# Patient Record
Sex: FEMALE | Race: WHITE | NOT HISPANIC OR LATINO | Employment: UNEMPLOYED | URBAN - METROPOLITAN AREA
[De-identification: names, ages, dates, MRNs, and addresses within clinical notes are randomized per-mention and may not be internally consistent; named-entity substitution may affect disease eponyms.]

---

## 2017-02-21 ENCOUNTER — LAB (OUTPATIENT)
Dept: LAB | Facility: HOSPITAL | Age: 42
End: 2017-02-21
Attending: INTERNAL MEDICINE

## 2017-02-21 DIAGNOSIS — L88 PYODERMA GANGRENOSUM: Primary | ICD-10-CM

## 2017-02-21 LAB
ALBUMIN SERPL-MCNC: 3 G/DL (ref 3.2–4.8)
ALBUMIN/GLOB SERPL: 1 G/DL (ref 1.5–2.5)
ALP SERPL-CCNC: 200 U/L (ref 25–100)
ALT SERPL W P-5'-P-CCNC: 14 U/L (ref 7–40)
ANION GAP SERPL CALCULATED.3IONS-SCNC: 0 MMOL/L (ref 3–11)
AST SERPL-CCNC: 47 U/L (ref 0–33)
BASOPHILS # BLD AUTO: 0.03 10*3/MM3 (ref 0–0.2)
BASOPHILS NFR BLD AUTO: 0.6 % (ref 0–1)
BILIRUB SERPL-MCNC: 0.4 MG/DL (ref 0.3–1.2)
BUN BLD-MCNC: 9 MG/DL (ref 9–23)
BUN/CREAT SERPL: 18 (ref 7–25)
CALCIUM SPEC-SCNC: 8.9 MG/DL (ref 8.7–10.4)
CHLORIDE SERPL-SCNC: 110 MMOL/L (ref 99–109)
CO2 SERPL-SCNC: 34 MMOL/L (ref 20–31)
CREAT BLD-MCNC: 0.5 MG/DL (ref 0.6–1.3)
CRP SERPL-MCNC: 9.1 MG/DL (ref 0–10)
DEPRECATED RDW RBC AUTO: 61.1 FL (ref 37–54)
EOSINOPHIL # BLD AUTO: 0.42 10*3/MM3 (ref 0.1–0.3)
EOSINOPHIL NFR BLD AUTO: 7.7 % (ref 0–3)
ERYTHROCYTE [DISTWIDTH] IN BLOOD BY AUTOMATED COUNT: 14.5 % (ref 11.3–14.5)
ERYTHROCYTE [SEDIMENTATION RATE] IN BLOOD: 59 MM/HR (ref 0–20)
GFR SERPL CREATININE-BSD FRML MDRD: 135 ML/MIN/1.73
GLOBULIN UR ELPH-MCNC: 3.1 GM/DL
GLUCOSE BLD-MCNC: 85 MG/DL (ref 70–100)
HCT VFR BLD AUTO: 32 % (ref 34.5–44)
HGB BLD-MCNC: 10.2 G/DL (ref 11.5–15.5)
IMM GRANULOCYTES # BLD: 0 10*3/MM3 (ref 0–0.03)
IMM GRANULOCYTES NFR BLD: 0 % (ref 0–0.6)
LYMPHOCYTES # BLD AUTO: 1.47 10*3/MM3 (ref 0.6–4.8)
LYMPHOCYTES NFR BLD AUTO: 27 % (ref 24–44)
MCH RBC QN AUTO: 36.3 PG (ref 27–31)
MCHC RBC AUTO-ENTMCNC: 31.9 G/DL (ref 32–36)
MCV RBC AUTO: 113.9 FL (ref 80–99)
MONOCYTES # BLD AUTO: 0.37 10*3/MM3 (ref 0–1)
MONOCYTES NFR BLD AUTO: 6.8 % (ref 0–12)
NEUTROPHILS # BLD AUTO: 3.16 10*3/MM3 (ref 1.5–8.3)
NEUTROPHILS NFR BLD AUTO: 57.9 % (ref 41–71)
PLAT MORPH BLD: NORMAL
PLATELET # BLD AUTO: 323 10*3/MM3 (ref 150–450)
PMV BLD AUTO: 9.4 FL (ref 6–12)
POTASSIUM BLD-SCNC: 4.5 MMOL/L (ref 3.5–5.5)
PROT SERPL-MCNC: 6.1 G/DL (ref 5.7–8.2)
RBC # BLD AUTO: 2.81 10*6/MM3 (ref 3.89–5.14)
RBC MORPH BLD: NORMAL
SODIUM BLD-SCNC: 144 MMOL/L (ref 132–146)
WBC MORPH BLD: NORMAL
WBC NRBC COR # BLD: 5.45 10*3/MM3 (ref 3.5–10.8)

## 2017-02-21 PROCEDURE — 36415 COLL VENOUS BLD VENIPUNCTURE: CPT

## 2017-02-21 PROCEDURE — 85025 COMPLETE CBC W/AUTO DIFF WBC: CPT | Performed by: INTERNAL MEDICINE

## 2017-02-21 PROCEDURE — 80053 COMPREHEN METABOLIC PANEL: CPT | Performed by: INTERNAL MEDICINE

## 2017-02-21 PROCEDURE — 85007 BL SMEAR W/DIFF WBC COUNT: CPT | Performed by: INTERNAL MEDICINE

## 2017-02-21 PROCEDURE — 86140 C-REACTIVE PROTEIN: CPT | Performed by: INTERNAL MEDICINE

## 2017-02-21 PROCEDURE — 85652 RBC SED RATE AUTOMATED: CPT | Performed by: INTERNAL MEDICINE

## 2017-02-28 ENCOUNTER — TRANSCRIBE ORDERS (OUTPATIENT)
Dept: LAB | Facility: HOSPITAL | Age: 42
End: 2017-02-28

## 2017-02-28 ENCOUNTER — APPOINTMENT (OUTPATIENT)
Dept: LAB | Facility: HOSPITAL | Age: 42
End: 2017-02-28

## 2017-02-28 DIAGNOSIS — L88 PYODERMA GANGRENOSUM: ICD-10-CM

## 2017-02-28 DIAGNOSIS — Z89.522 ACQUIRED ABSENCE OF LEFT KNEE: ICD-10-CM

## 2017-02-28 DIAGNOSIS — I87.2 UNSPECIFIED VENOUS (PERIPHERAL) INSUFFICIENCY: ICD-10-CM

## 2017-02-28 DIAGNOSIS — L73.9 STAPHYLOCOCCUS AUREUS SUPERFICIAL FOLLICULITIS: ICD-10-CM

## 2017-02-28 DIAGNOSIS — B95.61 STAPHYLOCOCCUS AUREUS SUPERFICIAL FOLLICULITIS: ICD-10-CM

## 2017-02-28 DIAGNOSIS — T84.54XD INFECTION OF TOTAL LEFT KNEE REPLACEMENT, SUBSEQUENT ENCOUNTER: Primary | ICD-10-CM

## 2017-02-28 LAB
ALBUMIN SERPL-MCNC: 2.8 G/DL (ref 3.2–4.8)
ALBUMIN/GLOB SERPL: 0.9 G/DL (ref 1.5–2.5)
ALP SERPL-CCNC: 170 U/L (ref 25–100)
ALT SERPL W P-5'-P-CCNC: 12 U/L (ref 7–40)
ANION GAP SERPL CALCULATED.3IONS-SCNC: 0 MMOL/L (ref 3–11)
AST SERPL-CCNC: 38 U/L (ref 0–33)
BILIRUB SERPL-MCNC: 0.4 MG/DL (ref 0.3–1.2)
BUN BLD-MCNC: 9 MG/DL (ref 9–23)
BUN/CREAT SERPL: 22.5 (ref 7–25)
CALCIUM SPEC-SCNC: 8.6 MG/DL (ref 8.7–10.4)
CHLORIDE SERPL-SCNC: 105 MMOL/L (ref 99–109)
CO2 SERPL-SCNC: 31 MMOL/L (ref 20–31)
CREAT BLD-MCNC: 0.4 MG/DL (ref 0.6–1.3)
CRP SERPL-MCNC: 4.7 MG/DL (ref 0–10)
DEPRECATED RDW RBC AUTO: 57.8 FL (ref 37–54)
ERYTHROCYTE [DISTWIDTH] IN BLOOD BY AUTOMATED COUNT: 13.9 % (ref 11.3–14.5)
ERYTHROCYTE [SEDIMENTATION RATE] IN BLOOD: 48 MM/HR (ref 0–20)
GFR SERPL CREATININE-BSD FRML MDRD: >150 ML/MIN/1.73
GLOBULIN UR ELPH-MCNC: 3.1 GM/DL
GLUCOSE BLD-MCNC: 79 MG/DL (ref 70–100)
HCT VFR BLD AUTO: 31.9 % (ref 34.5–44)
HGB BLD-MCNC: 10.1 G/DL (ref 11.5–15.5)
MCH RBC QN AUTO: 35.7 PG (ref 27–31)
MCHC RBC AUTO-ENTMCNC: 31.7 G/DL (ref 32–36)
MCV RBC AUTO: 112.7 FL (ref 80–99)
PLATELET # BLD AUTO: 242 10*3/MM3 (ref 150–450)
PMV BLD AUTO: 10.1 FL (ref 6–12)
POTASSIUM BLD-SCNC: 4.5 MMOL/L (ref 3.5–5.5)
PROT SERPL-MCNC: 5.9 G/DL (ref 5.7–8.2)
RBC # BLD AUTO: 2.83 10*6/MM3 (ref 3.89–5.14)
SODIUM BLD-SCNC: 136 MMOL/L (ref 132–146)
WBC NRBC COR # BLD: 4.37 10*3/MM3 (ref 3.5–10.8)

## 2017-02-28 PROCEDURE — 85652 RBC SED RATE AUTOMATED: CPT | Performed by: INTERNAL MEDICINE

## 2017-02-28 PROCEDURE — 86140 C-REACTIVE PROTEIN: CPT | Performed by: INTERNAL MEDICINE

## 2017-02-28 PROCEDURE — 36415 COLL VENOUS BLD VENIPUNCTURE: CPT | Performed by: INTERNAL MEDICINE

## 2017-02-28 PROCEDURE — 80053 COMPREHEN METABOLIC PANEL: CPT | Performed by: INTERNAL MEDICINE

## 2017-02-28 PROCEDURE — 85027 COMPLETE CBC AUTOMATED: CPT | Performed by: INTERNAL MEDICINE

## 2017-03-07 ENCOUNTER — TRANSCRIBE ORDERS (OUTPATIENT)
Dept: LAB | Facility: HOSPITAL | Age: 42
End: 2017-03-07

## 2017-03-07 ENCOUNTER — APPOINTMENT (OUTPATIENT)
Dept: LAB | Facility: HOSPITAL | Age: 42
End: 2017-03-07

## 2017-03-07 DIAGNOSIS — L88 PYODERMA GANGRENOSUM: Primary | ICD-10-CM

## 2017-03-07 LAB
ALBUMIN SERPL-MCNC: 3 G/DL (ref 3.2–4.8)
ALBUMIN/GLOB SERPL: 1 G/DL (ref 1.5–2.5)
ALP SERPL-CCNC: 205 U/L (ref 25–100)
ALT SERPL W P-5'-P-CCNC: 16 U/L (ref 7–40)
ANION GAP SERPL CALCULATED.3IONS-SCNC: 2 MMOL/L (ref 3–11)
AST SERPL-CCNC: 38 U/L (ref 0–33)
BASOPHILS # BLD AUTO: 0.01 10*3/MM3 (ref 0–0.2)
BASOPHILS NFR BLD AUTO: 0.2 % (ref 0–1)
BILIRUB SERPL-MCNC: 0.6 MG/DL (ref 0.3–1.2)
BUN BLD-MCNC: 7 MG/DL (ref 9–23)
BUN/CREAT SERPL: 14 (ref 7–25)
CALCIUM SPEC-SCNC: 9.2 MG/DL (ref 8.7–10.4)
CHLORIDE SERPL-SCNC: 108 MMOL/L (ref 99–109)
CO2 SERPL-SCNC: 32 MMOL/L (ref 20–31)
CREAT BLD-MCNC: 0.5 MG/DL (ref 0.6–1.3)
CRP SERPL-MCNC: 4 MG/DL (ref 0–10)
DEPRECATED RDW RBC AUTO: 57.2 FL (ref 37–54)
EOSINOPHIL # BLD AUTO: 0.26 10*3/MM3 (ref 0.1–0.3)
EOSINOPHIL NFR BLD AUTO: 5.5 % (ref 0–3)
ERYTHROCYTE [DISTWIDTH] IN BLOOD BY AUTOMATED COUNT: 13.9 % (ref 11.3–14.5)
ERYTHROCYTE [SEDIMENTATION RATE] IN BLOOD: 39 MM/HR (ref 0–20)
GFR SERPL CREATININE-BSD FRML MDRD: 135 ML/MIN/1.73
GLOBULIN UR ELPH-MCNC: 3 GM/DL
GLUCOSE BLD-MCNC: 92 MG/DL (ref 70–100)
HCT VFR BLD AUTO: 34.2 % (ref 34.5–44)
HGB BLD-MCNC: 10.8 G/DL (ref 11.5–15.5)
IMM GRANULOCYTES # BLD: 0 10*3/MM3 (ref 0–0.03)
IMM GRANULOCYTES NFR BLD: 0 % (ref 0–0.6)
LYMPHOCYTES # BLD AUTO: 1.52 10*3/MM3 (ref 0.6–4.8)
LYMPHOCYTES NFR BLD AUTO: 32 % (ref 24–44)
MCH RBC QN AUTO: 35.6 PG (ref 27–31)
MCHC RBC AUTO-ENTMCNC: 31.6 G/DL (ref 32–36)
MCV RBC AUTO: 112.9 FL (ref 80–99)
MONOCYTES # BLD AUTO: 0.44 10*3/MM3 (ref 0–1)
MONOCYTES NFR BLD AUTO: 9.3 % (ref 0–12)
NEUTROPHILS # BLD AUTO: 2.52 10*3/MM3 (ref 1.5–8.3)
NEUTROPHILS NFR BLD AUTO: 53 % (ref 41–71)
PLATELET # BLD AUTO: 186 10*3/MM3 (ref 150–450)
PMV BLD AUTO: 10.5 FL (ref 6–12)
POTASSIUM BLD-SCNC: 4.6 MMOL/L (ref 3.5–5.5)
PROT SERPL-MCNC: 6 G/DL (ref 5.7–8.2)
RBC # BLD AUTO: 3.03 10*6/MM3 (ref 3.89–5.14)
SODIUM BLD-SCNC: 142 MMOL/L (ref 132–146)
WBC NRBC COR # BLD: 4.75 10*3/MM3 (ref 3.5–10.8)

## 2017-03-07 PROCEDURE — 85025 COMPLETE CBC W/AUTO DIFF WBC: CPT | Performed by: INTERNAL MEDICINE

## 2017-03-07 PROCEDURE — 80053 COMPREHEN METABOLIC PANEL: CPT | Performed by: INTERNAL MEDICINE

## 2017-03-07 PROCEDURE — 85652 RBC SED RATE AUTOMATED: CPT | Performed by: INTERNAL MEDICINE

## 2017-03-07 PROCEDURE — 36415 COLL VENOUS BLD VENIPUNCTURE: CPT

## 2017-03-07 PROCEDURE — 86140 C-REACTIVE PROTEIN: CPT | Performed by: INTERNAL MEDICINE

## 2017-03-14 ENCOUNTER — TRANSCRIBE ORDERS (OUTPATIENT)
Dept: LAB | Facility: HOSPITAL | Age: 42
End: 2017-03-14

## 2017-03-14 ENCOUNTER — LAB (OUTPATIENT)
Dept: LAB | Facility: HOSPITAL | Age: 42
End: 2017-03-14

## 2017-03-14 DIAGNOSIS — L88 PYODERMA GANGRENOSUM: ICD-10-CM

## 2017-03-14 DIAGNOSIS — L88 PYODERMA GANGRENOSUM: Primary | ICD-10-CM

## 2017-03-14 LAB
ALBUMIN SERPL-MCNC: 3.3 G/DL (ref 3.2–4.8)
ALBUMIN/GLOB SERPL: 1 G/DL (ref 1.5–2.5)
ALP SERPL-CCNC: 188 U/L (ref 25–100)
ALT SERPL W P-5'-P-CCNC: 15 U/L (ref 7–40)
ANION GAP SERPL CALCULATED.3IONS-SCNC: 0 MMOL/L (ref 3–11)
AST SERPL-CCNC: 29 U/L (ref 0–33)
BASOPHILS # BLD AUTO: 0.01 10*3/MM3 (ref 0–0.2)
BASOPHILS NFR BLD AUTO: 0.2 % (ref 0–1)
BILIRUB SERPL-MCNC: 0.5 MG/DL (ref 0.3–1.2)
BUN BLD-MCNC: 9 MG/DL (ref 9–23)
BUN/CREAT SERPL: 18 (ref 7–25)
CALCIUM SPEC-SCNC: 9.3 MG/DL (ref 8.7–10.4)
CHLORIDE SERPL-SCNC: 102 MMOL/L (ref 99–109)
CO2 SERPL-SCNC: 38 MMOL/L (ref 20–31)
CREAT BLD-MCNC: 0.5 MG/DL (ref 0.6–1.3)
CRP SERPL-MCNC: 7.4 MG/DL (ref 0–10)
DEPRECATED RDW RBC AUTO: 54.8 FL (ref 37–54)
EOSINOPHIL # BLD AUTO: 0.16 10*3/MM3 (ref 0.1–0.3)
EOSINOPHIL NFR BLD AUTO: 3.3 % (ref 0–3)
ERYTHROCYTE [DISTWIDTH] IN BLOOD BY AUTOMATED COUNT: 13.5 % (ref 11.3–14.5)
ERYTHROCYTE [SEDIMENTATION RATE] IN BLOOD: 46 MM/HR (ref 0–20)
GFR SERPL CREATININE-BSD FRML MDRD: 135 ML/MIN/1.73
GLOBULIN UR ELPH-MCNC: 3.3 GM/DL
GLUCOSE BLD-MCNC: 97 MG/DL (ref 70–100)
HCT VFR BLD AUTO: 38.3 % (ref 34.5–44)
HGB BLD-MCNC: 12.1 G/DL (ref 11.5–15.5)
IMM GRANULOCYTES # BLD: 0.01 10*3/MM3 (ref 0–0.03)
IMM GRANULOCYTES NFR BLD: 0.2 % (ref 0–0.6)
LYMPHOCYTES # BLD AUTO: 1.29 10*3/MM3 (ref 0.6–4.8)
LYMPHOCYTES NFR BLD AUTO: 26.8 % (ref 24–44)
MCH RBC QN AUTO: 35.2 PG (ref 27–31)
MCHC RBC AUTO-ENTMCNC: 31.6 G/DL (ref 32–36)
MCV RBC AUTO: 111.3 FL (ref 80–99)
MONOCYTES # BLD AUTO: 0.57 10*3/MM3 (ref 0–1)
MONOCYTES NFR BLD AUTO: 11.9 % (ref 0–12)
NEUTROPHILS # BLD AUTO: 2.77 10*3/MM3 (ref 1.5–8.3)
NEUTROPHILS NFR BLD AUTO: 57.6 % (ref 41–71)
PLATELET # BLD AUTO: 203 10*3/MM3 (ref 150–450)
PMV BLD AUTO: 10.5 FL (ref 6–12)
POTASSIUM BLD-SCNC: 4.2 MMOL/L (ref 3.5–5.5)
PROT SERPL-MCNC: 6.6 G/DL (ref 5.7–8.2)
RBC # BLD AUTO: 3.44 10*6/MM3 (ref 3.89–5.14)
SODIUM BLD-SCNC: 140 MMOL/L (ref 132–146)
WBC NRBC COR # BLD: 4.81 10*3/MM3 (ref 3.5–10.8)

## 2017-03-14 PROCEDURE — 85025 COMPLETE CBC W/AUTO DIFF WBC: CPT | Performed by: INTERNAL MEDICINE

## 2017-03-14 PROCEDURE — 85652 RBC SED RATE AUTOMATED: CPT | Performed by: INTERNAL MEDICINE

## 2017-03-14 PROCEDURE — 86140 C-REACTIVE PROTEIN: CPT | Performed by: INTERNAL MEDICINE

## 2017-03-14 PROCEDURE — 36415 COLL VENOUS BLD VENIPUNCTURE: CPT

## 2017-03-14 PROCEDURE — 80053 COMPREHEN METABOLIC PANEL: CPT | Performed by: INTERNAL MEDICINE

## 2017-03-21 ENCOUNTER — TRANSCRIBE ORDERS (OUTPATIENT)
Dept: LAB | Facility: HOSPITAL | Age: 42
End: 2017-03-21

## 2017-03-21 ENCOUNTER — APPOINTMENT (OUTPATIENT)
Dept: LAB | Facility: HOSPITAL | Age: 42
End: 2017-03-21

## 2017-03-21 DIAGNOSIS — L88 PYODERMA GANGRENOSUM: Primary | ICD-10-CM

## 2017-03-21 LAB
ALBUMIN SERPL-MCNC: 3.3 G/DL (ref 3.2–4.8)
ALBUMIN/GLOB SERPL: 1 G/DL (ref 1.5–2.5)
ALP SERPL-CCNC: 252 U/L (ref 25–100)
ALT SERPL W P-5'-P-CCNC: 17 U/L (ref 7–40)
ANION GAP SERPL CALCULATED.3IONS-SCNC: 7 MMOL/L (ref 3–11)
AST SERPL-CCNC: 41 U/L (ref 0–33)
BASOPHILS # BLD AUTO: 0.02 10*3/MM3 (ref 0–0.2)
BASOPHILS NFR BLD AUTO: 0.2 % (ref 0–1)
BILIRUB SERPL-MCNC: 0.4 MG/DL (ref 0.3–1.2)
BUN BLD-MCNC: 9 MG/DL (ref 9–23)
BUN/CREAT SERPL: 18 (ref 7–25)
CALCIUM SPEC-SCNC: 9.1 MG/DL (ref 8.7–10.4)
CHLORIDE SERPL-SCNC: 102 MMOL/L (ref 99–109)
CO2 SERPL-SCNC: 31 MMOL/L (ref 20–31)
CREAT BLD-MCNC: 0.5 MG/DL (ref 0.6–1.3)
CRP SERPL-MCNC: 9.6 MG/DL (ref 0–10)
DEPRECATED RDW RBC AUTO: 57.1 FL (ref 37–54)
EOSINOPHIL # BLD AUTO: 0.21 10*3/MM3 (ref 0.1–0.3)
EOSINOPHIL NFR BLD AUTO: 2.5 % (ref 0–3)
ERYTHROCYTE [DISTWIDTH] IN BLOOD BY AUTOMATED COUNT: 13.8 % (ref 11.3–14.5)
ERYTHROCYTE [SEDIMENTATION RATE] IN BLOOD: 35 MM/HR (ref 0–20)
GFR SERPL CREATININE-BSD FRML MDRD: 135 ML/MIN/1.73
GLOBULIN UR ELPH-MCNC: 3.4 GM/DL
GLUCOSE BLD-MCNC: 77 MG/DL (ref 70–100)
HCT VFR BLD AUTO: 39.1 % (ref 34.5–44)
HGB BLD-MCNC: 12 G/DL (ref 11.5–15.5)
IMM GRANULOCYTES # BLD: 0.01 10*3/MM3 (ref 0–0.03)
IMM GRANULOCYTES NFR BLD: 0.1 % (ref 0–0.6)
LYMPHOCYTES # BLD AUTO: 1.29 10*3/MM3 (ref 0.6–4.8)
LYMPHOCYTES NFR BLD AUTO: 15.1 % (ref 24–44)
MCH RBC QN AUTO: 35.1 PG (ref 27–31)
MCHC RBC AUTO-ENTMCNC: 30.7 G/DL (ref 32–36)
MCV RBC AUTO: 114.3 FL (ref 80–99)
MONOCYTES # BLD AUTO: 0.59 10*3/MM3 (ref 0–1)
MONOCYTES NFR BLD AUTO: 6.9 % (ref 0–12)
NEUTROPHILS # BLD AUTO: 6.44 10*3/MM3 (ref 1.5–8.3)
NEUTROPHILS NFR BLD AUTO: 75.2 % (ref 41–71)
PLATELET # BLD AUTO: 202 10*3/MM3 (ref 150–450)
PMV BLD AUTO: 9.7 FL (ref 6–12)
POTASSIUM BLD-SCNC: 5.1 MMOL/L (ref 3.5–5.5)
PROT SERPL-MCNC: 6.7 G/DL (ref 5.7–8.2)
RBC # BLD AUTO: 3.42 10*6/MM3 (ref 3.89–5.14)
SODIUM BLD-SCNC: 140 MMOL/L (ref 132–146)
WBC NRBC COR # BLD: 8.56 10*3/MM3 (ref 3.5–10.8)

## 2017-03-21 PROCEDURE — 36415 COLL VENOUS BLD VENIPUNCTURE: CPT | Performed by: INTERNAL MEDICINE

## 2017-03-21 PROCEDURE — 80053 COMPREHEN METABOLIC PANEL: CPT | Performed by: INTERNAL MEDICINE

## 2017-03-21 PROCEDURE — 85652 RBC SED RATE AUTOMATED: CPT | Performed by: INTERNAL MEDICINE

## 2017-03-21 PROCEDURE — 86140 C-REACTIVE PROTEIN: CPT | Performed by: INTERNAL MEDICINE

## 2017-03-21 PROCEDURE — 85025 COMPLETE CBC W/AUTO DIFF WBC: CPT | Performed by: INTERNAL MEDICINE

## 2017-03-28 ENCOUNTER — APPOINTMENT (OUTPATIENT)
Dept: LAB | Facility: HOSPITAL | Age: 42
End: 2017-03-28

## 2017-03-28 ENCOUNTER — TRANSCRIBE ORDERS (OUTPATIENT)
Dept: LAB | Facility: HOSPITAL | Age: 42
End: 2017-03-28

## 2017-03-28 DIAGNOSIS — L88 PYODERMA GANGRENOSUM: Primary | ICD-10-CM

## 2017-03-28 LAB
ALBUMIN SERPL-MCNC: 3.3 G/DL (ref 3.2–4.8)
ALBUMIN/GLOB SERPL: 1 G/DL (ref 1.5–2.5)
ALP SERPL-CCNC: 234 U/L (ref 25–100)
ALT SERPL W P-5'-P-CCNC: 18 U/L (ref 7–40)
ANION GAP SERPL CALCULATED.3IONS-SCNC: 6 MMOL/L (ref 3–11)
AST SERPL-CCNC: 38 U/L (ref 0–33)
BASOPHILS # BLD AUTO: 0.01 10*3/MM3 (ref 0–0.2)
BASOPHILS NFR BLD AUTO: 0.2 % (ref 0–1)
BILIRUB SERPL-MCNC: 0.7 MG/DL (ref 0.3–1.2)
BUN BLD-MCNC: 7 MG/DL (ref 9–23)
BUN/CREAT SERPL: 14 (ref 7–25)
CALCIUM SPEC-SCNC: 9.1 MG/DL (ref 8.7–10.4)
CHLORIDE SERPL-SCNC: 105 MMOL/L (ref 99–109)
CO2 SERPL-SCNC: 30 MMOL/L (ref 20–31)
CREAT BLD-MCNC: 0.5 MG/DL (ref 0.6–1.3)
CRP SERPL-MCNC: 6 MG/DL (ref 0–10)
DEPRECATED RDW RBC AUTO: 57.8 FL (ref 37–54)
EOSINOPHIL # BLD AUTO: 0.17 10*3/MM3 (ref 0.1–0.3)
EOSINOPHIL NFR BLD AUTO: 2.8 % (ref 0–3)
ERYTHROCYTE [DISTWIDTH] IN BLOOD BY AUTOMATED COUNT: 14.1 % (ref 11.3–14.5)
ERYTHROCYTE [SEDIMENTATION RATE] IN BLOOD: 41 MM/HR (ref 0–20)
GFR SERPL CREATININE-BSD FRML MDRD: 135 ML/MIN/1.73
GLOBULIN UR ELPH-MCNC: 3.3 GM/DL
GLUCOSE BLD-MCNC: 86 MG/DL (ref 70–100)
HCT VFR BLD AUTO: 38.9 % (ref 34.5–44)
HGB BLD-MCNC: 12.3 G/DL (ref 11.5–15.5)
IMM GRANULOCYTES # BLD: 0 10*3/MM3 (ref 0–0.03)
IMM GRANULOCYTES NFR BLD: 0 % (ref 0–0.6)
LYMPHOCYTES # BLD AUTO: 1.51 10*3/MM3 (ref 0.6–4.8)
LYMPHOCYTES NFR BLD AUTO: 25 % (ref 24–44)
MCH RBC QN AUTO: 35.7 PG (ref 27–31)
MCHC RBC AUTO-ENTMCNC: 31.6 G/DL (ref 32–36)
MCV RBC AUTO: 112.8 FL (ref 80–99)
MONOCYTES # BLD AUTO: 0.41 10*3/MM3 (ref 0–1)
MONOCYTES NFR BLD AUTO: 6.8 % (ref 0–12)
NEUTROPHILS # BLD AUTO: 3.95 10*3/MM3 (ref 1.5–8.3)
NEUTROPHILS NFR BLD AUTO: 65.2 % (ref 41–71)
PLATELET # BLD AUTO: 205 10*3/MM3 (ref 150–450)
PMV BLD AUTO: 9.8 FL (ref 6–12)
POTASSIUM BLD-SCNC: 4.5 MMOL/L (ref 3.5–5.5)
PROT SERPL-MCNC: 6.6 G/DL (ref 5.7–8.2)
RBC # BLD AUTO: 3.45 10*6/MM3 (ref 3.89–5.14)
SODIUM BLD-SCNC: 141 MMOL/L (ref 132–146)
WBC NRBC COR # BLD: 6.05 10*3/MM3 (ref 3.5–10.8)

## 2017-03-28 PROCEDURE — 86140 C-REACTIVE PROTEIN: CPT | Performed by: INTERNAL MEDICINE

## 2017-03-28 PROCEDURE — 85652 RBC SED RATE AUTOMATED: CPT | Performed by: INTERNAL MEDICINE

## 2017-03-28 PROCEDURE — 80053 COMPREHEN METABOLIC PANEL: CPT | Performed by: INTERNAL MEDICINE

## 2017-03-28 PROCEDURE — 85025 COMPLETE CBC W/AUTO DIFF WBC: CPT | Performed by: INTERNAL MEDICINE

## 2017-03-28 PROCEDURE — 36415 COLL VENOUS BLD VENIPUNCTURE: CPT | Performed by: INTERNAL MEDICINE

## 2017-04-04 ENCOUNTER — LAB (OUTPATIENT)
Dept: LAB | Facility: HOSPITAL | Age: 42
End: 2017-04-04

## 2017-04-04 ENCOUNTER — TRANSCRIBE ORDERS (OUTPATIENT)
Dept: LAB | Facility: HOSPITAL | Age: 42
End: 2017-04-04

## 2017-04-04 DIAGNOSIS — L88 PYODERMA GANGRENOSUM: ICD-10-CM

## 2017-04-04 DIAGNOSIS — L88 PYODERMA GANGRENOSUM: Primary | ICD-10-CM

## 2017-04-04 LAB
ALBUMIN SERPL-MCNC: 3.4 G/DL (ref 3.2–4.8)
ALBUMIN/GLOB SERPL: 1 G/DL (ref 1.5–2.5)
ALP SERPL-CCNC: 228 U/L (ref 25–100)
ALT SERPL W P-5'-P-CCNC: 22 U/L (ref 7–40)
ANION GAP SERPL CALCULATED.3IONS-SCNC: 10 MMOL/L (ref 3–11)
AST SERPL-CCNC: 48 U/L (ref 0–33)
BASOPHILS # BLD AUTO: 0.01 10*3/MM3 (ref 0–0.2)
BASOPHILS NFR BLD AUTO: 0.2 % (ref 0–1)
BILIRUB SERPL-MCNC: 0.4 MG/DL (ref 0.3–1.2)
BUN BLD-MCNC: 5 MG/DL (ref 9–23)
BUN/CREAT SERPL: 10 (ref 7–25)
CALCIUM SPEC-SCNC: 9.2 MG/DL (ref 8.7–10.4)
CHLORIDE SERPL-SCNC: 102 MMOL/L (ref 99–109)
CO2 SERPL-SCNC: 26 MMOL/L (ref 20–31)
CREAT BLD-MCNC: 0.5 MG/DL (ref 0.6–1.3)
CRP SERPL-MCNC: 0.5 MG/DL (ref 0–1)
DEPRECATED RDW RBC AUTO: 56 FL (ref 37–54)
EOSINOPHIL # BLD AUTO: 0.18 10*3/MM3 (ref 0.1–0.3)
EOSINOPHIL NFR BLD AUTO: 4.1 % (ref 0–3)
ERYTHROCYTE [DISTWIDTH] IN BLOOD BY AUTOMATED COUNT: 14 % (ref 11.3–14.5)
ERYTHROCYTE [SEDIMENTATION RATE] IN BLOOD: 24 MM/HR (ref 0–20)
GFR SERPL CREATININE-BSD FRML MDRD: 135 ML/MIN/1.73
GLOBULIN UR ELPH-MCNC: 3.3 GM/DL
GLUCOSE BLD-MCNC: 111 MG/DL (ref 70–100)
HCT VFR BLD AUTO: 37 % (ref 34.5–44)
HGB BLD-MCNC: 11.7 G/DL (ref 11.5–15.5)
IMM GRANULOCYTES # BLD: 0 10*3/MM3 (ref 0–0.03)
IMM GRANULOCYTES NFR BLD: 0 % (ref 0–0.6)
LYMPHOCYTES # BLD AUTO: 1.9 10*3/MM3 (ref 0.6–4.8)
LYMPHOCYTES NFR BLD AUTO: 43.7 % (ref 24–44)
MCH RBC QN AUTO: 35.6 PG (ref 27–31)
MCHC RBC AUTO-ENTMCNC: 31.6 G/DL (ref 32–36)
MCV RBC AUTO: 112.5 FL (ref 80–99)
MONOCYTES # BLD AUTO: 0.37 10*3/MM3 (ref 0–1)
MONOCYTES NFR BLD AUTO: 8.5 % (ref 0–12)
NEUTROPHILS # BLD AUTO: 1.89 10*3/MM3 (ref 1.5–8.3)
NEUTROPHILS NFR BLD AUTO: 43.5 % (ref 41–71)
PLAT MORPH BLD: NORMAL
PLATELET # BLD AUTO: 194 10*3/MM3 (ref 150–450)
PMV BLD AUTO: 10 FL (ref 6–12)
POTASSIUM BLD-SCNC: 4.6 MMOL/L (ref 3.5–5.5)
PROT SERPL-MCNC: 6.7 G/DL (ref 5.7–8.2)
RBC # BLD AUTO: 3.29 10*6/MM3 (ref 3.89–5.14)
RBC MORPH BLD: NORMAL
SODIUM BLD-SCNC: 138 MMOL/L (ref 132–146)
WBC MORPH BLD: NORMAL
WBC NRBC COR # BLD: 4.35 10*3/MM3 (ref 3.5–10.8)

## 2017-04-04 PROCEDURE — 85652 RBC SED RATE AUTOMATED: CPT | Performed by: INTERNAL MEDICINE

## 2017-04-04 PROCEDURE — 80053 COMPREHEN METABOLIC PANEL: CPT | Performed by: INTERNAL MEDICINE

## 2017-04-04 PROCEDURE — 36415 COLL VENOUS BLD VENIPUNCTURE: CPT | Performed by: INTERNAL MEDICINE

## 2017-04-04 PROCEDURE — 85025 COMPLETE CBC W/AUTO DIFF WBC: CPT | Performed by: INTERNAL MEDICINE

## 2017-04-04 PROCEDURE — 86140 C-REACTIVE PROTEIN: CPT | Performed by: INTERNAL MEDICINE

## 2017-04-04 PROCEDURE — 85007 BL SMEAR W/DIFF WBC COUNT: CPT | Performed by: INTERNAL MEDICINE

## 2017-04-11 ENCOUNTER — LAB (OUTPATIENT)
Dept: LAB | Facility: HOSPITAL | Age: 42
End: 2017-04-11

## 2017-04-11 ENCOUNTER — TRANSCRIBE ORDERS (OUTPATIENT)
Dept: LAB | Facility: HOSPITAL | Age: 42
End: 2017-04-11

## 2017-04-11 DIAGNOSIS — L88 PYODERMA GANGRENOSUM: Primary | ICD-10-CM

## 2017-04-11 DIAGNOSIS — L88 PYODERMA GANGRENOSUM: ICD-10-CM

## 2017-04-11 LAB
ALBUMIN SERPL-MCNC: 3.3 G/DL (ref 3.2–4.8)
ALBUMIN/GLOB SERPL: 1 G/DL (ref 1.5–2.5)
ALP SERPL-CCNC: 278 U/L (ref 25–100)
ALT SERPL W P-5'-P-CCNC: 17 U/L (ref 7–40)
ANION GAP SERPL CALCULATED.3IONS-SCNC: 3 MMOL/L (ref 3–11)
AST SERPL-CCNC: 38 U/L (ref 0–33)
BASOPHILS # BLD AUTO: 0 10*3/MM3 (ref 0–0.2)
BASOPHILS NFR BLD AUTO: 0 % (ref 0–1)
BILIRUB SERPL-MCNC: 0.7 MG/DL (ref 0.3–1.2)
BUN BLD-MCNC: 7 MG/DL (ref 9–23)
BUN/CREAT SERPL: 14 (ref 7–25)
CALCIUM SPEC-SCNC: 9.1 MG/DL (ref 8.7–10.4)
CHLORIDE SERPL-SCNC: 105 MMOL/L (ref 99–109)
CO2 SERPL-SCNC: 30 MMOL/L (ref 20–31)
CREAT BLD-MCNC: 0.5 MG/DL (ref 0.6–1.3)
CRP SERPL-MCNC: 1.02 MG/DL (ref 0–1)
DEPRECATED RDW RBC AUTO: 58.7 FL (ref 37–54)
EOSINOPHIL # BLD AUTO: 0.15 10*3/MM3 (ref 0.1–0.3)
EOSINOPHIL NFR BLD AUTO: 2.8 % (ref 0–3)
ERYTHROCYTE [DISTWIDTH] IN BLOOD BY AUTOMATED COUNT: 14.9 % (ref 11.3–14.5)
ERYTHROCYTE [SEDIMENTATION RATE] IN BLOOD: 29 MM/HR (ref 0–20)
GFR SERPL CREATININE-BSD FRML MDRD: 135 ML/MIN/1.73
GLOBULIN UR ELPH-MCNC: 3.2 GM/DL
GLUCOSE BLD-MCNC: 156 MG/DL (ref 70–100)
HCT VFR BLD AUTO: 36.5 % (ref 34.5–44)
HGB BLD-MCNC: 11.3 G/DL (ref 11.5–15.5)
IMM GRANULOCYTES # BLD: 0.01 10*3/MM3 (ref 0–0.03)
IMM GRANULOCYTES NFR BLD: 0.2 % (ref 0–0.6)
LYMPHOCYTES # BLD AUTO: 1.55 10*3/MM3 (ref 0.6–4.8)
LYMPHOCYTES NFR BLD AUTO: 29.4 % (ref 24–44)
MCH RBC QN AUTO: 35.3 PG (ref 27–31)
MCHC RBC AUTO-ENTMCNC: 31 G/DL (ref 32–36)
MCV RBC AUTO: 114.1 FL (ref 80–99)
MONOCYTES # BLD AUTO: 0.35 10*3/MM3 (ref 0–1)
MONOCYTES NFR BLD AUTO: 6.6 % (ref 0–12)
NEUTROPHILS # BLD AUTO: 3.21 10*3/MM3 (ref 1.5–8.3)
NEUTROPHILS NFR BLD AUTO: 61 % (ref 41–71)
PLATELET # BLD AUTO: 200 10*3/MM3 (ref 150–450)
PMV BLD AUTO: 9.7 FL (ref 6–12)
POTASSIUM BLD-SCNC: 4.9 MMOL/L (ref 3.5–5.5)
PROT SERPL-MCNC: 6.5 G/DL (ref 5.7–8.2)
RBC # BLD AUTO: 3.2 10*6/MM3 (ref 3.89–5.14)
SODIUM BLD-SCNC: 138 MMOL/L (ref 132–146)
WBC NRBC COR # BLD: 5.27 10*3/MM3 (ref 3.5–10.8)

## 2017-04-11 PROCEDURE — 86140 C-REACTIVE PROTEIN: CPT

## 2017-04-11 PROCEDURE — 80053 COMPREHEN METABOLIC PANEL: CPT

## 2017-04-11 PROCEDURE — 36415 COLL VENOUS BLD VENIPUNCTURE: CPT

## 2017-04-11 PROCEDURE — 85025 COMPLETE CBC W/AUTO DIFF WBC: CPT

## 2017-04-11 PROCEDURE — 85652 RBC SED RATE AUTOMATED: CPT

## 2017-04-25 ENCOUNTER — TRANSCRIBE ORDERS (OUTPATIENT)
Dept: LAB | Facility: HOSPITAL | Age: 42
End: 2017-04-25

## 2017-04-25 ENCOUNTER — APPOINTMENT (OUTPATIENT)
Dept: LAB | Facility: HOSPITAL | Age: 42
End: 2017-04-25

## 2017-04-25 DIAGNOSIS — L88 PYODERMA GANGRENOSUM: Primary | ICD-10-CM

## 2017-04-25 LAB
ALBUMIN SERPL-MCNC: 3.7 G/DL (ref 3.2–4.8)
ALBUMIN/GLOB SERPL: 1.1 G/DL (ref 1.5–2.5)
ALP SERPL-CCNC: 377 U/L (ref 25–100)
ALT SERPL W P-5'-P-CCNC: 22 U/L (ref 7–40)
ANION GAP SERPL CALCULATED.3IONS-SCNC: 6 MMOL/L (ref 3–11)
AST SERPL-CCNC: 52 U/L (ref 0–33)
BASOPHILS # BLD AUTO: 0.02 10*3/MM3 (ref 0–0.2)
BASOPHILS NFR BLD AUTO: 0.4 % (ref 0–1)
BILIRUB SERPL-MCNC: 0.6 MG/DL (ref 0.3–1.2)
BUN BLD-MCNC: 5 MG/DL (ref 9–23)
BUN/CREAT SERPL: 10 (ref 7–25)
CALCIUM SPEC-SCNC: 9.5 MG/DL (ref 8.7–10.4)
CHLORIDE SERPL-SCNC: 101 MMOL/L (ref 99–109)
CO2 SERPL-SCNC: 32 MMOL/L (ref 20–31)
CREAT BLD-MCNC: 0.5 MG/DL (ref 0.6–1.3)
CRP SERPL-MCNC: 1.58 MG/DL (ref 0–1)
DEPRECATED RDW RBC AUTO: 62.3 FL (ref 37–54)
EOSINOPHIL # BLD AUTO: 0.16 10*3/MM3 (ref 0.1–0.3)
EOSINOPHIL NFR BLD AUTO: 2.8 % (ref 0–3)
ERYTHROCYTE [DISTWIDTH] IN BLOOD BY AUTOMATED COUNT: 15.8 % (ref 11.3–14.5)
ERYTHROCYTE [SEDIMENTATION RATE] IN BLOOD: 15 MM/HR (ref 0–20)
GFR SERPL CREATININE-BSD FRML MDRD: 135 ML/MIN/1.73
GLOBULIN UR ELPH-MCNC: 3.4 GM/DL
GLUCOSE BLD-MCNC: 152 MG/DL (ref 70–100)
HCT VFR BLD AUTO: 39.8 % (ref 34.5–44)
HGB BLD-MCNC: 12.9 G/DL (ref 11.5–15.5)
IMM GRANULOCYTES # BLD: 0.01 10*3/MM3 (ref 0–0.03)
IMM GRANULOCYTES NFR BLD: 0.2 % (ref 0–0.6)
LYMPHOCYTES # BLD AUTO: 1.54 10*3/MM3 (ref 0.6–4.8)
LYMPHOCYTES NFR BLD AUTO: 27.2 % (ref 24–44)
MCH RBC QN AUTO: 35.7 PG (ref 27–31)
MCHC RBC AUTO-ENTMCNC: 32.4 G/DL (ref 32–36)
MCV RBC AUTO: 110.2 FL (ref 80–99)
MONOCYTES # BLD AUTO: 0.39 10*3/MM3 (ref 0–1)
MONOCYTES NFR BLD AUTO: 6.9 % (ref 0–12)
NEUTROPHILS # BLD AUTO: 3.55 10*3/MM3 (ref 1.5–8.3)
NEUTROPHILS NFR BLD AUTO: 62.5 % (ref 41–71)
PLATELET # BLD AUTO: 186 10*3/MM3 (ref 150–450)
PMV BLD AUTO: 9.7 FL (ref 6–12)
POTASSIUM BLD-SCNC: 4.6 MMOL/L (ref 3.5–5.5)
PROT SERPL-MCNC: 7.1 G/DL (ref 5.7–8.2)
RBC # BLD AUTO: 3.61 10*6/MM3 (ref 3.89–5.14)
SODIUM BLD-SCNC: 139 MMOL/L (ref 132–146)
WBC NRBC COR # BLD: 5.67 10*3/MM3 (ref 3.5–10.8)

## 2017-04-25 PROCEDURE — 85025 COMPLETE CBC W/AUTO DIFF WBC: CPT | Performed by: INTERNAL MEDICINE

## 2017-04-25 PROCEDURE — 36415 COLL VENOUS BLD VENIPUNCTURE: CPT | Performed by: INTERNAL MEDICINE

## 2017-04-25 PROCEDURE — 86140 C-REACTIVE PROTEIN: CPT | Performed by: INTERNAL MEDICINE

## 2017-04-25 PROCEDURE — 80053 COMPREHEN METABOLIC PANEL: CPT | Performed by: INTERNAL MEDICINE

## 2017-04-25 PROCEDURE — 85652 RBC SED RATE AUTOMATED: CPT | Performed by: INTERNAL MEDICINE

## 2017-05-25 ENCOUNTER — TRANSCRIBE ORDERS (OUTPATIENT)
Dept: LAB | Facility: HOSPITAL | Age: 42
End: 2017-05-25

## 2017-05-25 ENCOUNTER — LAB (OUTPATIENT)
Dept: LAB | Facility: HOSPITAL | Age: 42
End: 2017-05-25

## 2017-05-25 DIAGNOSIS — M06.1 ADULT-ONSET STILL'S DISEASE (HCC): ICD-10-CM

## 2017-05-25 DIAGNOSIS — E66.09 OTHER OBESITY DUE TO EXCESS CALORIES: Primary | ICD-10-CM

## 2017-05-25 DIAGNOSIS — B95.61 STAPHYLOCOCCUS AUREUS SUPERFICIAL FOLLICULITIS: ICD-10-CM

## 2017-05-25 DIAGNOSIS — Z89.522 ACQUIRED ABSENCE OF LEFT KNEE: ICD-10-CM

## 2017-05-25 DIAGNOSIS — I87.2 UNSPECIFIED VENOUS (PERIPHERAL) INSUFFICIENCY: ICD-10-CM

## 2017-05-25 DIAGNOSIS — E66.09 OTHER OBESITY DUE TO EXCESS CALORIES: ICD-10-CM

## 2017-05-25 DIAGNOSIS — T84.54XD INFECTION OF TOTAL LEFT KNEE REPLACEMENT, SUBSEQUENT ENCOUNTER: ICD-10-CM

## 2017-05-25 DIAGNOSIS — L73.9 STAPHYLOCOCCUS AUREUS SUPERFICIAL FOLLICULITIS: ICD-10-CM

## 2017-05-25 LAB
ALBUMIN SERPL-MCNC: 3.3 G/DL (ref 3.2–4.8)
ALBUMIN/GLOB SERPL: 1 G/DL (ref 1.5–2.5)
ALP SERPL-CCNC: 274 U/L (ref 25–100)
ALT SERPL W P-5'-P-CCNC: 30 U/L (ref 7–40)
ANION GAP SERPL CALCULATED.3IONS-SCNC: 4 MMOL/L (ref 3–11)
AST SERPL-CCNC: 74 U/L (ref 0–33)
BASOPHILS # BLD AUTO: 0.01 10*3/MM3 (ref 0–0.2)
BASOPHILS NFR BLD AUTO: 0.2 % (ref 0–1)
BILIRUB SERPL-MCNC: 0.6 MG/DL (ref 0.3–1.2)
BUN BLD-MCNC: 6 MG/DL (ref 9–23)
BUN/CREAT SERPL: 12 (ref 7–25)
CALCIUM SPEC-SCNC: 9.1 MG/DL (ref 8.7–10.4)
CHLORIDE SERPL-SCNC: 104 MMOL/L (ref 99–109)
CO2 SERPL-SCNC: 31 MMOL/L (ref 20–31)
CREAT BLD-MCNC: 0.5 MG/DL (ref 0.6–1.3)
CRP SERPL-MCNC: 0.88 MG/DL (ref 0–1)
DEPRECATED RDW RBC AUTO: 76.8 FL (ref 37–54)
EOSINOPHIL # BLD AUTO: 0.14 10*3/MM3 (ref 0.1–0.3)
EOSINOPHIL NFR BLD AUTO: 3.1 % (ref 0–3)
ERYTHROCYTE [DISTWIDTH] IN BLOOD BY AUTOMATED COUNT: 18.9 % (ref 11.3–14.5)
ERYTHROCYTE [SEDIMENTATION RATE] IN BLOOD: 18 MM/HR (ref 0–20)
GFR SERPL CREATININE-BSD FRML MDRD: 135 ML/MIN/1.73
GLOBULIN UR ELPH-MCNC: 3.2 GM/DL
GLUCOSE BLD-MCNC: 174 MG/DL (ref 70–100)
HCT VFR BLD AUTO: 38.2 % (ref 34.5–44)
HGB BLD-MCNC: 12.2 G/DL (ref 11.5–15.5)
IMM GRANULOCYTES # BLD: 0.01 10*3/MM3 (ref 0–0.03)
IMM GRANULOCYTES NFR BLD: 0.2 % (ref 0–0.6)
LYMPHOCYTES # BLD AUTO: 1.5 10*3/MM3 (ref 0.6–4.8)
LYMPHOCYTES NFR BLD AUTO: 33.6 % (ref 24–44)
MCH RBC QN AUTO: 36.2 PG (ref 27–31)
MCHC RBC AUTO-ENTMCNC: 31.9 G/DL (ref 32–36)
MCV RBC AUTO: 113.4 FL (ref 80–99)
MONOCYTES # BLD AUTO: 0.32 10*3/MM3 (ref 0–1)
MONOCYTES NFR BLD AUTO: 7.2 % (ref 0–12)
NEUTROPHILS # BLD AUTO: 2.48 10*3/MM3 (ref 1.5–8.3)
NEUTROPHILS NFR BLD AUTO: 55.7 % (ref 41–71)
PLATELET # BLD AUTO: 170 10*3/MM3 (ref 150–450)
PMV BLD AUTO: 9.7 FL (ref 6–12)
POTASSIUM BLD-SCNC: 4.3 MMOL/L (ref 3.5–5.5)
PROT SERPL-MCNC: 6.5 G/DL (ref 5.7–8.2)
RBC # BLD AUTO: 3.37 10*6/MM3 (ref 3.89–5.14)
SODIUM BLD-SCNC: 139 MMOL/L (ref 132–146)
WBC NRBC COR # BLD: 4.46 10*3/MM3 (ref 3.5–10.8)

## 2017-05-25 PROCEDURE — 86140 C-REACTIVE PROTEIN: CPT | Performed by: INTERNAL MEDICINE

## 2017-05-25 PROCEDURE — 36415 COLL VENOUS BLD VENIPUNCTURE: CPT

## 2017-05-25 PROCEDURE — 85652 RBC SED RATE AUTOMATED: CPT | Performed by: INTERNAL MEDICINE

## 2017-05-25 PROCEDURE — 85025 COMPLETE CBC W/AUTO DIFF WBC: CPT | Performed by: INTERNAL MEDICINE

## 2017-05-25 PROCEDURE — 80053 COMPREHEN METABOLIC PANEL: CPT | Performed by: INTERNAL MEDICINE

## 2017-11-07 ENCOUNTER — LAB (OUTPATIENT)
Dept: LAB | Facility: HOSPITAL | Age: 42
End: 2017-11-07
Attending: INTERNAL MEDICINE

## 2017-11-07 ENCOUNTER — TRANSCRIBE ORDERS (OUTPATIENT)
Dept: LAB | Facility: HOSPITAL | Age: 42
End: 2017-11-07

## 2017-11-07 DIAGNOSIS — L73.9 STAPHYLOCOCCUS AUREUS SUPERFICIAL FOLLICULITIS: ICD-10-CM

## 2017-11-07 DIAGNOSIS — I87.2 PERIPHERAL VENOUS INSUFFICIENCY: ICD-10-CM

## 2017-11-07 DIAGNOSIS — Z91.199 PERSONAL HISTORY OF NONCOMPLIANCE WITH MEDICAL TREATMENT, PRESENTING HAZARDS TO HEALTH: ICD-10-CM

## 2017-11-07 DIAGNOSIS — Z89.522 ACQUIRED ABSENCE OF LEFT KNEE: ICD-10-CM

## 2017-11-07 DIAGNOSIS — M06.1 ADULT-ONSET STILL'S DISEASE (HCC): ICD-10-CM

## 2017-11-07 DIAGNOSIS — L73.9 STAPHYLOCOCCUS AUREUS SUPERFICIAL FOLLICULITIS: Primary | ICD-10-CM

## 2017-11-07 DIAGNOSIS — L88 PYODERMA GANGRENOSUM: ICD-10-CM

## 2017-11-07 DIAGNOSIS — E66.09 EXOGENOUS OBESITY: ICD-10-CM

## 2017-11-07 DIAGNOSIS — B95.61 STAPHYLOCOCCUS AUREUS SUPERFICIAL FOLLICULITIS: ICD-10-CM

## 2017-11-07 DIAGNOSIS — B95.61 STAPHYLOCOCCUS AUREUS SUPERFICIAL FOLLICULITIS: Primary | ICD-10-CM

## 2017-11-07 LAB
ALBUMIN SERPL-MCNC: 3.1 G/DL (ref 3.2–4.8)
ALBUMIN/GLOB SERPL: 1 G/DL (ref 1.5–2.5)
ALP SERPL-CCNC: 183 U/L (ref 25–100)
ALT SERPL W P-5'-P-CCNC: 18 U/L (ref 7–40)
ANION GAP SERPL CALCULATED.3IONS-SCNC: 7 MMOL/L (ref 3–11)
AST SERPL-CCNC: 47 U/L (ref 0–33)
BASOPHILS # BLD AUTO: 0.04 10*3/MM3 (ref 0–0.2)
BASOPHILS NFR BLD AUTO: 0.7 % (ref 0–1)
BILIRUB SERPL-MCNC: 0.5 MG/DL (ref 0.3–1.2)
BUN BLD-MCNC: 5 MG/DL (ref 9–23)
BUN/CREAT SERPL: 8.3 (ref 7–25)
CALCIUM SPEC-SCNC: 9 MG/DL (ref 8.7–10.4)
CHLORIDE SERPL-SCNC: 106 MMOL/L (ref 99–109)
CK SERPL-CCNC: 33 U/L (ref 26–174)
CO2 SERPL-SCNC: 30 MMOL/L (ref 20–31)
CREAT BLD-MCNC: 0.6 MG/DL (ref 0.6–1.3)
CRP SERPL-MCNC: 1.06 MG/DL (ref 0–1)
DEPRECATED RDW RBC AUTO: 59.7 FL (ref 37–54)
EOSINOPHIL # BLD AUTO: 0.27 10*3/MM3 (ref 0–0.3)
EOSINOPHIL NFR BLD AUTO: 4.7 % (ref 0–3)
ERYTHROCYTE [DISTWIDTH] IN BLOOD BY AUTOMATED COUNT: 13.4 % (ref 11.3–14.5)
ERYTHROCYTE [SEDIMENTATION RATE] IN BLOOD: 30 MM/HR (ref 0–20)
GFR SERPL CREATININE-BSD FRML MDRD: 109 ML/MIN/1.73
GLOBULIN UR ELPH-MCNC: 3.1 GM/DL
GLUCOSE BLD-MCNC: 85 MG/DL (ref 70–100)
HCT VFR BLD AUTO: 37.6 % (ref 34.5–44)
HGB BLD-MCNC: 12.3 G/DL (ref 11.5–15.5)
IMM GRANULOCYTES # BLD: 0.01 10*3/MM3 (ref 0–0.03)
IMM GRANULOCYTES NFR BLD: 0.2 % (ref 0–0.6)
LYMPHOCYTES # BLD AUTO: 2.03 10*3/MM3 (ref 0.6–4.8)
LYMPHOCYTES NFR BLD AUTO: 35.1 % (ref 24–44)
MCH RBC QN AUTO: 40.5 PG (ref 27–31)
MCHC RBC AUTO-ENTMCNC: 32.7 G/DL (ref 32–36)
MCV RBC AUTO: 123.7 FL (ref 80–99)
MONOCYTES # BLD AUTO: 0.57 10*3/MM3 (ref 0–1)
MONOCYTES NFR BLD AUTO: 9.8 % (ref 0–12)
NEUTROPHILS # BLD AUTO: 2.87 10*3/MM3 (ref 1.5–8.3)
NEUTROPHILS NFR BLD AUTO: 49.5 % (ref 41–71)
PLATELET # BLD AUTO: 292 10*3/MM3 (ref 150–450)
PMV BLD AUTO: 10.3 FL (ref 6–12)
POTASSIUM BLD-SCNC: 5 MMOL/L (ref 3.5–5.5)
PROT SERPL-MCNC: 6.2 G/DL (ref 5.7–8.2)
RBC # BLD AUTO: 3.04 10*6/MM3 (ref 3.89–5.14)
SODIUM BLD-SCNC: 143 MMOL/L (ref 132–146)
WBC NRBC COR # BLD: 5.79 10*3/MM3 (ref 3.5–10.8)

## 2017-11-07 PROCEDURE — 82550 ASSAY OF CK (CPK): CPT | Performed by: INTERNAL MEDICINE

## 2017-11-07 PROCEDURE — 86140 C-REACTIVE PROTEIN: CPT | Performed by: INTERNAL MEDICINE

## 2017-11-07 PROCEDURE — 85652 RBC SED RATE AUTOMATED: CPT | Performed by: INTERNAL MEDICINE

## 2017-11-07 PROCEDURE — 80053 COMPREHEN METABOLIC PANEL: CPT | Performed by: INTERNAL MEDICINE

## 2017-11-07 PROCEDURE — 36415 COLL VENOUS BLD VENIPUNCTURE: CPT | Performed by: INTERNAL MEDICINE

## 2017-11-07 PROCEDURE — 85025 COMPLETE CBC W/AUTO DIFF WBC: CPT | Performed by: INTERNAL MEDICINE

## 2017-11-09 ENCOUNTER — TRANSCRIBE ORDERS (OUTPATIENT)
Dept: ADMINISTRATIVE | Facility: HOSPITAL | Age: 42
End: 2017-11-09

## 2017-11-09 DIAGNOSIS — S80.211A INFECTED ABRASION OF RIGHT KNEE, INITIAL ENCOUNTER: Primary | ICD-10-CM

## 2017-11-09 DIAGNOSIS — L08.9 INFECTED ABRASION OF RIGHT KNEE, INITIAL ENCOUNTER: Primary | ICD-10-CM

## 2017-11-10 ENCOUNTER — CLINICAL SUPPORT (OUTPATIENT)
Dept: INFUSION THERAPY | Facility: HOSPITAL | Age: 42
End: 2017-11-10
Attending: INTERNAL MEDICINE

## 2017-11-10 VITALS
BODY MASS INDEX: 45.31 KG/M2 | WEIGHT: 240 LBS | RESPIRATION RATE: 20 BRPM | DIASTOLIC BLOOD PRESSURE: 81 MMHG | HEART RATE: 84 BPM | SYSTOLIC BLOOD PRESSURE: 111 MMHG | TEMPERATURE: 98 F | OXYGEN SATURATION: 95 % | HEIGHT: 61 IN

## 2017-11-10 PROCEDURE — C1894 INTRO/SHEATH, NON-LASER: HCPCS

## 2017-11-10 RX ORDER — SODIUM CHLORIDE 0.9 % (FLUSH) 0.9 %
10 SYRINGE (ML) INJECTION AS NEEDED
Status: CANCELLED | OUTPATIENT
Start: 2017-11-10

## 2017-11-10 RX ORDER — AMOXICILLIN AND CLAVULANATE POTASSIUM 875; 125 MG/1; MG/1
1 TABLET, FILM COATED ORAL 2 TIMES DAILY
COMMUNITY

## 2017-11-10 RX ORDER — POTASSIUM CHLORIDE 750 MG/1
10 TABLET, FILM COATED, EXTENDED RELEASE ORAL DAILY
COMMUNITY

## 2017-11-10 RX ORDER — FUROSEMIDE 20 MG/1
20 TABLET ORAL DAILY
COMMUNITY

## 2017-11-10 RX ORDER — ASPIRIN 325 MG
81 TABLET ORAL DAILY
COMMUNITY

## 2017-11-10 RX ORDER — FAMOTIDINE 10 MG
10 TABLET ORAL DAILY
COMMUNITY

## 2017-11-10 RX ORDER — PREGABALIN 200 MG/1
200 CAPSULE ORAL 3 TIMES DAILY
COMMUNITY

## 2017-11-10 RX ORDER — OXYCODONE HYDROCHLORIDE AND ACETAMINOPHEN 5; 325 MG/1; MG/1
1 TABLET ORAL EVERY 6 HOURS PRN
COMMUNITY

## 2017-11-10 RX ORDER — LEVOTHYROXINE SODIUM 0.12 MG/1
125 TABLET ORAL DAILY
COMMUNITY

## 2017-11-10 NOTE — NURSING NOTE
11/10/2017 1503 Pt discharged s/p picc line placement.  Education provided by [facility] nurses.  Pt left unit ambulatory with assist of rolling walker, headed to office for IV infusion.

## 2017-11-14 ENCOUNTER — APPOINTMENT (OUTPATIENT)
Dept: LAB | Facility: HOSPITAL | Age: 42
End: 2017-11-14

## 2017-11-14 ENCOUNTER — TRANSCRIBE ORDERS (OUTPATIENT)
Dept: LAB | Facility: HOSPITAL | Age: 42
End: 2017-11-14

## 2017-11-14 DIAGNOSIS — S10.92XS BLISTER OF FACE, NECK, AND SCALP EXCEPT EYE, INFECTED, SEQUELA: Primary | ICD-10-CM

## 2017-11-14 DIAGNOSIS — R79.89 HYPOURICEMIA: ICD-10-CM

## 2017-11-14 DIAGNOSIS — S00.02XS BLISTER OF FACE, NECK, AND SCALP EXCEPT EYE, INFECTED, SEQUELA: Primary | ICD-10-CM

## 2017-11-14 DIAGNOSIS — I87.2 PERIPHERAL VENOUS INSUFFICIENCY: ICD-10-CM

## 2017-11-14 DIAGNOSIS — Z89.522 ACQUIRED ABSENCE OF LEFT KNEE: ICD-10-CM

## 2017-11-14 DIAGNOSIS — L08.9 BLISTER OF FACE, NECK, AND SCALP EXCEPT EYE, INFECTED, SEQUELA: Primary | ICD-10-CM

## 2017-11-14 DIAGNOSIS — S00.82XS BLISTER OF FACE, NECK, AND SCALP EXCEPT EYE, INFECTED, SEQUELA: Primary | ICD-10-CM

## 2017-11-14 LAB
ALBUMIN SERPL-MCNC: 2.9 G/DL (ref 3.2–4.8)
ALBUMIN/GLOB SERPL: 0.9 G/DL (ref 1.5–2.5)
ALP SERPL-CCNC: 194 U/L (ref 25–100)
ALT SERPL W P-5'-P-CCNC: 18 U/L (ref 7–40)
ANION GAP SERPL CALCULATED.3IONS-SCNC: 9 MMOL/L (ref 3–11)
AST SERPL-CCNC: 52 U/L (ref 0–33)
BILIRUB SERPL-MCNC: 0.4 MG/DL (ref 0.3–1.2)
BUN BLD-MCNC: <5 MG/DL (ref 9–23)
BUN/CREAT SERPL: ABNORMAL (ref 7–25)
CALCIUM SPEC-SCNC: 8.4 MG/DL (ref 8.7–10.4)
CHLORIDE SERPL-SCNC: 106 MMOL/L (ref 99–109)
CK SERPL-CCNC: 40 U/L (ref 26–174)
CO2 SERPL-SCNC: 23 MMOL/L (ref 20–31)
CREAT BLD-MCNC: 0.5 MG/DL (ref 0.6–1.3)
CRP SERPL-MCNC: 1.17 MG/DL (ref 0–1)
DEPRECATED RDW RBC AUTO: 57 FL (ref 37–54)
ERYTHROCYTE [DISTWIDTH] IN BLOOD BY AUTOMATED COUNT: 12.8 % (ref 11.3–14.5)
ERYTHROCYTE [SEDIMENTATION RATE] IN BLOOD: 24 MM/HR (ref 0–20)
GFR SERPL CREATININE-BSD FRML MDRD: 134 ML/MIN/1.73
GLOBULIN UR ELPH-MCNC: 3.1 GM/DL
GLUCOSE BLD-MCNC: 111 MG/DL (ref 70–100)
HCT VFR BLD AUTO: 36.2 % (ref 34.5–44)
HGB BLD-MCNC: 12.4 G/DL (ref 11.5–15.5)
MCH RBC QN AUTO: 41.8 PG (ref 27–31)
MCHC RBC AUTO-ENTMCNC: 34.3 G/DL (ref 32–36)
MCV RBC AUTO: 121.9 FL (ref 80–99)
PLATELET # BLD AUTO: 243 10*3/MM3 (ref 150–450)
PMV BLD AUTO: 10.1 FL (ref 6–12)
POTASSIUM BLD-SCNC: 4.4 MMOL/L (ref 3.5–5.5)
PROT SERPL-MCNC: 6 G/DL (ref 5.7–8.2)
RBC # BLD AUTO: 2.97 10*6/MM3 (ref 3.89–5.14)
SODIUM BLD-SCNC: 138 MMOL/L (ref 132–146)
WBC NRBC COR # BLD: 4.38 10*3/MM3 (ref 3.5–10.8)

## 2017-11-14 PROCEDURE — 86140 C-REACTIVE PROTEIN: CPT | Performed by: INTERNAL MEDICINE

## 2017-11-14 PROCEDURE — 80053 COMPREHEN METABOLIC PANEL: CPT | Performed by: INTERNAL MEDICINE

## 2017-11-14 PROCEDURE — 82550 ASSAY OF CK (CPK): CPT | Performed by: INTERNAL MEDICINE

## 2017-11-14 PROCEDURE — 85027 COMPLETE CBC AUTOMATED: CPT | Performed by: INTERNAL MEDICINE

## 2017-11-14 PROCEDURE — 36415 COLL VENOUS BLD VENIPUNCTURE: CPT | Performed by: INTERNAL MEDICINE

## 2022-09-03 ENCOUNTER — TRANSCRIBE ORDERS (OUTPATIENT)
Dept: LAB | Facility: HOSPITAL | Age: 47
End: 2022-09-03

## 2022-09-03 DIAGNOSIS — L08.89 PITTED KERATOLYSIS: ICD-10-CM

## 2022-09-03 DIAGNOSIS — L03.116 CELLULITIS OF LEFT FOOT: Primary | ICD-10-CM

## 2022-09-03 DIAGNOSIS — L97.422 ULCER OF LEFT HEEL, WITH FAT LAYER EXPOSED: ICD-10-CM

## 2022-09-03 DIAGNOSIS — T84.53XA INFECTION OF TOTAL RIGHT KNEE REPLACEMENT, INITIAL ENCOUNTER: ICD-10-CM

## 2022-09-03 DIAGNOSIS — L03.115 CELLULITIS OF RIGHT KNEE: ICD-10-CM

## 2022-09-05 ENCOUNTER — TRANSCRIBE ORDERS (OUTPATIENT)
Dept: LAB | Facility: HOSPITAL | Age: 47
End: 2022-09-05

## 2022-09-05 ENCOUNTER — LAB (OUTPATIENT)
Dept: LAB | Facility: HOSPITAL | Age: 47
End: 2022-09-05

## 2022-09-05 DIAGNOSIS — T84.53XA INFECTION OF TOTAL RIGHT KNEE REPLACEMENT, INITIAL ENCOUNTER: ICD-10-CM

## 2022-09-05 DIAGNOSIS — L97.422 ULCER OF LEFT HEEL, WITH FAT LAYER EXPOSED: ICD-10-CM

## 2022-09-05 DIAGNOSIS — T84.53XA INFECTION AND INFLAMMATORY REACTION DUE TO INTERNAL RIGHT KNEE PROSTHESIS, INITIAL ENCOUNTER: ICD-10-CM

## 2022-09-05 DIAGNOSIS — L03.116 CELLULITIS OF LEFT FOOT: Primary | ICD-10-CM

## 2022-09-05 DIAGNOSIS — L03.115 CELLULITIS OF RIGHT KNEE: ICD-10-CM

## 2022-09-05 DIAGNOSIS — L03.115 CELLULITIS OF RIGHT FOOT: ICD-10-CM

## 2022-09-05 DIAGNOSIS — L03.116 CELLULITIS OF LEFT FOOT: ICD-10-CM

## 2022-09-05 DIAGNOSIS — L08.89 PITTED KERATOLYSIS: ICD-10-CM

## 2022-09-05 LAB
ALBUMIN SERPL-MCNC: 3.2 G/DL (ref 3.5–5.2)
ALBUMIN/GLOB SERPL: 0.8 G/DL
ALP SERPL-CCNC: 234 U/L (ref 39–117)
ALT SERPL W P-5'-P-CCNC: 17 U/L (ref 1–33)
ANION GAP SERPL CALCULATED.3IONS-SCNC: 7 MMOL/L (ref 5–15)
AST SERPL-CCNC: 61 U/L (ref 1–32)
BASOPHILS # BLD AUTO: 0.03 10*3/MM3 (ref 0–0.2)
BASOPHILS NFR BLD AUTO: 0.6 % (ref 0–1.5)
BILIRUB SERPL-MCNC: 0.5 MG/DL (ref 0–1.2)
BUN SERPL-MCNC: 7 MG/DL (ref 6–20)
BUN/CREAT SERPL: 9.3 (ref 7–25)
CALCIUM SPEC-SCNC: 8.8 MG/DL (ref 8.6–10.5)
CHLORIDE SERPL-SCNC: 107 MMOL/L (ref 98–107)
CO2 SERPL-SCNC: 29 MMOL/L (ref 22–29)
CREAT SERPL-MCNC: 0.75 MG/DL (ref 0.57–1)
CRP SERPL-MCNC: 0.31 MG/DL (ref 0–0.5)
DEPRECATED RDW RBC AUTO: 60.9 FL (ref 37–54)
EGFRCR SERPLBLD CKD-EPI 2021: 97.7 ML/MIN/1.73
EOSINOPHIL # BLD AUTO: 0.17 10*3/MM3 (ref 0–0.4)
EOSINOPHIL NFR BLD AUTO: 3.2 % (ref 0.3–6.2)
ERYTHROCYTE [DISTWIDTH] IN BLOOD BY AUTOMATED COUNT: 14.7 % (ref 12.3–15.4)
ERYTHROCYTE [SEDIMENTATION RATE] IN BLOOD: 32 MM/HR (ref 0–20)
GLOBULIN UR ELPH-MCNC: 4 GM/DL
GLUCOSE SERPL-MCNC: 88 MG/DL (ref 65–99)
HCT VFR BLD AUTO: 36.8 % (ref 34–46.6)
HGB BLD-MCNC: 12.2 G/DL (ref 12–15.9)
IMM GRANULOCYTES # BLD AUTO: 0.02 10*3/MM3 (ref 0–0.05)
IMM GRANULOCYTES NFR BLD AUTO: 0.4 % (ref 0–0.5)
LYMPHOCYTES # BLD AUTO: 1.7 10*3/MM3 (ref 0.7–3.1)
LYMPHOCYTES NFR BLD AUTO: 32.3 % (ref 19.6–45.3)
MACROCYTES BLD QL SMEAR: NORMAL
MCH RBC QN AUTO: 37.9 PG (ref 26.6–33)
MCHC RBC AUTO-ENTMCNC: 33.2 G/DL (ref 31.5–35.7)
MCV RBC AUTO: 114.3 FL (ref 79–97)
MONOCYTES # BLD AUTO: 0.42 10*3/MM3 (ref 0.1–0.9)
MONOCYTES NFR BLD AUTO: 8 % (ref 5–12)
NEUTROPHILS NFR BLD AUTO: 2.92 10*3/MM3 (ref 1.7–7)
NEUTROPHILS NFR BLD AUTO: 55.5 % (ref 42.7–76)
NRBC BLD AUTO-RTO: 0 /100 WBC (ref 0–0.2)
PLAT MORPH BLD: NORMAL
PLATELET # BLD AUTO: 181 10*3/MM3 (ref 140–450)
PMV BLD AUTO: 9.3 FL (ref 6–12)
POTASSIUM SERPL-SCNC: 4.2 MMOL/L (ref 3.5–5.2)
PROT SERPL-MCNC: 7.2 G/DL (ref 6–8.5)
RBC # BLD AUTO: 3.22 10*6/MM3 (ref 3.77–5.28)
SODIUM SERPL-SCNC: 143 MMOL/L (ref 136–145)
WBC MORPH BLD: NORMAL
WBC NRBC COR # BLD: 5.26 10*3/MM3 (ref 3.4–10.8)

## 2022-09-05 PROCEDURE — 85007 BL SMEAR W/DIFF WBC COUNT: CPT

## 2022-09-05 PROCEDURE — 36415 COLL VENOUS BLD VENIPUNCTURE: CPT

## 2022-09-05 PROCEDURE — 80053 COMPREHEN METABOLIC PANEL: CPT

## 2022-09-05 PROCEDURE — 86140 C-REACTIVE PROTEIN: CPT

## 2022-09-05 PROCEDURE — 85652 RBC SED RATE AUTOMATED: CPT

## 2022-09-05 PROCEDURE — 85025 COMPLETE CBC W/AUTO DIFF WBC: CPT

## 2022-09-07 ENCOUNTER — TRANSCRIBE ORDERS (OUTPATIENT)
Dept: ADMINISTRATIVE | Facility: HOSPITAL | Age: 47
End: 2022-09-07

## 2022-09-07 DIAGNOSIS — L03.116 CELLULITIS OF LEFT FOOT: Primary | ICD-10-CM

## 2022-09-08 ENCOUNTER — HOSPITAL ENCOUNTER (OUTPATIENT)
Dept: INFUSION THERAPY | Facility: HOSPITAL | Age: 47
Discharge: HOME | End: 2022-09-08
Admitting: INTERNAL MEDICINE

## 2022-09-08 VITALS
OXYGEN SATURATION: 97 % | TEMPERATURE: 97.8 F | SYSTOLIC BLOOD PRESSURE: 112 MMHG | RESPIRATION RATE: 18 BRPM | HEART RATE: 72 BPM | DIASTOLIC BLOOD PRESSURE: 63 MMHG

## 2022-09-08 DIAGNOSIS — L03.116 CELLULITIS OF LEFT FOOT: ICD-10-CM

## 2022-09-08 PROCEDURE — C1894 INTRO/SHEATH, NON-LASER: HCPCS

## 2022-09-08 PROCEDURE — C1751 CATH, INF, PER/CENT/MIDLINE: HCPCS

## 2022-09-08 RX ORDER — SODIUM CHLORIDE 0.9 % (FLUSH) 0.9 %
10 SYRINGE (ML) INJECTION EVERY 12 HOURS SCHEDULED
Status: DISCONTINUED | OUTPATIENT
Start: 2022-09-08 | End: 2022-09-10 | Stop reason: HOSPADM

## 2022-09-08 RX ORDER — SODIUM CHLORIDE 0.9 % (FLUSH) 0.9 %
10 SYRINGE (ML) INJECTION AS NEEDED
Status: DISCONTINUED | OUTPATIENT
Start: 2022-09-08 | End: 2022-09-10 | Stop reason: HOSPADM

## 2022-09-08 NOTE — NURSING NOTE
Single lumen PICC in place Right arm. Patient tolerated well. Ambulated to exit/ Discharged in stable condition and all belongings are with patient

## 2022-09-13 ENCOUNTER — TRANSCRIBE ORDERS (OUTPATIENT)
Dept: LAB | Facility: HOSPITAL | Age: 47
End: 2022-09-13

## 2022-09-13 ENCOUNTER — LAB (OUTPATIENT)
Dept: LAB | Facility: HOSPITAL | Age: 47
End: 2022-09-13

## 2022-09-13 DIAGNOSIS — L08.89 PITTED KERATOLYSIS: ICD-10-CM

## 2022-09-13 DIAGNOSIS — L97.422 ULCER OF LEFT HEEL, WITH FAT LAYER EXPOSED: ICD-10-CM

## 2022-09-13 DIAGNOSIS — L03.116 CELLULITIS OF LEFT FOOT: ICD-10-CM

## 2022-09-13 DIAGNOSIS — G62.9 PERIPHERAL NERVE DISORDER: ICD-10-CM

## 2022-09-13 DIAGNOSIS — G62.9 PERIPHERAL NERVE DISORDER: Primary | ICD-10-CM

## 2022-09-13 LAB
ALBUMIN SERPL-MCNC: 3.3 G/DL (ref 3.5–5.2)
ALBUMIN/GLOB SERPL: 0.9 G/DL
ALP SERPL-CCNC: 266 U/L (ref 39–117)
ALT SERPL W P-5'-P-CCNC: 13 U/L (ref 1–33)
ANION GAP SERPL CALCULATED.3IONS-SCNC: 7 MMOL/L (ref 5–15)
AST SERPL-CCNC: 29 U/L (ref 1–32)
BASOPHILS # BLD AUTO: 0.03 10*3/MM3 (ref 0–0.2)
BASOPHILS NFR BLD AUTO: 0.5 % (ref 0–1.5)
BILIRUB SERPL-MCNC: 0.7 MG/DL (ref 0–1.2)
BUN SERPL-MCNC: 5 MG/DL (ref 6–20)
BUN/CREAT SERPL: 9.6 (ref 7–25)
CALCIUM SPEC-SCNC: 8.5 MG/DL (ref 8.6–10.5)
CHLORIDE SERPL-SCNC: 102 MMOL/L (ref 98–107)
CO2 SERPL-SCNC: 30 MMOL/L (ref 22–29)
CREAT SERPL-MCNC: 0.52 MG/DL (ref 0.57–1)
CRP SERPL-MCNC: 2.02 MG/DL (ref 0–0.5)
DEPRECATED RDW RBC AUTO: 58.7 FL (ref 37–54)
EGFRCR SERPLBLD CKD-EPI 2021: 114.1 ML/MIN/1.73
EOSINOPHIL # BLD AUTO: 0.34 10*3/MM3 (ref 0–0.4)
EOSINOPHIL NFR BLD AUTO: 5.5 % (ref 0.3–6.2)
ERYTHROCYTE [DISTWIDTH] IN BLOOD BY AUTOMATED COUNT: 14.1 % (ref 12.3–15.4)
ERYTHROCYTE [SEDIMENTATION RATE] IN BLOOD: 33 MM/HR (ref 0–20)
GLOBULIN UR ELPH-MCNC: 3.8 GM/DL
GLUCOSE SERPL-MCNC: 92 MG/DL (ref 65–99)
HCT VFR BLD AUTO: 35.5 % (ref 34–46.6)
HGB BLD-MCNC: 11.9 G/DL (ref 12–15.9)
IMM GRANULOCYTES # BLD AUTO: 0.02 10*3/MM3 (ref 0–0.05)
IMM GRANULOCYTES NFR BLD AUTO: 0.3 % (ref 0–0.5)
LYMPHOCYTES # BLD AUTO: 1.37 10*3/MM3 (ref 0.7–3.1)
LYMPHOCYTES NFR BLD AUTO: 22.3 % (ref 19.6–45.3)
MCH RBC QN AUTO: 37.5 PG (ref 26.6–33)
MCHC RBC AUTO-ENTMCNC: 33.5 G/DL (ref 31.5–35.7)
MCV RBC AUTO: 112 FL (ref 79–97)
MONOCYTES # BLD AUTO: 0.5 10*3/MM3 (ref 0.1–0.9)
MONOCYTES NFR BLD AUTO: 8.1 % (ref 5–12)
NEUTROPHILS NFR BLD AUTO: 3.88 10*3/MM3 (ref 1.7–7)
NEUTROPHILS NFR BLD AUTO: 63.3 % (ref 42.7–76)
NRBC BLD AUTO-RTO: 0 /100 WBC (ref 0–0.2)
PLATELET # BLD AUTO: 171 10*3/MM3 (ref 140–450)
PMV BLD AUTO: 9.4 FL (ref 6–12)
POTASSIUM SERPL-SCNC: 3.8 MMOL/L (ref 3.5–5.2)
PROT SERPL-MCNC: 7.1 G/DL (ref 6–8.5)
RBC # BLD AUTO: 3.17 10*6/MM3 (ref 3.77–5.28)
SODIUM SERPL-SCNC: 139 MMOL/L (ref 136–145)
WBC NRBC COR # BLD: 6.14 10*3/MM3 (ref 3.4–10.8)

## 2022-09-13 PROCEDURE — 36415 COLL VENOUS BLD VENIPUNCTURE: CPT

## 2022-09-13 PROCEDURE — 86140 C-REACTIVE PROTEIN: CPT

## 2022-09-13 PROCEDURE — 85025 COMPLETE CBC W/AUTO DIFF WBC: CPT

## 2022-09-13 PROCEDURE — 85652 RBC SED RATE AUTOMATED: CPT

## 2022-09-13 PROCEDURE — 80053 COMPREHEN METABOLIC PANEL: CPT

## 2022-09-21 ENCOUNTER — TRANSCRIBE ORDERS (OUTPATIENT)
Dept: LAB | Facility: HOSPITAL | Age: 47
End: 2022-09-21

## 2022-09-21 ENCOUNTER — LAB (OUTPATIENT)
Dept: LAB | Facility: HOSPITAL | Age: 47
End: 2022-09-21

## 2022-09-21 DIAGNOSIS — L97.422 ULCER OF LEFT HEEL, WITH FAT LAYER EXPOSED: ICD-10-CM

## 2022-09-21 DIAGNOSIS — L03.116 CELLULITIS OF LEFT FOOT: ICD-10-CM

## 2022-09-21 DIAGNOSIS — G62.9 PERIPHERAL NERVE DISORDER: Primary | ICD-10-CM

## 2022-09-21 DIAGNOSIS — L08.89 PITTED KERATOLYSIS: ICD-10-CM

## 2022-09-21 DIAGNOSIS — G62.9 PERIPHERAL NERVE DISORDER: ICD-10-CM

## 2022-09-21 LAB
ALBUMIN SERPL-MCNC: 2.9 G/DL (ref 3.5–5.2)
ALBUMIN/GLOB SERPL: 0.9 G/DL
ALP SERPL-CCNC: 224 U/L (ref 39–117)
ALT SERPL W P-5'-P-CCNC: 9 U/L (ref 1–33)
ANION GAP SERPL CALCULATED.3IONS-SCNC: 7 MMOL/L (ref 5–15)
AST SERPL-CCNC: 21 U/L (ref 1–32)
BASOPHILS # BLD AUTO: 0.02 10*3/MM3 (ref 0–0.2)
BASOPHILS NFR BLD AUTO: 0.4 % (ref 0–1.5)
BILIRUB SERPL-MCNC: 0.5 MG/DL (ref 0–1.2)
BUN SERPL-MCNC: 6 MG/DL (ref 6–20)
BUN/CREAT SERPL: 9.2 (ref 7–25)
CALCIUM SPEC-SCNC: 8.1 MG/DL (ref 8.6–10.5)
CHLORIDE SERPL-SCNC: 106 MMOL/L (ref 98–107)
CO2 SERPL-SCNC: 26 MMOL/L (ref 22–29)
CREAT SERPL-MCNC: 0.65 MG/DL (ref 0.57–1)
CRP SERPL-MCNC: 2.25 MG/DL (ref 0–0.5)
DEPRECATED RDW RBC AUTO: 61.6 FL (ref 37–54)
EGFRCR SERPLBLD CKD-EPI 2021: 108.1 ML/MIN/1.73
EOSINOPHIL # BLD AUTO: 0.2 10*3/MM3 (ref 0–0.4)
EOSINOPHIL NFR BLD AUTO: 3.7 % (ref 0.3–6.2)
ERYTHROCYTE [DISTWIDTH] IN BLOOD BY AUTOMATED COUNT: 15.2 % (ref 12.3–15.4)
ERYTHROCYTE [SEDIMENTATION RATE] IN BLOOD: 14 MM/HR (ref 0–20)
GLOBULIN UR ELPH-MCNC: 3.4 GM/DL
GLUCOSE SERPL-MCNC: 123 MG/DL (ref 65–99)
HCT VFR BLD AUTO: 33.1 % (ref 34–46.6)
HGB BLD-MCNC: 10.9 G/DL (ref 12–15.9)
IMM GRANULOCYTES # BLD AUTO: 0.01 10*3/MM3 (ref 0–0.05)
IMM GRANULOCYTES NFR BLD AUTO: 0.2 % (ref 0–0.5)
LYMPHOCYTES # BLD AUTO: 1.33 10*3/MM3 (ref 0.7–3.1)
LYMPHOCYTES NFR BLD AUTO: 24.8 % (ref 19.6–45.3)
MCH RBC QN AUTO: 37.6 PG (ref 26.6–33)
MCHC RBC AUTO-ENTMCNC: 32.9 G/DL (ref 31.5–35.7)
MCV RBC AUTO: 114.1 FL (ref 79–97)
MONOCYTES # BLD AUTO: 0.38 10*3/MM3 (ref 0.1–0.9)
MONOCYTES NFR BLD AUTO: 7.1 % (ref 5–12)
NEUTROPHILS NFR BLD AUTO: 3.42 10*3/MM3 (ref 1.7–7)
NEUTROPHILS NFR BLD AUTO: 63.8 % (ref 42.7–76)
NRBC BLD AUTO-RTO: 0 /100 WBC (ref 0–0.2)
PLATELET # BLD AUTO: 142 10*3/MM3 (ref 140–450)
PMV BLD AUTO: 10 FL (ref 6–12)
POTASSIUM SERPL-SCNC: 3.9 MMOL/L (ref 3.5–5.2)
PROT SERPL-MCNC: 6.3 G/DL (ref 6–8.5)
RBC # BLD AUTO: 2.9 10*6/MM3 (ref 3.77–5.28)
SODIUM SERPL-SCNC: 139 MMOL/L (ref 136–145)
WBC NRBC COR # BLD: 5.36 10*3/MM3 (ref 3.4–10.8)

## 2022-09-21 PROCEDURE — 36415 COLL VENOUS BLD VENIPUNCTURE: CPT

## 2022-09-21 PROCEDURE — 80053 COMPREHEN METABOLIC PANEL: CPT

## 2022-09-21 PROCEDURE — 85025 COMPLETE CBC W/AUTO DIFF WBC: CPT

## 2022-09-21 PROCEDURE — 86140 C-REACTIVE PROTEIN: CPT

## 2022-09-21 PROCEDURE — 85652 RBC SED RATE AUTOMATED: CPT

## 2022-09-30 ENCOUNTER — TRANSCRIBE ORDERS (OUTPATIENT)
Dept: LAB | Facility: HOSPITAL | Age: 47
End: 2022-09-30

## 2022-09-30 ENCOUNTER — LAB (OUTPATIENT)
Dept: LAB | Facility: HOSPITAL | Age: 47
End: 2022-09-30

## 2022-09-30 DIAGNOSIS — M06.1 ADULT-ONSET STILL'S DISEASE: ICD-10-CM

## 2022-09-30 DIAGNOSIS — L97.512 RIGHT FOOT ULCER, WITH FAT LAYER EXPOSED: ICD-10-CM

## 2022-09-30 DIAGNOSIS — L03.115 CELLULITIS OF RIGHT FOOT: Primary | ICD-10-CM

## 2022-09-30 DIAGNOSIS — T84.54XD INFECTION OF TOTAL LEFT KNEE REPLACEMENT, SUBSEQUENT ENCOUNTER: ICD-10-CM

## 2022-09-30 DIAGNOSIS — L97.422 ULCER OF LEFT HEEL, WITH FAT LAYER EXPOSED: ICD-10-CM

## 2022-09-30 DIAGNOSIS — L03.116 CELLULITIS OF LEFT FOOT: ICD-10-CM

## 2022-09-30 DIAGNOSIS — L03.115 CELLULITIS OF RIGHT FOOT: ICD-10-CM

## 2022-09-30 DIAGNOSIS — L88 PYODERMA GANGRENOSUM: ICD-10-CM

## 2022-09-30 DIAGNOSIS — I87.2 PERIPHERAL VENOUS INSUFFICIENCY: ICD-10-CM

## 2022-09-30 LAB
ALBUMIN SERPL-MCNC: 3 G/DL (ref 3.5–5.2)
ALBUMIN/GLOB SERPL: 0.8 G/DL
ALP SERPL-CCNC: 294 U/L (ref 39–117)
ALT SERPL W P-5'-P-CCNC: 19 U/L (ref 1–33)
ANION GAP SERPL CALCULATED.3IONS-SCNC: 8 MMOL/L (ref 5–15)
AST SERPL-CCNC: 51 U/L (ref 1–32)
BASOPHILS # BLD AUTO: 0.02 10*3/MM3 (ref 0–0.2)
BASOPHILS NFR BLD AUTO: 0.4 % (ref 0–1.5)
BILIRUB SERPL-MCNC: 0.6 MG/DL (ref 0–1.2)
BUN SERPL-MCNC: 5 MG/DL (ref 6–20)
BUN/CREAT SERPL: 8.2 (ref 7–25)
CALCIUM SPEC-SCNC: 8.5 MG/DL (ref 8.6–10.5)
CHLORIDE SERPL-SCNC: 99 MMOL/L (ref 98–107)
CO2 SERPL-SCNC: 28 MMOL/L (ref 22–29)
CREAT SERPL-MCNC: 0.61 MG/DL (ref 0.57–1)
CRP SERPL-MCNC: 2.03 MG/DL (ref 0–0.5)
DEPRECATED RDW RBC AUTO: 63 FL (ref 37–54)
EGFRCR SERPLBLD CKD-EPI 2021: 109.8 ML/MIN/1.73
EOSINOPHIL # BLD AUTO: 0.26 10*3/MM3 (ref 0–0.4)
EOSINOPHIL NFR BLD AUTO: 4.6 % (ref 0.3–6.2)
ERYTHROCYTE [DISTWIDTH] IN BLOOD BY AUTOMATED COUNT: 15.4 % (ref 12.3–15.4)
GLOBULIN UR ELPH-MCNC: 3.9 GM/DL
GLUCOSE SERPL-MCNC: 108 MG/DL (ref 65–99)
HCT VFR BLD AUTO: 34.5 % (ref 34–46.6)
HGB BLD-MCNC: 11.4 G/DL (ref 12–15.9)
IMM GRANULOCYTES # BLD AUTO: 0.01 10*3/MM3 (ref 0–0.05)
IMM GRANULOCYTES NFR BLD AUTO: 0.2 % (ref 0–0.5)
LYMPHOCYTES # BLD AUTO: 1.29 10*3/MM3 (ref 0.7–3.1)
LYMPHOCYTES NFR BLD AUTO: 22.6 % (ref 19.6–45.3)
MACROCYTES BLD QL SMEAR: NORMAL
MCH RBC QN AUTO: 37.1 PG (ref 26.6–33)
MCHC RBC AUTO-ENTMCNC: 33 G/DL (ref 31.5–35.7)
MCV RBC AUTO: 112.4 FL (ref 79–97)
MONOCYTES # BLD AUTO: 0.46 10*3/MM3 (ref 0.1–0.9)
MONOCYTES NFR BLD AUTO: 8.1 % (ref 5–12)
NEUTROPHILS NFR BLD AUTO: 3.66 10*3/MM3 (ref 1.7–7)
NEUTROPHILS NFR BLD AUTO: 64.1 % (ref 42.7–76)
NRBC BLD AUTO-RTO: 0 /100 WBC (ref 0–0.2)
PLAT MORPH BLD: NORMAL
PLATELET # BLD AUTO: 183 10*3/MM3 (ref 140–450)
PMV BLD AUTO: 9.6 FL (ref 6–12)
POTASSIUM SERPL-SCNC: 4.3 MMOL/L (ref 3.5–5.2)
PROT SERPL-MCNC: 6.9 G/DL (ref 6–8.5)
RBC # BLD AUTO: 3.07 10*6/MM3 (ref 3.77–5.28)
SODIUM SERPL-SCNC: 135 MMOL/L (ref 136–145)
WBC MORPH BLD: NORMAL
WBC NRBC COR # BLD: 5.7 10*3/MM3 (ref 3.4–10.8)

## 2022-09-30 PROCEDURE — 36415 COLL VENOUS BLD VENIPUNCTURE: CPT

## 2022-09-30 PROCEDURE — 85025 COMPLETE CBC W/AUTO DIFF WBC: CPT

## 2022-09-30 PROCEDURE — 86140 C-REACTIVE PROTEIN: CPT

## 2022-09-30 PROCEDURE — 80053 COMPREHEN METABOLIC PANEL: CPT

## 2022-09-30 PROCEDURE — 85007 BL SMEAR W/DIFF WBC COUNT: CPT

## 2022-10-24 ENCOUNTER — TRANSCRIBE ORDERS (OUTPATIENT)
Dept: LAB | Facility: HOSPITAL | Age: 47
End: 2022-10-24

## 2022-10-24 ENCOUNTER — LAB (OUTPATIENT)
Dept: LAB | Facility: HOSPITAL | Age: 47
End: 2022-10-24

## 2022-10-24 DIAGNOSIS — L97.422 ULCER OF LEFT HEEL, WITH FAT LAYER EXPOSED: ICD-10-CM

## 2022-10-24 DIAGNOSIS — L97.512 RIGHT FOOT ULCER, WITH FAT LAYER EXPOSED: ICD-10-CM

## 2022-10-24 DIAGNOSIS — L03.116 CELLULITIS OF LEFT FOOT: ICD-10-CM

## 2022-10-24 DIAGNOSIS — T84.54XD INFECTION OF TOTAL LEFT KNEE REPLACEMENT, SUBSEQUENT ENCOUNTER: ICD-10-CM

## 2022-10-24 DIAGNOSIS — I87.2 PERIPHERAL VENOUS INSUFFICIENCY: ICD-10-CM

## 2022-10-24 DIAGNOSIS — M06.1 ADULT-ONSET STILL'S DISEASE: ICD-10-CM

## 2022-10-24 DIAGNOSIS — L03.115 CELLULITIS OF RIGHT FOOT: ICD-10-CM

## 2022-10-24 DIAGNOSIS — L03.115 CELLULITIS OF RIGHT FOOT: Primary | ICD-10-CM

## 2022-10-24 LAB
ALBUMIN SERPL-MCNC: 3 G/DL (ref 3.5–5.2)
ALBUMIN/GLOB SERPL: 0.8 G/DL
ALP SERPL-CCNC: 181 U/L (ref 39–117)
ALT SERPL W P-5'-P-CCNC: 9 U/L (ref 1–33)
ANION GAP SERPL CALCULATED.3IONS-SCNC: 10 MMOL/L (ref 5–15)
AST SERPL-CCNC: 22 U/L (ref 1–32)
BILIRUB SERPL-MCNC: 0.4 MG/DL (ref 0–1.2)
BUN SERPL-MCNC: 7 MG/DL (ref 6–20)
BUN/CREAT SERPL: 11.1 (ref 7–25)
CALCIUM SPEC-SCNC: 8.2 MG/DL (ref 8.6–10.5)
CHLORIDE SERPL-SCNC: 105 MMOL/L (ref 98–107)
CO2 SERPL-SCNC: 25 MMOL/L (ref 22–29)
CREAT SERPL-MCNC: 0.63 MG/DL (ref 0.57–1)
CRP SERPL-MCNC: 3.43 MG/DL (ref 0–0.5)
DEPRECATED RDW RBC AUTO: 67.9 FL (ref 37–54)
EGFRCR SERPLBLD CKD-EPI 2021: 108.9 ML/MIN/1.73
ERYTHROCYTE [DISTWIDTH] IN BLOOD BY AUTOMATED COUNT: 16.7 % (ref 12.3–15.4)
GLOBULIN UR ELPH-MCNC: 3.7 GM/DL
GLUCOSE SERPL-MCNC: 126 MG/DL (ref 65–99)
HCT VFR BLD AUTO: 33.1 % (ref 34–46.6)
HGB BLD-MCNC: 11.5 G/DL (ref 12–15.9)
MCH RBC QN AUTO: 38.1 PG (ref 26.6–33)
MCHC RBC AUTO-ENTMCNC: 34.7 G/DL (ref 31.5–35.7)
MCV RBC AUTO: 109.6 FL (ref 79–97)
PLATELET # BLD AUTO: 207 10*3/MM3 (ref 140–450)
PMV BLD AUTO: 9.3 FL (ref 6–12)
POTASSIUM SERPL-SCNC: 4.3 MMOL/L (ref 3.5–5.2)
PROT SERPL-MCNC: 6.7 G/DL (ref 6–8.5)
RBC # BLD AUTO: 3.02 10*6/MM3 (ref 3.77–5.28)
SODIUM SERPL-SCNC: 140 MMOL/L (ref 136–145)
WBC NRBC COR # BLD: 6.15 10*3/MM3 (ref 3.4–10.8)

## 2022-10-24 PROCEDURE — 85027 COMPLETE CBC AUTOMATED: CPT

## 2022-10-24 PROCEDURE — 36415 COLL VENOUS BLD VENIPUNCTURE: CPT

## 2022-10-24 PROCEDURE — 86140 C-REACTIVE PROTEIN: CPT

## 2022-10-24 PROCEDURE — 80053 COMPREHEN METABOLIC PANEL: CPT

## 2023-12-18 ENCOUNTER — LAB (OUTPATIENT)
Dept: LAB | Facility: HOSPITAL | Age: 48
End: 2023-12-18
Payer: COMMERCIAL

## 2023-12-18 ENCOUNTER — TRANSCRIBE ORDERS (OUTPATIENT)
Dept: LAB | Facility: HOSPITAL | Age: 48
End: 2023-12-18
Payer: COMMERCIAL

## 2023-12-18 DIAGNOSIS — M86.672 CHRONIC OSTEOMYELITIS OF HINDFOOT, LEFT: ICD-10-CM

## 2023-12-18 DIAGNOSIS — T84.54XS INFECTION ASSOCIATED WITH INTERNAL LEFT KNEE PROSTHESIS, SEQUELA: ICD-10-CM

## 2023-12-18 DIAGNOSIS — M86.172 ACUTE OSTEOMYELITIS OF LEFT ANKLE OR FOOT: ICD-10-CM

## 2023-12-18 DIAGNOSIS — M86.172 ACUTE OSTEOMYELITIS OF LEFT ANKLE OR FOOT: Primary | ICD-10-CM

## 2023-12-18 LAB
ALBUMIN SERPL-MCNC: 2.9 G/DL (ref 3.5–5.2)
ALBUMIN/GLOB SERPL: 0.8 G/DL
ALP SERPL-CCNC: 291 U/L (ref 39–117)
ALT SERPL W P-5'-P-CCNC: 8 U/L (ref 1–33)
ANION GAP SERPL CALCULATED.3IONS-SCNC: 7 MMOL/L (ref 5–15)
AST SERPL-CCNC: 22 U/L (ref 1–32)
BASOPHILS # BLD AUTO: 0.02 10*3/MM3 (ref 0–0.2)
BASOPHILS NFR BLD AUTO: 0.5 % (ref 0–1.5)
BILIRUB SERPL-MCNC: 0.6 MG/DL (ref 0–1.2)
BUN SERPL-MCNC: 6 MG/DL (ref 6–20)
BUN/CREAT SERPL: 10 (ref 7–25)
CALCIUM SPEC-SCNC: 8.4 MG/DL (ref 8.6–10.5)
CHLORIDE SERPL-SCNC: 103 MMOL/L (ref 98–107)
CK SERPL-CCNC: 23 U/L (ref 20–180)
CO2 SERPL-SCNC: 27 MMOL/L (ref 22–29)
CREAT SERPL-MCNC: 0.6 MG/DL (ref 0.57–1)
CRP SERPL-MCNC: 2.02 MG/DL (ref 0–0.5)
DEPRECATED RDW RBC AUTO: 64.9 FL (ref 37–54)
EGFRCR SERPLBLD CKD-EPI 2021: 109.5 ML/MIN/1.73
EOSINOPHIL # BLD AUTO: 0.17 10*3/MM3 (ref 0–0.4)
EOSINOPHIL NFR BLD AUTO: 3.9 % (ref 0.3–6.2)
ERYTHROCYTE [DISTWIDTH] IN BLOOD BY AUTOMATED COUNT: 15.7 % (ref 12.3–15.4)
GLOBULIN UR ELPH-MCNC: 3.6 GM/DL
GLUCOSE SERPL-MCNC: 87 MG/DL (ref 65–99)
HCT VFR BLD AUTO: 32.3 % (ref 34–46.6)
HGB BLD-MCNC: 10.4 G/DL (ref 12–15.9)
IMM GRANULOCYTES # BLD AUTO: 0.02 10*3/MM3 (ref 0–0.05)
IMM GRANULOCYTES NFR BLD AUTO: 0.5 % (ref 0–0.5)
LYMPHOCYTES # BLD AUTO: 1.18 10*3/MM3 (ref 0.7–3.1)
LYMPHOCYTES NFR BLD AUTO: 27.1 % (ref 19.6–45.3)
MACROCYTES BLD QL SMEAR: NORMAL
MCH RBC QN AUTO: 35.7 PG (ref 26.6–33)
MCHC RBC AUTO-ENTMCNC: 32.2 G/DL (ref 31.5–35.7)
MCV RBC AUTO: 111 FL (ref 79–97)
MONOCYTES # BLD AUTO: 0.36 10*3/MM3 (ref 0.1–0.9)
MONOCYTES NFR BLD AUTO: 8.3 % (ref 5–12)
NEUTROPHILS NFR BLD AUTO: 2.6 10*3/MM3 (ref 1.7–7)
NEUTROPHILS NFR BLD AUTO: 59.7 % (ref 42.7–76)
NRBC BLD AUTO-RTO: 0 /100 WBC (ref 0–0.2)
PLAT MORPH BLD: NORMAL
PLATELET # BLD AUTO: 161 10*3/MM3 (ref 140–450)
PMV BLD AUTO: 9.8 FL (ref 6–12)
POTASSIUM SERPL-SCNC: 4 MMOL/L (ref 3.5–5.2)
PROT SERPL-MCNC: 6.5 G/DL (ref 6–8.5)
RBC # BLD AUTO: 2.91 10*6/MM3 (ref 3.77–5.28)
SODIUM SERPL-SCNC: 137 MMOL/L (ref 136–145)
WBC MORPH BLD: NORMAL
WBC NRBC COR # BLD AUTO: 4.35 10*3/MM3 (ref 3.4–10.8)

## 2023-12-18 PROCEDURE — 85007 BL SMEAR W/DIFF WBC COUNT: CPT

## 2023-12-18 PROCEDURE — 36415 COLL VENOUS BLD VENIPUNCTURE: CPT

## 2023-12-18 PROCEDURE — 82550 ASSAY OF CK (CPK): CPT

## 2023-12-18 PROCEDURE — 85025 COMPLETE CBC W/AUTO DIFF WBC: CPT

## 2023-12-18 PROCEDURE — 86140 C-REACTIVE PROTEIN: CPT

## 2023-12-18 PROCEDURE — 80053 COMPREHEN METABOLIC PANEL: CPT

## 2023-12-28 ENCOUNTER — APPOINTMENT (OUTPATIENT)
Dept: GENERAL RADIOLOGY | Facility: HOSPITAL | Age: 48
End: 2023-12-28
Payer: COMMERCIAL

## 2023-12-28 ENCOUNTER — HOSPITAL ENCOUNTER (INPATIENT)
Facility: HOSPITAL | Age: 48
LOS: 22 days | Discharge: NURSING, REHAB OR LONG-TERM CARE | End: 2024-01-19
Attending: EMERGENCY MEDICINE | Admitting: STUDENT IN AN ORGANIZED HEALTH CARE EDUCATION/TRAINING PROGRAM
Payer: COMMERCIAL

## 2023-12-28 ENCOUNTER — ANESTHESIA EVENT (OUTPATIENT)
Dept: PERIOP | Facility: HOSPITAL | Age: 48
End: 2023-12-28
Payer: COMMERCIAL

## 2023-12-28 DIAGNOSIS — F41.8 ANXIETY ASSOCIATED WITH DEPRESSION: ICD-10-CM

## 2023-12-28 DIAGNOSIS — Z89.612 S/P AKA (ABOVE KNEE AMPUTATION), LEFT: Primary | ICD-10-CM

## 2023-12-28 DIAGNOSIS — Z78.9 FAILURE OF OUTPATIENT TREATMENT: ICD-10-CM

## 2023-12-28 DIAGNOSIS — L08.9 LEFT FOOT INFECTION: ICD-10-CM

## 2023-12-28 DIAGNOSIS — L03.119 LOWER EXTREMITY CELLULITIS: ICD-10-CM

## 2023-12-28 LAB
ALBUMIN SERPL-MCNC: 2.7 G/DL (ref 3.5–5.2)
ALBUMIN/GLOB SERPL: 0.8 G/DL
ALP SERPL-CCNC: 247 U/L (ref 39–117)
ALT SERPL W P-5'-P-CCNC: 8 U/L (ref 1–33)
ANION GAP SERPL CALCULATED.3IONS-SCNC: 8 MMOL/L (ref 5–15)
AST SERPL-CCNC: 23 U/L (ref 1–32)
BASOPHILS # BLD AUTO: 0.01 10*3/MM3 (ref 0–0.2)
BASOPHILS NFR BLD AUTO: 0.3 % (ref 0–1.5)
BILIRUB SERPL-MCNC: 0.5 MG/DL (ref 0–1.2)
BUN SERPL-MCNC: 8 MG/DL (ref 6–20)
BUN/CREAT SERPL: 14.8 (ref 7–25)
CALCIUM SPEC-SCNC: 8.4 MG/DL (ref 8.6–10.5)
CHLORIDE SERPL-SCNC: 104 MMOL/L (ref 98–107)
CK SERPL-CCNC: 26 U/L (ref 20–180)
CO2 SERPL-SCNC: 28 MMOL/L (ref 22–29)
CREAT SERPL-MCNC: 0.54 MG/DL (ref 0.57–1)
D-LACTATE SERPL-SCNC: 0.9 MMOL/L (ref 0.5–2)
DEPRECATED RDW RBC AUTO: 66.5 FL (ref 37–54)
EGFRCR SERPLBLD CKD-EPI 2021: 112.3 ML/MIN/1.73
EOSINOPHIL # BLD AUTO: 0.13 10*3/MM3 (ref 0–0.4)
EOSINOPHIL NFR BLD AUTO: 3.4 % (ref 0.3–6.2)
ERYTHROCYTE [DISTWIDTH] IN BLOOD BY AUTOMATED COUNT: 16 % (ref 12.3–15.4)
GLOBULIN UR ELPH-MCNC: 3.4 GM/DL
GLUCOSE SERPL-MCNC: 95 MG/DL (ref 65–99)
HCT VFR BLD AUTO: 30.1 % (ref 34–46.6)
HGB BLD-MCNC: 9.7 G/DL (ref 12–15.9)
IMM GRANULOCYTES # BLD AUTO: 0.01 10*3/MM3 (ref 0–0.05)
IMM GRANULOCYTES NFR BLD AUTO: 0.3 % (ref 0–0.5)
LYMPHOCYTES # BLD AUTO: 0.8 10*3/MM3 (ref 0.7–3.1)
LYMPHOCYTES NFR BLD AUTO: 20.9 % (ref 19.6–45.3)
MCH RBC QN AUTO: 36.3 PG (ref 26.6–33)
MCHC RBC AUTO-ENTMCNC: 32.2 G/DL (ref 31.5–35.7)
MCV RBC AUTO: 112.7 FL (ref 79–97)
MONOCYTES # BLD AUTO: 0.36 10*3/MM3 (ref 0.1–0.9)
MONOCYTES NFR BLD AUTO: 9.4 % (ref 5–12)
NEUTROPHILS NFR BLD AUTO: 2.52 10*3/MM3 (ref 1.7–7)
NEUTROPHILS NFR BLD AUTO: 65.7 % (ref 42.7–76)
NRBC BLD AUTO-RTO: 0 /100 WBC (ref 0–0.2)
PLATELET # BLD AUTO: 143 10*3/MM3 (ref 140–450)
PMV BLD AUTO: 10 FL (ref 6–12)
POTASSIUM SERPL-SCNC: 4.4 MMOL/L (ref 3.5–5.2)
PROCALCITONIN SERPL-MCNC: 0.04 NG/ML (ref 0–0.25)
PROT SERPL-MCNC: 6.1 G/DL (ref 6–8.5)
RBC # BLD AUTO: 2.67 10*6/MM3 (ref 3.77–5.28)
SODIUM SERPL-SCNC: 140 MMOL/L (ref 136–145)
WBC NRBC COR # BLD AUTO: 3.83 10*3/MM3 (ref 3.4–10.8)

## 2023-12-28 PROCEDURE — 36415 COLL VENOUS BLD VENIPUNCTURE: CPT

## 2023-12-28 PROCEDURE — 84145 PROCALCITONIN (PCT): CPT | Performed by: EMERGENCY MEDICINE

## 2023-12-28 PROCEDURE — 82550 ASSAY OF CK (CPK): CPT | Performed by: EMERGENCY MEDICINE

## 2023-12-28 PROCEDURE — 86900 BLOOD TYPING SEROLOGIC ABO: CPT

## 2023-12-28 PROCEDURE — 99285 EMERGENCY DEPT VISIT HI MDM: CPT

## 2023-12-28 PROCEDURE — 80050 GENERAL HEALTH PANEL: CPT | Performed by: EMERGENCY MEDICINE

## 2023-12-28 PROCEDURE — 84439 ASSAY OF FREE THYROXINE: CPT | Performed by: INTERNAL MEDICINE

## 2023-12-28 PROCEDURE — 73630 X-RAY EXAM OF FOOT: CPT

## 2023-12-28 PROCEDURE — 93005 ELECTROCARDIOGRAM TRACING: CPT | Performed by: EMERGENCY MEDICINE

## 2023-12-28 PROCEDURE — 83605 ASSAY OF LACTIC ACID: CPT | Performed by: EMERGENCY MEDICINE

## 2023-12-28 PROCEDURE — 87040 BLOOD CULTURE FOR BACTERIA: CPT | Performed by: EMERGENCY MEDICINE

## 2023-12-28 PROCEDURE — 86901 BLOOD TYPING SEROLOGIC RH(D): CPT

## 2023-12-28 RX ORDER — POTASSIUM CHLORIDE 750 MG/1
10 TABLET, EXTENDED RELEASE ORAL DAILY
Status: ON HOLD | COMMUNITY
End: 2023-12-29

## 2023-12-28 RX ORDER — LORAZEPAM 1 MG/1
1 TABLET ORAL
COMMUNITY
Start: 2023-12-27

## 2023-12-28 RX ORDER — DIPHENHYDRAMINE HCL 25 MG
25 TABLET ORAL
COMMUNITY

## 2023-12-28 RX ORDER — OXYCODONE HYDROCHLORIDE 10 MG/1
15 TABLET ORAL EVERY 6 HOURS PRN
COMMUNITY
Start: 2023-11-30 | End: 2024-01-19 | Stop reason: HOSPADM

## 2023-12-28 RX ORDER — LIDOCAINE 50 MG/G
OINTMENT TOPICAL
Status: ON HOLD | COMMUNITY
End: 2023-12-29

## 2023-12-28 RX ORDER — ERGOCALCIFEROL 1.25 MG/1
50000 CAPSULE ORAL WEEKLY
COMMUNITY
Start: 2023-10-03

## 2023-12-28 RX ORDER — NEOMYCIN/POLYMYXIN B/PRAMOXINE 3.5-10K-1
CREAM (GRAM) TOPICAL
Status: ON HOLD | COMMUNITY
End: 2023-12-29

## 2023-12-28 RX ORDER — OXYCODONE HYDROCHLORIDE AND ACETAMINOPHEN 5; 325 MG/1; MG/1
1 TABLET ORAL ONCE
Status: COMPLETED | OUTPATIENT
Start: 2023-12-28 | End: 2023-12-28

## 2023-12-28 RX ORDER — NYSTATIN 100000 [USP'U]/G
POWDER TOPICAL
Status: ON HOLD | COMMUNITY
Start: 2023-01-11 | End: 2023-12-29

## 2023-12-28 RX ORDER — NALOXONE HYDROCHLORIDE 4 MG/.1ML
SPRAY NASAL
Status: ON HOLD | COMMUNITY
Start: 2023-09-11 | End: 2023-12-29

## 2023-12-28 RX ORDER — PRAMIPEXOLE DIHYDROCHLORIDE 0.5 MG/1
1 TABLET ORAL DAILY
COMMUNITY
Start: 2023-09-11 | End: 2024-09-10

## 2023-12-28 RX ORDER — TRAZODONE HYDROCHLORIDE 50 MG/1
50 TABLET ORAL
COMMUNITY
Start: 2023-09-11 | End: 2024-01-19 | Stop reason: HOSPADM

## 2023-12-28 RX ORDER — ZINC GLUCONATE 50 MG
50 TABLET ORAL DAILY
COMMUNITY
Start: 2023-10-03 | End: 2024-10-02

## 2023-12-28 RX ADMIN — OXYCODONE HYDROCHLORIDE AND ACETAMINOPHEN 1 TABLET: 5; 325 TABLET ORAL at 20:52

## 2023-12-28 NOTE — LETTER
EMS Transport Request  For use at [facility], [place], [place], [place], and [place] only   Patient Name: Phoebe Carvajal : 1975   Weight:(!) 219 kg (482 lb 14.4 oz) Pick-up Location: S4-1 BLS/ALS: BLS/ALS: BLS   Insurance: [employer] CR[employer] Auth End Date: 24   Pre-Cert #: D/C Summary complete: Y   Destination: Other [facility]   Contact Precautions: None   Equipment (O2, Fluids, etc.): None   Arrive By Date/Time: 24 @ noon Stretcher/WC: Stretcher   CM Requesting: Ling Ken, MSW Ext: [phone]   Notes/Medical Necessity: Non-ambulatory, non-weight bearing     ______________________________________________________________________    *Only 2 patient bags OR 1 carry-on size bag are permitted.  Wheelchairs and walkers CANNOT transported with the patient. Acknowledge: Yes

## 2023-12-28 NOTE — Clinical Note
Level of Care: Telemetry [5]   Diagnosis: Lower extremity cellulitis [629070]   Admitting Physician: YASMIN GARRISON III [[id number]]   Attending Physician: YASMIN GARRISON III [[id number]]   Bed Request Comments: tele inpt

## 2023-12-29 ENCOUNTER — ANESTHESIA EVENT CONVERTED (OUTPATIENT)
Dept: ANESTHESIOLOGY | Facility: HOSPITAL | Age: 48
End: 2023-12-29
Payer: COMMERCIAL

## 2023-12-29 ENCOUNTER — ANESTHESIA (OUTPATIENT)
Dept: PERIOP | Facility: HOSPITAL | Age: 48
End: 2023-12-29
Payer: COMMERCIAL

## 2023-12-29 ENCOUNTER — APPOINTMENT (OUTPATIENT)
Dept: GENERAL RADIOLOGY | Facility: HOSPITAL | Age: 48
End: 2023-12-29
Payer: COMMERCIAL

## 2023-12-29 PROBLEM — L97.412: Chronic | Status: ACTIVE | Noted: 2022-03-07

## 2023-12-29 PROBLEM — E66.01 OBESITY, MORBID, BMI 50 OR HIGHER: Status: ACTIVE | Noted: 2023-12-29

## 2023-12-29 PROBLEM — M86.60 CHRONIC OSTEOMYELITIS: Status: ACTIVE | Noted: 2023-10-23

## 2023-12-29 PROBLEM — G62.9 NEUROPATHY: Status: ACTIVE | Noted: 2023-12-29

## 2023-12-29 PROBLEM — G25.81 RLS (RESTLESS LEGS SYNDROME): Status: ACTIVE | Noted: 2023-12-29

## 2023-12-29 PROBLEM — M86.472 CHRONIC OSTEOMYELITIS OF LEFT FOOT WITH DRAINING SINUS: Status: ACTIVE | Noted: 2023-10-03

## 2023-12-29 PROBLEM — F41.8 ANXIETY ASSOCIATED WITH DEPRESSION: Status: ACTIVE | Noted: 2023-12-29

## 2023-12-29 PROBLEM — L97.422 ULCER OF LEFT HEEL, WITH FAT LAYER EXPOSED: Chronic | Status: ACTIVE | Noted: 2022-03-07

## 2023-12-29 PROBLEM — E03.9 ACQUIRED HYPOTHYROIDISM: Chronic | Status: ACTIVE | Noted: 2022-07-18

## 2023-12-29 LAB
ABO GROUP BLD: NORMAL
ABO GROUP BLD: NORMAL
ALBUMIN SERPL-MCNC: 3.1 G/DL (ref 3.5–5.2)
ALBUMIN/GLOB SERPL: 0.8 G/DL
ALP SERPL-CCNC: 275 U/L (ref 39–117)
ALT SERPL W P-5'-P-CCNC: 6 U/L (ref 1–33)
ANION GAP SERPL CALCULATED.3IONS-SCNC: 9 MMOL/L (ref 5–15)
AST SERPL-CCNC: 20 U/L (ref 1–32)
B PARAPERT DNA SPEC QL NAA+PROBE: NOT DETECTED
B PERT DNA SPEC QL NAA+PROBE: NOT DETECTED
BASOPHILS # BLD AUTO: 0.01 10*3/MM3 (ref 0–0.2)
BASOPHILS NFR BLD AUTO: 0.3 % (ref 0–1.5)
BILIRUB SERPL-MCNC: 0.5 MG/DL (ref 0–1.2)
BLD GP AB SCN SERPL QL: NEGATIVE
BUN SERPL-MCNC: 8 MG/DL (ref 6–20)
BUN/CREAT SERPL: 12.7 (ref 7–25)
C PNEUM DNA NPH QL NAA+NON-PROBE: NOT DETECTED
CALCIUM SPEC-SCNC: 8.6 MG/DL (ref 8.6–10.5)
CHLORIDE SERPL-SCNC: 103 MMOL/L (ref 98–107)
CO2 SERPL-SCNC: 26 MMOL/L (ref 22–29)
CREAT SERPL-MCNC: 0.63 MG/DL (ref 0.57–1)
CRP SERPL-MCNC: 1.92 MG/DL (ref 0–0.5)
D-LACTATE SERPL-SCNC: 1 MMOL/L (ref 0.5–2)
DEPRECATED RDW RBC AUTO: 66.4 FL (ref 37–54)
EGFRCR SERPLBLD CKD-EPI 2021: 108.2 ML/MIN/1.73
EOSINOPHIL # BLD AUTO: 0.15 10*3/MM3 (ref 0–0.4)
EOSINOPHIL NFR BLD AUTO: 4.3 % (ref 0.3–6.2)
ERYTHROCYTE [DISTWIDTH] IN BLOOD BY AUTOMATED COUNT: 16.1 % (ref 12.3–15.4)
FLUAV SUBTYP SPEC NAA+PROBE: NOT DETECTED
FLUBV RNA ISLT QL NAA+PROBE: NOT DETECTED
GLOBULIN UR ELPH-MCNC: 3.9 GM/DL
GLUCOSE SERPL-MCNC: 85 MG/DL (ref 65–99)
HADV DNA SPEC NAA+PROBE: NOT DETECTED
HBA1C MFR BLD: 4.6 % (ref 4.8–5.6)
HCOV 229E RNA SPEC QL NAA+PROBE: NOT DETECTED
HCOV HKU1 RNA SPEC QL NAA+PROBE: NOT DETECTED
HCOV NL63 RNA SPEC QL NAA+PROBE: NOT DETECTED
HCOV OC43 RNA SPEC QL NAA+PROBE: NOT DETECTED
HCT VFR BLD AUTO: 32.5 % (ref 34–46.6)
HGB BLD-MCNC: 10.3 G/DL (ref 12–15.9)
HMPV RNA NPH QL NAA+NON-PROBE: NOT DETECTED
HPIV1 RNA ISLT QL NAA+PROBE: NOT DETECTED
HPIV2 RNA SPEC QL NAA+PROBE: NOT DETECTED
HPIV3 RNA NPH QL NAA+PROBE: NOT DETECTED
HPIV4 P GENE NPH QL NAA+PROBE: NOT DETECTED
IMM GRANULOCYTES # BLD AUTO: 0 10*3/MM3 (ref 0–0.05)
IMM GRANULOCYTES NFR BLD AUTO: 0 % (ref 0–0.5)
INR PPP: 1.16 (ref 0.89–1.12)
LYMPHOCYTES # BLD AUTO: 1.16 10*3/MM3 (ref 0.7–3.1)
LYMPHOCYTES NFR BLD AUTO: 33 % (ref 19.6–45.3)
M PNEUMO IGG SER IA-ACNC: NOT DETECTED
MAGNESIUM SERPL-MCNC: 2 MG/DL (ref 1.6–2.6)
MCH RBC QN AUTO: 35.3 PG (ref 26.6–33)
MCHC RBC AUTO-ENTMCNC: 31.7 G/DL (ref 31.5–35.7)
MCV RBC AUTO: 111.3 FL (ref 79–97)
MONOCYTES # BLD AUTO: 0.22 10*3/MM3 (ref 0.1–0.9)
MONOCYTES NFR BLD AUTO: 6.3 % (ref 5–12)
MRSA DNA SPEC QL NAA+PROBE: NEGATIVE
NEUTROPHILS NFR BLD AUTO: 1.97 10*3/MM3 (ref 1.7–7)
NEUTROPHILS NFR BLD AUTO: 56.1 % (ref 42.7–76)
NRBC BLD AUTO-RTO: 0 /100 WBC (ref 0–0.2)
PHOSPHATE SERPL-MCNC: 3.9 MG/DL (ref 2.5–4.5)
PLATELET # BLD AUTO: 150 10*3/MM3 (ref 140–450)
PMV BLD AUTO: 9.8 FL (ref 6–12)
POTASSIUM SERPL-SCNC: 4.2 MMOL/L (ref 3.5–5.2)
PROCALCITONIN SERPL-MCNC: 0.04 NG/ML (ref 0–0.25)
PROT SERPL-MCNC: 7 G/DL (ref 6–8.5)
PROTHROMBIN TIME: 14.9 SECONDS (ref 12.2–14.5)
QT INTERVAL: 374 MS
QTC INTERVAL: 420 MS
RBC # BLD AUTO: 2.92 10*6/MM3 (ref 3.77–5.28)
RH BLD: NEGATIVE
RH BLD: NEGATIVE
RHINOVIRUS RNA SPEC NAA+PROBE: NOT DETECTED
RSV RNA NPH QL NAA+NON-PROBE: NOT DETECTED
SARS-COV-2 RNA NPH QL NAA+NON-PROBE: NOT DETECTED
SODIUM SERPL-SCNC: 138 MMOL/L (ref 136–145)
T&S EXPIRATION DATE: NORMAL
T4 FREE SERPL-MCNC: 0.75 NG/DL (ref 0.93–1.7)
TSH SERPL DL<=0.05 MIU/L-ACNC: 7.32 UIU/ML (ref 0.27–4.2)
WBC NRBC COR # BLD AUTO: 3.51 10*3/MM3 (ref 3.4–10.8)

## 2023-12-29 PROCEDURE — 86901 BLOOD TYPING SEROLOGIC RH(D): CPT | Performed by: INTERNAL MEDICINE

## 2023-12-29 PROCEDURE — 87075 CULTR BACTERIA EXCEPT BLOOD: CPT | Performed by: ORTHOPAEDIC SURGERY

## 2023-12-29 PROCEDURE — 83036 HEMOGLOBIN GLYCOSYLATED A1C: CPT | Performed by: INTERNAL MEDICINE

## 2023-12-29 PROCEDURE — 25010000002 HYDROMORPHONE PER 4 MG: Performed by: INTERNAL MEDICINE

## 2023-12-29 PROCEDURE — 93005 ELECTROCARDIOGRAM TRACING: CPT | Performed by: ANESTHESIOLOGY

## 2023-12-29 PROCEDURE — 88307 TISSUE EXAM BY PATHOLOGIST: CPT | Performed by: ORTHOPAEDIC SURGERY

## 2023-12-29 PROCEDURE — 87641 MR-STAPH DNA AMP PROBE: CPT | Performed by: INTERNAL MEDICINE

## 2023-12-29 PROCEDURE — 25810000003 LACTATED RINGERS PER 1000 ML: Performed by: ORTHOPAEDIC SURGERY

## 2023-12-29 PROCEDURE — 80053 COMPREHEN METABOLIC PANEL: CPT | Performed by: INTERNAL MEDICINE

## 2023-12-29 PROCEDURE — 25010000002 DAPTOMYCIN PER 1 MG: Performed by: ORTHOPAEDIC SURGERY

## 2023-12-29 PROCEDURE — 63710000001 DIPHENHYDRAMINE PER 50 MG: Performed by: FAMILY MEDICINE

## 2023-12-29 PROCEDURE — 25010000002 FENTANYL CITRATE (PF) 50 MCG/ML SOLUTION: Performed by: ANESTHESIOLOGY

## 2023-12-29 PROCEDURE — 25010000002 DEXAMETHASONE SODIUM PHOSPHATE 10 MG/ML SOLUTION: Performed by: NURSE ANESTHETIST, CERTIFIED REGISTERED

## 2023-12-29 PROCEDURE — 25010000002 DAPTOMYCIN PER 1 MG: Performed by: EMERGENCY MEDICINE

## 2023-12-29 PROCEDURE — 87102 FUNGUS ISOLATION CULTURE: CPT | Performed by: ORTHOPAEDIC SURGERY

## 2023-12-29 PROCEDURE — 25810000003 LACTATED RINGERS PER 1000 ML: Performed by: ANESTHESIOLOGY

## 2023-12-29 PROCEDURE — 87205 SMEAR GRAM STAIN: CPT | Performed by: FAMILY MEDICINE

## 2023-12-29 PROCEDURE — 0Y6D0Z3 DETACHMENT AT LEFT UPPER LEG, LOW, OPEN APPROACH: ICD-10-PCS | Performed by: ORTHOPAEDIC SURGERY

## 2023-12-29 PROCEDURE — 25010000002 MORPHINE PER 10 MG: Performed by: ORTHOPAEDIC SURGERY

## 2023-12-29 PROCEDURE — 25010000002 CEFAZOLIN PER 500 MG: Performed by: ANESTHESIOLOGY

## 2023-12-29 PROCEDURE — 86140 C-REACTIVE PROTEIN: CPT | Performed by: INTERNAL MEDICINE

## 2023-12-29 PROCEDURE — 84100 ASSAY OF PHOSPHORUS: CPT | Performed by: INTERNAL MEDICINE

## 2023-12-29 PROCEDURE — 83605 ASSAY OF LACTIC ACID: CPT | Performed by: INTERNAL MEDICINE

## 2023-12-29 PROCEDURE — 86900 BLOOD TYPING SEROLOGIC ABO: CPT | Performed by: INTERNAL MEDICINE

## 2023-12-29 PROCEDURE — 86850 RBC ANTIBODY SCREEN: CPT | Performed by: INTERNAL MEDICINE

## 2023-12-29 PROCEDURE — 85025 COMPLETE CBC W/AUTO DIFF WBC: CPT | Performed by: INTERNAL MEDICINE

## 2023-12-29 PROCEDURE — 25810000003 LACTATED RINGERS PER 1000 ML: Performed by: INTERNAL MEDICINE

## 2023-12-29 PROCEDURE — 25010000002 CEFTAZIDIME 2 G RECONSTITUTED SOLUTION 1 EACH VIAL: Performed by: INTERNAL MEDICINE

## 2023-12-29 PROCEDURE — 25010000002 DIPHENHYDRAMINE PER 50 MG: Performed by: ANESTHESIOLOGY

## 2023-12-29 PROCEDURE — 99223 1ST HOSP IP/OBS HIGH 75: CPT | Performed by: INTERNAL MEDICINE

## 2023-12-29 PROCEDURE — 25010000002 BUPIVACAINE (PF) 0.25 % SOLUTION: Performed by: NURSE ANESTHETIST, CERTIFIED REGISTERED

## 2023-12-29 PROCEDURE — 25010000002 CEFTRIAXONE PER 250 MG: Performed by: INTERNAL MEDICINE

## 2023-12-29 PROCEDURE — 71045 X-RAY EXAM CHEST 1 VIEW: CPT

## 2023-12-29 PROCEDURE — 85610 PROTHROMBIN TIME: CPT | Performed by: INTERNAL MEDICINE

## 2023-12-29 PROCEDURE — 25010000002 PROPOFOL 10 MG/ML EMULSION: Performed by: ANESTHESIOLOGY

## 2023-12-29 PROCEDURE — 87116 MYCOBACTERIA CULTURE: CPT | Performed by: ORTHOPAEDIC SURGERY

## 2023-12-29 PROCEDURE — 76000 FLUOROSCOPY <1 HR PHYS/QHP: CPT

## 2023-12-29 PROCEDURE — 25010000002 ONDANSETRON PER 1 MG: Performed by: ANESTHESIOLOGY

## 2023-12-29 PROCEDURE — 87206 SMEAR FLUORESCENT/ACID STAI: CPT | Performed by: ORTHOPAEDIC SURGERY

## 2023-12-29 PROCEDURE — 25010000002 BUPRENORPHINE PER 0.1 MG: Performed by: ANESTHESIOLOGY

## 2023-12-29 PROCEDURE — 0202U NFCT DS 22 TRGT SARS-COV-2: CPT | Performed by: INTERNAL MEDICINE

## 2023-12-29 PROCEDURE — 83735 ASSAY OF MAGNESIUM: CPT | Performed by: INTERNAL MEDICINE

## 2023-12-29 PROCEDURE — 25010000002 FENTANYL CITRATE (PF) 50 MCG/ML SOLUTION: Performed by: NURSE ANESTHETIST, CERTIFIED REGISTERED

## 2023-12-29 PROCEDURE — 25010000002 HYDROMORPHONE 1 MG/ML SOLUTION: Performed by: FAMILY MEDICINE

## 2023-12-29 PROCEDURE — 87070 CULTURE OTHR SPECIMN AEROBIC: CPT | Performed by: FAMILY MEDICINE

## 2023-12-29 PROCEDURE — 84145 PROCALCITONIN (PCT): CPT | Performed by: INTERNAL MEDICINE

## 2023-12-29 PROCEDURE — 88311 DECALCIFY TISSUE: CPT | Performed by: ORTHOPAEDIC SURGERY

## 2023-12-29 RX ORDER — POLYETHYLENE GLYCOL 3350 17 G/17G
17 POWDER, FOR SOLUTION ORAL DAILY PRN
Status: DISCONTINUED | OUTPATIENT
Start: 2023-12-29 | End: 2024-01-19 | Stop reason: HOSPADM

## 2023-12-29 RX ORDER — PROPOFOL 10 MG/ML
VIAL (ML) INTRAVENOUS AS NEEDED
Status: DISCONTINUED | OUTPATIENT
Start: 2023-12-29 | End: 2023-12-29 | Stop reason: SURG

## 2023-12-29 RX ORDER — SODIUM CHLORIDE, SODIUM LACTATE, POTASSIUM CHLORIDE, CALCIUM CHLORIDE 600; 310; 30; 20 MG/100ML; MG/100ML; MG/100ML; MG/100ML
100 INJECTION, SOLUTION INTRAVENOUS CONTINUOUS
Status: DISCONTINUED | OUTPATIENT
Start: 2023-12-29 | End: 2024-01-04

## 2023-12-29 RX ORDER — AMOXICILLIN 250 MG
2 CAPSULE ORAL 2 TIMES DAILY
Status: DISCONTINUED | OUTPATIENT
Start: 2023-12-29 | End: 2024-01-19 | Stop reason: HOSPADM

## 2023-12-29 RX ORDER — DEXAMETHASONE SODIUM PHOSPHATE 10 MG/ML
INJECTION, SOLUTION INTRAMUSCULAR; INTRAVENOUS
Status: COMPLETED | OUTPATIENT
Start: 2023-12-29 | End: 2023-12-29

## 2023-12-29 RX ORDER — MORPHINE SULFATE 2 MG/ML
2 INJECTION, SOLUTION INTRAMUSCULAR; INTRAVENOUS EVERY 4 HOURS PRN
Status: DISCONTINUED | OUTPATIENT
Start: 2023-12-29 | End: 2023-12-29

## 2023-12-29 RX ORDER — LORAZEPAM 0.5 MG/1
0.5 TABLET ORAL EVERY 12 HOURS PRN
Status: DISCONTINUED | OUTPATIENT
Start: 2023-12-29 | End: 2024-01-04

## 2023-12-29 RX ORDER — BUPIVACAINE HYDROCHLORIDE 2.5 MG/ML
INJECTION, SOLUTION EPIDURAL; INFILTRATION; INTRACAUDAL
Status: COMPLETED | OUTPATIENT
Start: 2023-12-29 | End: 2023-12-29

## 2023-12-29 RX ORDER — SODIUM CHLORIDE 0.9 % (FLUSH) 0.9 %
10 SYRINGE (ML) INJECTION AS NEEDED
Status: DISCONTINUED | OUTPATIENT
Start: 2023-12-29 | End: 2024-01-19 | Stop reason: HOSPADM

## 2023-12-29 RX ORDER — BUPIVACAINE HYDROCHLORIDE AND EPINEPHRINE 2.5; 5 MG/ML; UG/ML
INJECTION, SOLUTION EPIDURAL; INFILTRATION; INTRACAUDAL; PERINEURAL AS NEEDED
Status: DISCONTINUED | OUTPATIENT
Start: 2023-12-29 | End: 2023-12-29 | Stop reason: HOSPADM

## 2023-12-29 RX ORDER — SODIUM CHLORIDE 0.9 % (FLUSH) 0.9 %
10 SYRINGE (ML) INJECTION EVERY 12 HOURS SCHEDULED
Status: DISCONTINUED | OUTPATIENT
Start: 2023-12-29 | End: 2023-12-29 | Stop reason: HOSPADM

## 2023-12-29 RX ORDER — SODIUM CHLORIDE 9 MG/ML
40 INJECTION, SOLUTION INTRAVENOUS AS NEEDED
Status: DISCONTINUED | OUTPATIENT
Start: 2023-12-29 | End: 2023-12-29 | Stop reason: HOSPADM

## 2023-12-29 RX ORDER — BISACODYL 10 MG
10 SUPPOSITORY, RECTAL RECTAL DAILY PRN
Status: DISCONTINUED | OUTPATIENT
Start: 2023-12-29 | End: 2024-01-19 | Stop reason: HOSPADM

## 2023-12-29 RX ORDER — CEFAZOLIN SODIUM 1 G/3ML
INJECTION, POWDER, FOR SOLUTION INTRAMUSCULAR; INTRAVENOUS AS NEEDED
Status: DISCONTINUED | OUTPATIENT
Start: 2023-12-29 | End: 2023-12-29 | Stop reason: SURG

## 2023-12-29 RX ORDER — FENTANYL CITRATE 50 UG/ML
INJECTION, SOLUTION INTRAMUSCULAR; INTRAVENOUS
Status: COMPLETED | OUTPATIENT
Start: 2023-12-29 | End: 2023-12-29

## 2023-12-29 RX ORDER — MAGNESIUM HYDROXIDE 1200 MG/15ML
LIQUID ORAL AS NEEDED
Status: DISCONTINUED | OUTPATIENT
Start: 2023-12-29 | End: 2023-12-29 | Stop reason: HOSPADM

## 2023-12-29 RX ORDER — ONDANSETRON 2 MG/ML
4 INJECTION INTRAMUSCULAR; INTRAVENOUS EVERY 6 HOURS PRN
Status: DISCONTINUED | OUTPATIENT
Start: 2023-12-29 | End: 2024-01-19 | Stop reason: HOSPADM

## 2023-12-29 RX ORDER — CHOLECALCIFEROL (VITAMIN D3) 125 MCG
5 CAPSULE ORAL NIGHTLY PRN
Status: DISCONTINUED | OUTPATIENT
Start: 2023-12-29 | End: 2024-01-19 | Stop reason: HOSPADM

## 2023-12-29 RX ORDER — NALOXONE HCL 0.4 MG/ML
0.4 VIAL (ML) INJECTION
Status: DISCONTINUED | OUTPATIENT
Start: 2023-12-29 | End: 2024-01-02

## 2023-12-29 RX ORDER — LEVOTHYROXINE SODIUM 0.12 MG/1
125 TABLET ORAL DAILY
Status: DISCONTINUED | OUTPATIENT
Start: 2023-12-29 | End: 2024-01-19 | Stop reason: HOSPADM

## 2023-12-29 RX ORDER — DIPHENHYDRAMINE HYDROCHLORIDE 50 MG/ML
12.5 INJECTION INTRAMUSCULAR; INTRAVENOUS ONCE
Status: COMPLETED | OUTPATIENT
Start: 2023-12-29 | End: 2023-12-29

## 2023-12-29 RX ORDER — PRAMIPEXOLE DIHYDROCHLORIDE 0.25 MG/1
1 TABLET ORAL DAILY
Status: DISCONTINUED | OUTPATIENT
Start: 2023-12-29 | End: 2024-01-19 | Stop reason: HOSPADM

## 2023-12-29 RX ORDER — HYDROMORPHONE HYDROCHLORIDE 1 MG/ML
0.5 INJECTION, SOLUTION INTRAMUSCULAR; INTRAVENOUS; SUBCUTANEOUS
Status: DISCONTINUED | OUTPATIENT
Start: 2023-12-29 | End: 2023-12-29 | Stop reason: HOSPADM

## 2023-12-29 RX ORDER — LIDOCAINE HYDROCHLORIDE 10 MG/ML
0.5 INJECTION, SOLUTION EPIDURAL; INFILTRATION; INTRACAUDAL; PERINEURAL ONCE AS NEEDED
Status: DISCONTINUED | OUTPATIENT
Start: 2023-12-29 | End: 2023-12-29 | Stop reason: HOSPADM

## 2023-12-29 RX ORDER — SODIUM CHLORIDE 0.9 % (FLUSH) 0.9 %
10 SYRINGE (ML) INJECTION AS NEEDED
Status: DISCONTINUED | OUTPATIENT
Start: 2023-12-29 | End: 2023-12-29 | Stop reason: HOSPADM

## 2023-12-29 RX ORDER — OXYCODONE HYDROCHLORIDE 10 MG/1
10 TABLET ORAL EVERY 4 HOURS PRN
Status: DISCONTINUED | OUTPATIENT
Start: 2023-12-29 | End: 2024-01-01

## 2023-12-29 RX ORDER — SODIUM CHLORIDE 9 MG/ML
40 INJECTION, SOLUTION INTRAVENOUS AS NEEDED
Status: DISCONTINUED | OUTPATIENT
Start: 2023-12-29 | End: 2024-01-19 | Stop reason: HOSPADM

## 2023-12-29 RX ORDER — PREGABALIN 100 MG/1
200 CAPSULE ORAL 3 TIMES DAILY
Status: DISCONTINUED | OUTPATIENT
Start: 2023-12-29 | End: 2024-01-19 | Stop reason: HOSPADM

## 2023-12-29 RX ORDER — TRAZODONE HYDROCHLORIDE 50 MG/1
50 TABLET ORAL NIGHTLY PRN
Status: DISCONTINUED | OUTPATIENT
Start: 2023-12-29 | End: 2024-01-19 | Stop reason: HOSPADM

## 2023-12-29 RX ORDER — SODIUM CHLORIDE 0.9 % (FLUSH) 0.9 %
10 SYRINGE (ML) INJECTION EVERY 12 HOURS SCHEDULED
Status: DISCONTINUED | OUTPATIENT
Start: 2023-12-29 | End: 2024-01-19 | Stop reason: HOSPADM

## 2023-12-29 RX ORDER — BISACODYL 5 MG/1
5 TABLET, DELAYED RELEASE ORAL DAILY PRN
Status: DISCONTINUED | OUTPATIENT
Start: 2023-12-29 | End: 2024-01-19 | Stop reason: HOSPADM

## 2023-12-29 RX ORDER — PHENYLEPHRINE HCL IN 0.9% NACL 1 MG/10 ML
SYRINGE (ML) INTRAVENOUS AS NEEDED
Status: DISCONTINUED | OUTPATIENT
Start: 2023-12-29 | End: 2023-12-29 | Stop reason: SURG

## 2023-12-29 RX ORDER — MIDAZOLAM HYDROCHLORIDE 1 MG/ML
1 INJECTION INTRAMUSCULAR; INTRAVENOUS
Status: DISCONTINUED | OUTPATIENT
Start: 2023-12-29 | End: 2023-12-29 | Stop reason: HOSPADM

## 2023-12-29 RX ORDER — SODIUM CHLORIDE, SODIUM LACTATE, POTASSIUM CHLORIDE, CALCIUM CHLORIDE 600; 310; 30; 20 MG/100ML; MG/100ML; MG/100ML; MG/100ML
9 INJECTION, SOLUTION INTRAVENOUS CONTINUOUS
Status: DISCONTINUED | OUTPATIENT
Start: 2023-12-29 | End: 2024-01-01

## 2023-12-29 RX ORDER — CEFTAZIDIME 1 G/1
2 INJECTION, POWDER, FOR SOLUTION INTRAMUSCULAR; INTRAVENOUS EVERY 8 HOURS
Status: DISCONTINUED | OUTPATIENT
Start: 2023-12-29 | End: 2023-12-29

## 2023-12-29 RX ORDER — FAMOTIDINE 10 MG/ML
20 INJECTION, SOLUTION INTRAVENOUS EVERY 12 HOURS SCHEDULED
Status: DISCONTINUED | OUTPATIENT
Start: 2023-12-29 | End: 2024-01-01 | Stop reason: ALTCHOICE

## 2023-12-29 RX ORDER — NALOXONE HCL 0.4 MG/ML
0.4 VIAL (ML) INJECTION
Status: DISCONTINUED | OUTPATIENT
Start: 2023-12-29 | End: 2023-12-29

## 2023-12-29 RX ORDER — OXYCODONE HYDROCHLORIDE 5 MG/1
5 TABLET ORAL EVERY 4 HOURS PRN
Status: DISCONTINUED | OUTPATIENT
Start: 2023-12-29 | End: 2024-01-02

## 2023-12-29 RX ORDER — FENTANYL CITRATE 50 UG/ML
50 INJECTION, SOLUTION INTRAMUSCULAR; INTRAVENOUS
Status: DISCONTINUED | OUTPATIENT
Start: 2023-12-29 | End: 2023-12-29 | Stop reason: HOSPADM

## 2023-12-29 RX ORDER — ACETAMINOPHEN 325 MG/1
650 TABLET ORAL EVERY 6 HOURS PRN
Status: DISCONTINUED | OUTPATIENT
Start: 2023-12-29 | End: 2024-01-02

## 2023-12-29 RX ORDER — HYDROMORPHONE HYDROCHLORIDE 1 MG/ML
0.5 INJECTION, SOLUTION INTRAMUSCULAR; INTRAVENOUS; SUBCUTANEOUS
Status: DISCONTINUED | OUTPATIENT
Start: 2023-12-29 | End: 2023-12-29

## 2023-12-29 RX ORDER — LIDOCAINE HYDROCHLORIDE 10 MG/ML
INJECTION, SOLUTION EPIDURAL; INFILTRATION; INTRACAUDAL; PERINEURAL AS NEEDED
Status: DISCONTINUED | OUTPATIENT
Start: 2023-12-29 | End: 2023-12-29 | Stop reason: SURG

## 2023-12-29 RX ORDER — BUPRENORPHINE HYDROCHLORIDE 0.32 MG/ML
INJECTION INTRAMUSCULAR; INTRAVENOUS AS NEEDED
Status: DISCONTINUED | OUTPATIENT
Start: 2023-12-29 | End: 2023-12-29 | Stop reason: SURG

## 2023-12-29 RX ORDER — DIPHENHYDRAMINE HCL 25 MG
25 CAPSULE ORAL EVERY 6 HOURS PRN
Status: DISCONTINUED | OUTPATIENT
Start: 2023-12-29 | End: 2024-01-19 | Stop reason: HOSPADM

## 2023-12-29 RX ORDER — ROPIVACAINE HYDROCHLORIDE 2 MG/ML
INJECTION, SOLUTION EPIDURAL; INFILTRATION; PERINEURAL CONTINUOUS
Status: DISCONTINUED | OUTPATIENT
Start: 2023-12-29 | End: 2023-12-29

## 2023-12-29 RX ORDER — ONDANSETRON 2 MG/ML
INJECTION INTRAMUSCULAR; INTRAVENOUS AS NEEDED
Status: DISCONTINUED | OUTPATIENT
Start: 2023-12-29 | End: 2023-12-29 | Stop reason: SURG

## 2023-12-29 RX ADMIN — SODIUM CHLORIDE, POTASSIUM CHLORIDE, SODIUM LACTATE AND CALCIUM CHLORIDE: 600; 310; 30; 20 INJECTION, SOLUTION INTRAVENOUS at 12:32

## 2023-12-29 RX ADMIN — FAMOTIDINE 20 MG: 10 INJECTION INTRAVENOUS at 02:19

## 2023-12-29 RX ADMIN — Medication 200 MCG: at 12:06

## 2023-12-29 RX ADMIN — OXYCODONE HYDROCHLORIDE 10 MG: 10 TABLET ORAL at 08:23

## 2023-12-29 RX ADMIN — PREGABALIN 200 MG: 100 CAPSULE ORAL at 20:53

## 2023-12-29 RX ADMIN — PREGABALIN 200 MG: 100 CAPSULE ORAL at 15:41

## 2023-12-29 RX ADMIN — SODIUM CHLORIDE, POTASSIUM CHLORIDE, SODIUM LACTATE AND CALCIUM CHLORIDE 100 ML/HR: 600; 310; 30; 20 INJECTION, SOLUTION INTRAVENOUS at 02:19

## 2023-12-29 RX ADMIN — SERTRALINE HYDROCHLORIDE 50 MG: 50 TABLET ORAL at 20:53

## 2023-12-29 RX ADMIN — DEXAMETHASONE SODIUM PHOSPHATE 2 MG: 10 INJECTION, SOLUTION INTRAMUSCULAR; INTRAVENOUS at 09:50

## 2023-12-29 RX ADMIN — OXYCODONE HYDROCHLORIDE 5 MG: 5 TABLET ORAL at 17:17

## 2023-12-29 RX ADMIN — CEFTAZIDIME 2000 MG: 2 INJECTION, POWDER, FOR SOLUTION INTRAVENOUS at 20:52

## 2023-12-29 RX ADMIN — FENTANYL CITRATE 100 MCG: 50 INJECTION, SOLUTION INTRAMUSCULAR; INTRAVENOUS at 09:40

## 2023-12-29 RX ADMIN — BUPIVACAINE HYDROCHLORIDE 30 ML: 2.5 INJECTION, SOLUTION EPIDURAL; INFILTRATION; INTRACAUDAL at 09:50

## 2023-12-29 RX ADMIN — SODIUM CHLORIDE, POTASSIUM CHLORIDE, SODIUM LACTATE AND CALCIUM CHLORIDE: 600; 310; 30; 20 INJECTION, SOLUTION INTRAVENOUS at 10:36

## 2023-12-29 RX ADMIN — FENTANYL CITRATE 100 MCG: 50 INJECTION, SOLUTION INTRAMUSCULAR; INTRAVENOUS at 10:47

## 2023-12-29 RX ADMIN — SENNOSIDES AND DOCUSATE SODIUM 2 TABLET: 8.6; 5 TABLET ORAL at 08:23

## 2023-12-29 RX ADMIN — PROPOFOL 250 MG: 10 INJECTION, EMULSION INTRAVENOUS at 10:47

## 2023-12-29 RX ADMIN — LORAZEPAM 0.5 MG: 0.5 TABLET ORAL at 05:50

## 2023-12-29 RX ADMIN — SODIUM CHLORIDE, POTASSIUM CHLORIDE, SODIUM LACTATE AND CALCIUM CHLORIDE 100 ML/HR: 600; 310; 30; 20 INJECTION, SOLUTION INTRAVENOUS at 21:02

## 2023-12-29 RX ADMIN — LEVOTHYROXINE SODIUM 125 MCG: 125 TABLET ORAL at 05:50

## 2023-12-29 RX ADMIN — DIPHENHYDRAMINE HYDROCHLORIDE 25 MG: 25 CAPSULE ORAL at 16:11

## 2023-12-29 RX ADMIN — OXYCODONE HYDROCHLORIDE 10 MG: 10 TABLET ORAL at 03:16

## 2023-12-29 RX ADMIN — LIDOCAINE HYDROCHLORIDE 50 MG: 10 INJECTION, SOLUTION EPIDURAL; INFILTRATION; INTRACAUDAL; PERINEURAL at 10:47

## 2023-12-29 RX ADMIN — FENTANYL CITRATE 50 MCG: 50 INJECTION, SOLUTION INTRAMUSCULAR; INTRAVENOUS at 11:59

## 2023-12-29 RX ADMIN — BUPIVACAINE HYDROCHLORIDE 30 ML: 2.5 INJECTION, SOLUTION EPIDURAL; INFILTRATION; INTRACAUDAL; PERINEURAL at 09:40

## 2023-12-29 RX ADMIN — DAPTOMYCIN 500 MG: 500 INJECTION, POWDER, LYOPHILIZED, FOR SOLUTION INTRAVENOUS at 22:00

## 2023-12-29 RX ADMIN — PROPOFOL 50 MCG/KG/MIN: 10 INJECTION, EMULSION INTRAVENOUS at 10:53

## 2023-12-29 RX ADMIN — Medication 10 ML: at 20:54

## 2023-12-29 RX ADMIN — HYDROMORPHONE HYDROCHLORIDE 0.5 MG: 1 INJECTION, SOLUTION INTRAMUSCULAR; INTRAVENOUS; SUBCUTANEOUS at 02:04

## 2023-12-29 RX ADMIN — FENTANYL CITRATE 25 MCG: 50 INJECTION, SOLUTION INTRAMUSCULAR; INTRAVENOUS at 13:53

## 2023-12-29 RX ADMIN — CEFTRIAXONE 2000 MG: 2 INJECTION, POWDER, FOR SOLUTION INTRAMUSCULAR; INTRAVENOUS at 03:14

## 2023-12-29 RX ADMIN — DAPTOMYCIN 500 MG: 500 INJECTION, POWDER, LYOPHILIZED, FOR SOLUTION INTRAVENOUS at 00:43

## 2023-12-29 RX ADMIN — BUPRENORPHINE HYDROCHLORIDE 0.15 MG: 0.32 INJECTION INTRAMUSCULAR; INTRAVENOUS at 11:12

## 2023-12-29 RX ADMIN — DEXAMETHASONE SODIUM PHOSPHATE 4 MG: 10 INJECTION, SOLUTION INTRAMUSCULAR; INTRAVENOUS at 11:04

## 2023-12-29 RX ADMIN — PREGABALIN 200 MG: 100 CAPSULE ORAL at 08:23

## 2023-12-29 RX ADMIN — PRAMIPEXOLE DIHYDROCHLORIDE 1 MG: 0.25 TABLET ORAL at 08:22

## 2023-12-29 RX ADMIN — MORPHINE SULFATE 2 MG: 2 INJECTION, SOLUTION INTRAMUSCULAR; INTRAVENOUS at 15:41

## 2023-12-29 RX ADMIN — SODIUM CHLORIDE, POTASSIUM CHLORIDE, SODIUM LACTATE AND CALCIUM CHLORIDE 100 ML/HR: 600; 310; 30; 20 INJECTION, SOLUTION INTRAVENOUS at 05:41

## 2023-12-29 RX ADMIN — HYDROMORPHONE HYDROCHLORIDE 1 MG: 1 INJECTION, SOLUTION INTRAMUSCULAR; INTRAVENOUS; SUBCUTANEOUS at 18:43

## 2023-12-29 RX ADMIN — DEXAMETHASONE SODIUM PHOSPHATE 2 MG: 10 INJECTION, SOLUTION INTRAMUSCULAR; INTRAVENOUS at 09:40

## 2023-12-29 RX ADMIN — HYDROMORPHONE HYDROCHLORIDE 1 MG: 1 INJECTION, SOLUTION INTRAMUSCULAR; INTRAVENOUS; SUBCUTANEOUS at 21:01

## 2023-12-29 RX ADMIN — BUPRENORPHINE HYDROCHLORIDE 0.15 MG: 0.32 INJECTION INTRAMUSCULAR; INTRAVENOUS at 11:43

## 2023-12-29 RX ADMIN — Medication 10 ML: at 03:17

## 2023-12-29 RX ADMIN — Medication 10 ML: at 15:41

## 2023-12-29 RX ADMIN — Medication 10 ML: at 08:24

## 2023-12-29 RX ADMIN — CEFAZOLIN 3 G: 1 INJECTION, POWDER, FOR SOLUTION INTRAMUSCULAR; INTRAVENOUS at 11:23

## 2023-12-29 RX ADMIN — FAMOTIDINE 20 MG: 10 INJECTION INTRAVENOUS at 20:53

## 2023-12-29 RX ADMIN — ONDANSETRON 4 MG: 2 INJECTION INTRAMUSCULAR; INTRAVENOUS at 12:46

## 2023-12-29 RX ADMIN — DIPHENHYDRAMINE HYDROCHLORIDE 12.5 MG: 50 INJECTION, SOLUTION INTRAMUSCULAR; INTRAVENOUS at 10:23

## 2023-12-29 NOTE — ANESTHESIA PREPROCEDURE EVALUATION
Anesthesia Evaluation     Patient summary reviewed and Nursing notes reviewed   no history of anesthetic complications:   NPO Solid Status: > 8 hours  NPO Liquid Status: > 8 hours           Airway   Mallampati: III  TM distance: <3 FB  Neck ROM: full  Possible difficult intubation  Dental    (+) upper dentures    Pulmonary - negative pulmonary ROS and normal exam   Cardiovascular - normal exam    ECG reviewed      ROS comment: 5/17/23 Echo   ·  Left Ventricle: The left ventricle is not well visualized. The left   ventricle is normal size. There is normal left ventricular myocardial   thickness and mass. The left ventricular systolic function is normal.  The   LVEF as measured by biplane volume is 61%. The left ventricular filling   pressure is indeterminate. The left ventricular wall motion is normal.   ·  Right Ventricle: The right ventricle is normal in size. The right   ventricular systolic function is normal. The estimated right ventricular   systolic pressure is 47 mmHg.   ·  IVC/SVC: Based on the IVC size and respiratory variation, the estimated   right atrial pressure is 15mmHg.   ·  There is no recent study available for direct [id number] comparison.       Neuro/Psych  (+) psychiatric history Depression and Anxiety  (-) seizures, TIA, CVA  GI/Hepatic/Renal/Endo    (+) morbid obesity, liver disease history of elevated LFT, renal disease-, thyroid problem hypothyroidism  (-) GERD, hepatitis    Musculoskeletal     Abdominal   (+) obese   Substance History      OB/GYN          Other   arthritis, blood dyscrasia (HCT 32.5) anemia,     ROS/Med Hx Other: Chronic osteomyelitis              Anesthesia Plan    ASA 3     general with block     intravenous induction     Anesthetic plan, risks, benefits, and alternatives have been provided, discussed and informed consent has been obtained with: patient.    Plan discussed with CRNA.    CODE STATUS:    Code Status (Patient has no pulse and is not breathing): CPR  (Attempt to Resuscitate)  Medical Interventions (Patient has pulse or is breathing): Full Support
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
## 2023-12-29 NOTE — CASE MANAGEMENT/SOCIAL WORK
Discharge Planning Assessment  [facility]     Patient Name: Phoebe Carvajal  MRN: [id number]  Today's Date: 12/29/2023    Admit Date: 12/28/2023    Plan: ongoing   Discharge Needs Assessment       Row Name 12/29/23 0916       Living Environment    People in Home spouse    Name(s) of People in Home Duran Carvajal    Current Living Arrangements home    Potentially Unsafe Housing Conditions none    Primary Care Provided by self    Provides Primary Care For no one    Family Caregiver if Needed spouse    Family Caregiver Names Duran Carvajal    Quality of Family Relationships unable to assess    Able to Return to Prior Arrangements yes       Resource/Environmental Concerns    Resource/Environmental Concerns none       Transition Planning    Patient/Family Anticipates Transition to home    Patient/Family Anticipated Services at Transition none    Transportation Anticipated family or friend will provide       Discharge Needs Assessment    Readmission Within the Last 30 Days no previous admission in last 30 days    Equipment Currently Used at Home wheelchair;rollator;shower chair    Concerns to be Addressed denies needs/concerns at this time;discharge planning    Anticipated Changes Related to Illness none    Equipment Needed After Discharge none    Patient's Choice of Community Agency([facility]) [facility]    Current Discharge Risk dependent with mobility/activities of daily living;physical impairment    Discharge Coordination/Progress ongoing                   Discharge Plan       Row Name 12/29/23 0922       Plan    Plan ongoing    Patient/Family in Agreement with Plan yes    Plan Comments Spoke with patient at bedside regarding discharge planning.  Patient reports she is current with [facility] and reports she has a Wheelchair, Rollator and Shower Chair.  Patient reports that she has prescription coverage and medications are affordable.  Patient lives with her spouse in a multilevel house with bedroom on the lower level and  a ramp to enter.  Patient indicates that if she needs rehab at discharge would prefer [facility].  No other discharge needs verbalized.  CM following.  Patient discharge plan is ongoing.    Final Discharge Disposition Code 30 - still a patient                  Continued Care and Services - Admitted Since 12/28/2023    Coordination has not been started for this encounter.       Expected Discharge Date and Time       Expected Discharge Date Expected Discharge Time    Jan 1, 2024            Demographic Summary       Row Name 12/29/23 0914       General Information    Admission Type inpatient    Arrived From home    Referral Source admission list    Reason for Consult discharge planning    Preferred Language English    General Information Comments Sofya Benjamin MD       Contact Information    Permission Granted to Share Info With     Contact Information Comments Duran Carvajal, spouse  [phone]                   Functional Status       Row Name 12/29/23 0915       Functional Status    Usual Activity Tolerance good    Current Activity Tolerance good       Functional Status, IADL    Medications assistive person    Meal Preparation assistive person    Housekeeping assistive person    Laundry assistive person    Shopping assistive person       Employment/    Employment/ Comments [employer]                   Psychosocial    No documentation.                  Abuse/Neglect    No documentation.                  Legal    No documentation.                  Substance Abuse    No documentation.                  Patient Forms    No documentation.                     Jerica Paredes, RN

## 2023-12-29 NOTE — ANESTHESIA PROCEDURE NOTES
Airway  Urgency: elective    Date/Time: 12/29/2023 10:48 AM  Airway not difficult    General Information and Staff    Patient location during procedure: OR  SRNA: Victorina Perez SRNA  Indications and Patient Condition  Indications for airway management: airway protection    Preoxygenated: yes  Mask difficulty assessment: 1 - vent by mask    Final Airway Details  Final airway type: supraglottic airway      Successful airway: I-gel  Size 3     Number of attempts at approach: 1  Assessment: lips, teeth, and gum same as pre-op    Additional Comments  LMA placed without difficulty, ventilation with assist, equal breath sounds and symmetric chest rise and fall
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
## 2023-12-29 NOTE — OP NOTE
[facility]     OPERATIVE REPORT      PATIENT NAME:  Phoebe Carvajal                            YOB: 1975       PREOP DIAGNOSIS:   Left lower extremity infection    POSTOP DIAGNOSIS:   Same.    PROCEDURE:   Left     21894: Above-knee amputation  64227: Wound vacuum assisted closure    SURGEON:     Adama Witt MD    OPERATIVE TEAM:   Circulator: Liya Helm RN; Helen Dunn RN; Ya Locke RN  Scrub Person: Rebecca Porter; Georgina Neal  Nursing Assistant: Bob Arvizu    ANESTHETIST:  Anesthesiologist: Ellen Leong MD  CRNA: Benito Penaloza CRNA  Student Nurse Anesthetist: Victorina Perez SRNA    ANESTHESIA:   General with Block    ESTIMATED BLOOD LOSS:   50 ml    TOURNIQUET TIME:   < 50 minutes    FINDINGS:     Remaining tissue appeared healthy.  VAC with good seal.  No overt signs or symptoms of infection in the residual limb.    IMPLANTS:     none    SPECIMENS:     Specimens       ID Source Type Tests Collected By Collected At Frozen?    1 Leg, Left Tissue ANAEROBIC CULTURE  FUNGAL CULTURE  TISSUE / BONE CULTURE (Canceled)  AFB CULTURE   Adama Witt Jr., MD 12/29/23 1131     Description: LEFT LEG AKA WOUND CULTURES    A Leg, Left Tissue TISSUE PATHOLOGY EXAM   Adama Witt Jr., MD 12/29/23 1135 No    Description: LEFT LEG AKA            COMPLICATIONS:    None.    DISPOSITION:    Stable to recovery.     INDICATIONS:    48 y.o. female with Left calcaneal osteomyelitis, non healing proximal tibial wound adjacent to ipsilateral revision total knee prosthesis.  I had numerous discussions with the patient regarding further management.  After discussion of risk, benefits, alternatives, she was amenable with Left above-knee amputation with wound vacuum assisted closure.  Risks of surgery were discussed which included but were not limited to pain, bleeding, infection, damage to adjacent structures, need for further surgery, wound healing complications, loss  of limb and death.  The patient expressed verbal consent and written consent was obtained for the above procedure.    NARRATIVE:    Patient was identified in preoperative holding.  Operative site was marked in indelible ink.  History, physical, consent were reviewed and updated.  Patient was surrendered to the anesthesia team and transported to the operative suite where anesthesia was induced.  Hip bump was placed on the ipsilateral side.  Operative extremity was prepped and draped in the usual sterile fashion, prepping over the [place].  The operative team donned sterile gowns and gloves and a timeout was called.  All in attendance agreed regarding the patient's identity, proposed operative procedure, and operative site.    Sterile thigh tourniquet was placed.    I elevated the operative extremity and the tourniquet was inflated to 300 mmHg.    I made a fishmouth incision at the proposed amputation level.  I sharply dissected down through fascia, then utilizing Bovie electrocautery, dissected through the anterior compartment.  I bluntly dissected posterior to the femur, placed an [employer] as well as lap deep to the femur, and utilizing a sagittal saw, transected the femur.  I confirmed the femoral cut was just proximal to the femoral stem fluoroscopically.    I dissected posterior medially, identified the vascular bundle, suture-ligated this with a free tie followed by stick tie.  I identified the sciatic nerve and in a similar manner, suture ligated this with a free tie after injecting it with anesthetic.  This was cut and retracted proximal to the bone cut.    I took down the tourniquet, achieved hemostasis.  I copiously irrigated with Irrisept and saline.  I took swab specimens of the wound which I sent for culture.    I placed a wound vacuum assisted closure device which was noted to have good seal.  There was a single white sponge over the neurovascular structures, 1 large black foam, and one smaller black foam  at the distal aspect of the wound.  This was secured using Vesseloops in a Pierce sandal fashion with staples.    Sterile dressing applied.  Anesthesia was concluded and the patient was transported to the PACU in stable condition.  Counts were correct x2.  There were no apparent complications.  I was present scrubbed for the entire case.    Adama Witt Jr, MD  12/29/2023

## 2023-12-29 NOTE — CONSULTS
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       Assessment:  Patient Active Problem List   Diagnosis    Lower extremity cellulitis    Acquired hypothyroidism    Chronic osteomyelitis of left foot with draining sinus    Ulcer of right midfoot with fat layer exposed    Ulcer of left heel, with fat layer exposed    Still's disease    Chronic osteomyelitis    Anxiety associated with depression    RLS (restless legs syndrome)    Neuropathy    Obesity, morbid, BMI 50 or higher     48-year-old female with chronic left calcaneal osteomyelitis, nonhealing proximal lateral shin wound, left total knee prosthesis.    Plan: I had another lengthy discussion with her today.  We discussed further conservative measures with wound care and antibiotics as well as the merits of below-knee amputation, either in staged fashion or single-stage as well as above-knee amputation, single-stage versus two-stage.  I counseled her that I think the below-knee amputation is very unlikely to ultimately resulted in a functional residual limb that would accommodate a prosthesis given the chronic wound that has not healed in years.  Even in the setting of a total knee prosthesis without prosthetic joint infection, I think the likelihood of her healing a below-knee amputation wound as well as the proximal shin wound  that would allow for prosthetic use is low.  I counseled her that if she wished to pursue below-knee amputation, we could proceed as such but she would have a high likelihood of eventual conversion to above-knee amputation. Ultimately, after lengthy discussion of risks, benefits, alternatives, she wishes to proceed with left above-knee amputation in a staged fashion.  Today, plan for left above-knee amputation, wound vacuum-assisted closure with plan for later secondary wound closure.    Risks of surgery were discussed which included but were not limited to pain, bleeding, infection, damage to adjacent structures, need for further surgery, wound healing complications, loss of limb and death.  The patient expressed verbal consent and written consent was obtained for the above procedure.    Date: 12/29/2023    Adama Witt Jr, MD

## 2023-12-29 NOTE — ANESTHESIA PROCEDURE NOTES
LEFT Femoral SS      Patient reassessed immediately prior to procedure    Reason for block: at surgeon's request and post-op pain management  Performed by  CRNA/CAA: Dejuan Pulliam CRNA  Assisted by: Olivia Hutson RN  Preanesthetic Checklist  Completed: patient identified, IV checked, site marked, risks and benefits discussed, surgical consent, monitors and equipment checked, pre-op evaluation and timeout performed  Prep:  Pt Position: supine  Sterile barriers:gloves, cap, sterile barriers, mask and washed/disinfected hands  Prep: ChloraPrep  Patient monitoring: blood pressure monitoring, continuous pulse oximetry and EKG  Procedure    Sedation: yes  Performed under: local infiltration  Guidance:ultrasound guided    ULTRASOUND INTERPRETATION.  Using ultrasound guidance a 20 G gauge needle was placed in close proximity to the femoral nerve, at which point, under ultrasound guidance anesthetic was injected in the area of the nerve and spread of the anesthesia was seen on ultrasound in close proximity thereto.  There were no abnormalities seen on ultrasound; a digital image was taken; and the patient tolerated the procedure with no complications. Images:still images obtained, printed/placed on chart    Laterality:left  Block Type:femoral  Injection Technique:single-shot  Needle Type:short-bevel  Needle Gauge:20 G  Resistance on Injection: none    Medications Used: fentaNYL citrate (PF) (SUBLIMAZE) injection - Intravenous   100 mcg - 12/29/2023 9:40:00 AM  dexamethasone sodium phosphate injection - Injection   2 mg - 12/29/2023 9:40:00 AM  bupivacaine PF (MARCAINE) 0.25 % injection - Injection   30 mL - 12/29/2023 9:40:00 AM      Post Assessment  Injection Assessment: negative aspiration for heme, no paresthesia on injection and incremental injection  Patient Tolerance:comfortable throughout block  Complications:no  Additional Notes  SINGLE Shot  A high-frequency linear transducer, with sterile cover, was placed in the  "inguinal crease to visualize the Femoral Vein, Artery, and Nerve (medial to lateral). The insertion site was prepped in sterile fashion. Skin and cutaneous tissue was infiltrated with 2-5 ml of 1% Lidocaine. Using ultrasound-guidance, a 20-gauge B-Mera 4\" Ultraplex 360 non-stimulating echogenic needle was then inserted and advanced in-plane from lateral to medial with ultrasound guidance. The needle was directed below Fascia Iliacus towards the Femoral nerve. Preservative-free normal saline was utilized for hydro-dissection of tissue. Local anesthetic injection spread, in incremental 3-5 ml injections, was visualized lateral to the artery to surround the femoral nerve. Aspiration every 5 ml to prevent intravascular injection. Injection was completed with negative aspiration of blood and negative intravascular injection. Injection pressures were normal with minimal resistance.            "

## 2023-12-29 NOTE — NURSING NOTE
Woc consulted for: Right plantar foot, left heel, left knee wounds.    Wound/ Skin Assessment: Patient was in the OR when I went to see however Dr. Witt is following patient finally agreed to left AKA.    When patient came back from surgery wound VAC was doing well minimal amount of sanguinous blood in tubing and drainage canister.  Seal was intact.    Right foot presents with some kind of hard white area over it from the picture from the ED I thought it was a dressing however when I got there it was so hard that it thought it might be like a graft or I weird colored eschar however after cleansing the wound it softened up is able to peel it out.  Revealing a wound that is roughly 1.7 cm wide by 4 cm tall by 0.3 cm deep.  Dry pink not granulating fat pad is exposed.  Wound edges have epiboly and are macerated.  There is 0.2 cm undermining at the 12:00 aspects.  The rest of the wound edges are completely brown hyperpigmented brown and hard callus.      Patient also presents with some kind of red area on the lateral aspect of this right foot and it is hard and dry as well.  Upon further review of the picture there does not look to be an ulceration however there looks to be a scab it could be an abscess or new point of focal infection underneath.      Recommendations/ Intervention performed: Recommend cleansing the plantar wound with 5-minute Vashe soak you can do this by saturating a 4 x 4 in the Vashe and pressing it against the wound and leave it in place for 5 minutes and then as you remove the gauze in 5 minutes scrub the wound with a gauze.  Then apply Opticell AG Hydrofiber to the wound bed and cover with silicone foam changes every other day.    As for the right lateral redness just paint with Betadine daily.  Do recommend Ortho or podiatry follow-up.    Head to toe Ax performed.    Additional skin issues: Please inflate waffle mattress as this patient is high risk for pressure injuries.  Please offload the  right heel with the boots and place a pillow underneath the knee to prevent hyperextension and clot.  Please turn patient and offload coccyx every 2 hours.    Skin interventions in place.    Pressure Injury Protocol (initiate for Pradip Score of 18 or less):   *Maintain good skin care, keep dry, turn q 2 hr, keep heels elevated and offloaded with heel boots.    *Apply z-guard to sacrococcygeal area/ perineal area BID or daily and PRN per incontinent episodes.  *Follow C.A.R.E protocol if medical devices (Bipap, toussaint, Ng tube, etc) are being used.     Specialty bed: If patient ends up staying out having an extended length of stay and Pradip score drops please reconsult Woc for consideration of specialty bed.    Woc will follow.    Please contact with questions or concerns.
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
## 2023-12-29 NOTE — ANESTHESIA PROCEDURE NOTES
LEFT Popliteal SS      Patient reassessed immediately prior to procedure    Patient location during procedure: pre-op  Reason for block: at surgeon's request and post-op pain management  Performed by  CRNA/CAA: Dejuan Pulliam CRNA  Assisted by: Olivia Hutson RN  Preanesthetic Checklist  Completed: patient identified, IV checked, site marked, risks and benefits discussed, surgical consent, monitors and equipment checked, pre-op evaluation and timeout performed  Prep:  Pt Position: right lateral decubitus  Sterile barriers:cap, gloves, mask and washed/disinfected hands  Prep: ChloraPrep  Patient monitoring: blood pressure monitoring, continuous pulse oximetry and EKG  Procedure    Sedation: yes  Performed under: local infiltration  Guidance:ultrasound guided    ULTRASOUND INTERPRETATION.  Using ultrasound guidance a 20 G gauge needle was placed in close proximity to the nerve, at which point, under ultrasound guidance anesthetic was injected in the area of the nerve and spread of the anesthesia was seen on ultrasound in close proximity thereto.  There were no abnormalities seen on ultrasound; a digital image was taken; and the patient tolerated the procedure with no complications. Images:still images obtained, printed/placed on chart    Laterality:left  Block Type:popliteal  Injection Technique:single-shot  Needle Type:echogenic and Tuohy  Needle Gauge:18 G  Resistance on Injection: none  Catheter Size:20 G    Medications Used: dexamethasone sodium phosphate injection - Injection   2 mg - 12/29/2023 9:50:00 AM  bupivacaine PF (MARCAINE) 0.25 % injection - Injection   30 mL - 12/29/2023 9:50:00 AM      Post Assessment  Injection Assessment: negative aspiration for heme, no paresthesia on injection and incremental injection  Patient Tolerance:comfortable throughout block  Complications:no  Additional Notes  SINGLE shot    A high-frequency linear transducer, with sterile cover, was placed in the popliteal fossa to  "identify the popliteal artery and vein, Tibial nerve (TN) and Common Peroneal nerve (CP). The transducer was then moved in a cephalad fashion to observe the TN and CP nerve bifurcation to form the Sciatic Nerve. The insertion site was prepped and draped in sterile fashion. Skin and cutaneous tissue was infiltrated with 2-5 ml of 1% Lidocaine. Using ultrasound-guidance, a 20-gauge B-Mera 4\" Ultraplex 360 non-stimulating echogenic needle was then inserted and advanced in plane from lateral to medial. Preservative-free normal saline was utilized for hydro-dissection of tissue, advancement of Touhy, and to confirm final needle placement posterior to the nerves. Local anesthetic injection spread, in incremental 3-5 ml injections, to surround both nerve structures. Aspiration every 5 ml to prevent intravascular injection. Injection was completed with negative aspiration of blood and negative intravascular injection. Injection pressures were normal with minimal resistance              "

## 2023-12-29 NOTE — ANESTHESIA POSTPROCEDURE EVALUATION
Patient: Phoebe Carvajal    Procedure Summary       Date: 12/29/23 Room / Location:  [facility] /  [facility]    Anesthesia Start: 1036 Anesthesia Stop: 1302    Procedure: AMPUTATION ABOVE KNEE- LEFT, WOUND VAC (Left: Thigh) Diagnosis:     Surgeons: Adama Witt Jr., MD Provider: Ellen Leong MD    Anesthesia Type: general with block ASA Status: 3            Anesthesia Type: general with block    Vitals  Vitals Value Taken Time   /70 12/29/23 1306   Temp     Pulse 67 12/29/23 1309   Resp     SpO2 91 % 12/29/23 1309   Vitals shown include unfiled device data.        Post Anesthesia Care and Evaluation    Patient location during evaluation: PACU  Patient participation: complete - patient participated  Level of consciousness: awake and alert  Pain management: adequate    Airway patency: patent  Anesthetic complications: No anesthetic complications  PONV Status: none  Cardiovascular status: hemodynamically stable and acceptable  Respiratory status: nonlabored ventilation, acceptable and nasal cannula  Hydration status: acceptable

## 2023-12-29 NOTE — PLAN OF CARE
Goal Outcome Evaluation:         Pt arrived to the unit at approx 2350 12/28/23. Aox4, RA. Wound below knee cleaned with sterile saline and dressing applied. Pt consistently rates pain 7+ out of 10. Abx given twice overnight, currently LR infusing per orders. PRN pain meds given overnight and anti-anxiety medication given. Up with stand by assistance.

## 2023-12-30 PROBLEM — Z89.612 S/P AKA (ABOVE KNEE AMPUTATION), LEFT: Status: ACTIVE | Noted: 2023-12-28

## 2023-12-30 LAB
QT INTERVAL: 378 MS
QTC INTERVAL: 410 MS

## 2023-12-30 PROCEDURE — 97530 THERAPEUTIC ACTIVITIES: CPT

## 2023-12-30 PROCEDURE — 25010000002 CEFTAZIDIME 2 G RECONSTITUTED SOLUTION 1 EACH VIAL: Performed by: INTERNAL MEDICINE

## 2023-12-30 PROCEDURE — 99232 SBSQ HOSP IP/OBS MODERATE 35: CPT | Performed by: FAMILY MEDICINE

## 2023-12-30 PROCEDURE — 97605 NEG PRS WND THER DME<=50SQCM: CPT

## 2023-12-30 PROCEDURE — 25810000003 LACTATED RINGERS PER 1000 ML: Performed by: ORTHOPAEDIC SURGERY

## 2023-12-30 PROCEDURE — 97112 NEUROMUSCULAR REEDUCATION: CPT

## 2023-12-30 PROCEDURE — 25010000002 DAPTOMYCIN PER 1 MG: Performed by: ORTHOPAEDIC SURGERY

## 2023-12-30 PROCEDURE — 25010000002 HYDROMORPHONE 1 MG/ML SOLUTION: Performed by: FAMILY MEDICINE

## 2023-12-30 PROCEDURE — 97162 PT EVAL MOD COMPLEX 30 MIN: CPT

## 2023-12-30 PROCEDURE — 93010 ELECTROCARDIOGRAM REPORT: CPT | Performed by: INTERNAL MEDICINE

## 2023-12-30 PROCEDURE — 63710000001 DIPHENHYDRAMINE PER 50 MG: Performed by: FAMILY MEDICINE

## 2023-12-30 RX ADMIN — OXYCODONE HYDROCHLORIDE 10 MG: 10 TABLET ORAL at 20:52

## 2023-12-30 RX ADMIN — PREGABALIN 200 MG: 100 CAPSULE ORAL at 20:53

## 2023-12-30 RX ADMIN — LEVOTHYROXINE SODIUM 125 MCG: 125 TABLET ORAL at 05:34

## 2023-12-30 RX ADMIN — FAMOTIDINE 20 MG: 10 INJECTION INTRAVENOUS at 10:06

## 2023-12-30 RX ADMIN — DIPHENHYDRAMINE HYDROCHLORIDE 25 MG: 25 CAPSULE ORAL at 10:09

## 2023-12-30 RX ADMIN — HYDROMORPHONE HYDROCHLORIDE 1 MG: 1 INJECTION, SOLUTION INTRAMUSCULAR; INTRAVENOUS; SUBCUTANEOUS at 16:08

## 2023-12-30 RX ADMIN — SODIUM CHLORIDE, POTASSIUM CHLORIDE, SODIUM LACTATE AND CALCIUM CHLORIDE 100 ML/HR: 600; 310; 30; 20 INJECTION, SOLUTION INTRAVENOUS at 16:08

## 2023-12-30 RX ADMIN — OXYCODONE HYDROCHLORIDE 5 MG: 5 TABLET ORAL at 15:28

## 2023-12-30 RX ADMIN — HYDROMORPHONE HYDROCHLORIDE 1 MG: 1 INJECTION, SOLUTION INTRAMUSCULAR; INTRAVENOUS; SUBCUTANEOUS at 00:23

## 2023-12-30 RX ADMIN — DIPHENHYDRAMINE HYDROCHLORIDE 25 MG: 25 CAPSULE ORAL at 22:08

## 2023-12-30 RX ADMIN — OXYCODONE HYDROCHLORIDE 10 MG: 10 TABLET ORAL at 10:04

## 2023-12-30 RX ADMIN — HYDROMORPHONE HYDROCHLORIDE 1 MG: 1 INJECTION, SOLUTION INTRAMUSCULAR; INTRAVENOUS; SUBCUTANEOUS at 19:53

## 2023-12-30 RX ADMIN — CEFTAZIDIME 2000 MG: 2 INJECTION, POWDER, FOR SOLUTION INTRAVENOUS at 04:34

## 2023-12-30 RX ADMIN — Medication 10 ML: at 08:29

## 2023-12-30 RX ADMIN — LORAZEPAM 0.5 MG: 0.5 TABLET ORAL at 20:53

## 2023-12-30 RX ADMIN — DIPHENHYDRAMINE HYDROCHLORIDE 25 MG: 25 CAPSULE ORAL at 03:04

## 2023-12-30 RX ADMIN — CEFTAZIDIME 2000 MG: 2 INJECTION, POWDER, FOR SOLUTION INTRAVENOUS at 12:41

## 2023-12-30 RX ADMIN — SERTRALINE HYDROCHLORIDE 50 MG: 50 TABLET ORAL at 20:53

## 2023-12-30 RX ADMIN — PREGABALIN 200 MG: 100 CAPSULE ORAL at 08:49

## 2023-12-30 RX ADMIN — SODIUM CHLORIDE, POTASSIUM CHLORIDE, SODIUM LACTATE AND CALCIUM CHLORIDE 100 ML/HR: 600; 310; 30; 20 INJECTION, SOLUTION INTRAVENOUS at 05:42

## 2023-12-30 RX ADMIN — PRAMIPEXOLE DIHYDROCHLORIDE 1 MG: 0.25 TABLET ORAL at 08:49

## 2023-12-30 RX ADMIN — OXYCODONE HYDROCHLORIDE 10 MG: 10 TABLET ORAL at 06:11

## 2023-12-30 RX ADMIN — CEFTAZIDIME 2000 MG: 2 INJECTION, POWDER, FOR SOLUTION INTRAVENOUS at 20:56

## 2023-12-30 RX ADMIN — Medication 10 ML: at 20:55

## 2023-12-30 RX ADMIN — HYDROMORPHONE HYDROCHLORIDE 1 MG: 1 INJECTION, SOLUTION INTRAMUSCULAR; INTRAVENOUS; SUBCUTANEOUS at 08:27

## 2023-12-30 RX ADMIN — HYDROMORPHONE HYDROCHLORIDE 1 MG: 1 INJECTION, SOLUTION INTRAMUSCULAR; INTRAVENOUS; SUBCUTANEOUS at 22:08

## 2023-12-30 RX ADMIN — HYDROMORPHONE HYDROCHLORIDE 1 MG: 1 INJECTION, SOLUTION INTRAMUSCULAR; INTRAVENOUS; SUBCUTANEOUS at 03:02

## 2023-12-30 RX ADMIN — PREGABALIN 200 MG: 100 CAPSULE ORAL at 15:28

## 2023-12-30 RX ADMIN — DAPTOMYCIN 500 MG: 500 INJECTION, POWDER, LYOPHILIZED, FOR SOLUTION INTRAVENOUS at 21:30

## 2023-12-30 RX ADMIN — FAMOTIDINE 20 MG: 10 INJECTION INTRAVENOUS at 20:56

## 2023-12-30 NOTE — PROGRESS NOTES
[facility] Medicine Services  PROGRESS NOTE    Patient Name: Phoebe Carvajal  : 1975  MRN: [id number]    Date of Admission: 2023  Primary Care Physician: Sofya Benjamin MD    Subjective   Subjective     CC:  Osteomyelitis    HPI:  She underwent AKA yesterday for recurrent osteomyelitis of the left lower extremity.  Dr. Witt's note indicates he plans to take her back to the OR tomorrow for debridement again.  ID is following.  She complains of severe pain that is relieved with medications.       Objective   Objective     Vital Signs:   Temp:  [97.6 °F (36.4 °C)-98.6 °F (37 °C)] 98.6 °F (37 °C)  Heart Rate:  [60-83] 65  Resp:  [15-18] 16  BP: (104-123)/(59-77) 104/59  Flow (L/min):  [2-4] 2     Physical Exam:  Constitutional: No acute distress, awake, alert  HENT: NCAT, mucous membranes moist  Respiratory: Decreased breath sounds bilaterally, respiratory effort normal   Cardiovascular: RRR  Gastrointestinal: Positive bowel sounds, soft, nontender, nondistended  Musculoskeletal: Left AKA  Psychiatric: Appropriate affect, cooperative  Neurologic: Oriented x 3, s generally weak, Cranial Nerves grossly intact to confrontation, speech clear  Skin: Left AKA bandage with wound vac in place      Results Reviewed:  LAB RESULTS:      Lab 23  0206 23   WBC 3.51 3.83   HEMOGLOBIN 10.3* 9.7*   HEMATOCRIT 32.5* 30.1*   PLATELETS 150 143   NEUTROS ABS 1.97 2.52   IMMATURE GRANS (ABS) 0.00 0.01   LYMPHS ABS 1.16 0.80   MONOS ABS 0.22 0.36   EOS ABS 0.15 0.13   .3* 112.7*   CRP 1.92*  --    PROCALCITONIN 0.04 0.04   LACTATE 1.0 0.9   PROTIME 14.9*  --          Lab 23  0511 23  0206 23   SODIUM  --  138 140   POTASSIUM  --  4.2 4.4   CHLORIDE  --  103 104   CO2  --  26.0 28.0   ANION GAP  --  9.0 8.0   BUN  --  8 8   CREATININE  --  0.63 0.54*   EGFR  --  108.2 112.3   GLUCOSE  --  85 95   CALCIUM  --  8.6 8.4*   MAGNESIUM  --  2.0  --     PHOSPHORUS  --  3.9  --    HEMOGLOBIN A1C 4.60*  --   --    TSH  --   --  7.320*         Lab 12/29/23 0206 12/28/23  1943   TOTAL PROTEIN 7.0 6.1   ALBUMIN 3.1* 2.7*   GLOBULIN 3.9 3.4   ALT (SGPT) 6 8   AST (SGOT) 20 23   BILIRUBIN 0.5 0.5   ALK PHOS 275* 247*         Lab 12/29/23 0206   PROTIME 14.9*   INR 1.16*             Lab 12/29/23 0206   ABO TYPING A   RH TYPING Negative   ANTIBODY SCREEN Negative         Brief Urine Lab Results       None            Microbiology Results Abnormal       Procedure Component Value - Date/Time    Wound Culture - Wound, Leg, Left [[id number]] Collected: 12/29/23 1322    Lab Status: Preliminary result Specimen: Wound from Leg, Left Updated: 12/30/23 0831     Wound Culture No growth     Gram Stain No organisms seen    Blood Culture - Blood, Arm, Right [[id number]]  (Normal) Collected: 12/28/23 2115    Lab Status: Preliminary result Specimen: Blood from Arm, Right Updated: 12/30/23 0030     Blood Culture No growth at 24 hours    Blood Culture - Blood, Hand, Right [[id number]]  (Normal) Collected: 12/28/23 2120    Lab Status: Preliminary result Specimen: Blood from Hand, Right Updated: 12/30/23 0030     Blood Culture No growth at 24 hours    MRSA Screen, PCR (Inpatient) - Swab, Nares [[id number]]  (Normal) Collected: 12/29/23 0203    Lab Status: Final result Specimen: Swab from Nares Updated: 12/29/23 0733     MRSA PCR Negative    Narrative:      The negative predictive value of this diagnostic test is high and should only be used to consider de-escalating anti-MRSA therapy. A positive result may indicate colonization with MRSA and must be correlated clinically.  MRSA Negative    COVID PRE-OP / PRE-PROCEDURE SCREENING ORDER (NO ISOLATION) - Swab, Nasopharynx [[id number]]  (Normal) Collected: 12/29/23 0203    Lab Status: Final result Specimen: Swab from Nasopharynx Updated: 12/29/23 9603    Narrative:      The following orders were created for panel order COVID PRE-OP / PRE-PROCEDURE  SCREENING ORDER (NO ISOLATION) - Swab, Nasopharynx.  Procedure                               Abnormality         Status                     ---------                               -----------         ------                     Respiratory Panel PCR w/...[[id number]]  Normal              Final result                 Please view results for these tests on the individual orders.    Respiratory Panel PCR w/COVID-19(SARS-CoV-2) [facility] In-House, NP Swab in UTM/VTM, 2 HR TAT - Swab, Nasopharynx [[id number]]  (Normal) Collected: 12/29/23 0203    Lab Status: Final result Specimen: Swab from Nasopharynx Updated: 12/29/23 0353     ADENOVIRUS, PCR Not Detected     Coronavirus 229E Not Detected     Coronavirus HKU1 Not Detected     Coronavirus NL63 Not Detected     Coronavirus OC43 Not Detected     COVID19 Not Detected     Human Metapneumovirus Not Detected     Human Rhinovirus/Enterovirus Not Detected     Influenza A PCR Not Detected     Influenza B PCR Not Detected     Parainfluenza Virus 1 Not Detected     Parainfluenza Virus 2 Not Detected     Parainfluenza Virus 3 Not Detected     Parainfluenza Virus 4 Not Detected     RSV, PCR Not Detected     Bordetella pertussis pcr Not Detected     Bordetella parapertussis PCR Not Detected     Chlamydophila pneumoniae PCR Not Detected     Mycoplasma pneumo by PCR Not Detected    Narrative:      In the setting of a positive respiratory panel with a viral infection PLUS a negative procalcitonin without other underlying concern for bacterial infection, consider observing off antibiotics or discontinuation of antibiotics and continue supportive care. If the respiratory panel is positive for atypical bacterial infection (Bordetella pertussis, Chlamydophila pneumoniae, or Mycoplasma pneumoniae), consider antibiotic de-escalation to target atypical bacterial infection.            FL C Arm During Surgery    Result Date: 12/29/2023  This procedure was auto-finalized with no  dictation required.    LEFT Popliteal SS    Result Date: 12/29/2023  Dejuan Pulliam CRNA     12/29/2023 10:00 AM LEFT Popliteal SS Patient reassessed immediately prior to procedure Patient location during procedure: pre-op Reason for block: at surgeon's request and post-op pain management Performed by CRNA/CAA: Dejuan Pulliam CRNA Assisted by: Olivia Hutson RN Preanesthetic Checklist Completed: patient identified, IV checked, site marked, risks and benefits discussed, surgical consent, monitors and equipment checked, pre-op evaluation and timeout performed Prep: Pt Position: right lateral decubitus Sterile barriers:cap, gloves, mask and washed/disinfected hands Prep: ChloraPrep Patient monitoring: blood pressure monitoring, continuous pulse oximetry and EKG Procedure Sedation: yes Performed under: local infiltration Guidance:ultrasound guided ULTRASOUND INTERPRETATION.  Using ultrasound guidance a 20 G gauge needle was placed in close proximity to the nerve, at which point, under ultrasound guidance anesthetic was injected in the area of the nerve and spread of the anesthesia was seen on ultrasound in close proximity thereto.  There were no abnormalities seen on ultrasound; a digital image was taken; and the patient tolerated the procedure with no complications. Images:still images obtained, printed/placed on chart Laterality:left Block Type:popliteal Injection Technique:single-shot Needle Type:echogenic and Tuohy Needle Gauge:18 G Resistance on Injection: none Catheter Size:20 G Medications Used: dexamethasone sodium phosphate injection - Injection  2 mg - 12/29/2023 9:50:00 AM bupivacaine PF (MARCAINE) 0.25 % injection - Injection  30 mL - 12/29/2023 9:50:00 AM Post Assessment Injection Assessment: negative aspiration for heme, no paresthesia on injection and incremental injection Patient Tolerance:comfortable throughout block Complications:no Additional Notes SINGLE shot  A high-frequency linear transducer, with  "sterile cover, was placed in the popliteal fossa to identify the popliteal artery and vein, Tibial nerve (TN) and Common Peroneal nerve (CP). The transducer was then moved in a cephalad fashion to observe the TN and CP nerve bifurcation to form the Sciatic Nerve. The insertion site was prepped and draped in sterile fashion. Skin and cutaneous tissue was infiltrated with 2-5 ml of 1% Lidocaine. Using ultrasound-guidance, a 20-gauge B-Mera 4\" Ultraplex 360 non-stimulating echogenic needle was then inserted and advanced in plane from lateral to medial. Preservative-free normal saline was utilized for hydro-dissection of tissue, advancement of Touhy, and to confirm final needle placement posterior to the nerves. Local anesthetic injection spread, in incremental 3-5 ml injections, to surround both nerve structures. Aspiration every 5 ml to prevent intravascular injection. Injection was completed with negative aspiration of blood and negative intravascular injection. Injection pressures were normal with minimal resistance     LEFT Femoral SS    Result Date: 12/29/2023  Dejuan Pulliam CRNA     12/29/2023  9:59 AM LEFT Femoral SS Patient reassessed immediately prior to procedure Reason for block: at surgeon's request and post-op pain management Performed by CRNA/CAA: Dejuan Pulliam CRNA Assisted by: Olivia Hutson RN Preanesthetic Checklist Completed: patient identified, IV checked, site marked, risks and benefits discussed, surgical consent, monitors and equipment checked, pre-op evaluation and timeout performed Prep: Pt Position: supine Sterile barriers:gloves, cap, sterile barriers, mask and washed/disinfected hands Prep: ChloraPrep Patient monitoring: blood pressure monitoring, continuous pulse oximetry and EKG Procedure Sedation: yes Performed under: local infiltration Guidance:ultrasound guided ULTRASOUND INTERPRETATION.  Using ultrasound guidance a 20 G gauge needle was placed in close proximity to the femoral nerve, " "at which point, under ultrasound guidance anesthetic was injected in the area of the nerve and spread of the anesthesia was seen on ultrasound in close proximity thereto.  There were no abnormalities seen on ultrasound; a digital image was taken; and the patient tolerated the procedure with no complications. Images:still images obtained, printed/placed on chart Laterality:left Block Type:femoral Injection Technique:single-shot Needle Type:short-bevel Needle Gauge:20 G Resistance on Injection: none Medications Used: fentaNYL citrate (PF) (SUBLIMAZE) injection - Intravenous  100 mcg - 12/29/2023 9:40:00 AM dexamethasone sodium phosphate injection - Injection  2 mg - 12/29/2023 9:40:00 AM bupivacaine PF (MARCAINE) 0.25 % injection - Injection  30 mL - 12/29/2023 9:40:00 AM Post Assessment Injection Assessment: negative aspiration for heme, no paresthesia on injection and incremental injection Patient Tolerance:comfortable throughout block Complications:no Additional Notes SINGLE Shot A high-frequency linear transducer, with sterile cover, was placed in the inguinal crease to visualize the Femoral Vein, Artery, and Nerve (medial to lateral). The insertion site was prepped in sterile fashion. Skin and cutaneous tissue was infiltrated with 2-5 ml of 1% Lidocaine. Using ultrasound-guidance, a 20-gauge B-Mera 4\" Ultraplex 360 non-stimulating echogenic needle was then inserted and advanced in-plane from lateral to medial with ultrasound guidance. The needle was directed below Fascia Iliacus towards the Femoral nerve. Preservative-free normal saline was utilized for hydro-dissection of tissue. Local anesthetic injection spread, in incremental 3-5 ml injections, was visualized lateral to the artery to surround the femoral nerve. Aspiration every 5 ml to prevent intravascular injection. Injection was completed with negative aspiration of blood and negative intravascular injection. Injection pressures were normal with minimal " resistance.     XR Chest 1 View    Result Date: 12/29/2023  XR CHEST 1 VW Date of Exam: 12/29/2023 1:25 AM EST Indication: pre op Comparison: None available. Findings: The heart is enlarged. There is mild pulmonary vascular congestion. There are no focal consolidations to suggest pneumonia. There are posttraumatic changes and arthritic change of the left shoulder.     Impression: Impression: Mild pulmonary vascular congestion. Electronically Signed: Mandeep Goss MD  12/29/2023 1:38 AM EST  Workstation ID: [id number]    XR Foot 3+ View Left    Result Date: 12/28/2023  XR FOOT 3+ VW LEFT Date of Exam: 12/28/2023 9:21 PM EST Indication: Left foot infection possible osteomyelitis Comparison: None available. Findings: Prominent soft tissue swelling along the medial aspect of the proximal foot, as well as at the level of the heel. Diffuse demineralization. Predominantly chronic appearing abnormality of the calcaneus with osseous remodeling and abnormal contours. Small focus of focal osteopenia of the medial talus, as well as along the posterior/plantar calcaneus. Multifocal degenerative changes. Scattered vascular calcifications.     Impression: Impression: Significant soft tissue abnormality/swelling over the medial proximal foot and overlying the heel. There are significant underlying changes of the calcaneus, majority of which is chronic-appearing, though there are small foci of focal osteopenia involving the posterior/plantar calcaneus, as well as the medial talus, that may represent a component of acute osteomyelitis. Diffuse demineralization and scattered degenerative changes otherwise. Electronically Signed: Alexander Tello MD  12/28/2023 9:57 PM EST  Workstation ID: [id number]         Current medications:  Scheduled Meds:Pharmacy Consult, , Does not apply, Q12H  cefTAZidime, 2,000 mg, Intravenous, Q8H  DAPTOmycin, 6 mg/kg (Adjusted), Intravenous, Q24H  famotidine, 20 mg, Intravenous, Q12H  levothyroxine, 125 mcg, Oral,  Daily  pramipexole, 1 mg, Oral, Daily  pregabalin, 200 mg, Oral, TID  senna-docusate sodium, 2 tablet, Oral, BID  sertraline, 50 mg, Oral, Nightly  sodium chloride, 10 mL, Intravenous, Q12H      Continuous Infusions:lactated ringers, 100 mL/hr, Last Rate: 100 mL/hr (12/30/23 0542)  lactated ringers, 9 mL/hr      PRN Meds:.  acetaminophen    senna-docusate sodium **AND** polyethylene glycol **AND** bisacodyl **AND** bisacodyl    diphenhydrAMINE    HYDROmorphone **AND** naloxone    LORazepam    melatonin    ondansetron    oxyCODONE    oxyCODONE    sodium chloride    sodium chloride    traZODone    Assessment & Plan   Assessment & Plan     Active Hospital Problems    Diagnosis  POA    **Lower extremity cellulitis [L03.119]  Yes    Anxiety associated with depression [F41.8]  Unknown    RLS (restless legs syndrome) [G25.81]  Unknown    Neuropathy [G62.9]  Unknown    Obesity, morbid, BMI 50 or higher [E66.01]  Unknown    S/P AKA (above knee amputation), left [Z89.612]  Not Applicable    Chronic osteomyelitis [M86.60]  Yes    Acquired hypothyroidism [E03.9]  Yes    Ulcer of right midfoot with fat layer exposed [L97.412]  Yes    Ulcer of left heel, with fat layer exposed [L97.422]  Yes      Resolved Hospital Problems   No resolved problems to display.        Brief Hospital Course to date:  Phoebe Carvajal is a 48 y.o. female with history of depression, hypothyroidism, stills disease, RLS, PAD, anemia, morbid obesity, recurrent osteomyelitis, chronic lower extremity osteomyelitis, recurrent left prosthetic joint infection, bilateral foot wounds who presented to the emergency department for evaluation of fever and worsening erythema with foul odor from her chronic wounds.  Previous records from  from October 22 through November 8, 2023 showed intentions for proceeding with left AKA but patient waited for second opinion hoping to have the option of BKA.  She is followed by infectious disease-known to Dr. Wang.  She  underwent AKA on 12/29/2023 with Dr. Witt.    Osteomyelitis/cellulitis of the left lower extremity  Status post AKA on 12/29/2023 Dr. Witt  Chronic infected wounds  -Dr. Witt's note indicates planning for debridement again on 12/31/2023  -Patient has complained of excruciating pain postoperatively  -Continue IV Dilaudid, oxycodone, Tylenol, Lyrica  -Infectious disease consulted and following, seen by Dr. Valdez  -Continue ceftazidime and daptomycin    Neuropathy  -Continue Lyrica    Morbid obesity  Hypoxia  -Likely related to opiate use postoperatively and morbid obesity  -Continue O2 to maintain sats greater than 90, currently on 2 L nasal cannula    Restless leg syndrome  -Continue Mirapex    Anxiety  -Continue Zoloft    Expected Discharge Location and Transportation: Rehab  Expected Discharge   Expected Discharge Date: 1/1/2024; Expected Discharge Time:      DVT prophylaxis:  Mechanical DVT prophylaxis orders are present.     AM-PAC 6 Clicks Score (PT): 9 (12/29/23 2000)    CODE STATUS:   Code Status and Medical Interventions:   Ordered at: 12/29/23 0121     Code Status (Patient has no pulse and is not breathing):    CPR (Attempt to Resuscitate)     Medical Interventions (Patient has pulse or is breathing):    Full Support       Milagro Finley MD  12/30/23

## 2023-12-30 NOTE — PLAN OF CARE
Goal Outcome Evaluation:  Plan of Care Reviewed With: patient           Outcome Evaluation: PT wound in for continuity of vac management s/p vac placement in OR.  Canister full with 500ml serosanguinous drainage.  PT changed canister with good seal obtained.  PT able to visualize edge of dressing laterally with no loosening of drape noted.  Nursing educated on monitoring vac and changing canister.  If active bleeding noted, recommended contacting surgeon.  Additional canister left in room.  Canisters can also be obtained thru House Supervisor if after PT hours.

## 2023-12-30 NOTE — PLAN OF CARE
Goal Outcome Evaluation:         Aox4 this evening. On 2L NC overnight, kept pulling it out periodically. Would put it back on when directed. Pain was controlled well earlier in the evening, as the night progressed the pain was difficult to control at times and patient was awake much of the early morning hours.
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
## 2023-12-30 NOTE — PLAN OF CARE
Goal Outcome Evaluation:  Plan of Care Reviewed With: patient, spouse           Outcome Evaluation: PT mobility Eval completed. Patient presenting with deficits in general BLE strength, standing balance, safety awareness, and functional endurance effecting functional mobility below baseline. Patient will benefit from skilled IP PT services to address impairments for return to PLOF. Recommend inpatient rehab at [facility].      Anticipated Discharge Disposition (PT): inpatient rehabilitation facility

## 2023-12-30 NOTE — PROGRESS NOTES
Phoebe Carvajal       LOS: 2 days   Patient Care Team:  Sofya Benjamin MD as PCP - General (Internal Medicine)  Provider, No Known as PCP - Family Medicine    Chief Complaint:  s/p left above knee amputation    Subjective     Interval History:     Pain present but tolerable.    Review of Systems:      Gen- No fevers, chills  CV- No chest pain, palpitations  Resp- No cough, dyspnea  GI- No N/V/D, abd pain    Objective     Vital Signs  Vital Signs (last 24 hours)         12/28 0700  12/29 0659 12/29 0700  12/30 0654   Most Recent      Temp (°F) 98.5 -  98.6    97.3 -  98.3     98 (36.7) 12/29 2010    Heart Rate 65 -  109    60 -  83     71 12/30 0300    Resp 18 -  20    15 -  18     16 12/29 2010    BP 95/58 -  111/71    105/63 -  123/77     105/63 12/29 2010    SpO2 (%) 90 -  98    89 -  99     98 12/30 0300    Flow (L/min)   2 -  4     2 12/29 1800              Physical Exam:     Alert, oriented.  No acute distress.  Nonlabored respirations.  Regular rate and rhythm.  Abdomen nondistended.  Left lower extremity operative dressing in place, VAC with good seal, serosanguineous output.     Results Review:     I reviewed the patient's new clinical results.    Medication Review:   Hospital Medications (active)         Dose Frequency Start End    ! Home medications stored in pharmacy, please contact pharmacist prior to patient discharge  Every 12 Hours Scheduled 12/29/2023 --    Route: Does not apply    acetaminophen (TYLENOL) tablet 650 mg 650 mg Every 6 Hours PRN 12/29/2023 --    Admin Instructions: If given for fever, use fever parameter: fever greater than 100.4 °F  Based on patient request - if ordered for moderate or severe pain, provider allows for administration of a medication prescribed for a lower pain scale.    Do not exceed 4 grams of acetaminophen in a 24 hr period. Max dose of 2gm for AST/ALT greater than 120 units/L.    If given for pain, use the following pain scale:   Mild Pain = Pain Score of 1-3,  CPOT 1-2  Moderate Pain = Pain Score of 4-6, CPOT 3-4  Severe Pain = Pain Score of 7-10, CPOT 5-8    Route: Oral    bisacodyl (DULCOLAX) EC tablet 5 mg 5 mg Daily PRN 12/29/2023 --    Admin Instructions: Use if no bowel movement after 12 hours.  Swallow whole. Do not crush, split, or chew tablet.    Route: Oral    Linked Group 1: See Hyperspace for full Linked Orders Report.        bisacodyl (DULCOLAX) suppository 10 mg 10 mg Daily PRN 12/29/2023 --    Admin Instructions: Use if no bowel movement after 12 hours.  Hold for diarrhea    Route: Rectal    Linked Group 1: See Hyperspace for full Linked Orders Report.        cefTAZidime (FORTAZ) 2,000 mg in sodium chloride 0.9 % 100 mL IVPB 2,000 mg Every 8 Hours 12/29/2023 1/8/2024    Admin Instructions: Caution: Look alike/sound alike drug alert    Route: Intravenous    DAPTOmycin (CUBICIN) 500 mg in sodium chloride 0.9 % 50 mL IVPB 6 mg/kg × 82.9 kg (Adjusted) Every 24 Hours 12/29/2023 1/3/2024    Admin Instructions: Caution: Look alike/sound alike drug alert.  Refrigerate. Do not shake.    Route: Intravenous    diphenhydrAMINE (BENADRYL) capsule 25 mg 25 mg Every 6 Hours PRN 12/29/2023 --    Admin Instructions: Caution: Look alike/sound alike drug alert. This med may be ordered in other forms and routes. Before giving verify the last time the drug was given by any route/form.    Route: Oral    famotidine (PEPCID) injection 20 mg 20 mg Every 12 Hours Scheduled 12/29/2023 --    Admin Instructions: Give IV push over 2 minutes.    Route: Intravenous    HYDROmorphone (DILAUDID) injection 1 mg 1 mg Every 2 Hours PRN 12/29/2023 1/5/2024    Admin Instructions: Based on patient request - if ordered for moderate or severe pain, provider allows for administration of a medication prescribed for a lower pain scale.      Caution: Look alike/sound alike drug alert    If given for pain, use the following pain scale:  Mild Pain = Pain Score of 1-3, CPOT 1-2  Moderate Pain = Pain Score  of 4-6, CPOT 3-4  Severe Pain = Pain Score of 7-10, CPOT 5-8    Route: Intravenous    Linked Group 2: See Hyperspace for full Linked Orders Report.        lactated ringers infusion 100 mL/hr Continuous 12/29/2023 --    Route: Intravenous    lactated ringers infusion 9 mL/hr Continuous 12/29/2023 --    Admin Instructions: May switch to NS IV at KVO if renal / if indicated    Route: Intravenous    levothyroxine (SYNTHROID, LEVOTHROID) tablet 125 mcg 125 mcg Daily 12/29/2023 --    Admin Instructions: Take on empty stomach.    Route: Oral    LORazepam (ATIVAN) tablet 0.5 mg 0.5 mg Every 12 Hours PRN 12/29/2023 --    Admin Instructions: Hold for SBP < 110   Caution: Look alike/sound alike drug alert    Route: Oral    melatonin tablet 5 mg 5 mg Nightly PRN 12/29/2023 --    Route: Oral    naloxone (NARCAN) injection 0.4 mg 0.4 mg Every 5 Minutes PRN 12/29/2023 --    Admin Instructions: If Respiratory Rate Less Than 8 or Patient is Difficult to Arouse, Stop ALL Narcotics & Contact Provider.  Administer Slow IV Push.  Repeat As Ordered Until Respiratory Rate is Greater Than 12.    Route: Intravenous    Linked Group 2: See Hyperspace for full Linked Orders Report.        ondansetron (ZOFRAN) injection 4 mg 4 mg Every 6 Hours PRN 12/29/2023 --    Admin Instructions: If BOTH ondansetron (ZOFRAN) and promethazine (PHENERGAN) are ordered use ondansetron first and THEN promethazine IF ondansetron is ineffective.    Route: Intravenous    oxyCODONE (ROXICODONE) immediate release tablet 10 mg 10 mg Every 4 Hours PRN 12/29/2023 --    Admin Instructions: Based on patient request - if ordered for moderate or severe pain, provider allows for administration of a medication prescribed for a lower pain scale.  If given for pain, use the following pain scale:  Mild Pain = Pain Score of 1-3, CPOT 1-2  Moderate Pain = Pain Score of 4-6, CPOT 3-4  Severe Pain = Pain Score of 7-10, CPOT 5-8    Route: Oral    oxyCODONE (ROXICODONE) immediate  release tablet 5 mg 5 mg Every 4 Hours PRN 12/29/2023 1/5/2024    Admin Instructions: Based on patient request - if ordered for moderate or severe pain, provider allows for administration of a medication prescribed for a lower pain scale.      If given for pain, use the following pain scale:  Mild Pain = Pain Score of 1-3, CPOT 1-2  Moderate Pain = Pain Score of 4-6, CPOT 3-4  Severe Pain = Pain Score of 7-10, CPOT 5-8    Route: Oral    polyethylene glycol (MIRALAX) packet 17 g 17 g Daily PRN 12/29/2023 --    Admin Instructions: Use if no bowel movement after 12 hours. Mix in 6-8 ounces of water.  Use 4-8 ounces of water, tea, or juice for each 17 gram dose.    Route: Oral    Linked Group 1: See Hyperspace for full Linked Orders Report.        pramipexole (MIRAPEX) tablet 1 mg 1 mg Daily 12/29/2023 --    Route: Oral    pregabalin (LYRICA) capsule 200 mg 200 mg 3 Times Daily 12/29/2023 --    Admin Instructions:     Route: Oral    sennosides-docusate (PERICOLACE) 8.6-50 MG per tablet 2 tablet 2 tablet 2 Times Daily 12/29/2023 --    Admin Instructions: HOLD MEDICATION IF PATIENT HAS HAD BOWEL MOVEMENT. Start bowel management regimen if patient has not had a bowel movement after 12 hours.    Route: Oral    Linked Group 1: See Hyperspace for full Linked Orders Report.        sertraline (ZOLOFT) tablet 50 mg 50 mg Nightly 12/29/2023 --    Route: Oral    sodium chloride 0.9 % flush 10 mL 10 mL Every 12 Hours Scheduled 12/29/2023 --    Route: Intravenous    sodium chloride 0.9 % flush 10 mL 10 mL As Needed 12/29/2023 --    Route: Intravenous    sodium chloride 0.9 % infusion 40 mL 40 mL As Needed 12/29/2023 --    Admin Instructions: Following administration of an IV intermittent medication, flush line with 40mL NS at 100mL/hr.    Route: Intravenous    traZODone (DESYREL) tablet 50 mg 50 mg Nightly PRN 12/29/2023 --    Admin Instructions: Take with food.  Caution: Look alike/sound alike drug alert    Route: Oral               Assessment & Plan     48-year-old female status post left above-knee amputation, wound vacuum-assisted closure December 29, 2023.      Lower extremity cellulitis    Acquired hypothyroidism    Ulcer of right midfoot with fat layer exposed    Ulcer of left heel, with fat layer exposed    Chronic osteomyelitis    Anxiety associated with depression    RLS (restless legs syndrome)    Neuropathy    Obesity, morbid, BMI 50 or higher      Nonweightbearing left lower extremity.  Follow cultures.  Plan for n.p.o. at midnight, tentative plan for left leg debridement, irrigation, secondary closure of left above-knee amputation wound tomorrow.    Adama Witt Jr, MD  12/30/23  06:54 EST
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
## 2023-12-31 ENCOUNTER — ANESTHESIA EVENT (OUTPATIENT)
Dept: PERIOP | Facility: HOSPITAL | Age: 48
End: 2023-12-31
Payer: COMMERCIAL

## 2023-12-31 ENCOUNTER — ANESTHESIA (OUTPATIENT)
Dept: PERIOP | Facility: HOSPITAL | Age: 48
End: 2023-12-31
Payer: COMMERCIAL

## 2023-12-31 LAB
B-HCG UR QL: NEGATIVE
EXPIRATION DATE: NORMAL
INTERNAL NEGATIVE CONTROL: NORMAL
INTERNAL POSITIVE CONTROL: NORMAL
Lab: NORMAL

## 2023-12-31 PROCEDURE — 25010000002 FENTANYL CITRATE (PF) 50 MCG/ML SOLUTION

## 2023-12-31 PROCEDURE — 87075 CULTR BACTERIA EXCEPT BLOOD: CPT | Performed by: ORTHOPAEDIC SURGERY

## 2023-12-31 PROCEDURE — 25010000002 CEFTAZIDIME 2 G RECONSTITUTED SOLUTION 1 EACH VIAL: Performed by: ORTHOPAEDIC SURGERY

## 2023-12-31 PROCEDURE — 87206 SMEAR FLUORESCENT/ACID STAI: CPT | Performed by: ORTHOPAEDIC SURGERY

## 2023-12-31 PROCEDURE — 25010000002 FENTANYL CITRATE (PF) 100 MCG/2ML SOLUTION

## 2023-12-31 PROCEDURE — 25010000002 CEFAZOLIN PER 500 MG

## 2023-12-31 PROCEDURE — 99232 SBSQ HOSP IP/OBS MODERATE 35: CPT | Performed by: FAMILY MEDICINE

## 2023-12-31 PROCEDURE — 81025 URINE PREGNANCY TEST: CPT | Performed by: ANESTHESIOLOGY

## 2023-12-31 PROCEDURE — 25010000002 HYDROMORPHONE PER 4 MG

## 2023-12-31 PROCEDURE — 87070 CULTURE OTHR SPECIMN AEROBIC: CPT | Performed by: ORTHOPAEDIC SURGERY

## 2023-12-31 PROCEDURE — 87015 SPECIMEN INFECT AGNT CONCNTJ: CPT | Performed by: ORTHOPAEDIC SURGERY

## 2023-12-31 PROCEDURE — 25010000002 DAPTOMYCIN PER 1 MG: Performed by: ORTHOPAEDIC SURGERY

## 2023-12-31 PROCEDURE — 87176 TISSUE HOMOGENIZATION CULTR: CPT | Performed by: ORTHOPAEDIC SURGERY

## 2023-12-31 PROCEDURE — 87205 SMEAR GRAM STAIN: CPT | Performed by: ORTHOPAEDIC SURGERY

## 2023-12-31 PROCEDURE — 0JBM0ZZ EXCISION OF LEFT UPPER LEG SUBCUTANEOUS TISSUE AND FASCIA, OPEN APPROACH: ICD-10-PCS | Performed by: ORTHOPAEDIC SURGERY

## 2023-12-31 PROCEDURE — 25010000002 DIAZEPAM PER 5 MG: Performed by: INTERNAL MEDICINE

## 2023-12-31 PROCEDURE — 25010000002 CEFTAZIDIME 2 G RECONSTITUTED SOLUTION 1 EACH VIAL: Performed by: INTERNAL MEDICINE

## 2023-12-31 PROCEDURE — 25010000002 HYDROMORPHONE 1 MG/ML SOLUTION: Performed by: ORTHOPAEDIC SURGERY

## 2023-12-31 PROCEDURE — 25010000002 DROPERIDOL PER 5 MG

## 2023-12-31 PROCEDURE — 25810000003 LACTATED RINGERS PER 1000 ML: Performed by: ORTHOPAEDIC SURGERY

## 2023-12-31 PROCEDURE — 25010000002 HYDROMORPHONE 1 MG/ML SOLUTION: Performed by: FAMILY MEDICINE

## 2023-12-31 PROCEDURE — 87116 MYCOBACTERIA CULTURE: CPT | Performed by: ORTHOPAEDIC SURGERY

## 2023-12-31 PROCEDURE — 25010000002 PROPOFOL 10 MG/ML EMULSION

## 2023-12-31 PROCEDURE — 25810000003 LACTATED RINGERS PER 1000 ML: Performed by: ANESTHESIOLOGY

## 2023-12-31 PROCEDURE — 25010000002 VANCOMYCIN 1 G RECONSTITUTED SOLUTION: Performed by: ORTHOPAEDIC SURGERY

## 2023-12-31 PROCEDURE — 87102 FUNGUS ISOLATION CULTURE: CPT | Performed by: ORTHOPAEDIC SURGERY

## 2023-12-31 PROCEDURE — 0JQM0ZZ REPAIR LEFT UPPER LEG SUBCUTANEOUS TISSUE AND FASCIA, OPEN APPROACH: ICD-10-PCS | Performed by: ORTHOPAEDIC SURGERY

## 2023-12-31 RX ORDER — SODIUM CHLORIDE 0.9 % (FLUSH) 0.9 %
3 SYRINGE (ML) INJECTION EVERY 12 HOURS SCHEDULED
Status: DISCONTINUED | OUTPATIENT
Start: 2023-12-31 | End: 2023-12-31 | Stop reason: HOSPADM

## 2023-12-31 RX ORDER — CEFAZOLIN SODIUM IN 0.9 % NACL 3 G/100 ML
3000 INTRAVENOUS SOLUTION, PIGGYBACK (ML) INTRAVENOUS ONCE
Status: DISCONTINUED | OUTPATIENT
Start: 2023-12-31 | End: 2023-12-31 | Stop reason: HOSPADM

## 2023-12-31 RX ORDER — OXYCODONE HYDROCHLORIDE 5 MG/1
5 TABLET ORAL EVERY 4 HOURS PRN
Qty: 42 TABLET | Refills: 0 | Status: SHIPPED | OUTPATIENT
Start: 2023-12-31 | End: 2024-01-08 | Stop reason: HOSPADM

## 2023-12-31 RX ORDER — HYDRALAZINE HYDROCHLORIDE 20 MG/ML
5 INJECTION INTRAMUSCULAR; INTRAVENOUS
Status: DISCONTINUED | OUTPATIENT
Start: 2023-12-31 | End: 2023-12-31 | Stop reason: HOSPADM

## 2023-12-31 RX ORDER — LIDOCAINE HYDROCHLORIDE 10 MG/ML
INJECTION, SOLUTION EPIDURAL; INFILTRATION; INTRACAUDAL; PERINEURAL AS NEEDED
Status: DISCONTINUED | OUTPATIENT
Start: 2023-12-31 | End: 2023-12-31 | Stop reason: SURG

## 2023-12-31 RX ORDER — MIDAZOLAM HYDROCHLORIDE 1 MG/ML
1 INJECTION INTRAMUSCULAR; INTRAVENOUS
Status: DISCONTINUED | OUTPATIENT
Start: 2023-12-31 | End: 2023-12-31 | Stop reason: HOSPADM

## 2023-12-31 RX ORDER — NALOXONE HYDROCHLORIDE 4 MG/.1ML
SPRAY NASAL
Qty: 2 EACH | Refills: 0 | Status: SHIPPED | OUTPATIENT
Start: 2023-12-31 | End: 2024-01-19 | Stop reason: HOSPADM

## 2023-12-31 RX ORDER — LABETALOL HYDROCHLORIDE 5 MG/ML
5 INJECTION, SOLUTION INTRAVENOUS
Status: DISCONTINUED | OUTPATIENT
Start: 2023-12-31 | End: 2023-12-31 | Stop reason: HOSPADM

## 2023-12-31 RX ORDER — SODIUM CHLORIDE 0.9 % (FLUSH) 0.9 %
10 SYRINGE (ML) INJECTION EVERY 12 HOURS SCHEDULED
Status: DISCONTINUED | OUTPATIENT
Start: 2023-12-31 | End: 2023-12-31 | Stop reason: HOSPADM

## 2023-12-31 RX ORDER — PROMETHAZINE HYDROCHLORIDE 25 MG/1
25 TABLET ORAL ONCE AS NEEDED
Status: DISCONTINUED | OUTPATIENT
Start: 2023-12-31 | End: 2023-12-31 | Stop reason: HOSPADM

## 2023-12-31 RX ORDER — SODIUM CHLORIDE 9 MG/ML
40 INJECTION, SOLUTION INTRAVENOUS AS NEEDED
Status: DISCONTINUED | OUTPATIENT
Start: 2023-12-31 | End: 2023-12-31 | Stop reason: HOSPADM

## 2023-12-31 RX ORDER — PROMETHAZINE HYDROCHLORIDE 25 MG/1
25 SUPPOSITORY RECTAL ONCE AS NEEDED
Status: DISCONTINUED | OUTPATIENT
Start: 2023-12-31 | End: 2023-12-31 | Stop reason: HOSPADM

## 2023-12-31 RX ORDER — IPRATROPIUM BROMIDE AND ALBUTEROL SULFATE 2.5; .5 MG/3ML; MG/3ML
3 SOLUTION RESPIRATORY (INHALATION) ONCE AS NEEDED
Status: DISCONTINUED | OUTPATIENT
Start: 2023-12-31 | End: 2023-12-31 | Stop reason: HOSPADM

## 2023-12-31 RX ORDER — PROPOFOL 10 MG/ML
VIAL (ML) INTRAVENOUS CONTINUOUS PRN
Status: DISCONTINUED | OUTPATIENT
Start: 2023-12-31 | End: 2023-12-31 | Stop reason: SURG

## 2023-12-31 RX ORDER — SODIUM CHLORIDE, SODIUM LACTATE, POTASSIUM CHLORIDE, CALCIUM CHLORIDE 600; 310; 30; 20 MG/100ML; MG/100ML; MG/100ML; MG/100ML
9 INJECTION, SOLUTION INTRAVENOUS CONTINUOUS PRN
Status: DISCONTINUED | OUTPATIENT
Start: 2023-12-31 | End: 2024-01-19 | Stop reason: HOSPADM

## 2023-12-31 RX ORDER — NALOXONE HCL 0.4 MG/ML
0.4 VIAL (ML) INJECTION AS NEEDED
Status: DISCONTINUED | OUTPATIENT
Start: 2023-12-31 | End: 2023-12-31 | Stop reason: HOSPADM

## 2023-12-31 RX ORDER — HYDROMORPHONE HYDROCHLORIDE 1 MG/ML
0.5 INJECTION, SOLUTION INTRAMUSCULAR; INTRAVENOUS; SUBCUTANEOUS
Status: DISCONTINUED | OUTPATIENT
Start: 2023-12-31 | End: 2023-12-31 | Stop reason: HOSPADM

## 2023-12-31 RX ORDER — FENTANYL CITRATE 50 UG/ML
INJECTION, SOLUTION INTRAMUSCULAR; INTRAVENOUS AS NEEDED
Status: DISCONTINUED | OUTPATIENT
Start: 2023-12-31 | End: 2023-12-31 | Stop reason: SURG

## 2023-12-31 RX ORDER — SODIUM CHLORIDE 0.9 % (FLUSH) 0.9 %
10 SYRINGE (ML) INJECTION AS NEEDED
Status: DISCONTINUED | OUTPATIENT
Start: 2023-12-31 | End: 2023-12-31 | Stop reason: HOSPADM

## 2023-12-31 RX ORDER — DIAZEPAM 5 MG/ML
2.5 INJECTION, SOLUTION INTRAMUSCULAR; INTRAVENOUS ONCE
Status: COMPLETED | OUTPATIENT
Start: 2023-12-31 | End: 2023-12-31

## 2023-12-31 RX ORDER — ONDANSETRON 2 MG/ML
4 INJECTION INTRAMUSCULAR; INTRAVENOUS ONCE AS NEEDED
Status: DISCONTINUED | OUTPATIENT
Start: 2023-12-31 | End: 2023-12-31 | Stop reason: HOSPADM

## 2023-12-31 RX ORDER — FAMOTIDINE 10 MG/ML
20 INJECTION, SOLUTION INTRAVENOUS
Status: DISCONTINUED | OUTPATIENT
Start: 2023-12-31 | End: 2023-12-31 | Stop reason: HOSPADM

## 2023-12-31 RX ORDER — VANCOMYCIN HYDROCHLORIDE 1 G/20ML
INJECTION, POWDER, LYOPHILIZED, FOR SOLUTION INTRAVENOUS AS NEEDED
Status: DISCONTINUED | OUTPATIENT
Start: 2023-12-31 | End: 2023-12-31 | Stop reason: HOSPADM

## 2023-12-31 RX ORDER — FENTANYL CITRATE 50 UG/ML
50 INJECTION, SOLUTION INTRAMUSCULAR; INTRAVENOUS
Status: DISCONTINUED | OUTPATIENT
Start: 2023-12-31 | End: 2023-12-31 | Stop reason: HOSPADM

## 2023-12-31 RX ORDER — DROPERIDOL 2.5 MG/ML
0.62 INJECTION, SOLUTION INTRAMUSCULAR; INTRAVENOUS
Status: DISCONTINUED | OUTPATIENT
Start: 2023-12-31 | End: 2023-12-31 | Stop reason: HOSPADM

## 2023-12-31 RX ORDER — MAGNESIUM HYDROXIDE 1200 MG/15ML
LIQUID ORAL AS NEEDED
Status: DISCONTINUED | OUTPATIENT
Start: 2023-12-31 | End: 2023-12-31 | Stop reason: HOSPADM

## 2023-12-31 RX ORDER — SODIUM CHLORIDE 0.9 % (FLUSH) 0.9 %
3-10 SYRINGE (ML) INJECTION AS NEEDED
Status: DISCONTINUED | OUTPATIENT
Start: 2023-12-31 | End: 2023-12-31 | Stop reason: HOSPADM

## 2023-12-31 RX ORDER — DROPERIDOL 2.5 MG/ML
0.62 INJECTION, SOLUTION INTRAMUSCULAR; INTRAVENOUS ONCE AS NEEDED
Status: DISCONTINUED | OUTPATIENT
Start: 2023-12-31 | End: 2023-12-31 | Stop reason: HOSPADM

## 2023-12-31 RX ORDER — CEFAZOLIN SODIUM 1 G/3ML
INJECTION, POWDER, FOR SOLUTION INTRAMUSCULAR; INTRAVENOUS AS NEEDED
Status: DISCONTINUED | OUTPATIENT
Start: 2023-12-31 | End: 2023-12-31 | Stop reason: SURG

## 2023-12-31 RX ADMIN — TRAZODONE HYDROCHLORIDE 50 MG: 50 TABLET ORAL at 00:51

## 2023-12-31 RX ADMIN — Medication 5 MG: at 20:22

## 2023-12-31 RX ADMIN — CEFTAZIDIME 2000 MG: 2 INJECTION, POWDER, FOR SOLUTION INTRAVENOUS at 03:04

## 2023-12-31 RX ADMIN — SODIUM CHLORIDE, POTASSIUM CHLORIDE, SODIUM LACTATE AND CALCIUM CHLORIDE 100 ML/HR: 600; 310; 30; 20 INJECTION, SOLUTION INTRAVENOUS at 12:18

## 2023-12-31 RX ADMIN — OXYCODONE HYDROCHLORIDE 10 MG: 10 TABLET ORAL at 23:37

## 2023-12-31 RX ADMIN — CEFAZOLIN 3 G: 1 INJECTION, POWDER, FOR SOLUTION INTRAMUSCULAR; INTRAVENOUS; PARENTERAL at 08:17

## 2023-12-31 RX ADMIN — FAMOTIDINE 20 MG: 10 INJECTION INTRAVENOUS at 10:24

## 2023-12-31 RX ADMIN — CEFTAZIDIME 2000 MG: 2 INJECTION, POWDER, FOR SOLUTION INTRAVENOUS at 21:36

## 2023-12-31 RX ADMIN — LIDOCAINE HYDROCHLORIDE 50 MG: 10 INJECTION, SOLUTION EPIDURAL; INFILTRATION; INTRACAUDAL; PERINEURAL at 08:05

## 2023-12-31 RX ADMIN — Medication 10 ML: at 20:39

## 2023-12-31 RX ADMIN — CEFTAZIDIME 2000 MG: 2 INJECTION, POWDER, FOR SOLUTION INTRAVENOUS at 11:18

## 2023-12-31 RX ADMIN — DROPERIDOL 0.62 MG: 2.5 INJECTION, SOLUTION INTRAMUSCULAR; INTRAVENOUS at 09:40

## 2023-12-31 RX ADMIN — OXYCODONE HYDROCHLORIDE 10 MG: 10 TABLET ORAL at 11:17

## 2023-12-31 RX ADMIN — HYDROMORPHONE HYDROCHLORIDE 1 MG: 1 INJECTION, SOLUTION INTRAMUSCULAR; INTRAVENOUS; SUBCUTANEOUS at 10:25

## 2023-12-31 RX ADMIN — HYDROMORPHONE HYDROCHLORIDE 1 MG: 1 INJECTION, SOLUTION INTRAMUSCULAR; INTRAVENOUS; SUBCUTANEOUS at 14:09

## 2023-12-31 RX ADMIN — PREGABALIN 200 MG: 100 CAPSULE ORAL at 10:23

## 2023-12-31 RX ADMIN — FAMOTIDINE 20 MG: 10 INJECTION INTRAVENOUS at 07:56

## 2023-12-31 RX ADMIN — FENTANYL CITRATE 50 MCG: 50 INJECTION, SOLUTION INTRAMUSCULAR; INTRAVENOUS at 08:06

## 2023-12-31 RX ADMIN — LORAZEPAM 0.5 MG: 0.5 TABLET ORAL at 18:09

## 2023-12-31 RX ADMIN — SODIUM CHLORIDE, POTASSIUM CHLORIDE, SODIUM LACTATE AND CALCIUM CHLORIDE 100 ML/HR: 600; 310; 30; 20 INJECTION, SOLUTION INTRAVENOUS at 21:37

## 2023-12-31 RX ADMIN — HYDROMORPHONE HYDROCHLORIDE 1 MG: 1 INJECTION, SOLUTION INTRAMUSCULAR; INTRAVENOUS; SUBCUTANEOUS at 05:01

## 2023-12-31 RX ADMIN — HYDROMORPHONE HYDROCHLORIDE 0.5 MG: 1 INJECTION, SOLUTION INTRAMUSCULAR; INTRAVENOUS; SUBCUTANEOUS at 09:40

## 2023-12-31 RX ADMIN — OXYCODONE HYDROCHLORIDE 10 MG: 10 TABLET ORAL at 03:35

## 2023-12-31 RX ADMIN — SODIUM CHLORIDE, POTASSIUM CHLORIDE, SODIUM LACTATE AND CALCIUM CHLORIDE 9 ML/HR: 600; 310; 30; 20 INJECTION, SOLUTION INTRAVENOUS at 07:56

## 2023-12-31 RX ADMIN — PREGABALIN 200 MG: 100 CAPSULE ORAL at 15:23

## 2023-12-31 RX ADMIN — DIAZEPAM 2.5 MG: 10 INJECTION, SOLUTION INTRAMUSCULAR; INTRAVENOUS at 05:42

## 2023-12-31 RX ADMIN — PROPOFOL 200 MCG/KG/MIN: 10 INJECTION, EMULSION INTRAVENOUS at 08:07

## 2023-12-31 RX ADMIN — SERTRALINE HYDROCHLORIDE 50 MG: 50 TABLET ORAL at 20:22

## 2023-12-31 RX ADMIN — PREGABALIN 200 MG: 100 CAPSULE ORAL at 20:22

## 2023-12-31 RX ADMIN — FENTANYL CITRATE 25 MCG: 50 INJECTION, SOLUTION INTRAMUSCULAR; INTRAVENOUS at 09:15

## 2023-12-31 RX ADMIN — HYDROMORPHONE HYDROCHLORIDE 1 MG: 1 INJECTION, SOLUTION INTRAMUSCULAR; INTRAVENOUS; SUBCUTANEOUS at 20:22

## 2023-12-31 RX ADMIN — TRAZODONE HYDROCHLORIDE 50 MG: 50 TABLET ORAL at 20:31

## 2023-12-31 RX ADMIN — HYDROMORPHONE HYDROCHLORIDE 1 MG: 1 INJECTION, SOLUTION INTRAMUSCULAR; INTRAVENOUS; SUBCUTANEOUS at 02:49

## 2023-12-31 RX ADMIN — SENNOSIDES AND DOCUSATE SODIUM 2 TABLET: 8.6; 5 TABLET ORAL at 20:22

## 2023-12-31 RX ADMIN — FENTANYL CITRATE 50 MCG: 50 INJECTION, SOLUTION INTRAMUSCULAR; INTRAVENOUS at 08:38

## 2023-12-31 RX ADMIN — HYDROMORPHONE HYDROCHLORIDE 1 MG: 1 INJECTION, SOLUTION INTRAMUSCULAR; INTRAVENOUS; SUBCUTANEOUS at 22:54

## 2023-12-31 RX ADMIN — SODIUM CHLORIDE, POTASSIUM CHLORIDE, SODIUM LACTATE AND CALCIUM CHLORIDE: 600; 310; 30; 20 INJECTION, SOLUTION INTRAVENOUS at 08:00

## 2023-12-31 RX ADMIN — FENTANYL CITRATE 50 MCG: 50 INJECTION, SOLUTION INTRAMUSCULAR; INTRAVENOUS at 09:35

## 2023-12-31 RX ADMIN — DAPTOMYCIN 500 MG: 500 INJECTION, POWDER, LYOPHILIZED, FOR SOLUTION INTRAVENOUS at 20:32

## 2023-12-31 RX ADMIN — FAMOTIDINE 20 MG: 10 INJECTION INTRAVENOUS at 20:22

## 2023-12-31 RX ADMIN — OXYCODONE HYDROCHLORIDE 10 MG: 10 TABLET ORAL at 15:23

## 2023-12-31 RX ADMIN — HYDROMORPHONE HYDROCHLORIDE 1 MG: 1 INJECTION, SOLUTION INTRAMUSCULAR; INTRAVENOUS; SUBCUTANEOUS at 00:51

## 2023-12-31 RX ADMIN — PRAMIPEXOLE DIHYDROCHLORIDE 1 MG: 0.25 TABLET ORAL at 10:24

## 2023-12-31 RX ADMIN — HYDROMORPHONE HYDROCHLORIDE 1 MG: 1 INJECTION, SOLUTION INTRAMUSCULAR; INTRAVENOUS; SUBCUTANEOUS at 12:15

## 2023-12-31 RX ADMIN — FENTANYL CITRATE 50 MCG: 50 INJECTION, SOLUTION INTRAMUSCULAR; INTRAVENOUS at 08:01

## 2023-12-31 RX ADMIN — FENTANYL CITRATE 25 MCG: 50 INJECTION, SOLUTION INTRAMUSCULAR; INTRAVENOUS at 08:53

## 2023-12-31 RX ADMIN — HYDROMORPHONE HYDROCHLORIDE 1 MG: 1 INJECTION, SOLUTION INTRAMUSCULAR; INTRAVENOUS; SUBCUTANEOUS at 16:46

## 2023-12-31 NOTE — ANESTHESIA PREPROCEDURE EVALUATION
Anesthesia Evaluation     Patient summary reviewed and Nursing notes reviewed   no history of anesthetic complications:   NPO Solid Status: > 8 hours  NPO Liquid Status: > 2 hours           Airway   Mallampati: II  TM distance: >3 FB  Neck ROM: full  Possible difficult intubation  Dental - normal exam     Pulmonary     breath sounds clear to auscultation    ROS comment: PULM HTN  Cardiovascular   Exercise tolerance: good (4-7 METS)    ECG reviewed  Rhythm: regular  Rate: normal      ROS comment: EF 60%    Neuro/Psych  (+) psychiatric history Anxiety and Depression  GI/Hepatic/Renal/Endo    (+) morbid obesity, liver disease fatty liver disease, renal disease-, thyroid problem hypothyroidism  (-)  obesity    Musculoskeletal     Abdominal   (+) obese    Abdomen: soft.   Substance History      OB/GYN          Other   arthritis, blood dyscrasia anemia,                 Anesthesia Plan    ASA 3     general     intravenous induction     Anesthetic plan, risks, benefits, and alternatives have been provided, discussed and informed consent has been obtained with: patient.    Plan discussed with CRNA.    CODE STATUS:    Code Status (Patient has no pulse and is not breathing): CPR (Attempt to Resuscitate)  Medical Interventions (Patient has pulse or is breathing): Full Support

## 2023-12-31 NOTE — PROGRESS NOTES
[facility] Medicine Services  PROGRESS NOTE    Patient Name: Phoebe Carvajal  : 1975  MRN: [id number]    Date of Admission: 2023  Primary Care Physician: Sofya Benjamin MD    Subjective   Subjective     CC:  Osteomyelitis    HPI:   - She underwent AKA yesterday for recurrent osteomyelitis of the left lower extremity.  Dr. Witt's note indicates he plans to take her back to the OR tomorrow for debridement again.  ID is following.  She complains of severe pain that is relieved with medications.      -patient underwent debridement in the OR again this morning with Dr. Witt.  She now has a drain in place and wound VAC has been removed.  She complains of pain and request pain medication.  She denies nausea or vomiting.      Objective   Objective     Vital Signs:   Temp:  [97.7 °F (36.5 °C)-99 °F (37.2 °C)] 97.7 °F (36.5 °C)  Heart Rate:  [58-85] 81  Resp:  [14-20] 20  BP: (106-120)/(60-77) 107/60  Flow (L/min):  [2] 2     Physical Exam:  Constitutional: No acute distress, awake, alert  HENT: NCAT, mucous membranes moist  Respiratory: Decreased breath sounds bilaterally, respiratory effort normal   Cardiovascular: RRR  Gastrointestinal: Positive bowel sounds, soft, nontender, nondistended  Musculoskeletal: Left AKA  Psychiatric: Appropriate affect, cooperative  Neurologic: Oriented x 3, generally weak, Cranial Nerves grossly intact to confrontation, speech clear  Skin: Left AKA bandage with postop bandage and drain      Results Reviewed:  LAB RESULTS:      Lab 23  0206 23   WBC 3.51 3.83   HEMOGLOBIN 10.3* 9.7*   HEMATOCRIT 32.5* 30.1*   PLATELETS 150 143   NEUTROS ABS 1.97 2.52   IMMATURE GRANS (ABS) 0.00 0.01   LYMPHS ABS 1.16 0.80   MONOS ABS 0.22 0.36   EOS ABS 0.15 0.13   .3* 112.7*   CRP 1.92*  --    PROCALCITONIN 0.04 0.04   LACTATE 1.0 0.9   PROTIME 14.9*  --          Lab 23  0511 23  0206 23   SODIUM  --   138 140   POTASSIUM  --  4.2 4.4   CHLORIDE  --  103 104   CO2  --  26.0 28.0   ANION GAP  --  9.0 8.0   BUN  --  8 8   CREATININE  --  0.63 0.54*   EGFR  --  108.2 112.3   GLUCOSE  --  85 95   CALCIUM  --  8.6 8.4*   MAGNESIUM  --  2.0  --    PHOSPHORUS  --  3.9  --    HEMOGLOBIN A1C 4.60*  --   --    TSH  --   --  7.320*         Lab 12/29/23  0206 12/28/23  1943   TOTAL PROTEIN 7.0 6.1   ALBUMIN 3.1* 2.7*   GLOBULIN 3.9 3.4   ALT (SGPT) 6 8   AST (SGOT) 20 23   BILIRUBIN 0.5 0.5   ALK PHOS 275* 247*         Lab 12/29/23  0206   PROTIME 14.9*   INR 1.16*             Lab 12/29/23  0206   ABO TYPING A   RH TYPING Negative   ANTIBODY SCREEN Negative         Brief Urine Lab Results  (Last result in the past 365 days)        Color   Clarity   Blood   Leuk Est   Nitrite   Protein   CREAT   Urine HCG        12/31/23 0801               Negative               Microbiology Results Abnormal       Procedure Component Value - Date/Time    Wound Culture - Wound, Leg, Left [[id number]] Collected: 12/29/23 1322    Lab Status: Preliminary result Specimen: Wound from Leg, Left Updated: 12/31/23 0955     Wound Culture No growth at 2 days     Gram Stain No organisms seen    Blood Culture - Blood, Arm, Right [[id number]]  (Normal) Collected: 12/28/23 2115    Lab Status: Preliminary result Specimen: Blood from Arm, Right Updated: 12/31/23 0030     Blood Culture No growth at 2 days    Blood Culture - Blood, Hand, Right [[id number]]  (Normal) Collected: 12/28/23 2120    Lab Status: Preliminary result Specimen: Blood from Hand, Right Updated: 12/31/23 0030     Blood Culture No growth at 2 days    AFB Culture - Tissue, Leg, Left [[id number]] Collected: 12/29/23 1139    Lab Status: Preliminary result Specimen: Tissue from Leg, Left Updated: 12/30/23 1259     AFB Stain No acid fast bacilli seen on concentrated smear    MRSA Screen, PCR (Inpatient) - Swab, Nares [[id number]]  (Normal) Collected: 12/29/23 0203    Lab Status: Final result Specimen:  Swab from Nares Updated: 12/29/23 0733     MRSA PCR Negative    Narrative:      The negative predictive value of this diagnostic test is high and should only be used to consider de-escalating anti-MRSA therapy. A positive result may indicate colonization with MRSA and must be correlated clinically.  MRSA Negative    COVID PRE-OP / PRE-PROCEDURE SCREENING ORDER (NO ISOLATION) - Swab, Nasopharynx [[id number]]  (Normal) Collected: 12/29/23 0203    Lab Status: Final result Specimen: Swab from Nasopharynx Updated: 12/29/23 0353    Narrative:      The following orders were created for panel order COVID PRE-OP / PRE-PROCEDURE SCREENING ORDER (NO ISOLATION) - Swab, Nasopharynx.  Procedure                               Abnormality         Status                     ---------                               -----------         ------                     Respiratory Panel PCR w/...[[id number]]  Normal              Final result                 Please view results for these tests on the individual orders.    Respiratory Panel PCR w/COVID-19(SARS-CoV-2) [facility] In-House, NP Swab in UTM/VTM, 2 HR TAT - Swab, Nasopharynx [[id number]]  (Normal) Collected: 12/29/23 0203    Lab Status: Final result Specimen: Swab from Nasopharynx Updated: 12/29/23 0353     ADENOVIRUS, PCR Not Detected     Coronavirus 229E Not Detected     Coronavirus HKU1 Not Detected     Coronavirus NL63 Not Detected     Coronavirus OC43 Not Detected     COVID19 Not Detected     Human Metapneumovirus Not Detected     Human Rhinovirus/Enterovirus Not Detected     Influenza A PCR Not Detected     Influenza B PCR Not Detected     Parainfluenza Virus 1 Not Detected     Parainfluenza Virus 2 Not Detected     Parainfluenza Virus 3 Not Detected     Parainfluenza Virus 4 Not Detected     RSV, PCR Not Detected     Bordetella pertussis pcr Not Detected     Bordetella parapertussis PCR Not Detected     Chlamydophila pneumoniae PCR Not Detected     Mycoplasma pneumo  by PCR Not Detected    Narrative:      In the setting of a positive respiratory panel with a viral infection PLUS a negative procalcitonin without other underlying concern for bacterial infection, consider observing off antibiotics or discontinuation of antibiotics and continue supportive care. If the respiratory panel is positive for atypical bacterial infection (Bordetella pertussis, Chlamydophila pneumoniae, or Mycoplasma pneumoniae), consider antibiotic de-escalation to target atypical bacterial infection.            FL C Arm During Surgery    Result Date: 12/29/2023  This procedure was auto-finalized with no dictation required.         Current medications:  Scheduled Meds:Pharmacy Consult, , Does not apply, Q12H  cefTAZidime, 2,000 mg, Intravenous, Q8H  DAPTOmycin, 6 mg/kg (Adjusted), Intravenous, Q24H  famotidine, 20 mg, Intravenous, Q12H  levothyroxine, 125 mcg, Oral, Daily  pramipexole, 1 mg, Oral, Daily  pregabalin, 200 mg, Oral, TID  senna-docusate sodium, 2 tablet, Oral, BID  sertraline, 50 mg, Oral, Nightly  sodium chloride, 10 mL, Intravenous, Q12H      Continuous Infusions:lactated ringers, 100 mL/hr, Last Rate: 100 mL/hr (12/30/23 1608)  lactated ringers, 9 mL/hr, Last Rate: 9 mL/hr (12/31/23 0756)  lactated ringers, 9 mL/hr      PRN Meds:.  acetaminophen    senna-docusate sodium **AND** polyethylene glycol **AND** bisacodyl **AND** bisacodyl    diphenhydrAMINE    HYDROmorphone **AND** naloxone    lactated ringers    LORazepam    melatonin    ondansetron    oxyCODONE    oxyCODONE    sodium chloride    sodium chloride    traZODone    Assessment & Plan   Assessment & Plan     Active Hospital Problems    Diagnosis  POA    **Lower extremity cellulitis [L03.119]  Yes    Anxiety associated with depression [F41.8]  Unknown    RLS (restless legs syndrome) [G25.81]  Unknown    Neuropathy [G62.9]  Unknown    Obesity, morbid, BMI 50 or higher [E66.01]  Unknown    S/P AKA (above knee amputation), left [Z89.612]   Not Applicable    Chronic osteomyelitis [M86.60]  Yes    Acquired hypothyroidism [E03.9]  Yes    Ulcer of right midfoot with fat layer exposed [L97.412]  Yes    Ulcer of left heel, with fat layer exposed [L97.422]  Yes      Resolved Hospital Problems   No resolved problems to display.        Brief Hospital Course to date:  Phoebe Carvajal is a 48 y.o. female with history of depression, hypothyroidism, stills disease, RLS, PAD, anemia, morbid obesity, recurrent osteomyelitis, chronic lower extremity osteomyelitis, recurrent left prosthetic joint infection, bilateral foot wounds who presented to the emergency department for evaluation of fever and worsening erythema with foul odor from her chronic wounds.  Previous records from  from October 22 through November 8, 2023 showed intentions for proceeding with left AKA but patient waited for second opinion hoping to have the option of BKA.  She is followed by infectious disease-known to Dr. Wang.  She underwent AKA on 12/29/2023 with Dr. Witt, with additional debridement on 12/31/2023    Osteomyelitis/cellulitis of the left lower extremity  Status post AKA on 12/29/2023 Dr. Witt  Chronic infected wounds  -Dr. Witt performed debridement again on 12/31/2023  -Patient has complained of excruciating pain postoperatively  -Continue IV Dilaudid, oxycodone, Tylenol, Lyrica  -Infectious disease consulted and following, seen by Dr. Valdez  -Continue ceftazidime and daptomycin    Neuropathy  -Continue Lyrica    Morbid obesity  Hypoxia  -Likely related to opiate use postoperatively and morbid obesity  -Continue O2 to maintain sats greater than 90, currently on 2 L nasal cannula    Restless leg syndrome  -Continue Mirapex    Anxiety  -Continue Zoloft    Expected Discharge Location and Transportation: Rehab  Expected Discharge   Expected Discharge Date: 1/1/2024; Expected Discharge Time:      DVT prophylaxis:  Mechanical DVT prophylaxis orders are present.      AM-PAC 6 Clicks Score (PT): 10 (12/30/23 1953)    CODE STATUS:   Code Status and Medical Interventions:   Ordered at: 12/29/23 0121     Code Status (Patient has no pulse and is not breathing):    CPR (Attempt to Resuscitate)     Medical Interventions (Patient has pulse or is breathing):    Full Support       Milagro Finley MD  12/31/23

## 2023-12-31 NOTE — ANESTHESIA POSTPROCEDURE EVALUATION
Patient: Phoebe Carvajal    Procedure Summary       Date: 12/31/23 Room / Location:  [facility] /  [facility]    Anesthesia Start: 0800 Anesthesia Stop: 0927    Procedure: SECONDARY WOUND CLOSURE LEFT AMPUTATION ABOVE KNEE (Left: Thigh) Diagnosis:       S/P AKA (above knee amputation), left      (S/P AKA (above knee amputation), left [Z89.612])    Surgeons: Adama Witt Jr., MD Provider: Chase Almaguer MD    Anesthesia Type: general ASA Status: 3            Anesthesia Type: general    Vitals  Vitals Value Taken Time   /77 12/31/23 0927   Temp 98.4 °F (36.9 °C) 12/31/23 0927   Pulse 85 12/31/23 0927   Resp 14 12/31/23 0927   SpO2 97 % 12/31/23 0927           Post Anesthesia Care and Evaluation    Patient location during evaluation: PACU  Patient participation: complete - patient participated  Level of consciousness: awake and alert  Pain score: 0  Pain management: adequate    Airway patency: patent  Anesthetic complications: No anesthetic complications  PONV Status: none  Cardiovascular status: hemodynamically stable and acceptable  Respiratory status: nonlabored ventilation, acceptable and nasal cannula  Hydration status: acceptable

## 2023-12-31 NOTE — PROGRESS NOTES
Phoebe M Carvajal       LOS: 3 days   Patient Care Team:  Sofya Benjamin MD as PCP - General (Internal Medicine)  Provider, No Known as PCP - Family Medicine    Chief Complaint:  s/p left above knee amputation     Subjective     Interval History:     Pain present but tolerable.     Review of Systems:      Gen- No fevers, chills  CV- No chest pain, palpitations  Resp- No cough, dyspnea  GI- No N/V/D, abd pain    Objective     Vital Signs  Vital Signs (last 24 hours)         12/30 0700 12/31 0659 12/31 0700 12/31 0731   Most Recent      Temp (°F) 98.4 -  99       98.6 (37) 12/31 0533    Heart Rate 58 -  83       83 12/31 0600    Resp 18 -  20       20 12/31 0533    BP 97/62 -  120/65       111/76 12/31 0545    SpO2 (%) 90 -  99       91 12/31 0600    Flow (L/min)   2       2 12/31 0600              Physical Exam:     Alert, oriented.  No acute distress.  Nonlabored respirations.  Regular rate and rhythm.  Abdomen nondistended.  Left lower extremity operative dressing in place, VAC with good seal, serosanguineous output.     Results Review:     I reviewed the patient's new clinical results.    Medication Review:   Hospital Medications (active)         Dose Frequency Start End    ! Home medications stored in pharmacy, please contact pharmacist prior to patient discharge  Every 12 Hours Scheduled 12/29/2023 --    Route: Does not apply    acetaminophen (TYLENOL) tablet 650 mg 650 mg Every 6 Hours PRN 12/29/2023 --    Admin Instructions: If given for fever, use fever parameter: fever greater than 100.4 °F  Based on patient request - if ordered for moderate or severe pain, provider allows for administration of a medication prescribed for a lower pain scale.    Do not exceed 4 grams of acetaminophen in a 24 hr period. Max dose of 2gm for AST/ALT greater than 120 units/L.    If given for pain, use the following pain scale:   Mild Pain = Pain Score of 1-3, CPOT 1-2  Moderate Pain = Pain Score of 4-6, CPOT 3-4  Severe Pain  = Pain Score of 7-10, CPOT 5-8    Route: Oral    bisacodyl (DULCOLAX) EC tablet 5 mg 5 mg Daily PRN 12/29/2023 --    Admin Instructions: Use if no bowel movement after 12 hours.  Swallow whole. Do not crush, split, or chew tablet.    Route: Oral    Linked Group 1: See Hyperspace for full Linked Orders Report.        bisacodyl (DULCOLAX) suppository 10 mg 10 mg Daily PRN 12/29/2023 --    Admin Instructions: Use if no bowel movement after 12 hours.  Hold for diarrhea    Route: Rectal    Linked Group 1: See Hyperspace for full Linked Orders Report.        cefTAZidime (FORTAZ) 2,000 mg in sodium chloride 0.9 % 100 mL IVPB 2,000 mg Every 8 Hours 12/29/2023 1/8/2024    Admin Instructions: Caution: Look alike/sound alike drug alert    Route: Intravenous    DAPTOmycin (CUBICIN) 500 mg in sodium chloride 0.9 % 50 mL IVPB 6 mg/kg × 82.9 kg (Adjusted) Every 24 Hours 12/29/2023 1/3/2024    Admin Instructions: Caution: Look alike/sound alike drug alert.  Refrigerate. Do not shake.    Route: Intravenous    diphenhydrAMINE (BENADRYL) capsule 25 mg 25 mg Every 6 Hours PRN 12/29/2023 --    Admin Instructions: Caution: Look alike/sound alike drug alert. This med may be ordered in other forms and routes. Before giving verify the last time the drug was given by any route/form.    Route: Oral    famotidine (PEPCID) injection 20 mg 20 mg Every 12 Hours Scheduled 12/29/2023 --    Admin Instructions: Give IV push over 2 minutes.    Route: Intravenous    HYDROmorphone (DILAUDID) injection 1 mg 1 mg Every 2 Hours PRN 12/29/2023 1/5/2024    Admin Instructions: Based on patient request - if ordered for moderate or severe pain, provider allows for administration of a medication prescribed for a lower pain scale.      Caution: Look alike/sound alike drug alert    If given for pain, use the following pain scale:  Mild Pain = Pain Score of 1-3, CPOT 1-2  Moderate Pain = Pain Score of 4-6, CPOT 3-4  Severe Pain = Pain Score of 7-10, CPOT 5-8     Route: Intravenous    Linked Group 2: See Hyperspace for full Linked Orders Report.        lactated ringers infusion 100 mL/hr Continuous 12/29/2023 --    Route: Intravenous    lactated ringers infusion 9 mL/hr Continuous 12/29/2023 --    Admin Instructions: May switch to NS IV at KVO if renal / if indicated    Route: Intravenous    levothyroxine (SYNTHROID, LEVOTHROID) tablet 125 mcg 125 mcg Daily 12/29/2023 --    Admin Instructions: Take on empty stomach.    Route: Oral    LORazepam (ATIVAN) tablet 0.5 mg 0.5 mg Every 12 Hours PRN 12/29/2023 --    Admin Instructions: Hold for SBP < 110   Caution: Look alike/sound alike drug alert    Route: Oral    melatonin tablet 5 mg 5 mg Nightly PRN 12/29/2023 --    Route: Oral    naloxone (NARCAN) injection 0.4 mg 0.4 mg Every 5 Minutes PRN 12/29/2023 --    Admin Instructions: If Respiratory Rate Less Than 8 or Patient is Difficult to Arouse, Stop ALL Narcotics & Contact Provider.  Administer Slow IV Push.  Repeat As Ordered Until Respiratory Rate is Greater Than 12.    Route: Intravenous    Linked Group 2: See Hyperspace for full Linked Orders Report.        ondansetron (ZOFRAN) injection 4 mg 4 mg Every 6 Hours PRN 12/29/2023 --    Admin Instructions: If BOTH ondansetron (ZOFRAN) and promethazine (PHENERGAN) are ordered use ondansetron first and THEN promethazine IF ondansetron is ineffective.    Route: Intravenous    oxyCODONE (ROXICODONE) immediate release tablet 10 mg 10 mg Every 4 Hours PRN 12/29/2023 --    Admin Instructions: Based on patient request - if ordered for moderate or severe pain, provider allows for administration of a medication prescribed for a lower pain scale.  If given for pain, use the following pain scale:  Mild Pain = Pain Score of 1-3, CPOT 1-2  Moderate Pain = Pain Score of 4-6, CPOT 3-4  Severe Pain = Pain Score of 7-10, CPOT 5-8    Route: Oral    oxyCODONE (ROXICODONE) immediate release tablet 5 mg 5 mg Every 4 Hours PRN 12/29/2023 1/5/2024     Admin Instructions: Based on patient request - if ordered for moderate or severe pain, provider allows for administration of a medication prescribed for a lower pain scale.      If given for pain, use the following pain scale:  Mild Pain = Pain Score of 1-3, CPOT 1-2  Moderate Pain = Pain Score of 4-6, CPOT 3-4  Severe Pain = Pain Score of 7-10, CPOT 5-8    Route: Oral    polyethylene glycol (MIRALAX) packet 17 g 17 g Daily PRN 12/29/2023 --    Admin Instructions: Use if no bowel movement after 12 hours. Mix in 6-8 ounces of water.  Use 4-8 ounces of water, tea, or juice for each 17 gram dose.    Route: Oral    Linked Group 1: See Hyperspace for full Linked Orders Report.        pramipexole (MIRAPEX) tablet 1 mg 1 mg Daily 12/29/2023 --    Route: Oral    pregabalin (LYRICA) capsule 200 mg 200 mg 3 Times Daily 12/29/2023 --    Admin Instructions:     Route: Oral    sennosides-docusate (PERICOLACE) 8.6-50 MG per tablet 2 tablet 2 tablet 2 Times Daily 12/29/2023 --    Admin Instructions: HOLD MEDICATION IF PATIENT HAS HAD BOWEL MOVEMENT. Start bowel management regimen if patient has not had a bowel movement after 12 hours.    Route: Oral    Linked Group 1: See Hyperspace for full Linked Orders Report.        sertraline (ZOLOFT) tablet 50 mg 50 mg Nightly 12/29/2023 --    Route: Oral    sodium chloride 0.9 % flush 10 mL 10 mL Every 12 Hours Scheduled 12/29/2023 --    Route: Intravenous    sodium chloride 0.9 % flush 10 mL 10 mL As Needed 12/29/2023 --    Route: Intravenous    sodium chloride 0.9 % infusion 40 mL 40 mL As Needed 12/29/2023 --    Admin Instructions: Following administration of an IV intermittent medication, flush line with 40mL NS at 100mL/hr.    Route: Intravenous    traZODone (DESYREL) tablet 50 mg 50 mg Nightly PRN 12/29/2023 --    Admin Instructions: Take with food.  Caution: Look alike/sound alike drug alert    Route: Oral              Assessment & Plan     48-year-old female status post left  above-knee amputation, wound vacuum-assisted closure December 29, 2023.       Lower extremity cellulitis    Acquired hypothyroidism    Ulcer of right midfoot with fat layer exposed    Ulcer of left heel, with fat layer exposed    Chronic osteomyelitis    Anxiety associated with depression    RLS (restless legs syndrome)    Neuropathy    Obesity, morbid, BMI 50 or higher    S/P AKA (above knee amputation), left    Nonweightbearing left lower extremity.  Follow cultures - NGTD.  After discussion of risks, benefits, alternatives, she wishes to proceed with left leg debridement, irrigation, secondary closure of left above-knee amputation wound.     Risks of surgery were discussed which included but were not limited to pain, bleeding, infection, damage to adjacent structures, need for further surgery, wound healing complications, loss of limb and death.  The patient expressed verbal consent and written consent was obtained for the above procedure.    Adama Witt Jr, MD  12/31/23  07:31 EST

## 2023-12-31 NOTE — OP NOTE
[facility]     OPERATIVE REPORT      PATIENT NAME:  Phoebe Carvajal                            YOB: 1975       PREOP DIAGNOSIS:   Left above knee amputation    POSTOP DIAGNOSIS:   Same.    PROCEDURE:   Left     79057: Debridement of skin and subcutaneous tissue  20910: Secondary closure above-knee amputation    SURGEON:     Adama Witt MD    OPERATIVE TEAM:   Circulator: Jerica Coulter RN  Scrub Person: Thao Thompson  Nursing Assistant: Esteban Pickett  Other: Olivia Boogie RN    ANESTHETIST:  Anesthesiologist: Chase Almaguer MD  CRNA: Belinda Allred CRNA    ANESTHESIA:   Choice    ESTIMATED BLOOD LOSS:   < 30 ml    TOURNIQUET TIME:   Not utilized.    FINDINGS:     Remaining tissue appeared healthy.  Tension-free wound closure.  No overt signs or symptoms of infection in the residual limb.    IMPLANTS:     none    SPECIMENS:     Specimens       ID Source Type Tests Collected By Collected At Frozen?    1 Leg, Left Surgical Site FUNGAL CULTURE  TISSUE / BONE CULTURE  AFB CULTURE   Adama Witt Jr., MD 12/31/23 0831     Description: s/p above the knee amputation    This specimen was not marked as sent.    2 Leg, Left Surgical Site ANAEROBIC CULTURE   Adama Witt Jr., MD 12/31/23 0833     Description: s/p above the knee amputation    This specimen was not marked as sent.            COMPLICATIONS:    None.    DISPOSITION:    Stable to recovery.     INDICATIONS:    48 y.o. female status post Left above amputation with wound vacuum assisted closure.  I had a discussion with the patient regarding further management.  After discussion of risk, benefits, alternatives, they were amenable with Left secondary closure of above-knee amputation. Risks of surgery were discussed which included but were not limited to pain, bleeding, infection, damage to adjacent structures, need for further surgery, wound healing complications, loss of limb and death.  The patient expressed verbal  consent and written consent was obtained for the above procedure.    NARRATIVE:    Patient was identified in preoperative holding.  Operative site was marked in indelible ink.  History, physical, consent were reviewed and updated.  Patient was surrendered to the anesthesia team and transported to the operative suite where anesthesia was induced.  Hip bump was placed on the ipsilateral side and nonsterile thigh tourniquet was placed.  Nonsterile dressing was placed over the foot and Ioban was placed over this.  Operative extremity was prepped and draped in the usual sterile fashion, prepping over the Ioban.  The operative team donned sterile gowns and gloves and a timeout was called.  All in attendance agreed regarding the patient's identity, proposed operative procedure, and operative site.    Tourniquet was not utilized during this case.    I inspected the wound.  There was a small strip of dusky skin and subcutaneous tissue at the distal lateral anterior flap just posterior to her previous scar.  I sharply debrided this using a 15 blade scalpe.  This was an excisional debridement.  Deepest level of debridement was subcutaneous tissue.  No further evidence of tissue necrosis or infection was noted.    I took swab specimens of the deep wound which I sent for culture.    I copiously irrigated the wound with Irricept and saline.    I placed vancomycin powder into the wound.  I placed a deep drain exiting superior laterally which was sewn in place.  I then closed in anatomic layers with the monofilament suture, skin was closed with staples.    Sterile dressing applied.  Anesthesia was concluded and the patient was transported to the PACU in stable condition.  Counts were correct x2.  There were no apparent complications.  I was present scrubbed for the entire case.    Adama Witt Jr, MD  12/31/2023
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
## 2024-01-01 LAB
BACTERIA SPEC AEROBE CULT: NORMAL
GRAM STN SPEC: NORMAL

## 2024-01-01 PROCEDURE — 25010000002 CEFTAZIDIME 2 G RECONSTITUTED SOLUTION 1 EACH VIAL: Performed by: ORTHOPAEDIC SURGERY

## 2024-01-01 PROCEDURE — 25010000002 HYDROMORPHONE 1 MG/ML SOLUTION: Performed by: ORTHOPAEDIC SURGERY

## 2024-01-01 PROCEDURE — 97530 THERAPEUTIC ACTIVITIES: CPT

## 2024-01-01 PROCEDURE — 63710000001 DIPHENHYDRAMINE PER 50 MG: Performed by: ORTHOPAEDIC SURGERY

## 2024-01-01 PROCEDURE — 97164 PT RE-EVAL EST PLAN CARE: CPT

## 2024-01-01 PROCEDURE — 97535 SELF CARE MNGMENT TRAINING: CPT

## 2024-01-01 PROCEDURE — 99232 SBSQ HOSP IP/OBS MODERATE 35: CPT | Performed by: FAMILY MEDICINE

## 2024-01-01 PROCEDURE — 97166 OT EVAL MOD COMPLEX 45 MIN: CPT

## 2024-01-01 PROCEDURE — 25010000002 DAPTOMYCIN PER 1 MG: Performed by: INTERNAL MEDICINE

## 2024-01-01 RX ORDER — DIAZEPAM 5 MG/ML
2.5 INJECTION, SOLUTION INTRAMUSCULAR; INTRAVENOUS ONCE
Status: DISCONTINUED | OUTPATIENT
Start: 2024-01-01 | End: 2024-01-06

## 2024-01-01 RX ORDER — OXYCODONE HYDROCHLORIDE 10 MG/1
10 TABLET ORAL EVERY 4 HOURS PRN
Status: DISCONTINUED | OUTPATIENT
Start: 2024-01-01 | End: 2024-01-04

## 2024-01-01 RX ORDER — FAMOTIDINE 20 MG/1
20 TABLET, FILM COATED ORAL
Status: DISCONTINUED | OUTPATIENT
Start: 2024-01-01 | End: 2024-01-15

## 2024-01-01 RX ADMIN — Medication 10 ML: at 08:38

## 2024-01-01 RX ADMIN — HYDROMORPHONE HYDROCHLORIDE 1 MG: 1 INJECTION, SOLUTION INTRAMUSCULAR; INTRAVENOUS; SUBCUTANEOUS at 20:01

## 2024-01-01 RX ADMIN — LEVOTHYROXINE SODIUM 125 MCG: 125 TABLET ORAL at 04:53

## 2024-01-01 RX ADMIN — PREGABALIN 200 MG: 100 CAPSULE ORAL at 08:38

## 2024-01-01 RX ADMIN — OXYCODONE HYDROCHLORIDE 10 MG: 10 TABLET ORAL at 14:32

## 2024-01-01 RX ADMIN — DIPHENHYDRAMINE HYDROCHLORIDE 25 MG: 25 CAPSULE ORAL at 20:03

## 2024-01-01 RX ADMIN — Medication 10 ML: at 20:42

## 2024-01-01 RX ADMIN — OXYCODONE HYDROCHLORIDE 10 MG: 10 TABLET ORAL at 06:00

## 2024-01-01 RX ADMIN — SENNOSIDES AND DOCUSATE SODIUM 2 TABLET: 8.6; 5 TABLET ORAL at 20:03

## 2024-01-01 RX ADMIN — CEFTAZIDIME 2000 MG: 2 INJECTION, POWDER, FOR SOLUTION INTRAVENOUS at 06:00

## 2024-01-01 RX ADMIN — LORAZEPAM 0.5 MG: 0.5 TABLET ORAL at 14:54

## 2024-01-01 RX ADMIN — HYDROMORPHONE HYDROCHLORIDE 1 MG: 1 INJECTION, SOLUTION INTRAMUSCULAR; INTRAVENOUS; SUBCUTANEOUS at 02:13

## 2024-01-01 RX ADMIN — PREGABALIN 200 MG: 100 CAPSULE ORAL at 20:03

## 2024-01-01 RX ADMIN — SERTRALINE HYDROCHLORIDE 50 MG: 50 TABLET ORAL at 20:03

## 2024-01-01 RX ADMIN — HYDROMORPHONE HYDROCHLORIDE 1 MG: 1 INJECTION, SOLUTION INTRAMUSCULAR; INTRAVENOUS; SUBCUTANEOUS at 08:34

## 2024-01-01 RX ADMIN — PREGABALIN 200 MG: 100 CAPSULE ORAL at 17:55

## 2024-01-01 RX ADMIN — FAMOTIDINE 20 MG: 20 TABLET, FILM COATED ORAL at 17:55

## 2024-01-01 RX ADMIN — OXYCODONE HYDROCHLORIDE 5 MG: 5 TABLET ORAL at 10:55

## 2024-01-01 RX ADMIN — PRAMIPEXOLE DIHYDROCHLORIDE 1 MG: 0.25 TABLET ORAL at 08:38

## 2024-01-01 RX ADMIN — DAPTOMYCIN 500 MG: 500 INJECTION, POWDER, LYOPHILIZED, FOR SOLUTION INTRAVENOUS at 20:42

## 2024-01-01 RX ADMIN — FAMOTIDINE 20 MG: 10 INJECTION INTRAVENOUS at 08:38

## 2024-01-01 RX ADMIN — CEFTAZIDIME 2000 MG: 2 INJECTION, POWDER, FOR SOLUTION INTRAVENOUS at 23:19

## 2024-01-01 RX ADMIN — HYDROMORPHONE HYDROCHLORIDE 1 MG: 1 INJECTION, SOLUTION INTRAMUSCULAR; INTRAVENOUS; SUBCUTANEOUS at 04:52

## 2024-01-01 RX ADMIN — CEFTAZIDIME 2000 MG: 2 INJECTION, POWDER, FOR SOLUTION INTRAVENOUS at 15:31

## 2024-01-01 RX ADMIN — OXYCODONE HYDROCHLORIDE 5 MG: 5 TABLET ORAL at 23:19

## 2024-01-01 NOTE — PLAN OF CARE
Goal Outcome Evaluation:      No change. Pt requested PRN medications throughout the day. Pain at a 9/10 all day. Up in chair for 1 hour today. VSS. Sleeping at this moment. Medications given as ordered.

## 2024-01-01 NOTE — PROGRESS NOTES
[facility] Medicine Services  PROGRESS NOTE    Patient Name: Phoebe Carvajal  : 1975  MRN: [id number]    Date of Admission: 2023  Primary Care Physician: Sofya Benjamin MD    Subjective   Subjective     CC:  Osteomyelitis    HPI:   - She underwent AKA yesterday for recurrent osteomyelitis of the left lower extremity.  Dr. Witt's note indicates he plans to take her back to the OR tomorrow for debridement again.  ID is following.  She complains of severe pain that is relieved with medications.      -patient underwent debridement in the OR again this morning with Dr. Witt.  She now has a drain in place and wound VAC has been removed.  She complains of pain and request pain medication.  She denies nausea or vomiting.     -patient is tearful and crying this morning.  She states she had a lot of pain overnight.  She denies nausea or vomiting and would like a regular diet.  She states the pain medicine helps.  JESS drain still in place.      Objective   Objective     Vital Signs:   Temp:  [98.8 °F (37.1 °C)-99.3 °F (37.4 °C)] 98.8 °F (37.1 °C)  Heart Rate:  [76-95] 78  Resp:  [18-24] 18  BP: (103-129)/(63-77) 116/73  Flow (L/min):  [2-3] 3     Physical Exam:  Constitutional: No acute distress, awake, alert  HENT: NCAT, mucous membranes moist  Respiratory: Decreased breath sounds bilaterally, respiratory effort normal   Cardiovascular: RRR  Gastrointestinal: Positive bowel sounds, soft, nontender, nondistended  Musculoskeletal: Left AKA  Psychiatric: Appropriate affect, cooperative  Neurologic: Oriented x 3, generally weak, Cranial Nerves grossly intact to confrontation, speech clear  Skin: Left AKA bandage with postop bandage and drain      Results Reviewed:  LAB RESULTS:      Lab 23  0206 23   WBC 3.51 3.83   HEMOGLOBIN 10.3* 9.7*   HEMATOCRIT 32.5* 30.1*   PLATELETS 150 143   NEUTROS ABS 1.97 2.52   IMMATURE GRANS (ABS) 0.00 0.01   LYMPHS  ABS 1.16 0.80   MONOS ABS 0.22 0.36   EOS ABS 0.15 0.13   .3* 112.7*   CRP 1.92*  --    PROCALCITONIN 0.04 0.04   LACTATE 1.0 0.9   PROTIME 14.9*  --          Lab 12/29/23  0511 12/29/23  0206 12/28/23 1943   SODIUM  --  138 140   POTASSIUM  --  4.2 4.4   CHLORIDE  --  103 104   CO2  --  26.0 28.0   ANION GAP  --  9.0 8.0   BUN  --  8 8   CREATININE  --  0.63 0.54*   EGFR  --  108.2 112.3   GLUCOSE  --  85 95   CALCIUM  --  8.6 8.4*   MAGNESIUM  --  2.0  --    PHOSPHORUS  --  3.9  --    HEMOGLOBIN A1C 4.60*  --   --    TSH  --   --  7.320*         Lab 12/29/23  0206 12/28/23 1943   TOTAL PROTEIN 7.0 6.1   ALBUMIN 3.1* 2.7*   GLOBULIN 3.9 3.4   ALT (SGPT) 6 8   AST (SGOT) 20 23   BILIRUBIN 0.5 0.5   ALK PHOS 275* 247*         Lab 12/29/23  0206   PROTIME 14.9*   INR 1.16*             Lab 12/29/23  0206   ABO TYPING A   RH TYPING Negative   ANTIBODY SCREEN Negative         Brief Urine Lab Results  (Last result in the past 365 days)        Color   Clarity   Blood   Leuk Est   Nitrite   Protein   CREAT   Urine HCG        12/31/23 0801               Negative               Microbiology Results Abnormal       Procedure Component Value - Date/Time    AFB Culture - Surgical Site, Leg, Left [[id number]] Collected: 12/31/23 0831    Lab Status: Preliminary result Specimen: Surgical Site from Leg, Left Updated: 01/01/24 1204     AFB Stain No acid fast bacilli seen on concentrated smear    Anaerobic Culture - Tissue, Leg, Left [[id number]]  (Normal) Collected: 12/29/23 1139    Lab Status: Preliminary result Specimen: Tissue from Leg, Left Updated: 01/01/24 1129     Anaerobic Culture No anaerobes isolated at 3 days    Wound Culture - Wound, Leg, Left [[id number]] Collected: 12/29/23 1322    Lab Status: Final result Specimen: Wound from Leg, Left Updated: 01/01/24 0711     Wound Culture No growth at 3 days     Gram Stain No organisms seen    Blood Culture - Blood, Arm, Right [[id number]]  (Normal) Collected: 12/28/23 0163     Lab Status: Preliminary result Specimen: Blood from Arm, Right Updated: 01/01/24 0030     Blood Culture No growth at 3 days    Blood Culture - Blood, Hand, Right [[id number]]  (Normal) Collected: 12/28/23 2120    Lab Status: Preliminary result Specimen: Blood from Hand, Right Updated: 01/01/24 0030     Blood Culture No growth at 3 days    Tissue / Bone Culture - Surgical Site, Leg, Left [[id number]] Collected: 12/31/23 0831    Lab Status: Preliminary result Specimen: Surgical Site from Leg, Left Updated: 12/31/23 2149     Gram Stain Moderate (3+) WBCs per low power field      No organisms seen    AFB Culture - Tissue, Leg, Left [[id number]] Collected: 12/29/23 1139    Lab Status: Preliminary result Specimen: Tissue from Leg, Left Updated: 12/30/23 1259     AFB Stain No acid fast bacilli seen on concentrated smear    MRSA Screen, PCR (Inpatient) - Swab, Nares [[id number]]  (Normal) Collected: 12/29/23 0203    Lab Status: Final result Specimen: Swab from Nares Updated: 12/29/23 0733     MRSA PCR Negative    Narrative:      The negative predictive value of this diagnostic test is high and should only be used to consider de-escalating anti-MRSA therapy. A positive result may indicate colonization with MRSA and must be correlated clinically.  MRSA Negative    COVID PRE-OP / PRE-PROCEDURE SCREENING ORDER (NO ISOLATION) - Swab, Nasopharynx [[id number]]  (Normal) Collected: 12/29/23 0203    Lab Status: Final result Specimen: Swab from Nasopharynx Updated: 12/29/23 0353    Narrative:      The following orders were created for panel order COVID PRE-OP / PRE-PROCEDURE SCREENING ORDER (NO ISOLATION) - Swab, Nasopharynx.  Procedure                               Abnormality         Status                     ---------                               -----------         ------                     Respiratory Panel PCR w/...[[id number]]  Normal              Final result                 Please view results for these tests on the individual  orders.    Respiratory Panel PCR w/COVID-19(SARS-CoV-2) ARIELLE/VESNA/KARIE/PAD/COR/SÁNCHEZ In-House, NP Swab in UTM/VTM, 2 HR TAT - Swab, Nasopharynx [[id number]]  (Normal) Collected: 12/29/23 0203    Lab Status: Final result Specimen: Swab from Nasopharynx Updated: 12/29/23 0353     ADENOVIRUS, PCR Not Detected     Coronavirus 229E Not Detected     Coronavirus HKU1 Not Detected     Coronavirus NL63 Not Detected     Coronavirus OC43 Not Detected     COVID19 Not Detected     Human Metapneumovirus Not Detected     Human Rhinovirus/Enterovirus Not Detected     Influenza A PCR Not Detected     Influenza B PCR Not Detected     Parainfluenza Virus 1 Not Detected     Parainfluenza Virus 2 Not Detected     Parainfluenza Virus 3 Not Detected     Parainfluenza Virus 4 Not Detected     RSV, PCR Not Detected     Bordetella pertussis pcr Not Detected     Bordetella parapertussis PCR Not Detected     Chlamydophila pneumoniae PCR Not Detected     Mycoplasma pneumo by PCR Not Detected    Narrative:      In the setting of a positive respiratory panel with a viral infection PLUS a negative procalcitonin without other underlying concern for bacterial infection, consider observing off antibiotics or discontinuation of antibiotics and continue supportive care. If the respiratory panel is positive for atypical bacterial infection (Bordetella pertussis, Chlamydophila pneumoniae, or Mycoplasma pneumoniae), consider antibiotic de-escalation to target atypical bacterial infection.            No radiology results from the last 24 hrs        Current medications:  Scheduled Meds:Pharmacy Consult, , Does not apply, Q12H  cefTAZidime, 2,000 mg, Intravenous, Q8H  DAPTOmycin, 6 mg/kg (Adjusted), Intravenous, Q24H  diazePAM, 2.5 mg, Intravenous, Once  famotidine, 20 mg, Intravenous, Q12H  levothyroxine, 125 mcg, Oral, Daily  pramipexole, 1 mg, Oral, Daily  pregabalin, 200 mg, Oral, TID  senna-docusate sodium, 2 tablet, Oral, BID  sertraline, 50 mg, Oral,  Nightly  sodium chloride, 10 mL, Intravenous, Q12H      Continuous Infusions:lactated ringers, 100 mL/hr, Last Rate: 100 mL/hr (12/31/23 2137)  lactated ringers, 9 mL/hr, Last Rate: 9 mL/hr (12/31/23 0756)  lactated ringers, 9 mL/hr      PRN Meds:.  acetaminophen    senna-docusate sodium **AND** polyethylene glycol **AND** bisacodyl **AND** bisacodyl    diphenhydrAMINE    HYDROmorphone **AND** naloxone    lactated ringers    LORazepam    melatonin    ondansetron    oxyCODONE    oxyCODONE    sodium chloride    sodium chloride    traZODone    Assessment & Plan   Assessment & Plan     Active Hospital Problems    Diagnosis  POA    **Lower extremity cellulitis [L03.119]  Yes    Anxiety associated with depression [F41.8]  Unknown    RLS (restless legs syndrome) [G25.81]  Unknown    Neuropathy [G62.9]  Unknown    Obesity, morbid, BMI 50 or higher [E66.01]  Unknown    S/P AKA (above knee amputation), left [Z89.612]  Not Applicable    Chronic osteomyelitis [M86.60]  Yes    Acquired hypothyroidism [E03.9]  Yes    Ulcer of right midfoot with fat layer exposed [L97.412]  Yes    Ulcer of left heel, with fat layer exposed [L97.422]  Yes      Resolved Hospital Problems   No resolved problems to display.        Brief Hospital Course to date:  Phoebe Carvajal is a 48 y.o. female with history of depression, hypothyroidism, stills disease, RLS, PAD, anemia, morbid obesity, recurrent osteomyelitis, chronic lower extremity osteomyelitis, recurrent left prosthetic joint infection, bilateral foot wounds who presented to the emergency department for evaluation of fever and worsening erythema with foul odor from her chronic wounds.  Previous records from  from October 22 through November 8, 2023 showed intentions for proceeding with left AKA but patient waited for second opinion hoping to have the option of BKA.  She is followed by infectious disease-known to Dr. Wang.  She underwent AKA on 12/29/2023 with Dr. Witt, with  additional debridement on 12/31/2023    Osteomyelitis/cellulitis of the left lower extremity  Status post AKA on 12/29/2023 Dr. Witt  Chronic infected wounds  -Dr. Witt performed debridement again on 12/31/2023  -Patient has complained of excruciating pain postoperatively  -Continue IV Dilaudid, oxycodone, Tylenol, Lyrica  -Infectious disease consulted and following, seen by Dr. Valdez  -Continue ceftazidime and daptomycin    Neuropathy  -Continue Lyrica    Morbid obesity  Hypoxia  -Likely related to opiate use postoperatively and morbid obesity  -Continue O2 to maintain sats greater than 90, currently on 2 L nasal cannula    Restless leg syndrome  -Continue Mirapex    Anxiety  -Continue Zoloft    Expected Discharge Location and Transportation: Rehab  Expected Discharge   Expected Discharge Date: 1/1/2024; Expected Discharge Time:      DVT prophylaxis:  Mechanical DVT prophylaxis orders are present.     AM-PAC 6 Clicks Score (PT): 10 (12/30/23 1953)    CODE STATUS:   Code Status and Medical Interventions:   Ordered at: 12/29/23 0121     Code Status (Patient has no pulse and is not breathing):    CPR (Attempt to Resuscitate)     Medical Interventions (Patient has pulse or is breathing):    Full Support       Milagro Finley MD  01/01/24
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
## 2024-01-01 NOTE — PLAN OF CARE
Goal Outcome Evaluation:         Pt requires very frequent pain medication, see MAR. JESS drain had 83ml output for dayshift. Fluids infusing

## 2024-01-01 NOTE — PROGRESS NOTES
Nutrition Services    Patient Name:  Phoebe Carvajal  YOB: 1975  MRN: [id number]  Admit Date:  12/28/2023    Pt identified NPO/Clear Liquid Diet >72 hrs. Advance diet as medically appropriate. RD to monitor for diet advancement. Please consult RD if nutrition support indicated.      Electronically signed by:  Sophia Llamas MS,RADHA,LD  01/01/24 07:17 EST

## 2024-01-01 NOTE — PROGRESS NOTES
Phoebe QUEVEDO Carvajal       LOS: 4 days   Patient Care Team:  Sofya Benjamin MD as PCP - General (Internal Medicine)  Provider, No Known as PCP - Family Medicine    Chief Complaint:  s/p left above knee amputation    Subjective     Interval History:     Pain tolerable overnight, resting comfortably this morning.    Review of Systems:      Gen- No fevers, chills  CV- No chest pain, palpitations  Resp- No cough, dyspnea  GI- No N/V/D, abd pain    Objective     Vital Signs  Vital Signs (last 24 hours)         12/30 0700  12/31 0659 12/31 0700  01/01 0609   Most Recent      Temp (°F) 98.4 -  99    97.7 -  99.3     99.2 (37.3) 01/01 0414    Heart Rate 58 -  83    76 -  95     76 01/01 0500    Resp 18 -  20    14 -  24     18 01/01 0414    BP 97/62 -  120/65    103/69 -  117/77     113/63 01/01 0414    SpO2 (%) 90 -  99    91 -  100     99 01/01 0500    Flow (L/min)   2      2     2 12/31 2000              Physical Exam:     Alert, oriented.  No acute distress.  Nonlabored respirations.  Regular rate and rhythm.  Abdomen nondistended.  Left lower extremity: Operative dressing clean, dry, intact, drain with serosanguineous output.     Results Review:     I reviewed the patient's new clinical results.    Medication Review:   Hospital Medications (active)         Dose Frequency Start End    ! Home medications stored in pharmacy, please contact pharmacist prior to patient discharge  Every 12 Hours Scheduled 12/29/2023 --    Route: Does not apply    acetaminophen (TYLENOL) tablet 650 mg 650 mg Every 6 Hours PRN 12/29/2023 --    Admin Instructions: If given for fever, use fever parameter: fever greater than 100.4 °F  Based on patient request - if ordered for moderate or severe pain, provider allows for administration of a medication prescribed for a lower pain scale.    Do not exceed 4 grams of acetaminophen in a 24 hr period. Max dose of 2gm for AST/ALT greater than 120 units/L.    If given for pain, use the following pain  scale:   Mild Pain = Pain Score of 1-3, CPOT 1-2  Moderate Pain = Pain Score of 4-6, CPOT 3-4  Severe Pain = Pain Score of 7-10, CPOT 5-8    Route: Oral    bisacodyl (DULCOLAX) EC tablet 5 mg 5 mg Daily PRN 12/29/2023 --    Admin Instructions: Use if no bowel movement after 12 hours.  Swallow whole. Do not crush, split, or chew tablet.    Route: Oral    Linked Group 1: See Hyperspace for full Linked Orders Report.        bisacodyl (DULCOLAX) suppository 10 mg 10 mg Daily PRN 12/29/2023 --    Admin Instructions: Use if no bowel movement after 12 hours.  Hold for diarrhea    Route: Rectal    Linked Group 1: See Hyperspace for full Linked Orders Report.        cefTAZidime (FORTAZ) 2,000 mg in sodium chloride 0.9 % 100 mL IVPB 2,000 mg Every 8 Hours 12/29/2023 1/8/2024    Admin Instructions: Caution: Look alike/sound alike drug alert    Route: Intravenous    DAPTOmycin (CUBICIN) 500 mg in sodium chloride 0.9 % 50 mL IVPB 6 mg/kg × 82.9 kg (Adjusted) Every 24 Hours 12/29/2023 1/3/2024    Admin Instructions: Caution: Look alike/sound alike drug alert.  Refrigerate. Do not shake.    Route: Intravenous    diazePAM (VALIUM) injection 2.5 mg 2.5 mg Once 1/1/2024 --    Admin Instructions:  May give each 5 mg IV push over 1 minute. May be injected through infusion tubing using port closest to vein insertion. Do not mix or dilute with other solutions.    Route: Intravenous    diphenhydrAMINE (BENADRYL) capsule 25 mg 25 mg Every 6 Hours PRN 12/29/2023 --    Admin Instructions: Caution: Look alike/sound alike drug alert. This med may be ordered in other forms and routes. Before giving verify the last time the drug was given by any route/form.    Route: Oral    famotidine (PEPCID) injection 20 mg 20 mg Every 12 Hours Scheduled 12/29/2023 --    Admin Instructions: Give IV push over 2 minutes.    Route: Intravenous    HYDROmorphone (DILAUDID) injection 1 mg 1 mg Every 2 Hours PRN 12/29/2023 1/5/2024    Admin Instructions: Based on  patient request - if ordered for moderate or severe pain, provider allows for administration of a medication prescribed for a lower pain scale.      Caution: Look alike/sound alike drug alert    If given for pain, use the following pain scale:  Mild Pain = Pain Score of 1-3, CPOT 1-2  Moderate Pain = Pain Score of 4-6, CPOT 3-4  Severe Pain = Pain Score of 7-10, CPOT 5-8    Route: Intravenous    Linked Group 2: See Hyperspace for full Linked Orders Report.        lactated ringers infusion 100 mL/hr Continuous 12/29/2023 --    Route: Intravenous    lactated ringers infusion 9 mL/hr Continuous 12/29/2023 --    Admin Instructions: May switch to NS IV at KVO if renal / if indicated    Route: Intravenous    lactated ringers infusion 9 mL/hr Continuous PRN 12/31/2023 --    Route: Intravenous    levothyroxine (SYNTHROID, LEVOTHROID) tablet 125 mcg 125 mcg Daily 12/29/2023 --    Admin Instructions: Take on empty stomach.    Route: Oral    LORazepam (ATIVAN) tablet 0.5 mg 0.5 mg Every 12 Hours PRN 12/29/2023 --    Admin Instructions: Hold for SBP < 110   Caution: Look alike/sound alike drug alert    Route: Oral    melatonin tablet 5 mg 5 mg Nightly PRN 12/29/2023 --    Route: Oral    naloxone (NARCAN) injection 0.4 mg 0.4 mg Every 5 Minutes PRN 12/29/2023 --    Admin Instructions: If Respiratory Rate Less Than 8 or Patient is Difficult to Arouse, Stop ALL Narcotics & Contact Provider.  Administer Slow IV Push.  Repeat As Ordered Until Respiratory Rate is Greater Than 12.    Route: Intravenous    Linked Group 2: See Hyperspace for full Linked Orders Report.        ondansetron (ZOFRAN) injection 4 mg 4 mg Every 6 Hours PRN 12/29/2023 --    Admin Instructions: If BOTH ondansetron (ZOFRAN) and promethazine (PHENERGAN) are ordered use ondansetron first and THEN promethazine IF ondansetron is ineffective.    Route: Intravenous    oxyCODONE (ROXICODONE) immediate release tablet 10 mg 10 mg Every 4 Hours PRN 12/29/2023 --    Admin  Instructions: Based on patient request - if ordered for moderate or severe pain, provider allows for administration of a medication prescribed for a lower pain scale.  If given for pain, use the following pain scale:  Mild Pain = Pain Score of 1-3, CPOT 1-2  Moderate Pain = Pain Score of 4-6, CPOT 3-4  Severe Pain = Pain Score of 7-10, CPOT 5-8    Route: Oral    oxyCODONE (ROXICODONE) immediate release tablet 5 mg 5 mg Every 4 Hours PRN 12/29/2023 1/5/2024    Admin Instructions: Based on patient request - if ordered for moderate or severe pain, provider allows for administration of a medication prescribed for a lower pain scale.      If given for pain, use the following pain scale:  Mild Pain = Pain Score of 1-3, CPOT 1-2  Moderate Pain = Pain Score of 4-6, CPOT 3-4  Severe Pain = Pain Score of 7-10, CPOT 5-8    Route: Oral    polyethylene glycol (MIRALAX) packet 17 g 17 g Daily PRN 12/29/2023 --    Admin Instructions: Use if no bowel movement after 12 hours. Mix in 6-8 ounces of water.  Use 4-8 ounces of water, tea, or juice for each 17 gram dose.    Route: Oral    Linked Group 1: See Hyperspace for full Linked Orders Report.        pramipexole (MIRAPEX) tablet 1 mg 1 mg Daily 12/29/2023 --    Route: Oral    pregabalin (LYRICA) capsule 200 mg 200 mg 3 Times Daily 12/29/2023 --    Admin Instructions:     Route: Oral    sennosides-docusate (PERICOLACE) 8.6-50 MG per tablet 2 tablet 2 tablet 2 Times Daily 12/29/2023 --    Admin Instructions: HOLD MEDICATION IF PATIENT HAS HAD BOWEL MOVEMENT. Start bowel management regimen if patient has not had a bowel movement after 12 hours.    Route: Oral    Linked Group 1: See Hyperspace for full Linked Orders Report.        sertraline (ZOLOFT) tablet 50 mg 50 mg Nightly 12/29/2023 --    Route: Oral    sodium chloride 0.9 % flush 10 mL 10 mL Every 12 Hours Scheduled 12/29/2023 --    Route: Intravenous    sodium chloride 0.9 % flush 10 mL 10 mL As Needed 12/29/2023 --    Route:  Intravenous    sodium chloride 0.9 % infusion 40 mL 40 mL As Needed 12/29/2023 --    Admin Instructions: Following administration of an IV intermittent medication, flush line with 40mL NS at 100mL/hr.    Route: Intravenous    traZODone (DESYREL) tablet 50 mg 50 mg Nightly PRN 12/29/2023 --    Admin Instructions: Take with food.  Caution: Look alike/sound alike drug alert    Route: Oral              Assessment & Plan     48-year-old female status post left above-knee amputation, wound vacuum-assisted closure December 29, 2023.       Lower extremity cellulitis    Acquired hypothyroidism    Ulcer of right midfoot with fat layer exposed    Ulcer of left heel, with fat layer exposed    Chronic osteomyelitis    Anxiety associated with depression    RLS (restless legs syndrome)    Neuropathy    Obesity, morbid, BMI 50 or higher    S/P AKA (above knee amputation), left      Nonweightbearing left lower extremity.      Follow cultures -- 12/29/23: NGD2; 12/31/23: pending    Anticipate drain removal tomorrow.  At that point she would be appropriate for discharge from surgical perspective.  Anticipate need for therapy placement.    Daily dressing changes beginning January 3, 2024.  Xeroform, 4 x 4, Kerlix, Ace wrap.    Follow-up 2 weeks for wound check and x-rays.    Follow up with Jazzmine Brar PA-C.    [facility]  [address], [address]  [place], [address]  Please schedule at [phone]    Adama Witt Jr, MD  01/01/24  06:09 EST

## 2024-01-01 NOTE — NURSING NOTE
No acute events overnight, on 3L NC Etco2. JESS seroussang output 65 total. Complaints of pain in left leg, controlled by prn dilaudid and oxycodone. Appears to have rested wwell overngiht. Occasionally tearful and crying. VSS
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
## 2024-01-01 NOTE — PLAN OF CARE
Goal Outcome Evaluation:  Plan of Care Reviewed With: patient, friend           Outcome Evaluation: OT eval completed. Pt pesents s/p L AKA with deficits in activity tolerance, sitting balance, and overall strength. Pt limited by anticipation of pain and fear during all functional transfers; completed bed mobility with MaxAx2 and grooming tasks seated in chair with MaxA. IP OT services warranted. Recommend SNF at discharge.      Anticipated Discharge Disposition (OT): skilled nursing facility
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
## 2024-01-01 NOTE — PLAN OF CARE
Goal Outcome Evaluation:  Plan of Care Reviewed With: patient, friend        Progress: no change  Outcome Evaluation: PT re-eval completed. Pt completed all bed mobility max A x2 and dependently lifted to chair with lift device. Pt required encouragement to progress with PT POC however significantly limited d/t pain. Pt demonstrated mobility below baseline function with pain limiting function, strength deficits, balance deficits, and decreased activity tolerance. d/c rec SNF.      Anticipated Discharge Disposition (PT): skilled nursing facility

## 2024-01-02 LAB
ANION GAP SERPL CALCULATED.3IONS-SCNC: 10 MMOL/L (ref 5–15)
BASOPHILS # BLD AUTO: 0.01 10*3/MM3 (ref 0–0.2)
BASOPHILS NFR BLD AUTO: 0.2 % (ref 0–1.5)
BUN SERPL-MCNC: 4 MG/DL (ref 6–20)
BUN/CREAT SERPL: 11.1 (ref 7–25)
CALCIUM SPEC-SCNC: 8.2 MG/DL (ref 8.6–10.5)
CHLORIDE SERPL-SCNC: 100 MMOL/L (ref 98–107)
CO2 SERPL-SCNC: 27 MMOL/L (ref 22–29)
CREAT SERPL-MCNC: 0.36 MG/DL (ref 0.57–1)
DEPRECATED RDW RBC AUTO: 63.1 FL (ref 37–54)
EGFRCR SERPLBLD CKD-EPI 2021: 123.9 ML/MIN/1.73
EOSINOPHIL # BLD AUTO: 0.2 10*3/MM3 (ref 0–0.4)
EOSINOPHIL NFR BLD AUTO: 3.9 % (ref 0.3–6.2)
ERYTHROCYTE [DISTWIDTH] IN BLOOD BY AUTOMATED COUNT: 15.3 % (ref 12.3–15.4)
GLUCOSE SERPL-MCNC: 91 MG/DL (ref 65–99)
HCT VFR BLD AUTO: 29.8 % (ref 34–46.6)
HGB BLD-MCNC: 9.5 G/DL (ref 12–15.9)
IMM GRANULOCYTES # BLD AUTO: 0.01 10*3/MM3 (ref 0–0.05)
IMM GRANULOCYTES NFR BLD AUTO: 0.2 % (ref 0–0.5)
LYMPHOCYTES # BLD AUTO: 0.93 10*3/MM3 (ref 0.7–3.1)
LYMPHOCYTES NFR BLD AUTO: 18.2 % (ref 19.6–45.3)
MACROCYTES BLD QL SMEAR: NORMAL
MCH RBC QN AUTO: 35.6 PG (ref 26.6–33)
MCHC RBC AUTO-ENTMCNC: 31.9 G/DL (ref 31.5–35.7)
MCV RBC AUTO: 111.6 FL (ref 79–97)
MONOCYTES # BLD AUTO: 0.53 10*3/MM3 (ref 0.1–0.9)
MONOCYTES NFR BLD AUTO: 10.4 % (ref 5–12)
NEUTROPHILS NFR BLD AUTO: 3.42 10*3/MM3 (ref 1.7–7)
NEUTROPHILS NFR BLD AUTO: 67.1 % (ref 42.7–76)
NRBC BLD AUTO-RTO: 0 /100 WBC (ref 0–0.2)
PLAT MORPH BLD: NORMAL
PLATELET # BLD AUTO: 172 10*3/MM3 (ref 140–450)
PMV BLD AUTO: 10.1 FL (ref 6–12)
POTASSIUM SERPL-SCNC: 3.8 MMOL/L (ref 3.5–5.2)
RBC # BLD AUTO: 2.67 10*6/MM3 (ref 3.77–5.28)
SODIUM SERPL-SCNC: 137 MMOL/L (ref 136–145)
WBC MORPH BLD: NORMAL
WBC NRBC COR # BLD AUTO: 5.1 10*3/MM3 (ref 3.4–10.8)

## 2024-01-02 PROCEDURE — 25010000002 DAPTOMYCIN PER 1 MG: Performed by: INTERNAL MEDICINE

## 2024-01-02 PROCEDURE — 85025 COMPLETE CBC W/AUTO DIFF WBC: CPT | Performed by: FAMILY MEDICINE

## 2024-01-02 PROCEDURE — 25010000002 CEFTAZIDIME 2 G RECONSTITUTED SOLUTION 1 EACH VIAL: Performed by: ORTHOPAEDIC SURGERY

## 2024-01-02 PROCEDURE — 80048 BASIC METABOLIC PNL TOTAL CA: CPT | Performed by: FAMILY MEDICINE

## 2024-01-02 PROCEDURE — 25010000002 HYDROMORPHONE 1 MG/ML SOLUTION: Performed by: ORTHOPAEDIC SURGERY

## 2024-01-02 PROCEDURE — 85007 BL SMEAR W/DIFF WBC COUNT: CPT | Performed by: FAMILY MEDICINE

## 2024-01-02 PROCEDURE — 99232 SBSQ HOSP IP/OBS MODERATE 35: CPT | Performed by: NURSE PRACTITIONER

## 2024-01-02 PROCEDURE — 25810000003 LACTATED RINGERS PER 1000 ML: Performed by: ORTHOPAEDIC SURGERY

## 2024-01-02 PROCEDURE — 25010000002 HYDROMORPHONE 1 MG/ML SOLUTION: Performed by: NURSE PRACTITIONER

## 2024-01-02 RX ORDER — NALOXONE HCL 0.4 MG/ML
0.4 VIAL (ML) INJECTION
Status: DISCONTINUED | OUTPATIENT
Start: 2024-01-02 | End: 2024-01-04

## 2024-01-02 RX ORDER — ACETAMINOPHEN 500 MG
1000 TABLET ORAL EVERY 8 HOURS SCHEDULED
Status: DISCONTINUED | OUTPATIENT
Start: 2024-01-02 | End: 2024-01-19 | Stop reason: HOSPADM

## 2024-01-02 RX ADMIN — HYDROMORPHONE HYDROCHLORIDE 1 MG: 1 INJECTION, SOLUTION INTRAMUSCULAR; INTRAVENOUS; SUBCUTANEOUS at 21:05

## 2024-01-02 RX ADMIN — OXYCODONE HYDROCHLORIDE 10 MG: 10 TABLET ORAL at 13:22

## 2024-01-02 RX ADMIN — OXYCODONE HYDROCHLORIDE 10 MG: 10 TABLET ORAL at 22:40

## 2024-01-02 RX ADMIN — PREGABALIN 200 MG: 100 CAPSULE ORAL at 08:21

## 2024-01-02 RX ADMIN — FAMOTIDINE 20 MG: 20 TABLET, FILM COATED ORAL at 06:14

## 2024-01-02 RX ADMIN — ACETAMINOPHEN 1000 MG: 500 TABLET ORAL at 13:22

## 2024-01-02 RX ADMIN — SERTRALINE HYDROCHLORIDE 50 MG: 50 TABLET ORAL at 21:06

## 2024-01-02 RX ADMIN — HYDROMORPHONE HYDROCHLORIDE 1 MG: 1 INJECTION, SOLUTION INTRAMUSCULAR; INTRAVENOUS; SUBCUTANEOUS at 02:24

## 2024-01-02 RX ADMIN — SODIUM CHLORIDE, POTASSIUM CHLORIDE, SODIUM LACTATE AND CALCIUM CHLORIDE 100 ML/HR: 600; 310; 30; 20 INJECTION, SOLUTION INTRAVENOUS at 02:21

## 2024-01-02 RX ADMIN — OXYCODONE HYDROCHLORIDE 10 MG: 10 TABLET ORAL at 17:47

## 2024-01-02 RX ADMIN — CEFTAZIDIME 2000 MG: 2 INJECTION, POWDER, FOR SOLUTION INTRAVENOUS at 22:39

## 2024-01-02 RX ADMIN — HYDROMORPHONE HYDROCHLORIDE 1 MG: 1 INJECTION, SOLUTION INTRAMUSCULAR; INTRAVENOUS; SUBCUTANEOUS at 06:15

## 2024-01-02 RX ADMIN — CEFTAZIDIME 2000 MG: 2 INJECTION, POWDER, FOR SOLUTION INTRAVENOUS at 16:47

## 2024-01-02 RX ADMIN — TRAZODONE HYDROCHLORIDE 50 MG: 50 TABLET ORAL at 22:39

## 2024-01-02 RX ADMIN — DAPTOMYCIN 500 MG: 500 INJECTION, POWDER, LYOPHILIZED, FOR SOLUTION INTRAVENOUS at 21:06

## 2024-01-02 RX ADMIN — Medication 10 ML: at 21:24

## 2024-01-02 RX ADMIN — HYDROMORPHONE HYDROCHLORIDE 1 MG: 1 INJECTION, SOLUTION INTRAMUSCULAR; INTRAVENOUS; SUBCUTANEOUS at 08:21

## 2024-01-02 RX ADMIN — ACETAMINOPHEN 1000 MG: 500 TABLET ORAL at 21:06

## 2024-01-02 RX ADMIN — OXYCODONE HYDROCHLORIDE 10 MG: 10 TABLET ORAL at 04:23

## 2024-01-02 RX ADMIN — SODIUM CHLORIDE, POTASSIUM CHLORIDE, SODIUM LACTATE AND CALCIUM CHLORIDE 100 ML/HR: 600; 310; 30; 20 INJECTION, SOLUTION INTRAVENOUS at 22:41

## 2024-01-02 RX ADMIN — SODIUM CHLORIDE, POTASSIUM CHLORIDE, SODIUM LACTATE AND CALCIUM CHLORIDE 100 ML/HR: 600; 310; 30; 20 INJECTION, SOLUTION INTRAVENOUS at 11:57

## 2024-01-02 RX ADMIN — LEVOTHYROXINE SODIUM 125 MCG: 125 TABLET ORAL at 06:14

## 2024-01-02 RX ADMIN — FAMOTIDINE 20 MG: 20 TABLET, FILM COATED ORAL at 17:47

## 2024-01-02 RX ADMIN — PRAMIPEXOLE DIHYDROCHLORIDE 1 MG: 0.25 TABLET ORAL at 08:21

## 2024-01-02 RX ADMIN — Medication 10 ML: at 08:21

## 2024-01-02 RX ADMIN — PREGABALIN 200 MG: 100 CAPSULE ORAL at 21:06

## 2024-01-02 RX ADMIN — LORAZEPAM 0.5 MG: 0.5 TABLET ORAL at 04:23

## 2024-01-02 RX ADMIN — CEFTAZIDIME 2000 MG: 2 INJECTION, POWDER, FOR SOLUTION INTRAVENOUS at 06:15

## 2024-01-02 RX ADMIN — PREGABALIN 200 MG: 100 CAPSULE ORAL at 16:47
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
## 2024-01-02 NOTE — CASE MANAGEMENT/SOCIAL WORK
Continued Stay Note   [facility]     Patient Name: Phoebe Carvajal  MRN: [id number]  Today's Date: 1/2/2024    Admit Date: 12/28/2023    Plan: update   Discharge Plan       Row Name 01/02/24 1401       Plan    Plan update    Patient/Family in Agreement with Plan yes    Plan Comments Per MDR, patient requiring lift to chair.  Spoke with patient at bedside regarding discharge plan and encouraged her to work with therapy and attempt to stand and or pivot to the chair to assist CM in getting her to rehab.  Patient verbalizes understanding and agreement.  No new needs verbalized.  CM following.  Patient plan is to discharge to [facility] pending improvement with therapy, bed offer and insurance approval.    Final Discharge Disposition Code 62 - inpatient rehab facility                   Discharge Codes    No documentation.                 Expected Discharge Date and Time       Expected Discharge Date Expected Discharge Time    Jan 4, 2024               Jerica Paredes RN

## 2024-01-02 NOTE — PLAN OF CARE
Goal Outcome Evaluation:         Pt up to chair x1.5 hours, specialty bed placed in room and pt placed on bed, wound care performed to right foot, wound care for AKA to start 1/3/24, 3L NC, LR @ 100, JESS drain removed this AM per provider. Continue POC

## 2024-01-02 NOTE — PLAN OF CARE
Goal Outcome Evaluation:            Aox4 this evening, was tearful at times, has had a lot of trouble adjusting to AKA. Requested WOC for specialty bed due to increased immobility from initial admission. Didn't ask for pain meds as often tonight, improvement from prior days. Discussed ensuring not pulling on IV site, she stated she will not allow another IV placement if this one comes out. Lower abdomen feels firm, bowel sounds are hypoactive, pt states no BM since 12/27. Took off NC several times overnight, explained the necessity to wear it due to number of narcotics she's receiving.         **Psych consult was asked for by pt & family. Trouble adjusting to AKA, high anxiety and depression over this life transition.

## 2024-01-02 NOTE — ANESTHESIA POSTPROCEDURE EVALUATION
Patient: Phoebe Carvajal    Procedure Summary       Date: 12/29/23 Room / Location:  [facility] 10 /  [facility]    Anesthesia Start: 1036 Anesthesia Stop: 1302    Procedure: AMPUTATION ABOVE KNEE- LEFT, WOUND VAC (Left: Thigh) Diagnosis:     Surgeons: Adama Witt Jr., MD Provider: Ellen Leong MD    Anesthesia Type: general with block ASA Status: 3            Anesthesia Type: general with block    Vitals  Vitals Value Taken Time   /69 12/29/23 1400   Temp 98.3 °F (36.8 °C) 12/29/23 1345   Pulse 69 12/29/23 1400   Resp 15 12/29/23 1400   SpO2 97 % 12/29/23 1400           Post Anesthesia Care and Evaluation    Patient location during evaluation: PACU  Patient participation: complete - patient participated  Level of consciousness: awake and alert  Pain management: adequate    Airway patency: patent  Anesthetic complications: No anesthetic complications  PONV Status: none  Cardiovascular status: hemodynamically stable and acceptable  Respiratory status: nonlabored ventilation, acceptable and nasal cannula  Hydration status: acceptable

## 2024-01-02 NOTE — PROGRESS NOTES
Phoebe QUEVEDO Carvajal       LOS: 5 days   Patient Care Team:  Sofya Benjamin MD as PCP - General (Internal Medicine)  Provider, No Known as PCP - Family Medicine    Chief Complaint:  s/p left above knee amputation     Subjective     Interval History:     Pain tolerable overnight, resting comfortably this morning.     Review of Systems:      Gen- No fevers, chills  CV- No chest pain, palpitations  Resp- No cough, dyspnea  GI- No N/V/D, abd pain    Objective     Vital Signs  Vital Signs (last 24 hours)         01/01 0700  01/02 0659 01/02 0700 01/02 0722   Most Recent      Temp (°F) 98.2 -  98.9       98.2 (36.8) 01/02 0309    Heart Rate 65 -  90       75 01/02 0600    Resp 17 -  18       17 01/02 0400    /73 -  144/87       120/70 01/02 0309    SpO2 (%) 87 -  100       98 01/02 0600    Flow (L/min)   3       3 01/02 0400              Physical Exam:     Alert, oriented.  No acute distress.  Nonlabored respirations.  Regular rate and rhythm.  Abdomen nondistended.  Left lower extremity: Operative dressing clean, dry, intact, drain with serosanguineous output.     Results Review:     I reviewed the patient's new clinical results.    Medication Review:   Hospital Medications (active)         Dose Frequency Start End    ! Home medications stored in pharmacy, please contact pharmacist prior to patient discharge  Every 12 Hours Scheduled 12/29/2023 --    Route: Does not apply    acetaminophen (TYLENOL) tablet 650 mg 650 mg Every 6 Hours PRN 12/29/2023 --    Admin Instructions: If given for fever, use fever parameter: fever greater than 100.4 °F  Based on patient request - if ordered for moderate or severe pain, provider allows for administration of a medication prescribed for a lower pain scale.    Do not exceed 4 grams of acetaminophen in a 24 hr period. Max dose of 2gm for AST/ALT greater than 120 units/L.    If given for pain, use the following pain scale:   Mild Pain = Pain Score of 1-3, CPOT 1-2  Moderate Pain =  Pain Score of 4-6, CPOT 3-4  Severe Pain = Pain Score of 7-10, CPOT 5-8    Route: Oral    bisacodyl (DULCOLAX) EC tablet 5 mg 5 mg Daily PRN 12/29/2023 --    Admin Instructions: Use if no bowel movement after 12 hours.  Swallow whole. Do not crush, split, or chew tablet.    Route: Oral    Linked Group 1: See Hyperspace for full Linked Orders Report.        bisacodyl (DULCOLAX) suppository 10 mg 10 mg Daily PRN 12/29/2023 --    Admin Instructions: Use if no bowel movement after 12 hours.  Hold for diarrhea    Route: Rectal    Linked Group 1: See Hyperspace for full Linked Orders Report.        cefTAZidime (FORTAZ) 2,000 mg in sodium chloride 0.9 % 100 mL IVPB 2,000 mg Every 8 Hours 12/29/2023 1/8/2024    Admin Instructions: Caution: Look alike/sound alike drug alert    Route: Intravenous    DAPTOmycin (CUBICIN) 500 mg in sodium chloride 0.9 % 50 mL IVPB 6 mg/kg × 82.9 kg (Adjusted) Every 24 Hours 12/29/2023 1/6/2024    Admin Instructions: Caution: Look alike/sound alike drug alert.  Refrigerate. Do not shake.    Route: Intravenous    diazePAM (VALIUM) injection 2.5 mg 2.5 mg Once 1/1/2024 --    Admin Instructions:  May give each 5 mg IV push over 1 minute. May be injected through infusion tubing using port closest to vein insertion. Do not mix or dilute with other solutions.    Route: Intravenous    diphenhydrAMINE (BENADRYL) capsule 25 mg 25 mg Every 6 Hours PRN 12/29/2023 --    Admin Instructions: Caution: Look alike/sound alike drug alert. This med may be ordered in other forms and routes. Before giving verify the last time the drug was given by any route/form.    Route: Oral    famotidine (PEPCID) tablet 20 mg 20 mg 2 Times Daily Before Meals 1/1/2024 --    Route: Oral    HYDROmorphone (DILAUDID) injection 1 mg 1 mg Every 2 Hours PRN 12/29/2023 1/5/2024    Admin Instructions: Based on patient request - if ordered for moderate or severe pain, provider allows for administration of a medication prescribed for a  lower pain scale.      Caution: Look alike/sound alike drug alert    If given for pain, use the following pain scale:  Mild Pain = Pain Score of 1-3, CPOT 1-2  Moderate Pain = Pain Score of 4-6, CPOT 3-4  Severe Pain = Pain Score of 7-10, CPOT 5-8    Route: Intravenous    Linked Group 2: See Hyperspace for full Linked Orders Report.        lactated ringers infusion 100 mL/hr Continuous 12/29/2023 --    Route: Intravenous    lactated ringers infusion 9 mL/hr Continuous PRN 12/31/2023 --    Route: Intravenous    levothyroxine (SYNTHROID, LEVOTHROID) tablet 125 mcg 125 mcg Daily 12/29/2023 --    Admin Instructions: Take on empty stomach.    Route: Oral    LORazepam (ATIVAN) tablet 0.5 mg 0.5 mg Every 12 Hours PRN 12/29/2023 --    Admin Instructions: Hold for SBP < 110   Caution: Look alike/sound alike drug alert    Route: Oral    melatonin tablet 5 mg 5 mg Nightly PRN 12/29/2023 --    Route: Oral    naloxone (NARCAN) injection 0.4 mg 0.4 mg Every 5 Minutes PRN 12/29/2023 --    Admin Instructions: If Respiratory Rate Less Than 8 or Patient is Difficult to Arouse, Stop ALL Narcotics & Contact Provider.  Administer Slow IV Push.  Repeat As Ordered Until Respiratory Rate is Greater Than 12.    Route: Intravenous    Linked Group 2: See Hyperspace for full Linked Orders Report.        ondansetron (ZOFRAN) injection 4 mg 4 mg Every 6 Hours PRN 12/29/2023 --    Admin Instructions: If BOTH ondansetron (ZOFRAN) and promethazine (PHENERGAN) are ordered use ondansetron first and THEN promethazine IF ondansetron is ineffective.    Route: Intravenous    oxyCODONE (ROXICODONE) immediate release tablet 10 mg 10 mg Every 4 Hours PRN 1/1/2024 --    Admin Instructions: Based on patient request - if ordered for moderate or severe pain, provider allows for administration of a medication prescribed for a lower pain scale.  If given for pain, use the following pain scale:  Mild Pain = Pain Score of 1-3, CPOT 1-2  Moderate Pain = Pain Score  of 4-6, CPOT 3-4  Severe Pain = Pain Score of 7-10, CPOT 5-8    Route: Oral    oxyCODONE (ROXICODONE) immediate release tablet 5 mg 5 mg Every 4 Hours PRN 12/29/2023 1/5/2024    Admin Instructions: Based on patient request - if ordered for moderate or severe pain, provider allows for administration of a medication prescribed for a lower pain scale.      If given for pain, use the following pain scale:  Mild Pain = Pain Score of 1-3, CPOT 1-2  Moderate Pain = Pain Score of 4-6, CPOT 3-4  Severe Pain = Pain Score of 7-10, CPOT 5-8    Route: Oral    polyethylene glycol (MIRALAX) packet 17 g 17 g Daily PRN 12/29/2023 --    Admin Instructions: Use if no bowel movement after 12 hours. Mix in 6-8 ounces of water.  Use 4-8 ounces of water, tea, or juice for each 17 gram dose.    Route: Oral    Linked Group 1: See Hyperspace for full Linked Orders Report.        pramipexole (MIRAPEX) tablet 1 mg 1 mg Daily 12/29/2023 --    Route: Oral    pregabalin (LYRICA) capsule 200 mg 200 mg 3 Times Daily 12/29/2023 --    Admin Instructions:     Route: Oral    sennosides-docusate (PERICOLACE) 8.6-50 MG per tablet 2 tablet 2 tablet 2 Times Daily 12/29/2023 --    Admin Instructions: HOLD MEDICATION IF PATIENT HAS HAD BOWEL MOVEMENT. Start bowel management regimen if patient has not had a bowel movement after 12 hours.    Route: Oral    Linked Group 1: See Hyperspace for full Linked Orders Report.        sertraline (ZOLOFT) tablet 50 mg 50 mg Nightly 12/29/2023 --    Route: Oral    sodium chloride 0.9 % flush 10 mL 10 mL Every 12 Hours Scheduled 12/29/2023 --    Route: Intravenous    sodium chloride 0.9 % flush 10 mL 10 mL As Needed 12/29/2023 --    Route: Intravenous    sodium chloride 0.9 % infusion 40 mL 40 mL As Needed 12/29/2023 --    Admin Instructions: Following administration of an IV intermittent medication, flush line with 40mL NS at 100mL/hr.    Route: Intravenous    traZODone (DESYREL) tablet 50 mg 50 mg Nightly PRN 12/29/2023  --    Admin Instructions: Take with food.  Caution: Look alike/sound alike drug alert    Route: Oral              Assessment & Plan     48-year-old female status post left above-knee amputation, wound vacuum-assisted closure December 29, 2023, secondary wound closure 12/31/23.      Lower extremity cellulitis    Acquired hypothyroidism    Ulcer of right midfoot with fat layer exposed    Ulcer of left heel, with fat layer exposed    Chronic osteomyelitis    Anxiety associated with depression    RLS (restless legs syndrome)    Neuropathy    Obesity, morbid, BMI 50 or higher    S/P AKA (above knee amputation), left      Nonweightbearing left lower extremity.       Follow cultures -- 12/29/23: NGD3; 12/31/23: pending, no organisms on gram stain.     Anticipate drain removal tomorrow.  At that point she would be appropriate for discharge from surgical perspective.  Anticipate need for therapy placement.    Drain removed 1/2/24.     Daily dressing changes beginning January 3, 2024.  Xeroform, 4 x 4, Kerlix, Ace wrap.     Follow-up 2 weeks for wound check and x-rays.     Follow up with Jazzmine Brar PA-C.     [facility]  [address], [address]  [place], [address]  Please schedule at [phone]        Adama Witt Jr, MD  01/02/24  07:22 EST

## 2024-01-02 NOTE — PROGRESS NOTES
[facility]  PROGRESS NOTE    Patient Name: Phoebe Carvajal  : 1975  MRN: [id number]    Date of Admission: 2023  Primary Care Physician: Sofya Benjamin MD    Subjective   Subjective     CC:  F/u osteomyelitis    HPI:  Patient resting in bed.  Says pain is mostly controlled.  Discussed weaning IV pain meds so that she can get to rehab.      Objective   Objec  Says pain is mostly controlled.  Discussed weaning IV pain meds so that she can get to rehab.  Tive     Vital Signs:   Temp:  [98.2 °F (36.8 °C)-98.8 °F (37.1 °C)] 98.7 °F (37.1 °C)  Heart Rate:  [65-90] 77  Resp:  [17-18] 18  BP: (101-144)/(60-87) 101/60  Flow (L/min):  [3] 3     Physical Exam:  Constitutional: No acute distress, awake, alert  HENT: NCAT, mucous membranes moist  Respiratory: Diminished at bases bilaterally, respiratory effort normal, 2L NC   Cardiovascular: RRR, no murmurs, rubs, or gallops  Gastrointestinal: Positive bowel sounds, soft, nontender, nondistended  Musculoskeletal: Left AKA, 1+ edema RLE  Psychiatric: Appropriate affect, cooperative  Neurologic: Oriented x 3, moves all extremities, Cranial Nerves grossly intact to confrontation, speech clear  Skin: No rashes, erythema, dry skin RLE      Results Reviewed:  LAB RESULTS:      Lab 24  0443 23  0206 23  1943   WBC 5.10 3.51 3.83   HEMOGLOBIN 9.5* 10.3* 9.7*   HEMATOCRIT 29.8* 32.5* 30.1*   PLATELETS 172 150 143   NEUTROS ABS 3.42 1.97 2.52   IMMATURE GRANS (ABS) 0.01 0.00 0.01   LYMPHS ABS 0.93 1.16 0.80   MONOS ABS 0.53 0.22 0.36   EOS ABS 0.20 0.15 0.13   .6* 111.3* 112.7*   CRP  --  1.92*  --    PROCALCITONIN  --  0.04 0.04   LACTATE  --  1.0 0.9   PROTIME  --  14.9*  --          Lab 24  0443 23  0511 23  0206 23  1943   SODIUM 137  --  138 140   POTASSIUM 3.8  --  4.2 4.4   CHLORIDE 100  --  103 104   CO2 27.0  --  26.0 28.0   ANION GAP 10.0  --  9.0 8.0   BUN 4*  --  8 8    CREATININE 0.36*  --  0.63 0.54*   EGFR 123.9  --  108.2 112.3   GLUCOSE 91  --  85 95   CALCIUM 8.2*  --  8.6 8.4*   MAGNESIUM  --   --  2.0  --    PHOSPHORUS  --   --  3.9  --    HEMOGLOBIN A1C  --  4.60*  --   --    TSH  --   --   --  7.320*         Lab 12/29/23  0206 12/28/23  1943   TOTAL PROTEIN 7.0 6.1   ALBUMIN 3.1* 2.7*   GLOBULIN 3.9 3.4   ALT (SGPT) 6 8   AST (SGOT) 20 23   BILIRUBIN 0.5 0.5   ALK PHOS 275* 247*         Lab 12/29/23  0206   PROTIME 14.9*   INR 1.16*             Lab 12/29/23  0206   ABO TYPING A   RH TYPING Negative   ANTIBODY SCREEN Negative         Brief Urine Lab Results  (Last result in the past 365 days)        Color   Clarity   Blood   Leuk Est   Nitrite   Protein   CREAT   Urine HCG        12/31/23 0801               Negative               Microbiology Results Abnormal       Procedure Component Value - Date/Time    Tissue / Bone Culture - Surgical Site, Leg, Left [[id number]] Collected: 12/31/23 0831    Lab Status: Preliminary result Specimen: Surgical Site from Leg, Left Updated: 01/02/24 0822     Tissue Culture No growth     Gram Stain Moderate (3+) WBCs per low power field      No organisms seen    Blood Culture - Blood, Hand, Right [[id number]]  (Normal) Collected: 12/28/23 2120    Lab Status: Preliminary result Specimen: Blood from Hand, Right Updated: 01/02/24 0030     Blood Culture No growth at 4 days    Blood Culture - Blood, Arm, Right [[id number]]  (Normal) Collected: 12/28/23 2115    Lab Status: Preliminary result Specimen: Blood from Arm, Right Updated: 01/02/24 0030     Blood Culture No growth at 4 days    AFB Culture - Surgical Site, Leg, Left [[id number]] Collected: 12/31/23 0831    Lab Status: Preliminary result Specimen: Surgical Site from Leg, Left Updated: 01/01/24 1204     AFB Stain No acid fast bacilli seen on concentrated smear    Anaerobic Culture - Tissue, Leg, Left [[id number]]  (Normal) Collected: 12/29/23 1139    Lab Status: Preliminary result Specimen:  Tissue from Leg, Left Updated: 01/01/24 1129     Anaerobic Culture No anaerobes isolated at 3 days    Wound Culture - Wound, Leg, Left [[id number]] Collected: 12/29/23 1322    Lab Status: Final result Specimen: Wound from Leg, Left Updated: 01/01/24 0711     Wound Culture No growth at 3 days     Gram Stain No organisms seen    AFB Culture - Tissue, Leg, Left [[id number]] Collected: 12/29/23 1139    Lab Status: Preliminary result Specimen: Tissue from Leg, Left Updated: 12/30/23 1259     AFB Stain No acid fast bacilli seen on concentrated smear    MRSA Screen, PCR (Inpatient) - Swab, Nares [[id number]]  (Normal) Collected: 12/29/23 0203    Lab Status: Final result Specimen: Swab from Nares Updated: 12/29/23 0733     MRSA PCR Negative    Narrative:      The negative predictive value of this diagnostic test is high and should only be used to consider de-escalating anti-MRSA therapy. A positive result may indicate colonization with MRSA and must be correlated clinically.  MRSA Negative    COVID PRE-OP / PRE-PROCEDURE SCREENING ORDER (NO ISOLATION) - Swab, Nasopharynx [[id number]]  (Normal) Collected: 12/29/23 0203    Lab Status: Final result Specimen: Swab from Nasopharynx Updated: 12/29/23 0353    Narrative:      The following orders were created for panel order COVID PRE-OP / PRE-PROCEDURE SCREENING ORDER (NO ISOLATION) - Swab, Nasopharynx.  Procedure                               Abnormality         Status                     ---------                               -----------         ------                     Respiratory Panel PCR w/...[[id number]]  Normal              Final result                 Please view results for these tests on the individual orders.    Respiratory Panel PCR w/COVID-19(SARS-CoV-2) ARIELLE/VESNA/KARIE/PAD/COR/SÁNCHEZ In-House, NP Swab in UTM/VTM, 2 HR TAT - Swab, Nasopharynx [[id number]]  (Normal) Collected: 12/29/23 0203    Lab Status: Final result Specimen: Swab from Nasopharynx Updated: 12/29/23 0353      ADENOVIRUS, PCR Not Detected     Coronavirus 229E Not Detected     Coronavirus HKU1 Not Detected     Coronavirus NL63 Not Detected     Coronavirus OC43 Not Detected     COVID19 Not Detected     Human Metapneumovirus Not Detected     Human Rhinovirus/Enterovirus Not Detected     Influenza A PCR Not Detected     Influenza B PCR Not Detected     Parainfluenza Virus 1 Not Detected     Parainfluenza Virus 2 Not Detected     Parainfluenza Virus 3 Not Detected     Parainfluenza Virus 4 Not Detected     RSV, PCR Not Detected     Bordetella pertussis pcr Not Detected     Bordetella parapertussis PCR Not Detected     Chlamydophila pneumoniae PCR Not Detected     Mycoplasma pneumo by PCR Not Detected    Narrative:      In the setting of a positive respiratory panel with a viral infection PLUS a negative procalcitonin without other underlying concern for bacterial infection, consider observing off antibiotics or discontinuation of antibiotics and continue supportive care. If the respiratory panel is positive for atypical bacterial infection (Bordetella pertussis, Chlamydophila pneumoniae, or Mycoplasma pneumoniae), consider antibiotic de-escalation to target atypical bacterial infection.            No radiology results from the last 24 hrs        Current medications:  Scheduled Meds:Pharmacy Consult, , Does not apply, Q12H  cefTAZidime, 2,000 mg, Intravenous, Q8H  DAPTOmycin, 6 mg/kg (Adjusted), Intravenous, Q24H  diazePAM, 2.5 mg, Intravenous, Once  famotidine, 20 mg, Oral, BID AC  levothyroxine, 125 mcg, Oral, Daily  pramipexole, 1 mg, Oral, Daily  pregabalin, 200 mg, Oral, TID  senna-docusate sodium, 2 tablet, Oral, BID  sertraline, 50 mg, Oral, Nightly  sodium chloride, 10 mL, Intravenous, Q12H      Continuous Infusions:lactated ringers, 100 mL/hr, Last Rate: 100 mL/hr (01/02/24 0221)  lactated ringers, 9 mL/hr      PRN Meds:.  acetaminophen    senna-docusate sodium **AND** polyethylene glycol **AND** bisacodyl **AND**  bisacodyl    diphenhydrAMINE    HYDROmorphone **AND** naloxone    lactated ringers    LORazepam    melatonin    ondansetron    oxyCODONE    oxyCODONE    sodium chloride    sodium chloride    traZODone    Assessment & Plan   Assessment & Plan     Active Hospital Problems    Diagnosis  POA    **Lower extremity cellulitis [L03.119]  Yes    Anxiety associated with depression [F41.8]  Unknown    RLS (restless legs syndrome) [G25.81]  Unknown    Neuropathy [G62.9]  Unknown    Obesity, morbid, BMI 50 or higher [E66.01]  Unknown    S/P AKA (above knee amputation), left [Z89.612]  Not Applicable    Chronic osteomyelitis [M86.60]  Yes    Acquired hypothyroidism [E03.9]  Yes    Ulcer of right midfoot with fat layer exposed [L97.412]  Yes    Ulcer of left heel, with fat layer exposed [L97.422]  Yes      Resolved Hospital Problems   No resolved problems to display.        Brief Hospital Course to date:  Phoebe Carvajal is a 48 y.o. female with history of depression, hypothyroidism, stills disease, RLS, PAD, anemia, morbid obesity, recurrent osteomyelitis, chronic lower extremity osteomyelitis, recurrent left prosthetic joint infection, bilateral foot wounds who presented to the emergency department for evaluation of fever and worsening erythema with foul odor from her chronic wounds. Previous records from  from October 22 through November 8, 2023 showed intention for proceeding with left AKA but patient waited for second opinion hoping to have the option of BKA.  She is followed by infectious disease-known to Dr. Wang.  She underwent AKA on 12/29/2023 with Dr. Witt, with additional debridement on 12/31/2023     This patient's problems and plans were partially entered by my partner and updated as appropriate by me 01/02/24.      Osteomyelitis/cellulitis of the left lower extremity  Status post left AKA on 12/29/2023 Dr. Witt  Chronic infected wounds  -Dr. Witt performed debridement again on 12/31/2023  -Patient  has complained of excruciating pain postoperatively  -Continue prn IV Dilaudid, prn oxycodone  -ID consulted, Dr. Tayo Arnold following  --wound cultures pending  -Continue ceftazidime and daptomycin  --drain removed 1/2/24  --daily dressing changes Xeroform 4x4, Kerlix, ACE wrap  --Follow up with PANDA English with Dr. Witt in 2 weeks for wound check and x-rays  -- Wean Dilaudid in anticipation of transfer to rehab, add scheduled Tylenol, consider scheduling oxycodone with plan to wean to prn  --AM BMP, CBC     Neuropathy  -Continue Lyrica      Morbid obesity  Hypoxia  -Likely related to opiate use postoperatively and morbid obesity  -Continue O2 to maintain sats greater than 90, currently on 2 L nasal cannula  --Wean O2 as tolerated     Restless leg syndrome  -Continue Mirapex     Anxiety  -Continue Zoloft        Expected Discharge Location and Transportation: [place]  Expected Discharge pending bed offer, insurance approval  Expected Discharge Date: 1/4/2024; Expected Discharge Time:      DVT prophylaxis:  Mechanical DVT prophylaxis orders are present.     AM-PAC 6 Clicks Score (PT): 7 (01/01/24 2015)    CODE STATUS:   Code Status and Medical Interventions:   Ordered at: 12/29/23 0121     Code Status (Patient has no pulse and is not breathing):    CPR (Attempt to Resuscitate)     Medical Interventions (Patient has pulse or is breathing):    Full Support       Grazyna Fernandez, APRN  01/02/24

## 2024-01-03 LAB
ANION GAP SERPL CALCULATED.3IONS-SCNC: 4 MMOL/L (ref 5–15)
BACTERIA SPEC AEROBE CULT: NORMAL
BACTERIA SPEC AEROBE CULT: NORMAL
BACTERIA SPEC ANAEROBE CULT: NORMAL
BUN SERPL-MCNC: 5 MG/DL (ref 6–20)
BUN/CREAT SERPL: 15.6 (ref 7–25)
CALCIUM SPEC-SCNC: 8 MG/DL (ref 8.6–10.5)
CHLORIDE SERPL-SCNC: 107 MMOL/L (ref 98–107)
CO2 SERPL-SCNC: 31 MMOL/L (ref 22–29)
CREAT SERPL-MCNC: 0.32 MG/DL (ref 0.57–1)
CYTO UR: NORMAL
DEPRECATED RDW RBC AUTO: 62.9 FL (ref 37–54)
EGFRCR SERPLBLD CKD-EPI 2021: 127.4 ML/MIN/1.73
ERYTHROCYTE [DISTWIDTH] IN BLOOD BY AUTOMATED COUNT: 15.5 % (ref 12.3–15.4)
GLUCOSE SERPL-MCNC: 92 MG/DL (ref 65–99)
HCT VFR BLD AUTO: 26.9 % (ref 34–46.6)
HGB BLD-MCNC: 8.6 G/DL (ref 12–15.9)
LAB AP CASE REPORT: NORMAL
LAB AP CLINICAL INFORMATION: NORMAL
MCH RBC QN AUTO: 35 PG (ref 26.6–33)
MCHC RBC AUTO-ENTMCNC: 32 G/DL (ref 31.5–35.7)
MCV RBC AUTO: 109.3 FL (ref 79–97)
PATH REPORT.FINAL DX SPEC: NORMAL
PATH REPORT.GROSS SPEC: NORMAL
PLATELET # BLD AUTO: 198 10*3/MM3 (ref 140–450)
PMV BLD AUTO: 9.9 FL (ref 6–12)
POTASSIUM SERPL-SCNC: 3.9 MMOL/L (ref 3.5–5.2)
RBC # BLD AUTO: 2.46 10*6/MM3 (ref 3.77–5.28)
SODIUM SERPL-SCNC: 142 MMOL/L (ref 136–145)
WBC NRBC COR # BLD AUTO: 3.52 10*3/MM3 (ref 3.4–10.8)

## 2024-01-03 PROCEDURE — 80048 BASIC METABOLIC PNL TOTAL CA: CPT | Performed by: NURSE PRACTITIONER

## 2024-01-03 PROCEDURE — 97530 THERAPEUTIC ACTIVITIES: CPT

## 2024-01-03 PROCEDURE — 25010000002 ONDANSETRON PER 1 MG: Performed by: ORTHOPAEDIC SURGERY

## 2024-01-03 PROCEDURE — 97164 PT RE-EVAL EST PLAN CARE: CPT

## 2024-01-03 PROCEDURE — 97110 THERAPEUTIC EXERCISES: CPT

## 2024-01-03 PROCEDURE — 85027 COMPLETE CBC AUTOMATED: CPT | Performed by: NURSE PRACTITIONER

## 2024-01-03 PROCEDURE — 97597 DBRDMT OPN WND 1ST 20 CM/<: CPT

## 2024-01-03 PROCEDURE — 99232 SBSQ HOSP IP/OBS MODERATE 35: CPT | Performed by: NURSE PRACTITIONER

## 2024-01-03 PROCEDURE — 25810000003 LACTATED RINGERS PER 1000 ML: Performed by: ORTHOPAEDIC SURGERY

## 2024-01-03 PROCEDURE — 97535 SELF CARE MNGMENT TRAINING: CPT

## 2024-01-03 PROCEDURE — 25010000002 HYDROMORPHONE 1 MG/ML SOLUTION: Performed by: NURSE PRACTITIONER

## 2024-01-03 PROCEDURE — 63710000001 DIPHENHYDRAMINE PER 50 MG: Performed by: ORTHOPAEDIC SURGERY

## 2024-01-03 PROCEDURE — 25010000002 CEFTAZIDIME 2 G RECONSTITUTED SOLUTION 1 EACH VIAL: Performed by: ORTHOPAEDIC SURGERY

## 2024-01-03 PROCEDURE — 25010000002 DAPTOMYCIN PER 1 MG: Performed by: INTERNAL MEDICINE

## 2024-01-03 RX ORDER — LOPERAMIDE HYDROCHLORIDE 2 MG/1
2 CAPSULE ORAL 4 TIMES DAILY PRN
Status: DISCONTINUED | OUTPATIENT
Start: 2024-01-03 | End: 2024-01-19 | Stop reason: HOSPADM

## 2024-01-03 RX ORDER — L.ACID,PARA/B.BIFIDUM/S.THERM 8B CELL
1 CAPSULE ORAL DAILY
Status: DISCONTINUED | OUTPATIENT
Start: 2024-01-03 | End: 2024-01-19 | Stop reason: HOSPADM

## 2024-01-03 RX ADMIN — CEFTAZIDIME 2000 MG: 2 INJECTION, POWDER, FOR SOLUTION INTRAVENOUS at 23:57

## 2024-01-03 RX ADMIN — PREGABALIN 200 MG: 100 CAPSULE ORAL at 08:37

## 2024-01-03 RX ADMIN — Medication 10 ML: at 08:37

## 2024-01-03 RX ADMIN — SODIUM CHLORIDE, POTASSIUM CHLORIDE, SODIUM LACTATE AND CALCIUM CHLORIDE 100 ML/HR: 600; 310; 30; 20 INJECTION, SOLUTION INTRAVENOUS at 08:36

## 2024-01-03 RX ADMIN — PREGABALIN 200 MG: 100 CAPSULE ORAL at 16:10

## 2024-01-03 RX ADMIN — ACETAMINOPHEN 1000 MG: 500 TABLET ORAL at 13:08

## 2024-01-03 RX ADMIN — OXYCODONE HYDROCHLORIDE 10 MG: 10 TABLET ORAL at 08:37

## 2024-01-03 RX ADMIN — FAMOTIDINE 20 MG: 20 TABLET, FILM COATED ORAL at 05:42

## 2024-01-03 RX ADMIN — Medication 10 ML: at 20:42

## 2024-01-03 RX ADMIN — CEFTAZIDIME 2000 MG: 2 INJECTION, POWDER, FOR SOLUTION INTRAVENOUS at 14:12

## 2024-01-03 RX ADMIN — LOPERAMIDE HYDROCHLORIDE 2 MG: 2 CAPSULE ORAL at 18:21

## 2024-01-03 RX ADMIN — HYDROMORPHONE HYDROCHLORIDE 1 MG: 1 INJECTION, SOLUTION INTRAMUSCULAR; INTRAVENOUS; SUBCUTANEOUS at 10:12

## 2024-01-03 RX ADMIN — OXYCODONE HYDROCHLORIDE 10 MG: 10 TABLET ORAL at 04:39

## 2024-01-03 RX ADMIN — FAMOTIDINE 20 MG: 20 TABLET, FILM COATED ORAL at 16:09

## 2024-01-03 RX ADMIN — OXYCODONE HYDROCHLORIDE 10 MG: 10 TABLET ORAL at 18:21

## 2024-01-03 RX ADMIN — LORAZEPAM 0.5 MG: 0.5 TABLET ORAL at 05:54

## 2024-01-03 RX ADMIN — ACETAMINOPHEN 1000 MG: 500 TABLET ORAL at 20:42

## 2024-01-03 RX ADMIN — HYDROMORPHONE HYDROCHLORIDE 1 MG: 1 INJECTION, SOLUTION INTRAMUSCULAR; INTRAVENOUS; SUBCUTANEOUS at 14:12

## 2024-01-03 RX ADMIN — LEVOTHYROXINE SODIUM 125 MCG: 125 TABLET ORAL at 05:42

## 2024-01-03 RX ADMIN — HYDROMORPHONE HYDROCHLORIDE 1 MG: 1 INJECTION, SOLUTION INTRAMUSCULAR; INTRAVENOUS; SUBCUTANEOUS at 06:13

## 2024-01-03 RX ADMIN — Medication 1 CAPSULE: at 16:09

## 2024-01-03 RX ADMIN — DIPHENHYDRAMINE HYDROCHLORIDE 25 MG: 25 CAPSULE ORAL at 08:37

## 2024-01-03 RX ADMIN — CEFTAZIDIME 2000 MG: 2 INJECTION, POWDER, FOR SOLUTION INTRAVENOUS at 05:42

## 2024-01-03 RX ADMIN — SERTRALINE HYDROCHLORIDE 50 MG: 50 TABLET ORAL at 20:41

## 2024-01-03 RX ADMIN — HYDROMORPHONE HYDROCHLORIDE 1 MG: 1 INJECTION, SOLUTION INTRAMUSCULAR; INTRAVENOUS; SUBCUTANEOUS at 21:06

## 2024-01-03 RX ADMIN — DAPTOMYCIN 500 MG: 500 INJECTION, POWDER, LYOPHILIZED, FOR SOLUTION INTRAVENOUS at 20:41

## 2024-01-03 RX ADMIN — SODIUM CHLORIDE, POTASSIUM CHLORIDE, SODIUM LACTATE AND CALCIUM CHLORIDE 100 ML/HR: 600; 310; 30; 20 INJECTION, SOLUTION INTRAVENOUS at 18:23

## 2024-01-03 RX ADMIN — PREGABALIN 200 MG: 100 CAPSULE ORAL at 20:41

## 2024-01-03 RX ADMIN — ACETAMINOPHEN 1000 MG: 500 TABLET ORAL at 05:42

## 2024-01-03 RX ADMIN — PRAMIPEXOLE DIHYDROCHLORIDE 1 MG: 0.25 TABLET ORAL at 08:36

## 2024-01-03 RX ADMIN — ONDANSETRON 4 MG: 2 INJECTION INTRAMUSCULAR; INTRAVENOUS at 14:12

## 2024-01-03 RX ADMIN — OXYCODONE HYDROCHLORIDE 10 MG: 10 TABLET ORAL at 13:08

## 2024-01-03 NOTE — CASE MANAGEMENT/SOCIAL WORK
Continued Stay Note  [facility]     Patient Name: Phoebe Carvajal  MRN: [id number]  Today's Date: 1/3/2024    Admit Date: 12/28/2023    Plan: update   Discharge Plan       Row Name 01/03/24 1510       Plan    Plan update    Patient/Family in Agreement with Plan yes    Plan Comments Received a call from [facility] liaison who accepted referral for review.  CM following.    Final Discharge Disposition Code 62 - inpatient rehab facility      Row Name 01/03/24 1501       Plan    Plan update    Patient/Family in Agreement with Plan yes    Plan Comments Per RN, PT/OT have worked with patient.  Spoke with patient in room, patient up to chair, and advised will call referral over to [facility].  Patient verbalized understanding and agreement with plan.  No new discharge needs indicated.  Called [facility] liaison and left VM with name and callback number.  CM following.  Patient plan is to discharge to [facility] pending bed offer and insurance approval.    Final Discharge Disposition Code 62 - inpatient rehab facility                   Discharge Codes    No documentation.                 Expected Discharge Date and Time       Expected Discharge Date Expected Discharge Time    Jan 4, 2024               Jerica Paredes RN
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
## 2024-01-03 NOTE — PLAN OF CARE
Goal Outcome Evaluation:  Plan of Care Reviewed With: patient        Progress: no change  Outcome Evaluation: PT wound care re-evaluation complete. Pt presenting with chronic R plantar foot ulceration. Pt's wound base initially with layer of biofilm, however following debridement wound with good granulating wound base. Pt would continue to benefit from skilled PT wound care for further debridement and dressing changes every 2-3 days.

## 2024-01-03 NOTE — PLAN OF CARE
Goal Outcome Evaluation:                 Aox4 this evening, tolerated weaning o2 down to 1L overnight. Desat only once while sleeping. Was able to sleep for longer stretch tonight. Dressing on AKA changed, did not tolerate well. Cried and screamed the entire time.     Pt asked again about psych referral.

## 2024-01-03 NOTE — PLAN OF CARE
Goal Outcome Evaluation:  Plan of Care Reviewed With: patient        Progress: improving  Outcome Evaluation: Pt is A/Ox3 and participates in therapy with encouragement and improved effort this session. Education reviewed for L residual limb NWB precautions. Pt agreeable to OOB activity. Able to stand x2 reps with Max Ax2 in parallel bars. Able to sustain static standing with good posture on 2nd rep x30 seconds. Pt continues to require assist for all LB ADLs and toileting. AE is contraindicated for RLE due to acute wounds on foot. OT will continue to follow IP. Recommend IRF at d/c for best outcome.      Anticipated Discharge Disposition (OT): inpatient rehabilitation facility
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
## 2024-01-03 NOTE — PLAN OF CARE
Goal Outcome Evaluation:  Plan of Care Reviewed With: patient, daughter        Progress: improving  Outcome Evaluation: Progressed to standing today.  STS x 2 reps with maxAx2 and BUE support in parallel bars; able to achieve full upright standing position for ~30 sec to 1 min interval on 2nd rep.  Participated in seated BLE ther ex following increased motivation and encouragement.  Con to present below baseline with L residual limb pain, weakness, decreased balance, and decreased functional endurance limiting indep with mobility and ability to safely navigate home environment.  Will con to benefit from skilled IP PT to improve deficits and promote return to PLOF.  Rec IPR upon d/c for best fxl outcome.      Anticipated Discharge Disposition (PT): inpatient rehabilitation facility
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
## 2024-01-03 NOTE — PROGRESS NOTES
[facility] Medicine Services  PROGRESS NOTE    Patient Name: Phoebe Carvajal  : 1975  MRN: [id number]    Date of Admission: 2023  Primary Care Physician: Sofya Benjamin MD    Subjective   Subjective     CC:  F/u osteomyelitis    HPI:   Patient resting in bed.  Says she is still having issues with pain control, rates her pain 9/10, describes it as burning, electric as well as aching.  Patient also having 2-3 episodes of diarrhea a day.      Objective   Objec  Says pain is mostly controlled.  Discussed weaning IV pain meds so that she can get to rehab.  Tive     Vital Signs:   Temp:  [97.6 °F (36.4 °C)-98.5 °F (36.9 °C)] 97.9 °F (36.6 °C)  Heart Rate:  [53-76] 66  Resp:  [18] 18  BP: ()/(54-75) 86/54  Flow (L/min):  [1-3] 1     Physical Exam:   Constitutional: No acute distress, awake, alert  HENT: NCAT, mucous membranes moist  Respiratory: Diminished at bases bilaterally, respiratory effort normal, room air  Cardiovascular: RRR, no murmurs, rubs, or gallops  Gastrointestinal: Positive bowel sounds, soft, nontender, nondistended  Musculoskeletal: Left AKA, 1+ edema RLE  Psychiatric: Appropriate affect, cooperative  Neurologic: Oriented x 3, moves all extremities, Cranial Nerves grossly intact to confrontation, speech clear  Skin: No rashes, erythema, dry skin RLE      Results Reviewed:  LAB RESULTS:      Lab 24  0433 24  0443 23  0206 23  1943   WBC 3.52 5.10 3.51 3.83   HEMOGLOBIN 8.6* 9.5* 10.3* 9.7*   HEMATOCRIT 26.9* 29.8* 32.5* 30.1*   PLATELETS 198 172 150 143   NEUTROS ABS  --  3.42 1.97 2.52   IMMATURE GRANS (ABS)  --  0.01 0.00 0.01   LYMPHS ABS  --  0.93 1.16 0.80   MONOS ABS  --  0.53 0.22 0.36   EOS ABS  --  0.20 0.15 0.13   .3* 111.6* 111.3* 112.7*   CRP  --   --  1.92*  --    PROCALCITONIN  --   --  0.04 0.04   LACTATE  --   --  1.0 0.9   PROTIME  --   --  14.9*  --          Lab 24  0433 24  0443 23  0511  12/29/23 0206 12/28/23 1943   SODIUM 142 137  --  138 140   POTASSIUM 3.9 3.8  --  4.2 4.4   CHLORIDE 107 100  --  103 104   CO2 31.0* 27.0  --  26.0 28.0   ANION GAP 4.0* 10.0  --  9.0 8.0   BUN 5* 4*  --  8 8   CREATININE 0.32* 0.36*  --  0.63 0.54*   EGFR 127.4 123.9  --  108.2 112.3   GLUCOSE 92 91  --  85 95   CALCIUM 8.0* 8.2*  --  8.6 8.4*   MAGNESIUM  --   --   --  2.0  --    PHOSPHORUS  --   --   --  3.9  --    HEMOGLOBIN A1C  --   --  4.60*  --   --    TSH  --   --   --   --  7.320*         Lab 12/29/23  0206 12/28/23 1943   TOTAL PROTEIN 7.0 6.1   ALBUMIN 3.1* 2.7*   GLOBULIN 3.9 3.4   ALT (SGPT) 6 8   AST (SGOT) 20 23   BILIRUBIN 0.5 0.5   ALK PHOS 275* 247*         Lab 12/29/23  0206   PROTIME 14.9*   INR 1.16*             Lab 12/29/23  0206   ABO TYPING A   RH TYPING Negative   ANTIBODY SCREEN Negative         Brief Urine Lab Results  (Last result in the past 365 days)        Color   Clarity   Blood   Leuk Est   Nitrite   Protein   CREAT   Urine HCG        12/31/23 0801               Negative               Microbiology Results Abnormal       Procedure Component Value - Date/Time    Fungus Culture - Tissue, Leg, Left [[id number]] Collected: 12/29/23 1139    Lab Status: Preliminary result Specimen: Tissue from Leg, Left Updated: 01/03/24 1330     Fungus Culture No fungus isolated at less than 1 week    AFB Culture - Tissue, Leg, Left [[id number]] Collected: 12/29/23 1139    Lab Status: Preliminary result Specimen: Tissue from Leg, Left Updated: 01/03/24 1330     AFB Culture No AFB isolated at less than 1 week     AFB Stain No acid fast bacilli seen on concentrated smear    Anaerobic Culture - Tissue, Leg, Left [[id number]]  (Normal) Collected: 12/29/23 1139    Lab Status: Final result Specimen: Tissue from Leg, Left Updated: 01/03/24 1001     Anaerobic Culture No anaerobes isolated at 5 days    Tissue / Bone Culture - Surgical Site, Leg, Left [[id number]] Collected: 12/31/23 0831    Lab Status:  Preliminary result Specimen: Surgical Site from Leg, Left Updated: 01/03/24 0719     Tissue Culture No growth at 2 days     Gram Stain Moderate (3+) WBCs per low power field      No organisms seen    Blood Culture - Blood, Arm, Right [[id number]]  (Normal) Collected: 12/28/23 2115    Lab Status: Final result Specimen: Blood from Arm, Right Updated: 01/03/24 0030     Blood Culture No growth at 5 days    Blood Culture - Blood, Hand, Right [[id number]]  (Normal) Collected: 12/28/23 2120    Lab Status: Final result Specimen: Blood from Hand, Right Updated: 01/03/24 0030     Blood Culture No growth at 5 days    AFB Culture - Surgical Site, Leg, Left [[id number]] Collected: 12/31/23 0831    Lab Status: Preliminary result Specimen: Surgical Site from Leg, Left Updated: 01/01/24 1204     AFB Stain No acid fast bacilli seen on concentrated smear    Wound Culture - Wound, Leg, Left [[id number]] Collected: 12/29/23 1322    Lab Status: Final result Specimen: Wound from Leg, Left Updated: 01/01/24 0711     Wound Culture No growth at 3 days     Gram Stain No organisms seen    MRSA Screen, PCR (Inpatient) - Swab, Nares [[id number]]  (Normal) Collected: 12/29/23 0203    Lab Status: Final result Specimen: Swab from Nares Updated: 12/29/23 0733     MRSA PCR Negative    Narrative:      The negative predictive value of this diagnostic test is high and should only be used to consider de-escalating anti-MRSA therapy. A positive result may indicate colonization with MRSA and must be correlated clinically.  MRSA Negative    COVID PRE-OP / PRE-PROCEDURE SCREENING ORDER (NO ISOLATION) - Swab, Nasopharynx [[id number]]  (Normal) Collected: 12/29/23 0203    Lab Status: Final result Specimen: Swab from Nasopharynx Updated: 12/29/23 0353    Narrative:      The following orders were created for panel order COVID PRE-OP / PRE-PROCEDURE SCREENING ORDER (NO ISOLATION) - Swab, Nasopharynx.  Procedure                               Abnormality         Status                      ---------                               -----------         ------                     Respiratory Panel PCR w/...[[id number]]  Normal              Final result                 Please view results for these tests on the individual orders.    Respiratory Panel PCR w/COVID-19(SARS-CoV-2) ARIELLE/VESNA/KARIE/PAD/COR/SÁNCHEZ In-House, NP Swab in UTM/VTM, 2 HR TAT - Swab, Nasopharynx [[id number]]  (Normal) Collected: 12/29/23 0203    Lab Status: Final result Specimen: Swab from Nasopharynx Updated: 12/29/23 0353     ADENOVIRUS, PCR Not Detected     Coronavirus 229E Not Detected     Coronavirus HKU1 Not Detected     Coronavirus NL63 Not Detected     Coronavirus OC43 Not Detected     COVID19 Not Detected     Human Metapneumovirus Not Detected     Human Rhinovirus/Enterovirus Not Detected     Influenza A PCR Not Detected     Influenza B PCR Not Detected     Parainfluenza Virus 1 Not Detected     Parainfluenza Virus 2 Not Detected     Parainfluenza Virus 3 Not Detected     Parainfluenza Virus 4 Not Detected     RSV, PCR Not Detected     Bordetella pertussis pcr Not Detected     Bordetella parapertussis PCR Not Detected     Chlamydophila pneumoniae PCR Not Detected     Mycoplasma pneumo by PCR Not Detected    Narrative:      In the setting of a positive respiratory panel with a viral infection PLUS a negative procalcitonin without other underlying concern for bacterial infection, consider observing off antibiotics or discontinuation of antibiotics and continue supportive care. If the respiratory panel is positive for atypical bacterial infection (Bordetella pertussis, Chlamydophila pneumoniae, or Mycoplasma pneumoniae), consider antibiotic de-escalation to target atypical bacterial infection.            No radiology results from the last 24 hrs        Current medications:  Scheduled Meds:Pharmacy Consult, , Does not apply, Q12H  acetaminophen, 1,000 mg, Oral, Q8H  cefTAZidime, 2,000 mg, Intravenous, Q8H  DAPTOmycin, 6  mg/kg (Adjusted), Intravenous, Q24H  diazePAM, 2.5 mg, Intravenous, Once  famotidine, 20 mg, Oral, BID AC  lactobacillus acidophilus, 1 capsule, Oral, Daily  levothyroxine, 125 mcg, Oral, Daily  pramipexole, 1 mg, Oral, Daily  pregabalin, 200 mg, Oral, TID  senna-docusate sodium, 2 tablet, Oral, BID  sertraline, 50 mg, Oral, Nightly  sodium chloride, 10 mL, Intravenous, Q12H      Continuous Infusions:lactated ringers, 100 mL/hr, Last Rate: 100 mL/hr (01/03/24 0836)  lactated ringers, 9 mL/hr      PRN Meds:.  senna-docusate sodium **AND** polyethylene glycol **AND** bisacodyl **AND** bisacodyl    diphenhydrAMINE    HYDROmorphone **AND** naloxone    lactated ringers    loperamide    LORazepam    melatonin    ondansetron    oxyCODONE    sodium chloride    sodium chloride    traZODone    Assessment & Plan   Assessment & Plan     Active Hospital Problems    Diagnosis  POA    **Lower extremity cellulitis [L03.119]  Yes    Anxiety associated with depression [F41.8]  Unknown    RLS (restless legs syndrome) [G25.81]  Unknown    Neuropathy [G62.9]  Unknown    Obesity, morbid, BMI 50 or higher [E66.01]  Unknown    S/P AKA (above knee amputation), left [Z89.612]  Not Applicable    Chronic osteomyelitis [M86.60]  Yes    Acquired hypothyroidism [E03.9]  Yes    Ulcer of right midfoot with fat layer exposed [L97.412]  Yes    Ulcer of left heel, with fat layer exposed [L97.422]  Yes      Resolved Hospital Problems   No resolved problems to display.        Brief Hospital Course to date:  Phoebe Carvajal is a 48 y.o. female with history of depression, hypothyroidism, stills disease, RLS, PAD, anemia, morbid obesity, recurrent osteomyelitis, chronic lower extremity osteomyelitis, recurrent left prosthetic joint infection, bilateral foot wounds who presented to the emergency department for evaluation of fever and worsening erythema with foul odor from her chronic wounds. Previous records from  from October 22 through November 8, 2023  showed intention for proceeding with left AKA but patient waited for second opinion hoping to have the option of BKA.  She is followed by infectious disease-known to Dr. Wang.  She underwent AKA on 12/29/2023 with Dr. Witt, with additional debridement on 12/31/2023     This patient's problems and plans were partially entered by my partner and updated as appropriate by me 01/03/24.      Osteomyelitis/cellulitis of the left lower extremity  Status post left AKA on 12/29/2023 Dr. Witt  Chronic infected wounds  -Dr. Witt performed debridement again on 12/31/2023  -Patient has complained of excruciating pain postoperatively  -Continue prn IV Dilaudid, prn oxycodone  -ID consulted, Dr. Tayo Arnold following  --wound cultures pending  -Continue ceftazidime and daptomycin  --drain removed 1/2/24  --daily dressing changes Xeroform 4x4, Kerlix, ACE wrap  --Follow up with PANDA English with Dr. Witt in 2 weeks for wound check and x-rays  -- Wean Dilaudid in anticipation of transfer to rehab, add scheduled Tylenol, consider scheduling oxycodone with plan to wean to prn.  -- Consult palliative sent for pain management recommendations     Diarrhea  -- Suspect secondary to IV antibiotics  -- Add probiotic daily  -- Imodium as needed  -- AM BMP    Anemia  -- Likely postop but also macrocytic per labs  -- AM B12, folate  -- AM CBC    Neuropathy  -Continue Lyrica   -- Palliative consult for pain management    Restless leg syndrome  -Continue Mirapex    Morbid obesity  Hypoxia, resolved  -Likely related to opiate use postoperatively and morbid obesity  -O2 to maintain sats greater than 90 and wean O2 as tolerated  --now on room air     Anxiety  -Continue Zoloft        Expected Discharge Location and Transportation: [facility]  Expected Discharge pending bed offer, insurance approval  Expected Discharge Date: 1/5/2024; Expected Discharge Time:      DVT prophylaxis:  Mechanical DVT prophylaxis orders are  present.     AM-PAC 6 Clicks Score (PT): 8 (01/03/24 0836)    CODE STATUS:   Code Status and Medical Interventions:   Ordered at: 12/29/23 0121     Code Status (Patient has no pulse and is not breathing):    CPR (Attempt to Resuscitate)     Medical Interventions (Patient has pulse or is breathing):    Full Support       Grayzna Fernandez, APRN  01/03/24

## 2024-01-03 NOTE — PROGRESS NOTES
Phoebe Carvajal       LOS: 6 days   Patient Care Team:  Sofya Benjamin MD as PCP - General (Internal Medicine)  Provider, No Known as PCP - Family Medicine    Chief Complaint:  s/p left above knee amputation     Subjective     Interval History:     Pain tolerable overnight, resting comfortably this morning.  Tearful this morning following dressing change.    Review of Systems:      Gen- No fevers, chills  CV- No chest pain, palpitations  Resp- No cough, dyspnea  GI- No N/V/D, abd pain    Objective     Vital Signs  Vital Signs (last 24 hours)         01/01 0700  01/02 0659 01/02 0700 01/02 0722   Most Recent      Temp (°F) 98.2 -  98.9       98.2 (36.8) 01/02 0309    Heart Rate 65 -  90       75 01/02 0600    Resp 17 -  18       17 01/02 0400    /73 -  144/87       120/70 01/02 0309    SpO2 (%) 87 -  100       98 01/02 0600    Flow (L/min)   3       3 01/02 0400              Physical Exam:     Alert, oriented.  No acute distress.  Nonlabored respirations.  Regular rate and rhythm.  Abdomen nondistended.  Left lower extremity: Dressing clean, dry, intact.     Results Review:     I reviewed the patient's new clinical results.    Medication Review:   Hospital Medications (active)         Dose Frequency Start End    ! Home medications stored in pharmacy, please contact pharmacist prior to patient discharge  Every 12 Hours Scheduled 12/29/2023 --    Route: Does not apply    acetaminophen (TYLENOL) tablet 650 mg 650 mg Every 6 Hours PRN 12/29/2023 --    Admin Instructions: If given for fever, use fever parameter: fever greater than 100.4 °F  Based on patient request - if ordered for moderate or severe pain, provider allows for administration of a medication prescribed for a lower pain scale.    Do not exceed 4 grams of acetaminophen in a 24 hr period. Max dose of 2gm for AST/ALT greater than 120 units/L.    If given for pain, use the following pain scale:   Mild Pain = Pain Score of 1-3, CPOT 1-2  Moderate Pain  = Pain Score of 4-6, CPOT 3-4  Severe Pain = Pain Score of 7-10, CPOT 5-8    Route: Oral    bisacodyl (DULCOLAX) EC tablet 5 mg 5 mg Daily PRN 12/29/2023 --    Admin Instructions: Use if no bowel movement after 12 hours.  Swallow whole. Do not crush, split, or chew tablet.    Route: Oral    Linked Group 1: See Hyperspace for full Linked Orders Report.        bisacodyl (DULCOLAX) suppository 10 mg 10 mg Daily PRN 12/29/2023 --    Admin Instructions: Use if no bowel movement after 12 hours.  Hold for diarrhea    Route: Rectal    Linked Group 1: See Hyperspace for full Linked Orders Report.        cefTAZidime (FORTAZ) 2,000 mg in sodium chloride 0.9 % 100 mL IVPB 2,000 mg Every 8 Hours 12/29/2023 1/8/2024    Admin Instructions: Caution: Look alike/sound alike drug alert    Route: Intravenous    DAPTOmycin (CUBICIN) 500 mg in sodium chloride 0.9 % 50 mL IVPB 6 mg/kg × 82.9 kg (Adjusted) Every 24 Hours 12/29/2023 1/6/2024    Admin Instructions: Caution: Look alike/sound alike drug alert.  Refrigerate. Do not shake.    Route: Intravenous    diazePAM (VALIUM) injection 2.5 mg 2.5 mg Once 1/1/2024 --    Admin Instructions:  May give each 5 mg IV push over 1 minute. May be injected through infusion tubing using port closest to vein insertion. Do not mix or dilute with other solutions.    Route: Intravenous    diphenhydrAMINE (BENADRYL) capsule 25 mg 25 mg Every 6 Hours PRN 12/29/2023 --    Admin Instructions: Caution: Look alike/sound alike drug alert. This med may be ordered in other forms and routes. Before giving verify the last time the drug was given by any route/form.    Route: Oral    famotidine (PEPCID) tablet 20 mg 20 mg 2 Times Daily Before Meals 1/1/2024 --    Route: Oral    HYDROmorphone (DILAUDID) injection 1 mg 1 mg Every 2 Hours PRN 12/29/2023 1/5/2024    Admin Instructions: Based on patient request - if ordered for moderate or severe pain, provider allows for administration of a medication prescribed for a  lower pain scale.      Caution: Look alike/sound alike drug alert    If given for pain, use the following pain scale:  Mild Pain = Pain Score of 1-3, CPOT 1-2  Moderate Pain = Pain Score of 4-6, CPOT 3-4  Severe Pain = Pain Score of 7-10, CPOT 5-8    Route: Intravenous    Linked Group 2: See Hyperspace for full Linked Orders Report.        lactated ringers infusion 100 mL/hr Continuous 12/29/2023 --    Route: Intravenous    lactated ringers infusion 9 mL/hr Continuous PRN 12/31/2023 --    Route: Intravenous    levothyroxine (SYNTHROID, LEVOTHROID) tablet 125 mcg 125 mcg Daily 12/29/2023 --    Admin Instructions: Take on empty stomach.    Route: Oral    LORazepam (ATIVAN) tablet 0.5 mg 0.5 mg Every 12 Hours PRN 12/29/2023 --    Admin Instructions: Hold for SBP < 110   Caution: Look alike/sound alike drug alert    Route: Oral    melatonin tablet 5 mg 5 mg Nightly PRN 12/29/2023 --    Route: Oral    naloxone (NARCAN) injection 0.4 mg 0.4 mg Every 5 Minutes PRN 12/29/2023 --    Admin Instructions: If Respiratory Rate Less Than 8 or Patient is Difficult to Arouse, Stop ALL Narcotics & Contact Provider.  Administer Slow IV Push.  Repeat As Ordered Until Respiratory Rate is Greater Than 12.    Route: Intravenous    Linked Group 2: See Hyperspace for full Linked Orders Report.        ondansetron (ZOFRAN) injection 4 mg 4 mg Every 6 Hours PRN 12/29/2023 --    Admin Instructions: If BOTH ondansetron (ZOFRAN) and promethazine (PHENERGAN) are ordered use ondansetron first and THEN promethazine IF ondansetron is ineffective.    Route: Intravenous    oxyCODONE (ROXICODONE) immediate release tablet 10 mg 10 mg Every 4 Hours PRN 1/1/2024 --    Admin Instructions: Based on patient request - if ordered for moderate or severe pain, provider allows for administration of a medication prescribed for a lower pain scale.  If given for pain, use the following pain scale:  Mild Pain = Pain Score of 1-3, CPOT 1-2  Moderate Pain = Pain Score  of 4-6, CPOT 3-4  Severe Pain = Pain Score of 7-10, CPOT 5-8    Route: Oral    oxyCODONE (ROXICODONE) immediate release tablet 5 mg 5 mg Every 4 Hours PRN 12/29/2023 1/5/2024    Admin Instructions: Based on patient request - if ordered for moderate or severe pain, provider allows for administration of a medication prescribed for a lower pain scale.      If given for pain, use the following pain scale:  Mild Pain = Pain Score of 1-3, CPOT 1-2  Moderate Pain = Pain Score of 4-6, CPOT 3-4  Severe Pain = Pain Score of 7-10, CPOT 5-8    Route: Oral    polyethylene glycol (MIRALAX) packet 17 g 17 g Daily PRN 12/29/2023 --    Admin Instructions: Use if no bowel movement after 12 hours. Mix in 6-8 ounces of water.  Use 4-8 ounces of water, tea, or juice for each 17 gram dose.    Route: Oral    Linked Group 1: See Hyperspace for full Linked Orders Report.        pramipexole (MIRAPEX) tablet 1 mg 1 mg Daily 12/29/2023 --    Route: Oral    pregabalin (LYRICA) capsule 200 mg 200 mg 3 Times Daily 12/29/2023 --    Admin Instructions:     Route: Oral    sennosides-docusate (PERICOLACE) 8.6-50 MG per tablet 2 tablet 2 tablet 2 Times Daily 12/29/2023 --    Admin Instructions: HOLD MEDICATION IF PATIENT HAS HAD BOWEL MOVEMENT. Start bowel management regimen if patient has not had a bowel movement after 12 hours.    Route: Oral    Linked Group 1: See Hyperspace for full Linked Orders Report.        sertraline (ZOLOFT) tablet 50 mg 50 mg Nightly 12/29/2023 --    Route: Oral    sodium chloride 0.9 % flush 10 mL 10 mL Every 12 Hours Scheduled 12/29/2023 --    Route: Intravenous    sodium chloride 0.9 % flush 10 mL 10 mL As Needed 12/29/2023 --    Route: Intravenous    sodium chloride 0.9 % infusion 40 mL 40 mL As Needed 12/29/2023 --    Admin Instructions: Following administration of an IV intermittent medication, flush line with 40mL NS at 100mL/hr.    Route: Intravenous    traZODone (DESYREL) tablet 50 mg 50 mg Nightly PRN 12/29/2023  --    Admin Instructions: Take with food.  Caution: Look alike/sound alike drug alert    Route: Oral              Assessment & Plan     48-year-old female status post left above-knee amputation, wound vacuum-assisted closure December 29, 2023, secondary wound closure 12/31/23.      Lower extremity cellulitis    Acquired hypothyroidism    Ulcer of right midfoot with fat layer exposed    Ulcer of left heel, with fat layer exposed    Chronic osteomyelitis    Anxiety associated with depression    RLS (restless legs syndrome)    Neuropathy    Obesity, morbid, BMI 50 or higher    S/P AKA (above knee amputation), left      Nonweightbearing left lower extremity.       Follow cultures -- 12/29/23: NGD3; 12/31/23: negative to date, no organisms on gram stain.    Drain removed 1/2/24. Appropriate for discharge from surgical perspective.  Anticipate need for therapy placement; tentative plan for [facility] rehabilitation.     Daily dressing changes beginning January 3, 2024.  Xeroform, 4 x 4, Kerlix, Ace wrap.     Follow-up 2 weeks for wound check and x-rays.     Follow up with Jazzmine Brar PA-C.     [facility]  [address], [address]  [place], [address]  Please schedule at [phone]        PANDA English  01/03/24  07:10 EST

## 2024-01-04 ENCOUNTER — APPOINTMENT (OUTPATIENT)
Dept: GENERAL RADIOLOGY | Facility: HOSPITAL | Age: 49
End: 2024-01-04
Payer: COMMERCIAL

## 2024-01-04 LAB
ALBUMIN SERPL-MCNC: 1.8 G/DL (ref 3.5–5.2)
ANION GAP SERPL CALCULATED.3IONS-SCNC: 5 MMOL/L (ref 5–15)
BACTERIA SPEC AEROBE CULT: NORMAL
BUN SERPL-MCNC: 6 MG/DL (ref 6–20)
BUN/CREAT SERPL: 16.7 (ref 7–25)
CALCIUM SPEC-SCNC: 7.9 MG/DL (ref 8.6–10.5)
CHLORIDE SERPL-SCNC: 109 MMOL/L (ref 98–107)
CK SERPL-CCNC: 56 U/L (ref 20–180)
CO2 SERPL-SCNC: 29 MMOL/L (ref 22–29)
CREAT SERPL-MCNC: 0.36 MG/DL (ref 0.57–1)
DEPRECATED RDW RBC AUTO: 63.5 FL (ref 37–54)
EGFRCR SERPLBLD CKD-EPI 2021: 123.9 ML/MIN/1.73
ERYTHROCYTE [DISTWIDTH] IN BLOOD BY AUTOMATED COUNT: 15.3 % (ref 12.3–15.4)
FOLATE SERPL-MCNC: 3.61 NG/ML (ref 4.78–24.2)
GLUCOSE SERPL-MCNC: 86 MG/DL (ref 65–99)
GRAM STN SPEC: NORMAL
GRAM STN SPEC: NORMAL
HCT VFR BLD AUTO: 29.1 % (ref 34–46.6)
HGB BLD-MCNC: 9 G/DL (ref 12–15.9)
MCH RBC QN AUTO: 34.6 PG (ref 26.6–33)
MCHC RBC AUTO-ENTMCNC: 30.9 G/DL (ref 31.5–35.7)
MCV RBC AUTO: 111.9 FL (ref 79–97)
PLATELET # BLD AUTO: 219 10*3/MM3 (ref 140–450)
PMV BLD AUTO: 9.8 FL (ref 6–12)
POTASSIUM SERPL-SCNC: 4.1 MMOL/L (ref 3.5–5.2)
RBC # BLD AUTO: 2.6 10*6/MM3 (ref 3.77–5.28)
SODIUM SERPL-SCNC: 143 MMOL/L (ref 136–145)
VIT B12 BLD-MCNC: 161 PG/ML (ref 211–946)
WBC NRBC COR # BLD AUTO: 3.47 10*3/MM3 (ref 3.4–10.8)

## 2024-01-04 PROCEDURE — 99232 SBSQ HOSP IP/OBS MODERATE 35: CPT | Performed by: NURSE PRACTITIONER

## 2024-01-04 PROCEDURE — 25010000002 CYANOCOBALAMIN PER 1000 MCG: Performed by: NURSE PRACTITIONER

## 2024-01-04 PROCEDURE — 25010000002 HYDROMORPHONE PER 4 MG: Performed by: NURSE PRACTITIONER

## 2024-01-04 PROCEDURE — 74018 RADEX ABDOMEN 1 VIEW: CPT

## 2024-01-04 PROCEDURE — 85027 COMPLETE CBC AUTOMATED: CPT | Performed by: NURSE PRACTITIONER

## 2024-01-04 PROCEDURE — 25010000002 CEFTAZIDIME 2 G RECONSTITUTED SOLUTION 1 EACH VIAL: Performed by: ORTHOPAEDIC SURGERY

## 2024-01-04 PROCEDURE — 25010000002 HYDROMORPHONE 1 MG/ML SOLUTION: Performed by: NURSE PRACTITIONER

## 2024-01-04 PROCEDURE — 82746 ASSAY OF FOLIC ACID SERUM: CPT | Performed by: NURSE PRACTITIONER

## 2024-01-04 PROCEDURE — 25810000003 LACTATED RINGERS PER 1000 ML: Performed by: ORTHOPAEDIC SURGERY

## 2024-01-04 PROCEDURE — 82040 ASSAY OF SERUM ALBUMIN: CPT | Performed by: NURSE PRACTITIONER

## 2024-01-04 PROCEDURE — 82607 VITAMIN B-12: CPT | Performed by: NURSE PRACTITIONER

## 2024-01-04 PROCEDURE — 80048 BASIC METABOLIC PNL TOTAL CA: CPT | Performed by: NURSE PRACTITIONER

## 2024-01-04 PROCEDURE — 82550 ASSAY OF CK (CPK): CPT | Performed by: INTERNAL MEDICINE

## 2024-01-04 PROCEDURE — 25010000002 ONDANSETRON PER 1 MG: Performed by: ORTHOPAEDIC SURGERY

## 2024-01-04 RX ORDER — NALOXONE HCL 0.4 MG/ML
0.4 VIAL (ML) INJECTION
Status: DISCONTINUED | OUTPATIENT
Start: 2024-01-04 | End: 2024-01-08

## 2024-01-04 RX ORDER — OXYCODONE HYDROCHLORIDE 15 MG/1
15 TABLET ORAL EVERY 6 HOURS
Status: DISCONTINUED | OUTPATIENT
Start: 2024-01-04 | End: 2024-01-13

## 2024-01-04 RX ORDER — LORAZEPAM 0.5 MG/1
0.5 TABLET ORAL DAILY PRN
Status: DISCONTINUED | OUTPATIENT
Start: 2024-01-04 | End: 2024-01-10

## 2024-01-04 RX ORDER — CYANOCOBALAMIN 1000 UG/ML
1000 INJECTION, SOLUTION INTRAMUSCULAR; SUBCUTANEOUS DAILY
Status: COMPLETED | OUTPATIENT
Start: 2024-01-04 | End: 2024-01-10

## 2024-01-04 RX ORDER — FOLIC ACID 1 MG/1
1 TABLET ORAL DAILY
Status: DISCONTINUED | OUTPATIENT
Start: 2024-01-04 | End: 2024-01-19 | Stop reason: HOSPADM

## 2024-01-04 RX ORDER — HYDROMORPHONE HYDROCHLORIDE 1 MG/ML
0.5 INJECTION, SOLUTION INTRAMUSCULAR; INTRAVENOUS; SUBCUTANEOUS EVERY 6 HOURS PRN
Status: DISCONTINUED | OUTPATIENT
Start: 2024-01-04 | End: 2024-01-08

## 2024-01-04 RX ADMIN — CEFTAZIDIME 2000 MG: 2 INJECTION, POWDER, FOR SOLUTION INTRAVENOUS at 15:59

## 2024-01-04 RX ADMIN — CEFTAZIDIME 2000 MG: 2 INJECTION, POWDER, FOR SOLUTION INTRAVENOUS at 06:40

## 2024-01-04 RX ADMIN — PREGABALIN 200 MG: 100 CAPSULE ORAL at 20:18

## 2024-01-04 RX ADMIN — ACETAMINOPHEN 1000 MG: 500 TABLET ORAL at 20:19

## 2024-01-04 RX ADMIN — Medication 1 CAPSULE: at 08:27

## 2024-01-04 RX ADMIN — LOPERAMIDE HYDROCHLORIDE 2 MG: 2 CAPSULE ORAL at 12:07

## 2024-01-04 RX ADMIN — PRAMIPEXOLE DIHYDROCHLORIDE 1 MG: 0.25 TABLET ORAL at 08:28

## 2024-01-04 RX ADMIN — SODIUM CHLORIDE, POTASSIUM CHLORIDE, SODIUM LACTATE AND CALCIUM CHLORIDE 100 ML/HR: 600; 310; 30; 20 INJECTION, SOLUTION INTRAVENOUS at 04:44

## 2024-01-04 RX ADMIN — OXYCODONE HYDROCHLORIDE 10 MG: 10 TABLET ORAL at 07:12

## 2024-01-04 RX ADMIN — Medication 10 ML: at 20:18

## 2024-01-04 RX ADMIN — LORAZEPAM 0.5 MG: 0.5 TABLET ORAL at 08:27

## 2024-01-04 RX ADMIN — OXYCODONE HYDROCHLORIDE 15 MG: 15 TABLET ORAL at 23:50

## 2024-01-04 RX ADMIN — Medication 10 ML: at 08:28

## 2024-01-04 RX ADMIN — FOLIC ACID 1 MG: 1 TABLET ORAL at 16:03

## 2024-01-04 RX ADMIN — ONDANSETRON 4 MG: 2 INJECTION INTRAMUSCULAR; INTRAVENOUS at 12:07

## 2024-01-04 RX ADMIN — HYDROMORPHONE HYDROCHLORIDE 1 MG: 1 INJECTION, SOLUTION INTRAMUSCULAR; INTRAVENOUS; SUBCUTANEOUS at 13:59

## 2024-01-04 RX ADMIN — OXYCODONE HYDROCHLORIDE 10 MG: 10 TABLET ORAL at 12:03

## 2024-01-04 RX ADMIN — HYDROMORPHONE HYDROCHLORIDE 1 MG: 1 INJECTION, SOLUTION INTRAMUSCULAR; INTRAVENOUS; SUBCUTANEOUS at 09:52

## 2024-01-04 RX ADMIN — ACETAMINOPHEN 1000 MG: 500 TABLET ORAL at 13:59

## 2024-01-04 RX ADMIN — OXYCODONE HYDROCHLORIDE 15 MG: 15 TABLET ORAL at 17:47

## 2024-01-04 RX ADMIN — SERTRALINE HYDROCHLORIDE 50 MG: 50 TABLET ORAL at 20:18

## 2024-01-04 RX ADMIN — FAMOTIDINE 20 MG: 20 TABLET, FILM COATED ORAL at 08:28

## 2024-01-04 RX ADMIN — TRAZODONE HYDROCHLORIDE 50 MG: 50 TABLET ORAL at 23:49

## 2024-01-04 RX ADMIN — HYDROMORPHONE HYDROCHLORIDE 1 MG: 1 INJECTION, SOLUTION INTRAMUSCULAR; INTRAVENOUS; SUBCUTANEOUS at 05:28

## 2024-01-04 RX ADMIN — LEVOTHYROXINE SODIUM 125 MCG: 125 TABLET ORAL at 05:28

## 2024-01-04 RX ADMIN — PREGABALIN 200 MG: 100 CAPSULE ORAL at 08:27

## 2024-01-04 RX ADMIN — HYDROMORPHONE HYDROCHLORIDE 0.5 MG: 1 INJECTION, SOLUTION INTRAMUSCULAR; INTRAVENOUS; SUBCUTANEOUS at 20:19

## 2024-01-04 RX ADMIN — ACETAMINOPHEN 1000 MG: 500 TABLET ORAL at 05:28

## 2024-01-04 RX ADMIN — CYANOCOBALAMIN 1000 MCG: 1000 INJECTION, SOLUTION INTRAMUSCULAR; SUBCUTANEOUS at 16:03

## 2024-01-04 RX ADMIN — FAMOTIDINE 20 MG: 20 TABLET, FILM COATED ORAL at 16:03

## 2024-01-04 RX ADMIN — PREGABALIN 200 MG: 100 CAPSULE ORAL at 16:03

## 2024-01-04 NOTE — PLAN OF CARE
Problem: Fall Injury Risk  Goal: Absence of Fall and Fall-Related Injury  Intervention: Promote Injury-Free Environment  Recent Flowsheet Documentation  Taken 1/4/2024 0220 by Jeni Darby RN  Safety Promotion/Fall Prevention:   activity supervised   assistive device/personal items within reach   safety round/check completed  Taken 1/4/2024 0020 by Jeni Darby RN  Safety Promotion/Fall Prevention:   activity supervised   assistive device/personal items within reach   safety round/check completed  Taken 1/3/2024 2200 by Jeni Darby RN  Safety Promotion/Fall Prevention:   activity supervised   assistive device/personal items within reach   safety round/check completed  Taken 1/3/2024 2020 by Jeni Darby RN  Safety Promotion/Fall Prevention:   activity supervised   assistive device/personal items within reach   safety round/check completed     Problem: Skin Injury Risk Increased  Goal: Skin Health and Integrity  Intervention: Optimize Skin Protection  Recent Flowsheet Documentation  Taken 1/4/2024 0220 by Jeni Darby RN  Pressure Reduction Techniques: frequent weight shift encouraged  Head of Bed (HOB) Positioning: [facility] elevated  Pressure Reduction Devices: specialty bed utilized  Skin Protection:   adhesive use limited   incontinence pads utilized  Taken 1/4/2024 0020 by Jeni Darby RN  Pressure Reduction Techniques: frequent weight shift encouraged  Head of Bed (HOB) Positioning: [facility] elevated  Pressure Reduction Devices: specialty bed utilized  Skin Protection:   adhesive use limited   incontinence pads utilized  Taken 1/3/2024 2200 by Jeni Darby RN  Pressure Reduction Techniques: frequent weight shift encouraged  Head of Bed (HOB) Positioning: [facility] elevated  Pressure Reduction Devices: specialty bed utilized  Skin Protection:   adhesive use limited   incontinence pads utilized  Taken 1/3/2024 2020 by Jeni Darby RN  Pressure Reduction Techniques: frequent weight shift encouraged  Head of Bed (HOB)  Positioning: HOB elevated  Pressure Reduction Devices: specialty bed utilized  Skin Protection:   adhesive use limited   incontinence pads utilized     Problem: Infection Progression (Sepsis/Septic Shock)  Goal: Absence of Infection Signs and Symptoms  Intervention: Promote Recovery  Recent Flowsheet Documentation  Taken 1/4/2024 0220 by Jeni Darby, RN  Activity Management: activity encouraged  Taken 1/4/2024 0020 by Jeni Darby, RN  Activity Management: activity encouraged  Taken 1/3/2024 2200 by Jeni Darby, RN  Activity Management: activity encouraged  Taken 1/3/2024 2020 by Jeni Darby, RN  Activity Management: activity encouraged   Goal Outcome Evaluation:
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
## 2024-01-04 NOTE — PROGRESS NOTES
[facility] Medicine Services  PROGRESS NOTE    Patient Name: Phoebe Carvajal  : 1975  MRN: [id number]    Date of Admission: 2023  Primary Care Physician: Sofya Benjamin MD    Subjective   Subjective     CC:  F/u osteomyelitis    HPI:   Patient resting in bed.  Says she has had approximately 6 episodes of diarrhea and 2 episodes of emesis in past 24 hours.  Says she feels like her abdomen is burning and rumbling.  Says palliative came by and discussed putting her back on home pain regimen.    Objective   Objec  Says pain is mostly controlled.  Discussed weaning IV pain meds so that she can get to rehab.  Tive     Vital Signs:   Temp:  [97.6 °F (36.4 °C)-98.4 °F (36.9 °C)] 98.4 °F (36.9 °C)  Heart Rate:  [55-77] 77  Resp:  [16-18] 18  BP: ()/(51-76) 103/56  Flow (L/min):  [1] 1     Physical Exam:   Constitutional: No acute distress, awake, alert  HENT: NCAT, mucous membranes moist  Respiratory: Diminished at bases bilaterally, respiratory effort normal, room air  Cardiovascular: RRR, no murmurs, rubs, or gallops  Gastrointestinal: Positive bowel sounds, soft, nontender, nondistended  Musculoskeletal: Left AKA, 1+ edema RLE  Psychiatric: Appropriate affect, cooperative  Neurologic: Oriented x 3, moves all extremities, Cranial Nerves grossly intact to confrontation, speech clear  Skin: No rashes, erythema, dry skin RLE      Results Reviewed:  LAB RESULTS:      Lab 24  0527 24  0433 24  0443 23  0206 23  1943   WBC 3.47 3.52 5.10 3.51 3.83   HEMOGLOBIN 9.0* 8.6* 9.5* 10.3* 9.7*   HEMATOCRIT 29.1* 26.9* 29.8* 32.5* 30.1*   PLATELETS 219 198 172 150 143   NEUTROS ABS  --   --  3.42 1.97 2.52   IMMATURE GRANS (ABS)  --   --  0.01 0.00 0.01   LYMPHS ABS  --   --  0.93 1.16 0.80   MONOS ABS  --   --  0.53 0.22 0.36   EOS ABS  --   --  0.20 0.15 0.13   .9* 109.3* 111.6* 111.3* 112.7*   CRP  --   --   --  1.92*  --    PROCALCITONIN  --   --    --  0.04 0.04   LACTATE  --   --   --  1.0 0.9   PROTIME  --   --   --  14.9*  --          Lab 01/04/24  0527 01/03/24  0433 01/02/24  0443 12/29/23  0511 12/29/23  0206 12/28/23 1943   SODIUM 143 142 137  --  138 140   POTASSIUM 4.1 3.9 3.8  --  4.2 4.4   CHLORIDE 109* 107 100  --  103 104   CO2 29.0 31.0* 27.0  --  26.0 28.0   ANION GAP 5.0 4.0* 10.0  --  9.0 8.0   BUN 6 5* 4*  --  8 8   CREATININE 0.36* 0.32* 0.36*  --  0.63 0.54*   EGFR 123.9 127.4 123.9  --  108.2 112.3   GLUCOSE 86 92 91  --  85 95   CALCIUM 7.9* 8.0* 8.2*  --  8.6 8.4*   MAGNESIUM  --   --   --   --  2.0  --    PHOSPHORUS  --   --   --   --  3.9  --    HEMOGLOBIN A1C  --   --   --  4.60*  --   --    TSH  --   --   --   --   --  7.320*         Lab 12/29/23  0206 12/28/23 1943   TOTAL PROTEIN 7.0 6.1   ALBUMIN 3.1* 2.7*   GLOBULIN 3.9 3.4   ALT (SGPT) 6 8   AST (SGOT) 20 23   BILIRUBIN 0.5 0.5   ALK PHOS 275* 247*         Lab 12/29/23 0206   PROTIME 14.9*   INR 1.16*             Lab 12/29/23 0206   ABO TYPING A   RH TYPING Negative   ANTIBODY SCREEN Negative         Brief Urine Lab Results  (Last result in the past 365 days)        Color   Clarity   Blood   Leuk Est   Nitrite   Protein   CREAT   Urine HCG        12/31/23 0801               Negative               Microbiology Results Abnormal       Procedure Component Value - Date/Time    Tissue / Bone Culture - Surgical Site, Leg, Left [[id number]] Collected: 12/31/23 0831    Lab Status: Final result Specimen: Surgical Site from Leg, Left Updated: 01/04/24 0784     Tissue Culture No growth at 3 days     Gram Stain Moderate (3+) WBCs per low power field      No organisms seen    Anaerobic Culture - Surgical Site, Leg, Left [[id number]]  (Normal) Collected: 12/31/23 0833    Lab Status: Preliminary result Specimen: Surgical Site from Leg, Left Updated: 01/04/24 0730     Anaerobic Culture No anaerobes isolated at 3 days    Fungus Culture - Tissue, Leg, Left [[id number]] Collected: 12/29/23 0576     Lab Status: Preliminary result Specimen: Tissue from Leg, Left Updated: 01/03/24 1330     Fungus Culture No fungus isolated at less than 1 week    AFB Culture - Tissue, Leg, Left [[id number]] Collected: 12/29/23 1139    Lab Status: Preliminary result Specimen: Tissue from Leg, Left Updated: 01/03/24 1330     AFB Culture No AFB isolated at less than 1 week     AFB Stain No acid fast bacilli seen on concentrated smear    Anaerobic Culture - Tissue, Leg, Left [[id number]]  (Normal) Collected: 12/29/23 1139    Lab Status: Final result Specimen: Tissue from Leg, Left Updated: 01/03/24 1001     Anaerobic Culture No anaerobes isolated at 5 days    Blood Culture - Blood, Arm, Right [[id number]]  (Normal) Collected: 12/28/23 2115    Lab Status: Final result Specimen: Blood from Arm, Right Updated: 01/03/24 0030     Blood Culture No growth at 5 days    Blood Culture - Blood, Hand, Right [[id number]]  (Normal) Collected: 12/28/23 2120    Lab Status: Final result Specimen: Blood from Hand, Right Updated: 01/03/24 0030     Blood Culture No growth at 5 days    AFB Culture - Surgical Site, Leg, Left [[id number]] Collected: 12/31/23 0831    Lab Status: Preliminary result Specimen: Surgical Site from Leg, Left Updated: 01/01/24 1204     AFB Stain No acid fast bacilli seen on concentrated smear    Wound Culture - Wound, Leg, Left [[id number]] Collected: 12/29/23 1322    Lab Status: Final result Specimen: Wound from Leg, Left Updated: 01/01/24 0711     Wound Culture No growth at 3 days     Gram Stain No organisms seen    MRSA Screen, PCR (Inpatient) - Swab, Nares [[id number]]  (Normal) Collected: 12/29/23 0203    Lab Status: Final result Specimen: Swab from Nares Updated: 12/29/23 0733     MRSA PCR Negative    Narrative:      The negative predictive value of this diagnostic test is high and should only be used to consider de-escalating anti-MRSA therapy. A positive result may indicate colonization with MRSA and must be correlated  clinically.  MRSA Negative    COVID PRE-OP / PRE-PROCEDURE SCREENING ORDER (NO ISOLATION) - Swab, Nasopharynx [[id number]]  (Normal) Collected: 12/29/23 0203    Lab Status: Final result Specimen: Swab from Nasopharynx Updated: 12/29/23 0353    Narrative:      The following orders were created for panel order COVID PRE-OP / PRE-PROCEDURE SCREENING ORDER (NO ISOLATION) - Swab, Nasopharynx.  Procedure                               Abnormality         Status                     ---------                               -----------         ------                     Respiratory Panel PCR w/...[[id number]]  Normal              Final result                 Please view results for these tests on the individual orders.    Respiratory Panel PCR w/COVID-19(SARS-CoV-2) ARIELLE/VESNA/KARIE/PAD/COR/SÁNCHEZ In-House, NP Swab in UTM/VTM, 2 HR TAT - Swab, Nasopharynx [[id number]]  (Normal) Collected: 12/29/23 0203    Lab Status: Final result Specimen: Swab from Nasopharynx Updated: 12/29/23 0353     ADENOVIRUS, PCR Not Detected     Coronavirus 229E Not Detected     Coronavirus HKU1 Not Detected     Coronavirus NL63 Not Detected     Coronavirus OC43 Not Detected     COVID19 Not Detected     Human Metapneumovirus Not Detected     Human Rhinovirus/Enterovirus Not Detected     Influenza A PCR Not Detected     Influenza B PCR Not Detected     Parainfluenza Virus 1 Not Detected     Parainfluenza Virus 2 Not Detected     Parainfluenza Virus 3 Not Detected     Parainfluenza Virus 4 Not Detected     RSV, PCR Not Detected     Bordetella pertussis pcr Not Detected     Bordetella parapertussis PCR Not Detected     Chlamydophila pneumoniae PCR Not Detected     Mycoplasma pneumo by PCR Not Detected    Narrative:      In the setting of a positive respiratory panel with a viral infection PLUS a negative procalcitonin without other underlying concern for bacterial infection, consider observing off antibiotics or discontinuation of antibiotics and continue supportive  care. If the respiratory panel is positive for atypical bacterial infection (Bordetella pertussis, Chlamydophila pneumoniae, or Mycoplasma pneumoniae), consider antibiotic de-escalation to target atypical bacterial infection.            No radiology results from the last 24 hrs        Current medications:  Scheduled Meds:Pharmacy Consult, , Does not apply, Q12H  acetaminophen, 1,000 mg, Oral, Q8H  cefTAZidime, 2,000 mg, Intravenous, Q8H  DAPTOmycin, 6 mg/kg (Adjusted), Intravenous, Q24H  diazePAM, 2.5 mg, Intravenous, Once  famotidine, 20 mg, Oral, BID AC  lactobacillus acidophilus, 1 capsule, Oral, Daily  levothyroxine, 125 mcg, Oral, Daily  pramipexole, 1 mg, Oral, Daily  pregabalin, 200 mg, Oral, TID  senna-docusate sodium, 2 tablet, Oral, BID  sertraline, 50 mg, Oral, Nightly  sodium chloride, 10 mL, Intravenous, Q12H      Continuous Infusions:lactated ringers, 100 mL/hr, Last Rate: 100 mL/hr (01/04/24 0444)  lactated ringers, 9 mL/hr      PRN Meds:.  senna-docusate sodium **AND** polyethylene glycol **AND** bisacodyl **AND** bisacodyl    diphenhydrAMINE    HYDROmorphone **AND** naloxone    lactated ringers    loperamide    LORazepam    melatonin    ondansetron    oxyCODONE    sodium chloride    sodium chloride    traZODone    Assessment & Plan   Assessment & Plan     Active Hospital Problems    Diagnosis  POA    **Lower extremity cellulitis [L03.119]  Yes    Anxiety associated with depression [F41.8]  Unknown    RLS (restless legs syndrome) [G25.81]  Unknown    Neuropathy [G62.9]  Unknown    Obesity, morbid, BMI 50 or higher [E66.01]  Unknown    S/P AKA (above knee amputation), left [Z89.612]  Not Applicable    Chronic osteomyelitis [M86.60]  Yes    Acquired hypothyroidism [E03.9]  Yes    Ulcer of right midfoot with fat layer exposed [L97.412]  Yes    Ulcer of left heel, with fat layer exposed [L97.422]  Yes      Resolved Hospital Problems   No resolved problems to display.        Brief Hospital Course to  date:  Phoebe Carvajal is a 48 y.o. female with history of depression, hypothyroidism, stills disease, RLS, PAD, anemia, morbid obesity, recurrent osteomyelitis, chronic lower extremity osteomyelitis, recurrent left prosthetic joint infection, bilateral foot wounds who presented to the emergency department for evaluation of fever and worsening erythema with foul odor from her chronic wounds. Previous records from  from October 22 through November 8, 2023 showed intention for proceeding with left AKA but patient waited for second opinion hoping to have the option of BKA.  She is followed by infectious disease-known to Dr. Wang.  She underwent AKA on 12/29/2023 with Dr. Witt, with additional debridement on 12/31/2023     This patient's problems and plans were partially entered by my partner and updated as appropriate by me 01/04/24.      Osteomyelitis/cellulitis of the left lower extremity  Status post left AKA on 12/29/2023 Dr. Witt  Chronic infected wounds  -Dr. Witt performed debridement again on 12/31/2023  -Patient has complained of excruciating pain postoperatively  -Continue prn IV Dilaudid, prn oxycodone  -ID consulted, Dr. Tayo Arnold following  --wound cultures pending  -Continue ceftazidime and daptomycin  --drain removed 1/2/24  --daily dressing changes Xeroform 4x4, Kerlix, ACE wrap  --Follow up with PANDA English with Dr. Witt in 2 weeks for wound check and x-rays  -- Continue to wean in anticipation of transfer to rehab  -- Continue scheduled Tylenol, add scheduled oxycodone 15 mg q6h per home dosing as discussed with palliative care     Right foot ulcer  --PT wound care following  --Offload right foot    Diarrhea  -- Suspect secondary to IV antibiotics, remains afebrile, WBCs WNL  -- Add probiotic daily  -- Imodium as needed  -- KUB  -- AM CMP    Anemia  -- Likely postop but also macrocytic per labs  -- B12 and folate low  -- Start B12 replacement cyanocobalamin 1000 mcg  IM daily x 7 days, then weekly x 4 weeks  -- Start folic acid 1 mg daily  -- Recheck B12 and folic acid in 1 month at PCP follow-up    Neuropathy  -Continue Lyrica     Restless leg syndrome  -Continue Mirapex    Morbid obesity  Hypoxia, resolved  -Likely related to opiate use postoperatively and morbid obesity  -O2 to maintain sats greater than 90 and wean O2 as tolerated  --now on room air     Anxiety  -Continue Zoloft        Expected Discharge Location and Transportation: [place]  Expected Discharge pending bed offer, insurance approval  Expected Discharge Date: 1/5/2024; Expected Discharge Time:      DVT prophylaxis:  Mechanical DVT prophylaxis orders are present.     AM-PAC 6 Clicks Score (PT): 9 (01/03/24 2020)    CODE STATUS:   Code Status and Medical Interventions:   Ordered at: 12/29/23 0121     Code Status (Patient has no pulse and is not breathing):    CPR (Attempt to Resuscitate)     Medical Interventions (Patient has pulse or is breathing):    Full Support       Grazyna Fernandez, APRN  01/04/24
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
## 2024-01-05 LAB
BACTERIA UR QL AUTO: ABNORMAL /HPF
BILIRUB UR QL STRIP: NEGATIVE
CLARITY UR: ABNORMAL
COLOR UR: YELLOW
GLUCOSE UR STRIP-MCNC: NEGATIVE MG/DL
HGB UR QL STRIP.AUTO: NEGATIVE
HYALINE CASTS UR QL AUTO: ABNORMAL /LPF
KETONES UR QL STRIP: NEGATIVE
LEUKOCYTE ESTERASE UR QL STRIP.AUTO: ABNORMAL
NITRITE UR QL STRIP: NEGATIVE
PH UR STRIP.AUTO: 6.5 [PH] (ref 5–8)
PROT UR QL STRIP: ABNORMAL
RBC # UR STRIP: ABNORMAL /HPF
REF LAB TEST METHOD: ABNORMAL
SP GR UR STRIP: 1.02 (ref 1–1.03)
SQUAMOUS #/AREA URNS HPF: ABNORMAL /HPF
STARCH GRANULES URNS QL MICRO: ABNORMAL /HPF
UROBILINOGEN UR QL STRIP: ABNORMAL
WBC # UR STRIP: ABNORMAL /HPF

## 2024-01-05 PROCEDURE — 25010000002 HYDROMORPHONE PER 4 MG: Performed by: NURSE PRACTITIONER

## 2024-01-05 PROCEDURE — 25010000002 CYANOCOBALAMIN PER 1000 MCG: Performed by: NURSE PRACTITIONER

## 2024-01-05 PROCEDURE — 81001 URINALYSIS AUTO W/SCOPE: CPT | Performed by: NURSE PRACTITIONER

## 2024-01-05 PROCEDURE — 29581 APPL MULTLAYER CMPRN SYS LEG: CPT

## 2024-01-05 PROCEDURE — 99231 SBSQ HOSP IP/OBS SF/LOW 25: CPT | Performed by: NURSE PRACTITIONER

## 2024-01-05 PROCEDURE — 97597 DBRDMT OPN WND 1ST 20 CM/<: CPT

## 2024-01-05 PROCEDURE — 25010000002 ONDANSETRON PER 1 MG: Performed by: ORTHOPAEDIC SURGERY

## 2024-01-05 PROCEDURE — 25810000003 SODIUM CHLORIDE 0.9 % SOLUTION: Performed by: NURSE PRACTITIONER

## 2024-01-05 RX ADMIN — ACETAMINOPHEN 1000 MG: 500 TABLET ORAL at 13:51

## 2024-01-05 RX ADMIN — OXYCODONE HYDROCHLORIDE 15 MG: 15 TABLET ORAL at 17:11

## 2024-01-05 RX ADMIN — Medication 1 CAPSULE: at 08:48

## 2024-01-05 RX ADMIN — CYANOCOBALAMIN 1000 MCG: 1000 INJECTION, SOLUTION INTRAMUSCULAR; SUBCUTANEOUS at 08:53

## 2024-01-05 RX ADMIN — FAMOTIDINE 20 MG: 20 TABLET, FILM COATED ORAL at 08:49

## 2024-01-05 RX ADMIN — Medication 10 ML: at 20:59

## 2024-01-05 RX ADMIN — PREGABALIN 200 MG: 100 CAPSULE ORAL at 08:48

## 2024-01-05 RX ADMIN — SODIUM CHLORIDE 500 ML: 9 INJECTION, SOLUTION INTRAVENOUS at 15:25

## 2024-01-05 RX ADMIN — HYDROMORPHONE HYDROCHLORIDE 0.5 MG: 1 INJECTION, SOLUTION INTRAMUSCULAR; INTRAVENOUS; SUBCUTANEOUS at 22:46

## 2024-01-05 RX ADMIN — PREGABALIN 200 MG: 100 CAPSULE ORAL at 17:10

## 2024-01-05 RX ADMIN — LEVOTHYROXINE SODIUM 125 MCG: 125 TABLET ORAL at 05:16

## 2024-01-05 RX ADMIN — TRAZODONE HYDROCHLORIDE 50 MG: 50 TABLET ORAL at 21:21

## 2024-01-05 RX ADMIN — FAMOTIDINE 20 MG: 20 TABLET, FILM COATED ORAL at 17:11

## 2024-01-05 RX ADMIN — PREGABALIN 200 MG: 100 CAPSULE ORAL at 20:58

## 2024-01-05 RX ADMIN — ACETAMINOPHEN 1000 MG: 500 TABLET ORAL at 21:00

## 2024-01-05 RX ADMIN — OXYCODONE HYDROCHLORIDE 15 MG: 15 TABLET ORAL at 05:16

## 2024-01-05 RX ADMIN — ACETAMINOPHEN 1000 MG: 500 TABLET ORAL at 05:16

## 2024-01-05 RX ADMIN — OXYCODONE HYDROCHLORIDE 15 MG: 15 TABLET ORAL at 12:07

## 2024-01-05 RX ADMIN — Medication 10 ML: at 08:49

## 2024-01-05 RX ADMIN — SERTRALINE HYDROCHLORIDE 50 MG: 50 TABLET ORAL at 20:58

## 2024-01-05 RX ADMIN — FOLIC ACID 1 MG: 1 TABLET ORAL at 08:49

## 2024-01-05 RX ADMIN — LORAZEPAM 0.5 MG: 0.5 TABLET ORAL at 06:39

## 2024-01-05 RX ADMIN — PRAMIPEXOLE DIHYDROCHLORIDE 1 MG: 0.25 TABLET ORAL at 08:49

## 2024-01-05 RX ADMIN — Medication: at 22:41

## 2024-01-05 RX ADMIN — HYDROMORPHONE HYDROCHLORIDE 0.5 MG: 1 INJECTION, SOLUTION INTRAMUSCULAR; INTRAVENOUS; SUBCUTANEOUS at 15:25

## 2024-01-05 RX ADMIN — HYDROMORPHONE HYDROCHLORIDE 0.5 MG: 1 INJECTION, SOLUTION INTRAMUSCULAR; INTRAVENOUS; SUBCUTANEOUS at 08:54

## 2024-01-05 RX ADMIN — ONDANSETRON 4 MG: 2 INJECTION INTRAMUSCULAR; INTRAVENOUS at 12:10
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
## 2024-01-05 NOTE — PROGRESS NOTES
[facility]  PROGRESS NOTE    Patient Name: Phoebe Carvajal  : 1975  MRN: [id number]    Date of Admission: 2023  Primary Care Physician: Sofya Benjamin MD    Subjective   Subjective     CC:  F/u osteomyelitis    HPI:   Patient sitting up in bed reporting pain the amputation site. Patient reports RLE edema. Urine is very dark this morning and appears bloody. Pt states she no longer has menstrual cycle. Pt states she has not been drinking well and had only 200ml urine output this morning. Adm 500ml NS bolus. Encouraged her to drink water.     Objective   Objec  Says pain is mostly controlled.  Discussed weaning IV pain meds so that she can get to rehab.  Tive     Vital Signs:   Temp:  [97.8 °F (36.6 °C)-98.3 °F (36.8 °C)] 97.8 °F (36.6 °C)  Heart Rate:  [54-76] 69  Resp:  [18] 18  BP: ()/(60-81) 106/81     Physical Exam:   Constitutional: Awake, alert, NAD  HENT: NCAT, mucous membranes moist  Respiratory: Clear to auscultation bilaterally, nonlabored respirations   Cardiovascular: RRR, no murmurs, rubs, or gallops  Gastrointestinal: Positive bowel sounds, soft, nontender, nondistended  Musculoskeletal: L AKA  Psychiatric: Appropriate affect, cooperative  Neurologic: Oriented x 3, BRIONES, speech clear  Skin: No rashes        Results Reviewed:  LAB RESULTS:      Lab 243 24  0443   WBC 3.47 3.52 5.10   HEMOGLOBIN 9.0* 8.6* 9.5*   HEMATOCRIT 29.1* 26.9* 29.8*   PLATELETS 219 198 172   NEUTROS ABS  --   --  3.42   IMMATURE GRANS (ABS)  --   --  0.01   LYMPHS ABS  --   --  0.93   MONOS ABS  --   --  0.53   EOS ABS  --   --  0.20   .9* 109.3* 111.6*         Lab 24  0433 24  0443   SODIUM 143 142 137   POTASSIUM 4.1 3.9 3.8   CHLORIDE 109* 107 100   CO2 29.0 31.0* 27.0   ANION GAP 5.0 4.0* 10.0   BUN 6 5* 4*   CREATININE 0.36* 0.32* 0.36*   EGFR 123.9 127.4 123.9   GLUCOSE 86 92 91   CALCIUM 7.9*  8.0* 8.2*         Lab 01/04/24  0535   ALBUMIN 1.8*                   Lab 01/04/24  0535   FOLATE 3.61*   VITAMIN B 12 161*         Brief Urine Lab Results  (Last result in the past 365 days)        Color   Clarity   Blood   Leuk Est   Nitrite   Protein   CREAT   Urine HCG        12/31/23 0801               Negative               Microbiology Results Abnormal       Procedure Component Value - Date/Time    Tissue / Bone Culture - Surgical Site, Leg, Left [[id number]] Collected: 12/31/23 0831    Lab Status: Final result Specimen: Surgical Site from Leg, Left Updated: 01/04/24 0743     Tissue Culture No growth at 3 days     Gram Stain Moderate (3+) WBCs per low power field      No organisms seen    Anaerobic Culture - Surgical Site, Leg, Left [[id number]]  (Normal) Collected: 12/31/23 0833    Lab Status: Preliminary result Specimen: Surgical Site from Leg, Left Updated: 01/04/24 0730     Anaerobic Culture No anaerobes isolated at 3 days    Fungus Culture - Tissue, Leg, Left [[id number]] Collected: 12/29/23 1139    Lab Status: Preliminary result Specimen: Tissue from Leg, Left Updated: 01/03/24 1330     Fungus Culture No fungus isolated at less than 1 week    AFB Culture - Tissue, Leg, Left [[id number]] Collected: 12/29/23 1139    Lab Status: Preliminary result Specimen: Tissue from Leg, Left Updated: 01/03/24 1330     AFB Culture No AFB isolated at less than 1 week     AFB Stain No acid fast bacilli seen on concentrated smear    Anaerobic Culture - Tissue, Leg, Left [[id number]]  (Normal) Collected: 12/29/23 1139    Lab Status: Final result Specimen: Tissue from Leg, Left Updated: 01/03/24 1001     Anaerobic Culture No anaerobes isolated at 5 days    Blood Culture - Blood, Arm, Right [[id number]]  (Normal) Collected: 12/28/23 2115    Lab Status: Final result Specimen: Blood from Arm, Right Updated: 01/03/24 0030     Blood Culture No growth at 5 days    Blood Culture - Blood, Hand, Right [[id number]]  (Normal) Collected:  12/28/23 2120    Lab Status: Final result Specimen: Blood from Hand, Right Updated: 01/03/24 0030     Blood Culture No growth at 5 days    AFB Culture - Surgical Site, Leg, Left [[id number]] Collected: 12/31/23 0831    Lab Status: Preliminary result Specimen: Surgical Site from Leg, Left Updated: 01/01/24 1204     AFB Stain No acid fast bacilli seen on concentrated smear    Wound Culture - Wound, Leg, Left [[id number]] Collected: 12/29/23 1322    Lab Status: Final result Specimen: Wound from Leg, Left Updated: 01/01/24 0711     Wound Culture No growth at 3 days     Gram Stain No organisms seen    MRSA Screen, PCR (Inpatient) - Swab, Nares [[id number]]  (Normal) Collected: 12/29/23 0203    Lab Status: Final result Specimen: Swab from Nares Updated: 12/29/23 0733     MRSA PCR Negative    Narrative:      The negative predictive value of this diagnostic test is high and should only be used to consider de-escalating anti-MRSA therapy. A positive result may indicate colonization with MRSA and must be correlated clinically.  MRSA Negative    COVID PRE-OP / PRE-PROCEDURE SCREENING ORDER (NO ISOLATION) - Swab, Nasopharynx [[id number]]  (Normal) Collected: 12/29/23 0203    Lab Status: Final result Specimen: Swab from Nasopharynx Updated: 12/29/23 0353    Narrative:      The following orders were created for panel order COVID PRE-OP / PRE-PROCEDURE SCREENING ORDER (NO ISOLATION) - Swab, Nasopharynx.  Procedure                               Abnormality         Status                     ---------                               -----------         ------                     Respiratory Panel PCR w/...[[id number]]  Normal              Final result                 Please view results for these tests on the individual orders.    Respiratory Panel PCR w/COVID-19(SARS-CoV-2) ARIELLE/VESNA/KARIE/PAD/COR/SÁNCHEZ In-House, NP Swab in UTM/VTM, 2 HR TAT - Swab, Nasopharynx [[id number]]  (Normal) Collected: 12/29/23 0203    Lab Status: Final result Specimen:  Swab from Nasopharynx Updated: 12/29/23 0353     ADENOVIRUS, PCR Not Detected     Coronavirus 229E Not Detected     Coronavirus HKU1 Not Detected     Coronavirus NL63 Not Detected     Coronavirus OC43 Not Detected     COVID19 Not Detected     Human Metapneumovirus Not Detected     Human Rhinovirus/Enterovirus Not Detected     Influenza A PCR Not Detected     Influenza B PCR Not Detected     Parainfluenza Virus 1 Not Detected     Parainfluenza Virus 2 Not Detected     Parainfluenza Virus 3 Not Detected     Parainfluenza Virus 4 Not Detected     RSV, PCR Not Detected     Bordetella pertussis pcr Not Detected     Bordetella parapertussis PCR Not Detected     Chlamydophila pneumoniae PCR Not Detected     Mycoplasma pneumo by PCR Not Detected    Narrative:      In the setting of a positive respiratory panel with a viral infection PLUS a negative procalcitonin without other underlying concern for bacterial infection, consider observing off antibiotics or discontinuation of antibiotics and continue supportive care. If the respiratory panel is positive for atypical bacterial infection (Bordetella pertussis, Chlamydophila pneumoniae, or Mycoplasma pneumoniae), consider antibiotic de-escalation to target atypical bacterial infection.            XR Abdomen KUB    Result Date: 1/4/2024  XR ABDOMEN KUB Date of Exam: 1/4/2024 3:04 PM EST Indication: abdominal pain, diarrhea, vomiting Comparison: None available. Findings: No abnormally dilated loops of bowel to suggest an overt ileus or mechanical obstruction. Gas noted throughout large bowel to the level of pelvis. No significant formed stool. No significant gastric distention. No obvious renal calcifications. There is diffuse body wall edema. Spondylotic changes in the spine noted. Mild degenerative osteoarthritis of the hip joints.     Impression: Impression: Nonobstructive bowel gas pattern. Anasarca. Electronically Signed: David Gutierrez MD  1/4/2024 3:54 PM EST  Workstation  ID: [id number]         Current medications:  Scheduled Meds:Pharmacy Consult, , Does not apply, Q12H  acetaminophen, 1,000 mg, Oral, Q8H  cyanocobalamin, 1,000 mcg, Intramuscular, Daily  diazePAM, 2.5 mg, Intravenous, Once  famotidine, 20 mg, Oral, BID AC  folic acid, 1 mg, Oral, Daily  lactobacillus acidophilus, 1 capsule, Oral, Daily  levothyroxine, 125 mcg, Oral, Daily  oxyCODONE, 15 mg, Oral, Q6H  pramipexole, 1 mg, Oral, Daily  pregabalin, 200 mg, Oral, TID  senna-docusate sodium, 2 tablet, Oral, BID  sertraline, 50 mg, Oral, Nightly  sodium chloride, 10 mL, Intravenous, Q12H      Continuous Infusions:lactated ringers, 9 mL/hr      PRN Meds:.  senna-docusate sodium **AND** polyethylene glycol **AND** bisacodyl **AND** bisacodyl    diphenhydrAMINE    HYDROmorphone **AND** naloxone    lactated ringers    loperamide    LORazepam    melatonin    ondansetron    sodium chloride    sodium chloride    traZODone    Assessment & Plan   Assessment & Plan     Active Hospital Problems    Diagnosis  POA    **Lower extremity cellulitis [L03.119]  Yes    Anxiety associated with depression [F41.8]  Unknown    RLS (restless legs syndrome) [G25.81]  Unknown    Neuropathy [G62.9]  Unknown    Obesity, morbid, BMI 50 or higher [E66.01]  Unknown    S/P AKA (above knee amputation), left [Z89.612]  Not Applicable    Chronic osteomyelitis [M86.60]  Yes    Acquired hypothyroidism [E03.9]  Yes    Ulcer of right midfoot with fat layer exposed [L97.412]  Yes    Ulcer of left heel, with fat layer exposed [L97.422]  Yes      Resolved Hospital Problems   No resolved problems to display.        Brief Hospital Course to date:  Phoebe Carvajal is a 48 y.o. female with history of depression, hypothyroidism, stills disease, RLS, PAD, anemia, morbid obesity, recurrent osteomyelitis, chronic lower extremity osteomyelitis, recurrent left prosthetic joint infection, bilateral foot wounds who presented to the emergency department for evaluation of fever  and worsening erythema with foul odor from her chronic wounds. Previous records from  from October 22 through November 8, 2023 showed intention for proceeding with left AKA but patient waited for second opinion hoping to have the option of BKA.  She is followed by infectious disease-known to Dr. Wang.  She underwent AKA on 12/29/2023 with Dr. Witt, with additional debridement on 12/31/2023     This patient's problems and plans were partially entered by my partner and updated as appropriate by me 01/05/24.    Copied text in this note has been reviewed and is accurate as of 01/05/24.     Gross hematuria  - no longer has menstrual cycle.   - consult urology     Osteomyelitis/cellulitis of the left lower extremity  Status post left AKA on 12/29/2023 Dr. Witt  Chronic infected wounds  -Dr. Witt performed debridement again on 12/31/2023  -Patient has complained of excruciating pain postoperatively  -Continue prn IV Dilaudid, prn oxycodone  -ID consulted, Dr. Tayo Arnold following  --wound cultures: Anaerobic culture no anaerobes, tissue culture Gram stain no growth, AFB stain no acid-fast bacilli seen, culture no growth, fungus culture no growth, AFB culture IBF stain noted, MRSA PCR negative  - s/p ceftazidime and daptomycin 1/4/2024  --drain removed 1/2/24  --daily dressing changes Xeroform 4x4, Kerlix, ACE wrap  --Follow up with PANDA English with Dr. Witt in 2 weeks for wound check and x-rays  -- Continue to wean in anticipation of transfer to rehab  -- Continue scheduled Tylenol, add scheduled oxycodone 15 mg q6h per home dosing as discussed with palliative care     Right foot ulcer  --PT wound care following  --Offload right foot    Diarrhea  -- Suspect secondary to IV antibiotics, remains afebrile, WBCs WNL  -- Add probiotic daily  -- Imodium as needed  -- 1/4/24 KUB nonobstructive bowel gas pattern, anasarca  -- AM CMP    Anemia  -- Likely postop but also macrocytic per labs  -- B12  and folate low  -- Start B12 replacement cyanocobalamin 1000 mcg IM daily x 7 days, then weekly x 4 weeks  -- Start folic acid 1 mg daily  -- Recheck B12 and folic acid in 1 month at PCP follow-up    Neuropathy  -Continue Lyrica     Restless leg syndrome  -Continue Mirapex    Morbid obesity  Hypoxia, resolved  -Likely related to opiate use postoperatively and morbid obesity  -O2 to maintain sats greater than 90 and wean O2 as tolerated  --now on room air     Anxiety  -Continue Zoloft        Expected Discharge Location and Transportation: [place]  Expected Discharge pending bed offer, insurance approval  1/6/2024    DVT prophylaxis:  Mechanical DVT prophylaxis orders are present.     AM-PAC 6 Clicks Score (PT): 9 (01/04/24 2000)    CODE STATUS:   Code Status and Medical Interventions:   Ordered at: 12/29/23 0121     Code Status (Patient has no pulse and is not breathing):    CPR (Attempt to Resuscitate)     Medical Interventions (Patient has pulse or is breathing):    Full Support       Jocelyne Kinsey, APRN  01/05/24

## 2024-01-05 NOTE — PLAN OF CARE
Goal Outcome Evaluation:  Plan of Care Reviewed With: patient, family        Progress: improving  Outcome Evaluation: Pt's R plantar foot demonstrating good improvements in granulation of wound base with only very minimal slough/biofilm noted this date. Pt also with good improvements in re-epithelialization of wound edges. PT inlcuded R lateral foot within optifoam this date to help offload the red area. PT also applied compressogrips for light compression to the RLE to help improve venous return, improve skin integrity, and improve wound healing potential. PT plans to f/u in 2-3 days.
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
## 2024-01-05 NOTE — PLAN OF CARE
Problem: Fall Injury Risk  Goal: Absence of Fall and Fall-Related Injury  Intervention: Promote Injury-Free Environment  Recent Flowsheet Documentation  Taken 1/5/2024 0215 by Jeni Darby RN  Safety Promotion/Fall Prevention:   activity supervised   assistive device/personal items within reach   safety round/check completed  Taken 1/5/2024 0015 by Jeni Darby RN  Safety Promotion/Fall Prevention:   activity supervised   assistive device/personal items within reach   safety round/check completed  Taken 1/4/2024 2215 by Jeni Darby RN  Safety Promotion/Fall Prevention:   activity supervised   assistive device/personal items within reach   safety round/check completed  Taken 1/4/2024 2000 by Jeni Darby RN  Safety Promotion/Fall Prevention:   activity supervised   assistive device/personal items within reach   safety round/check completed     Problem: Skin Injury Risk Increased  Goal: Skin Health and Integrity  Intervention: Optimize Skin Protection  Recent Flowsheet Documentation  Taken 1/5/2024 0215 by Jeni Darby RN  Pressure Reduction Techniques: frequent weight shift encouraged  Head of Bed (HOB) Positioning: [facility] elevated  Pressure Reduction Devices: specialty bed utilized  Skin Protection:   adhesive use limited   incontinence pads utilized  Taken 1/5/2024 0015 by Jeni Darby RN  Pressure Reduction Techniques: frequent weight shift encouraged  Head of Bed (HOB) Positioning: HOB elevated  Pressure Reduction Devices: specialty bed utilized  Skin Protection:   adhesive use limited   incontinence pads utilized  Taken 1/4/2024 2215 by Jeni Darby RN  Pressure Reduction Techniques: frequent weight shift encouraged  Head of Bed (HOB) Positioning: [facility] elevated  Pressure Reduction Devices: specialty bed utilized  Skin Protection:   adhesive use limited   incontinence pads utilized  Taken 1/4/2024 2000 by Jeni Darby RN  Head of Bed (HOB) Positioning: HOB elevated     Problem: Infection Progression  (Sepsis/Septic Shock)  Goal: Absence of Infection Signs and Symptoms  Intervention: Promote Recovery  Recent Flowsheet Documentation  Taken 1/5/2024 0215 by Jeni Darby, RN  Activity Management: activity encouraged  Taken 1/5/2024 0015 by Jeni Darby, RN  Activity Management: activity encouraged  Taken 1/4/2024 2215 by Jeni Darby, RN  Activity Management: activity encouraged  Taken 1/4/2024 2000 by Jeni Darby, RN  Activity Management: activity encouraged   Goal Outcome Evaluation:

## 2024-01-05 NOTE — CASE MANAGEMENT/SOCIAL WORK
Continued Stay Note   [facility]     Patient Name: Phoebe Carvajal  MRN: [id number]  Today's Date: 1/5/2024    Admit Date: 12/28/2023    Plan: update   Discharge Plan       Row Name 01/05/24 1422       Plan    Plan update    Patient/Family in Agreement with Plan yes    Plan Comments Spoke to [facility] liasion who advised they have received [facility] MD approval and started precert.  Spoke with patient at bedside regarding discharge plan and advised that [facility] has offered a bed and started precert.  Patient reports she has also spoken with the [facility] liaison and is in agreement with plan.  No other discharge needs verbalized.  CM following.  Patient plan is to discharge to [facility] pending insurance approval.    Final Discharge Disposition Code 62 - inpatient rehab facility                   Discharge Codes    No documentation.                 Expected Discharge Date and Time       Expected Discharge Date Expected Discharge Time    Jan 5, 2024               Jerica Paredes RN

## 2024-01-06 LAB
ANION GAP SERPL CALCULATED.3IONS-SCNC: 6 MMOL/L (ref 5–15)
BACTERIA SPEC ANAEROBE CULT: NORMAL
BASOPHILS # BLD AUTO: 0.01 10*3/MM3 (ref 0–0.2)
BASOPHILS NFR BLD AUTO: 0.4 % (ref 0–1.5)
BUN SERPL-MCNC: 5 MG/DL (ref 6–20)
BUN/CREAT SERPL: 13.5 (ref 7–25)
CALCIUM SPEC-SCNC: 8 MG/DL (ref 8.6–10.5)
CHLORIDE SERPL-SCNC: 106 MMOL/L (ref 98–107)
CO2 SERPL-SCNC: 27 MMOL/L (ref 22–29)
CREAT SERPL-MCNC: 0.37 MG/DL (ref 0.57–1)
DEPRECATED RDW RBC AUTO: 63 FL (ref 37–54)
EGFRCR SERPLBLD CKD-EPI 2021: 123 ML/MIN/1.73
EOSINOPHIL # BLD AUTO: 0.16 10*3/MM3 (ref 0–0.4)
EOSINOPHIL NFR BLD AUTO: 5.6 % (ref 0.3–6.2)
ERYTHROCYTE [DISTWIDTH] IN BLOOD BY AUTOMATED COUNT: 15.3 % (ref 12.3–15.4)
GLUCOSE SERPL-MCNC: 78 MG/DL (ref 65–99)
HCT VFR BLD AUTO: 30.5 % (ref 34–46.6)
HGB BLD-MCNC: 9.6 G/DL (ref 12–15.9)
IMM GRANULOCYTES # BLD AUTO: 0.01 10*3/MM3 (ref 0–0.05)
IMM GRANULOCYTES NFR BLD AUTO: 0.4 % (ref 0–0.5)
LYMPHOCYTES # BLD AUTO: 0.91 10*3/MM3 (ref 0.7–3.1)
LYMPHOCYTES NFR BLD AUTO: 31.9 % (ref 19.6–45.3)
MCH RBC QN AUTO: 35 PG (ref 26.6–33)
MCHC RBC AUTO-ENTMCNC: 31.5 G/DL (ref 31.5–35.7)
MCV RBC AUTO: 111.3 FL (ref 79–97)
MONOCYTES # BLD AUTO: 0.27 10*3/MM3 (ref 0.1–0.9)
MONOCYTES NFR BLD AUTO: 9.5 % (ref 5–12)
NEUTROPHILS NFR BLD AUTO: 1.49 10*3/MM3 (ref 1.7–7)
NEUTROPHILS NFR BLD AUTO: 52.2 % (ref 42.7–76)
NRBC BLD AUTO-RTO: 0 /100 WBC (ref 0–0.2)
PLATELET # BLD AUTO: 232 10*3/MM3 (ref 140–450)
PMV BLD AUTO: 9.7 FL (ref 6–12)
POTASSIUM SERPL-SCNC: 4.7 MMOL/L (ref 3.5–5.2)
RBC # BLD AUTO: 2.74 10*6/MM3 (ref 3.77–5.28)
SODIUM SERPL-SCNC: 139 MMOL/L (ref 136–145)
WBC NRBC COR # BLD AUTO: 2.85 10*3/MM3 (ref 3.4–10.8)

## 2024-01-06 PROCEDURE — 85025 COMPLETE CBC W/AUTO DIFF WBC: CPT | Performed by: NURSE PRACTITIONER

## 2024-01-06 PROCEDURE — 25010000002 HYDROMORPHONE PER 4 MG: Performed by: NURSE PRACTITIONER

## 2024-01-06 PROCEDURE — 87086 URINE CULTURE/COLONY COUNT: CPT | Performed by: NURSE PRACTITIONER

## 2024-01-06 PROCEDURE — 25010000002 CYANOCOBALAMIN PER 1000 MCG: Performed by: NURSE PRACTITIONER

## 2024-01-06 PROCEDURE — 80048 BASIC METABOLIC PNL TOTAL CA: CPT | Performed by: NURSE PRACTITIONER

## 2024-01-06 PROCEDURE — 99222 1ST HOSP IP/OBS MODERATE 55: CPT | Performed by: UROLOGY

## 2024-01-06 PROCEDURE — 25010000002 ONDANSETRON PER 1 MG: Performed by: ORTHOPAEDIC SURGERY

## 2024-01-06 PROCEDURE — 97530 THERAPEUTIC ACTIVITIES: CPT

## 2024-01-06 PROCEDURE — 99232 SBSQ HOSP IP/OBS MODERATE 35: CPT | Performed by: NURSE PRACTITIONER

## 2024-01-06 PROCEDURE — 97110 THERAPEUTIC EXERCISES: CPT

## 2024-01-06 RX ADMIN — FAMOTIDINE 20 MG: 20 TABLET, FILM COATED ORAL at 08:26

## 2024-01-06 RX ADMIN — Medication 10 ML: at 08:27

## 2024-01-06 RX ADMIN — PRAMIPEXOLE DIHYDROCHLORIDE 1 MG: 0.25 TABLET ORAL at 08:26

## 2024-01-06 RX ADMIN — HYDROMORPHONE HYDROCHLORIDE 0.5 MG: 1 INJECTION, SOLUTION INTRAMUSCULAR; INTRAVENOUS; SUBCUTANEOUS at 20:10

## 2024-01-06 RX ADMIN — CYANOCOBALAMIN 1000 MCG: 1000 INJECTION, SOLUTION INTRAMUSCULAR; SUBCUTANEOUS at 08:27

## 2024-01-06 RX ADMIN — SERTRALINE HYDROCHLORIDE 50 MG: 50 TABLET ORAL at 20:10

## 2024-01-06 RX ADMIN — PREGABALIN 200 MG: 100 CAPSULE ORAL at 08:26

## 2024-01-06 RX ADMIN — OXYCODONE HYDROCHLORIDE 15 MG: 15 TABLET ORAL at 11:20

## 2024-01-06 RX ADMIN — FOLIC ACID 1 MG: 1 TABLET ORAL at 08:26

## 2024-01-06 RX ADMIN — PREGABALIN 200 MG: 100 CAPSULE ORAL at 17:08

## 2024-01-06 RX ADMIN — HYDROMORPHONE HYDROCHLORIDE 0.5 MG: 1 INJECTION, SOLUTION INTRAMUSCULAR; INTRAVENOUS; SUBCUTANEOUS at 13:29

## 2024-01-06 RX ADMIN — ACETAMINOPHEN 1000 MG: 500 TABLET ORAL at 04:57

## 2024-01-06 RX ADMIN — ACETAMINOPHEN 1000 MG: 500 TABLET ORAL at 20:09

## 2024-01-06 RX ADMIN — FAMOTIDINE 20 MG: 20 TABLET, FILM COATED ORAL at 17:08

## 2024-01-06 RX ADMIN — ACETAMINOPHEN 1000 MG: 500 TABLET ORAL at 13:30

## 2024-01-06 RX ADMIN — OXYCODONE HYDROCHLORIDE 15 MG: 15 TABLET ORAL at 17:08

## 2024-01-06 RX ADMIN — PREGABALIN 200 MG: 100 CAPSULE ORAL at 20:10

## 2024-01-06 RX ADMIN — OXYCODONE HYDROCHLORIDE 15 MG: 15 TABLET ORAL at 04:57

## 2024-01-06 RX ADMIN — HYDROMORPHONE HYDROCHLORIDE 0.5 MG: 1 INJECTION, SOLUTION INTRAMUSCULAR; INTRAVENOUS; SUBCUTANEOUS at 07:24

## 2024-01-06 RX ADMIN — Medication 1 CAPSULE: at 08:26

## 2024-01-06 RX ADMIN — ONDANSETRON 4 MG: 2 INJECTION INTRAMUSCULAR; INTRAVENOUS at 10:40

## 2024-01-06 RX ADMIN — LORAZEPAM 0.5 MG: 0.5 TABLET ORAL at 10:39

## 2024-01-06 RX ADMIN — Medication 10 ML: at 21:16

## 2024-01-06 RX ADMIN — LEVOTHYROXINE SODIUM 125 MCG: 125 TABLET ORAL at 04:57

## 2024-01-06 NOTE — PROGRESS NOTES
Pohebe Carvajal       LOS: 9 days   Patient Care Team:  Sofya Benjamin MD as PCP - General (Internal Medicine)  Provider, No Known as PCP - Family Medicine    Chief Complaint: Status post left above-knee amputation    Subjective     Interval History:     Complains of some pain    Review of Systems:     Gen- No fevers, chills  CV- No chest pain, palpitations  Resp- No cough, dyspnea  GI- No N/V/D, abd pain    Objective     Vital Signs  Vital Signs (last 24 hours)         01/05 0700  01/06 0659 01/06 0700 01/06 0806   Most Recent      Temp (°F) 97.7 -  98.5       98.5 (36.9) 01/06 0505    Heart Rate 55 -  73       63 01/06 0505    Resp 16 -  18       18 01/06 0505    /85 -  109/71       100/85 01/06 0505    SpO2 (%) 90 -  98       98 01/06 0505              Physical Exam:    Alert, oriented.  No acute distress.  Nonlabored respirations.  Regular rate and rhythm.  Abdomen nondistended.  Scant serous drainage on her dressings.  I inspected the incision, well-approximated, no erythema or warmth.     Results Review:     I reviewed the patient's new clinical results.    Medication Review:   Hospital Medications (active)         Dose Frequency Start End    ! Home medications stored in pharmacy, please contact pharmacist prior to patient discharge  Every 12 Hours Scheduled 12/29/2023 --    Route: Does not apply    acetaminophen (TYLENOL) tablet 1,000 mg 1,000 mg Every 8 Hours Scheduled 1/2/2024 --    Admin Instructions: If given for fever, use fever parameter: fever greater than 100.4 °F  Based on patient request - if ordered for moderate or severe pain, provider allows for administration of a medication prescribed for a lower pain scale.    Do not exceed 4 grams of acetaminophen in a 24 hr period. Max dose of 2gm for AST/ALT greater than 120 units/L.    If given for pain, use the following pain scale:   Mild Pain = Pain Score of 1-3, CPOT 1-2  Moderate Pain = Pain Score of 4-6, CPOT 3-4  Severe Pain = Pain Score  of 7-10, CPOT 5-8    Route: Oral    bisacodyl (DULCOLAX) EC tablet 5 mg 5 mg Daily PRN 12/29/2023 --    Admin Instructions: Use if no bowel movement after 12 hours.  Swallow whole. Do not crush, split, or chew tablet.    Route: Oral    Linked Group 1: See Hyperspace for full Linked Orders Report.        bisacodyl (DULCOLAX) suppository 10 mg 10 mg Daily PRN 12/29/2023 --    Admin Instructions: Use if no bowel movement after 12 hours.  Hold for diarrhea    Route: Rectal    Linked Group 1: See Hyperspace for full Linked Orders Report.        cyanocobalamin injection 1,000 mcg 1,000 mcg Daily 1/4/2024 1/11/2024    Route: Intramuscular    diazePAM (VALIUM) injection 2.5 mg 2.5 mg Once 1/1/2024 --    Admin Instructions:  May give each 5 mg IV push over 1 minute. May be injected through infusion tubing using port closest to vein insertion. Do not mix or dilute with other solutions.    Route: Intravenous    diphenhydrAMINE (BENADRYL) capsule 25 mg 25 mg Every 6 Hours PRN 12/29/2023 --    Admin Instructions: Caution: Look alike/sound alike drug alert. This med may be ordered in other forms and routes. Before giving verify the last time the drug was given by any route/form.    Route: Oral    famotidine (PEPCID) tablet 20 mg 20 mg 2 Times Daily Before Meals 1/1/2024 --    Route: Oral    folic acid (FOLVITE) tablet 1 mg 1 mg Daily 1/4/2024 --    Route: Oral    HYDROmorphone (DILAUDID) injection 0.5 mg 0.5 mg Every 6 Hours PRN 1/4/2024 --    Admin Instructions: Based on patient request - if ordered for moderate or severe pain, provider allows for administration of a medication prescribed for a lower pain scale.  If given for pain, use the following pain scale:  Mild Pain = Pain Score of 1-3, CPOT 1-2  Moderate Pain = Pain Score of 4-6, CPOT 3-4  Severe Pain = Pain Score of 7-10, CPOT 5-8    Route: Intravenous    Cosign for Ordering: Required by Jorge Lua MD    Linked Group 2: See Hyperspace for full Linked Orders  Report.        lactated ringers infusion 9 mL/hr Continuous PRN 12/31/2023 --    Route: Intravenous    lactobacillus acidophilus (RISAQUAD) capsule 1 capsule 1 capsule Daily 1/3/2024 --    Route: Oral    levothyroxine (SYNTHROID, LEVOTHROID) tablet 125 mcg 125 mcg Daily 12/29/2023 --    Admin Instructions: Take on empty stomach.    Route: Oral    loperamide (IMODIUM) capsule 2 mg 2 mg 4 Times Daily PRN 1/3/2024 --    Admin Instructions: Maximum recommended 16 mg / 24 hours.      Route: Oral    LORazepam (ATIVAN) tablet 0.5 mg 0.5 mg Daily PRN 1/4/2024 --    Admin Instructions: Hold for SBP < 110   Caution: Look alike/sound alike drug alert    Route: Oral    Cosign for Ordering: Required by Jorge Lua MD    melatonin tablet 5 mg 5 mg Nightly PRN 12/29/2023 --    Route: Oral    naloxone (NARCAN) injection 0.4 mg 0.4 mg Every 5 Minutes PRN 1/4/2024 --    Admin Instructions: If Respiratory Rate Less Than 8 or Patient is Difficult to Arouse, Stop ALL Narcotics & Contact Provider.  Administer Slow IV Push.  Repeat As Ordered Until Respiratory Rate is Greater Than 12.    Route: Intravenous    Linked Group 2: See Russ for full Linked Orders Report.        ondansetron (ZOFRAN) injection 4 mg 4 mg Every 6 Hours PRN 12/29/2023 --    Admin Instructions: If BOTH ondansetron (ZOFRAN) and promethazine (PHENERGAN) are ordered use ondansetron first and THEN promethazine IF ondansetron is ineffective.    Route: Intravenous    oxyCODONE (ROXICODONE) immediate release tablet 15 mg 15 mg Every 6 Hours 1/4/2024 --    Admin Instructions: Based on patient request - if ordered for moderate or severe pain, provider allows for administration of a medication prescribed for a lower pain scale.      If given for pain, use the following pain scale:  Mild Pain = Pain Score of 1-3, CPOT 1-2  Moderate Pain = Pain Score of 4-6, CPOT 3-4  Severe Pain = Pain Score of 7-10, CPOT 5-8    Route: Oral    Cosign for Ordering: Required by  Jorge Lua MD    polyethylene glycol (MIRALAX) packet 17 g 17 g Daily PRN 12/29/2023 --    Admin Instructions: Use if no bowel movement after 12 hours. Mix in 6-8 ounces of water.  Use 4-8 ounces of water, tea, or juice for each 17 gram dose.    Route: Oral    Linked Group 1: See Hyperspace for full Linked Orders Report.        pramipexole (MIRAPEX) tablet 1 mg 1 mg Daily 12/29/2023 --    Route: Oral    pregabalin (LYRICA) capsule 200 mg 200 mg 3 Times Daily 12/29/2023 --    Admin Instructions:     Route: Oral    sennosides-docusate (PERICOLACE) 8.6-50 MG per tablet 2 tablet 2 tablet 2 Times Daily 12/29/2023 --    Admin Instructions: HOLD MEDICATION IF PATIENT HAS HAD BOWEL MOVEMENT. Start bowel management regimen if patient has not had a bowel movement after 12 hours.    Route: Oral    Linked Group 1: See Hyperspace for full Linked Orders Report.        sertraline (ZOLOFT) tablet 50 mg 50 mg Nightly 12/29/2023 --    Route: Oral    sodium chloride 0.9 % flush 10 mL 10 mL Every 12 Hours Scheduled 12/29/2023 --    Route: Intravenous    sodium chloride 0.9 % flush 10 mL 10 mL As Needed 12/29/2023 --    Route: Intravenous    sodium chloride 0.9 % infusion 40 mL 40 mL As Needed 12/29/2023 --    Admin Instructions: Following administration of an IV intermittent medication, flush line with 40mL NS at 100mL/hr.    Route: Intravenous    traZODone (DESYREL) tablet 50 mg 50 mg Nightly PRN 12/29/2023 --    Admin Instructions: Take with food.  Caution: Look alike/sound alike drug alert    Route: Oral              Assessment & Plan     48-year-old female status post left above-knee amputation.      Lower extremity cellulitis    Acquired hypothyroidism    Ulcer of right midfoot with fat layer exposed    Ulcer of left heel, with fat layer exposed    Chronic osteomyelitis    Anxiety associated with depression    RLS (restless legs syndrome)    Neuropathy    Obesity, morbid, BMI 50 or higher    S/P AKA (above knee amputation),  left    Nonweightbearing left lower extremity.  Agree with plan to discontinue antibiotics.  Follow-up 2 weeks for wound check and x-rays.    Follow up with Jazzmine Brar PA-C.    [facility]  [address], [address]  [place], located within [facility], [address]  Please schedule at [phone]      Adama Witt Jr, MD  01/06/24  08:06 EST

## 2024-01-06 NOTE — PLAN OF CARE
Goal Outcome Evaluation:  Plan of Care Reviewed With: patient        Progress: no change  Outcome Evaluation: Rolled L/R w/ mod 2/max 1+ 1A to place sling, transf to chair w/ mech lift (Dep.2A), worked on sit bal activ/WS,recip scooting, LAQ & mini marches RLE w/ legrest lowered, & ther exer incl. L AKA protocol w/ leg rest raised, but def. STS trials d/t incr conf. & erratic behavior/comments, diffic focusing on task, c/o's of sleep depriv, & feeling overwhelmed. Noted BP 71/54 immed s/p transf UIC,then 118/67 s/p exer. Limited by pain incr to 8/10 & desat 90% on RA.

## 2024-01-06 NOTE — PROGRESS NOTES
"    [facility] Medicine Services  PROGRESS NOTE    Patient Name: Phoebe Carvajal  : 1975  MRN: [id number]    Date of Admission: 2023  Primary Care Physician: Sofya Benjamin MD    Subjective   Subjective     CC:  F/u osteomyelitis    HPI:   No new issues overnight  \"I am sorry I am being whiny\"  Diarrhea persists    Objective   Vital Signs:   Temp:  [97.7 °F (36.5 °C)-98.5 °F (36.9 °C)] 98.2 °F (36.8 °C)  Heart Rate:  [55-76] 71  Resp:  [16-18] 16  BP: ()/(58-85) 98/73     Physical Exam:   Constitutional: No acute distress, awake, alert, tearful  HENT: NCAT, mucous membranes moist  Respiratory: Decreased in bases, respiratory effort normal   Cardiovascular: RRR, no murmurs, rubs, or gallops  Gastrointestinal: Positive bowel sounds, soft, nontender, nondistended, obese  Musculoskeletal: No right ankle edema  Psychiatric: Appropriate affect, cooperative  Neurologic: Oriented x 3, moves remaining extremities, speech clear  Skin: No rashes noted      Results Reviewed:  LAB RESULTS:      Lab 24   WBC 2.85* 3.47 3.52 5.10   HEMOGLOBIN 9.6* 9.0* 8.6* 9.5*   HEMATOCRIT 30.5* 29.1* 26.9* 29.8*   PLATELETS 232 219 198 172   NEUTROS ABS 1.49*  --   --  3.42   IMMATURE GRANS (ABS) 0.01  --   --  0.01   LYMPHS ABS 0.91  --   --  0.93   MONOS ABS 0.27  --   --  0.53   EOS ABS 0.16  --   --  0.20   .3* 111.9* 109.3* 111.6*         Lab 24  0443   SODIUM 139 143 142 137   POTASSIUM 4.7 4.1 3.9 3.8   CHLORIDE 106 109* 107 100   CO2 27.0 29.0 31.0* 27.0   ANION GAP 6.0 5.0 4.0* 10.0   BUN 5* 6 5* 4*   CREATININE 0.37* 0.36* 0.32* 0.36*   EGFR 123.0 123.9 127.4 123.9   GLUCOSE 78 86 92 91   CALCIUM 8.0* 7.9* 8.0* 8.2*         Lab 24  0535   ALBUMIN 1.8*                   Lab 24  0535   FOLATE 3.61*   VITAMIN B 12 161*         Brief Urine Lab Results  (Last result in " the past 365 days)        Color   Clarity   Blood   Leuk Est   Nitrite   Protein   CREAT   Urine HCG        01/05/24 1351 Yellow   Cloudy   Negative   Small (1+)   Negative   Trace                   Microbiology Results Abnormal       Procedure Component Value - Date/Time    Anaerobic Culture - Surgical Site, Leg, Left [[id number]]  (Normal) Collected: 12/31/23 0833    Lab Status: Final result Specimen: Surgical Site from Leg, Left Updated: 01/06/24 0551     Anaerobic Culture No anaerobes isolated at 5 days    Fungus Culture - Surgical Site, Leg, Left [[id number]] Collected: 12/31/23 0831    Lab Status: Preliminary result Specimen: Surgical Site from Leg, Left Updated: 01/05/24 1946     Fungus Culture No fungus isolated at less than 1 week    AFB Culture - Surgical Site, Leg, Left [[id number]] Collected: 12/31/23 0831    Lab Status: Preliminary result Specimen: Surgical Site from Leg, Left Updated: 01/05/24 1946     AFB Culture No AFB isolated at less than 1 week     AFB Stain No acid fast bacilli seen on concentrated smear    Fungus Culture - Tissue, Leg, Left [[id number]] Collected: 12/29/23 1139    Lab Status: Preliminary result Specimen: Tissue from Leg, Left Updated: 01/05/24 1330     Fungus Culture No fungus isolated at 1 week    AFB Culture - Tissue, Leg, Left [[id number]] Collected: 12/29/23 1139    Lab Status: Preliminary result Specimen: Tissue from Leg, Left Updated: 01/05/24 1330     AFB Culture No AFB isolated at 1 week     AFB Stain No acid fast bacilli seen on concentrated smear    Tissue / Bone Culture - Surgical Site, Leg, Left [[id number]] Collected: 12/31/23 0831    Lab Status: Final result Specimen: Surgical Site from Leg, Left Updated: 01/04/24 0743     Tissue Culture No growth at 3 days     Gram Stain Moderate (3+) WBCs per low power field      No organisms seen    Anaerobic Culture - Tissue, Leg, Left [[id number]]  (Normal) Collected: 12/29/23 1139    Lab Status: Final result Specimen: Tissue from  Leg, Left Updated: 01/03/24 1001     Anaerobic Culture No anaerobes isolated at 5 days    Blood Culture - Blood, Arm, Right [[id number]]  (Normal) Collected: 12/28/23 2115    Lab Status: Final result Specimen: Blood from Arm, Right Updated: 01/03/24 0030     Blood Culture No growth at 5 days    Blood Culture - Blood, Hand, Right [[id number]]  (Normal) Collected: 12/28/23 2120    Lab Status: Final result Specimen: Blood from Hand, Right Updated: 01/03/24 0030     Blood Culture No growth at 5 days    Wound Culture - Wound, Leg, Left [[id number]] Collected: 12/29/23 1322    Lab Status: Final result Specimen: Wound from Leg, Left Updated: 01/01/24 0711     Wound Culture No growth at 3 days     Gram Stain No organisms seen    MRSA Screen, PCR (Inpatient) - Swab, Nares [[id number]]  (Normal) Collected: 12/29/23 0203    Lab Status: Final result Specimen: Swab from Nares Updated: 12/29/23 0733     MRSA PCR Negative    Narrative:      The negative predictive value of this diagnostic test is high and should only be used to consider de-escalating anti-MRSA therapy. A positive result may indicate colonization with MRSA and must be correlated clinically.  MRSA Negative    COVID PRE-OP / PRE-PROCEDURE SCREENING ORDER (NO ISOLATION) - Swab, Nasopharynx [[id number]]  (Normal) Collected: 12/29/23 0203    Lab Status: Final result Specimen: Swab from Nasopharynx Updated: 12/29/23 0353    Narrative:      The following orders were created for panel order COVID PRE-OP / PRE-PROCEDURE SCREENING ORDER (NO ISOLATION) - Swab, Nasopharynx.  Procedure                               Abnormality         Status                     ---------                               -----------         ------                     Respiratory Panel PCR w/...[[id number]]  Normal              Final result                 Please view results for these tests on the individual orders.    Respiratory Panel PCR w/COVID-19(SARS-CoV-2) ARIELLE/VESNA/KARIE/PAD/COR/SÁNCHEZ In-House, NP Swab  in UTM/VTM, 2 HR TAT - Swab, Nasopharynx [[id number]]  (Normal) Collected: 12/29/23 0203    Lab Status: Final result Specimen: Swab from Nasopharynx Updated: 12/29/23 0353     ADENOVIRUS, PCR Not Detected     Coronavirus 229E Not Detected     Coronavirus HKU1 Not Detected     Coronavirus NL63 Not Detected     Coronavirus OC43 Not Detected     COVID19 Not Detected     Human Metapneumovirus Not Detected     Human Rhinovirus/Enterovirus Not Detected     Influenza A PCR Not Detected     Influenza B PCR Not Detected     Parainfluenza Virus 1 Not Detected     Parainfluenza Virus 2 Not Detected     Parainfluenza Virus 3 Not Detected     Parainfluenza Virus 4 Not Detected     RSV, PCR Not Detected     Bordetella pertussis pcr Not Detected     Bordetella parapertussis PCR Not Detected     Chlamydophila pneumoniae PCR Not Detected     Mycoplasma pneumo by PCR Not Detected    Narrative:      In the setting of a positive respiratory panel with a viral infection PLUS a negative procalcitonin without other underlying concern for bacterial infection, consider observing off antibiotics or discontinuation of antibiotics and continue supportive care. If the respiratory panel is positive for atypical bacterial infection (Bordetella pertussis, Chlamydophila pneumoniae, or Mycoplasma pneumoniae), consider antibiotic de-escalation to target atypical bacterial infection.            XR Abdomen KUB    Result Date: 1/4/2024  XR ABDOMEN KUB Date of Exam: 1/4/2024 3:04 PM EST Indication: abdominal pain, diarrhea, vomiting Comparison: None available. Findings: No abnormally dilated loops of bowel to suggest an overt ileus or mechanical obstruction. Gas noted throughout large bowel to the level of pelvis. No significant formed stool. No significant gastric distention. No obvious renal calcifications. There is diffuse body wall edema. Spondylotic changes in the spine noted. Mild degenerative osteoarthritis of the hip joints.     Impression:  Impression: Nonobstructive bowel gas pattern. Anasarca. Electronically Signed: David Gutierrez MD  1/4/2024 3:54 PM EST  Workstation ID: [id number]         Current medications:  Scheduled Meds:Pharmacy Consult, , Does not apply, Q12H  acetaminophen, 1,000 mg, Oral, Q8H  cyanocobalamin, 1,000 mcg, Intramuscular, Daily  diazePAM, 2.5 mg, Intravenous, Once  famotidine, 20 mg, Oral, BID AC  folic acid, 1 mg, Oral, Daily  lactobacillus acidophilus, 1 capsule, Oral, Daily  levothyroxine, 125 mcg, Oral, Daily  oxyCODONE, 15 mg, Oral, Q6H  pramipexole, 1 mg, Oral, Daily  pregabalin, 200 mg, Oral, TID  senna-docusate sodium, 2 tablet, Oral, BID  sertraline, 50 mg, Oral, Nightly  sodium chloride, 10 mL, Intravenous, Q12H      Continuous Infusions:lactated ringers, 9 mL/hr      PRN Meds:.  senna-docusate sodium **AND** polyethylene glycol **AND** bisacodyl **AND** bisacodyl    diphenhydrAMINE    HYDROmorphone **AND** naloxone    lactated ringers    loperamide    LORazepam    melatonin    ondansetron    sodium chloride    sodium chloride    traZODone    Assessment & Plan   Assessment & Plan     Active Hospital Problems    Diagnosis  POA    **Lower extremity cellulitis [L03.119]  Yes    Anxiety associated with depression [F41.8]  Unknown    RLS (restless legs syndrome) [G25.81]  Unknown    Neuropathy [G62.9]  Unknown    Obesity, morbid, BMI 50 or higher [E66.01]  Unknown    S/P AKA (above knee amputation), left [Z89.612]  Not Applicable    Chronic osteomyelitis [M86.60]  Yes    Acquired hypothyroidism [E03.9]  Yes    Ulcer of right midfoot with fat layer exposed [L97.412]  Yes    Ulcer of left heel, with fat layer exposed [L97.422]  Yes      Resolved Hospital Problems   No resolved problems to display.        Brief Hospital Course to date:  Phoebe Carvajal is a 48 y.o. female with history of depression, hypothyroidism, stills disease, RLS, PAD, anemia, morbid obesity, recurrent osteomyelitis, chronic lower extremity  osteomyelitis, recurrent left prosthetic joint infection, bilateral foot wounds who presented to the emergency department for evaluation of fever and worsening erythema with foul odor from her chronic wounds. Previous records from  from October 22 through November 8, 2023 showed intention for proceeding with left AKA but patient waited for second opinion hoping to have the option of BKA.  She is followed by infectious disease-known to Dr. Wang.  She underwent AKA on 12/29/2023 with Dr. Witt, with additional debridement on 12/31/2023     This patient's problems and plans were partially entered by my partner and updated as appropriate by me 01/06/24.    Copied text in this note has been reviewed and is accurate as of 01/06/24.     Gross hematuria  - no longer has menstrual cycle.   - urology follow up 6-8 weeks  - urine culture added on    Osteomyelitis/cellulitis of the left lower extremity  Status post left AKA on 12/29/2023 Dr. Witt  Chronic infected wounds  -Dr. Witt performed debridement again on 12/31/2023  -Patient has complained of excruciating pain postoperatively  -Continue prn IV Dilaudid, prn oxycodone  -ID consulted, Dr. Tayo Arnold following  --wound cultures: Anaerobic culture no anaerobes, tissue culture Gram stain no growth, AFB stain no acid-fast bacilli seen, culture no growth, fungus culture no growth, AFB culture IBF stain noted, MRSA PCR negative  - s/p ceftazidime and daptomycin 1/4/2024  --drain removed 1/2/24  --daily dressing changes Xeroform 4x4, Kerlix, ACE wrap  -- Follow up with [facility] with Dr. Witt in 2 weeks for wound check and x-rays  -- Continue to wean in anticipation of transfer to rehab  -- Continue scheduled Tylenol, continue scheduled oxycodone 15 mg q6h per home dosing as discussed with palliative care     Right foot ulcer  --PT wound care following  --Offload right foot    Diarrhea  -- Suspect secondary to IV antibiotics, remains afebrile,  WBCs WNL  -- Add probiotic daily  -- Imodium as needed  -- 1/4/24 KUB nonobstructive bowel gas pattern, anasarca    Anemia  -- Likely postop but also macrocytic per labs  -- B12 and folate low  -- Start B12 replacement cyanocobalamin 1000 mcg IM daily x 7 days, then weekly x 4 weeks  -- continue folic acid 1 mg daily  -- Recheck B12 and folic acid in 1 month at PCP follow-up    Neuropathy  -Continue Lyrica     Restless leg syndrome  -Continue Mirapex    Morbid obesity  Hypoxia, resolved  -Likely related to opiate use postoperatively and morbid obesity  -O2 to maintain sats greater than 90 and wean O2 as tolerated  --now on room air     Anxiety  -Continue Zoloft      Expected Discharge Location and Transportation: [facility] once precert obtained  Expected Discharge   Expected Discharge Date: 1/8/2024; Expected Discharge Time:     DVT prophylaxis:  Mechanical DVT prophylaxis orders are present. No lovenox due to hematuria    AM-PAC 6 Clicks Score (PT): 11 (01/06/24 0730)    CODE STATUS:   Code Status and Medical Interventions:   Ordered at: 12/29/23 0121     Code Status (Patient has no pulse and is not breathing):    CPR (Attempt to Resuscitate)     Medical Interventions (Patient has pulse or is breathing):    Full Support       Ginette Junior, APRN  01/06/24
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
## 2024-01-06 NOTE — CASE MANAGEMENT/SOCIAL WORK
Continued Stay Note  [facility]     Patient Name: Phoebe Carvajal  MRN: [id number]  Today's Date: 1/6/2024    Admit Date: 12/28/2023    Plan: update   Discharge Plan       Row Name 01/06/24 1131       Plan    Plan Comments CM spoke with Masood at [facility]. The patient's insurance is still pending approval. CM updated APRN and patient. Will follow up with Masood again tomorrow.                   Discharge Codes    No documentation.                 Expected Discharge Date and Time       Expected Discharge Date Expected Discharge Time    Jan 5, 2024               Elizabeth Guzmán RN
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
## 2024-01-07 LAB
ALBUMIN SERPL-MCNC: 2.4 G/DL (ref 3.5–5.2)
ALBUMIN/GLOB SERPL: 0.9 G/DL
ALP SERPL-CCNC: 182 U/L (ref 39–117)
ALT SERPL W P-5'-P-CCNC: <5 U/L (ref 1–33)
ANION GAP SERPL CALCULATED.3IONS-SCNC: 6 MMOL/L (ref 5–15)
AST SERPL-CCNC: 14 U/L (ref 1–32)
BACTERIA SPEC AEROBE CULT: NO GROWTH
BASOPHILS # BLD AUTO: 0.02 10*3/MM3 (ref 0–0.2)
BASOPHILS NFR BLD AUTO: 0.6 % (ref 0–1.5)
BILIRUB SERPL-MCNC: 0.3 MG/DL (ref 0–1.2)
BUN SERPL-MCNC: 5 MG/DL (ref 6–20)
BUN/CREAT SERPL: 12.8 (ref 7–25)
CALCIUM SPEC-SCNC: 8 MG/DL (ref 8.6–10.5)
CHLORIDE SERPL-SCNC: 107 MMOL/L (ref 98–107)
CO2 SERPL-SCNC: 27 MMOL/L (ref 22–29)
CREAT SERPL-MCNC: 0.39 MG/DL (ref 0.57–1)
DEPRECATED RDW RBC AUTO: 62.4 FL (ref 37–54)
EGFRCR SERPLBLD CKD-EPI 2021: 121.5 ML/MIN/1.73
EOSINOPHIL # BLD AUTO: 0.19 10*3/MM3 (ref 0–0.4)
EOSINOPHIL NFR BLD AUTO: 5.7 % (ref 0.3–6.2)
ERYTHROCYTE [DISTWIDTH] IN BLOOD BY AUTOMATED COUNT: 15.5 % (ref 12.3–15.4)
GLOBULIN UR ELPH-MCNC: 2.7 GM/DL
GLUCOSE SERPL-MCNC: 95 MG/DL (ref 65–99)
HCT VFR BLD AUTO: 28.2 % (ref 34–46.6)
HGB BLD-MCNC: 8.8 G/DL (ref 12–15.9)
IMM GRANULOCYTES # BLD AUTO: 0 10*3/MM3 (ref 0–0.05)
IMM GRANULOCYTES NFR BLD AUTO: 0 % (ref 0–0.5)
LYMPHOCYTES # BLD AUTO: 0.95 10*3/MM3 (ref 0.7–3.1)
LYMPHOCYTES NFR BLD AUTO: 28.4 % (ref 19.6–45.3)
MACROCYTES BLD QL SMEAR: NORMAL
MCH RBC QN AUTO: 34.2 PG (ref 26.6–33)
MCHC RBC AUTO-ENTMCNC: 31.2 G/DL (ref 31.5–35.7)
MCV RBC AUTO: 109.7 FL (ref 79–97)
MONOCYTES # BLD AUTO: 0.32 10*3/MM3 (ref 0.1–0.9)
MONOCYTES NFR BLD AUTO: 9.6 % (ref 5–12)
NEUTROPHILS NFR BLD AUTO: 1.86 10*3/MM3 (ref 1.7–7)
NEUTROPHILS NFR BLD AUTO: 55.7 % (ref 42.7–76)
NRBC BLD AUTO-RTO: 0 /100 WBC (ref 0–0.2)
PLAT MORPH BLD: NORMAL
PLATELET # BLD AUTO: 233 10*3/MM3 (ref 140–450)
PMV BLD AUTO: 9.9 FL (ref 6–12)
POTASSIUM SERPL-SCNC: 4.2 MMOL/L (ref 3.5–5.2)
PROT SERPL-MCNC: 5.1 G/DL (ref 6–8.5)
RBC # BLD AUTO: 2.57 10*6/MM3 (ref 3.77–5.28)
SODIUM SERPL-SCNC: 140 MMOL/L (ref 136–145)
WBC MORPH BLD: NORMAL
WBC NRBC COR # BLD AUTO: 3.34 10*3/MM3 (ref 3.4–10.8)

## 2024-01-07 PROCEDURE — 85007 BL SMEAR W/DIFF WBC COUNT: CPT | Performed by: NURSE PRACTITIONER

## 2024-01-07 PROCEDURE — 85025 COMPLETE CBC W/AUTO DIFF WBC: CPT | Performed by: NURSE PRACTITIONER

## 2024-01-07 PROCEDURE — 25010000002 HYDROMORPHONE PER 4 MG: Performed by: NURSE PRACTITIONER

## 2024-01-07 PROCEDURE — 80053 COMPREHEN METABOLIC PANEL: CPT | Performed by: NURSE PRACTITIONER

## 2024-01-07 PROCEDURE — 99231 SBSQ HOSP IP/OBS SF/LOW 25: CPT | Performed by: NURSE PRACTITIONER

## 2024-01-07 PROCEDURE — 25010000002 CYANOCOBALAMIN PER 1000 MCG: Performed by: NURSE PRACTITIONER

## 2024-01-07 RX ADMIN — FOLIC ACID 1 MG: 1 TABLET ORAL at 09:32

## 2024-01-07 RX ADMIN — SERTRALINE HYDROCHLORIDE 50 MG: 50 TABLET ORAL at 20:36

## 2024-01-07 RX ADMIN — Medication 10 ML: at 09:32

## 2024-01-07 RX ADMIN — FAMOTIDINE 20 MG: 20 TABLET, FILM COATED ORAL at 16:30

## 2024-01-07 RX ADMIN — OXYCODONE HYDROCHLORIDE 15 MG: 15 TABLET ORAL at 12:00

## 2024-01-07 RX ADMIN — HYDROMORPHONE HYDROCHLORIDE 0.5 MG: 1 INJECTION, SOLUTION INTRAMUSCULAR; INTRAVENOUS; SUBCUTANEOUS at 04:19

## 2024-01-07 RX ADMIN — Medication 1 CAPSULE: at 09:32

## 2024-01-07 RX ADMIN — CYANOCOBALAMIN 1000 MCG: 1000 INJECTION, SOLUTION INTRAMUSCULAR; SUBCUTANEOUS at 09:37

## 2024-01-07 RX ADMIN — ACETAMINOPHEN 1000 MG: 500 TABLET ORAL at 06:26

## 2024-01-07 RX ADMIN — TRAZODONE HYDROCHLORIDE 50 MG: 50 TABLET ORAL at 20:35

## 2024-01-07 RX ADMIN — PREGABALIN 200 MG: 100 CAPSULE ORAL at 16:27

## 2024-01-07 RX ADMIN — Medication 10 ML: at 20:37

## 2024-01-07 RX ADMIN — HYDROMORPHONE HYDROCHLORIDE 0.5 MG: 1 INJECTION, SOLUTION INTRAMUSCULAR; INTRAVENOUS; SUBCUTANEOUS at 16:27

## 2024-01-07 RX ADMIN — PRAMIPEXOLE DIHYDROCHLORIDE 1 MG: 0.25 TABLET ORAL at 09:32

## 2024-01-07 RX ADMIN — ACETAMINOPHEN 1000 MG: 500 TABLET ORAL at 20:36

## 2024-01-07 RX ADMIN — OXYCODONE HYDROCHLORIDE 15 MG: 15 TABLET ORAL at 02:20

## 2024-01-07 RX ADMIN — HYDROMORPHONE HYDROCHLORIDE 0.5 MG: 1 INJECTION, SOLUTION INTRAMUSCULAR; INTRAVENOUS; SUBCUTANEOUS at 10:33

## 2024-01-07 RX ADMIN — TRAZODONE HYDROCHLORIDE 50 MG: 50 TABLET ORAL at 03:06

## 2024-01-07 RX ADMIN — OXYCODONE HYDROCHLORIDE 15 MG: 15 TABLET ORAL at 06:26

## 2024-01-07 RX ADMIN — PREGABALIN 200 MG: 100 CAPSULE ORAL at 09:31

## 2024-01-07 RX ADMIN — PREGABALIN 200 MG: 100 CAPSULE ORAL at 20:36

## 2024-01-07 RX ADMIN — FAMOTIDINE 20 MG: 20 TABLET, FILM COATED ORAL at 06:26

## 2024-01-07 RX ADMIN — OXYCODONE HYDROCHLORIDE 15 MG: 15 TABLET ORAL at 18:00

## 2024-01-07 RX ADMIN — LORAZEPAM 0.5 MG: 0.5 TABLET ORAL at 09:32

## 2024-01-07 RX ADMIN — LEVOTHYROXINE SODIUM 125 MCG: 125 TABLET ORAL at 06:26

## 2024-01-07 RX ADMIN — ACETAMINOPHEN 1000 MG: 500 TABLET ORAL at 14:47

## 2024-01-07 NOTE — PROGRESS NOTES
[facility] Medicine Services  PROGRESS NOTE    Patient Name: Phoebe Carvajal  : 1975  MRN: [id number]    Date of Admission: 2023  Primary Care Physician: Sofya Benjamin MD    Subjective   Subjective     CC:  F/u osteomyelitis    HPI:   Pt sitting up in bed, diarrhea is better today. Pt reports pain at the amputation site.     Objective   Vital Signs:   Temp:  [98 °F (36.7 °C)-98.8 °F (37.1 °C)] 98 °F (36.7 °C)  Heart Rate:  [55-88] 69  Resp:  [18] 18  BP: ()/(54-72) 103/55     Physical Exam:   Constitutional: Awake, alert, NAD  HENT: NCAT, mucous membranes moist  Respiratory: Clear to auscultation bilaterally, nonlabored  Cardiovascular: RRR, no murmurs, rubs, or gallops  Gastrointestinal: Positive bowel sounds, soft, nontender, nondistended  Musculoskeletal: no RLE edema, L AKA  Psychiatric: Appropriate affect, cooperative  Neurologic: Oriented x 3, BRIONES, speech clear  Skin: No rashes        Results Reviewed:  LAB RESULTS:      Lab 24  0433 24  0443   WBC 3.34* 2.85* 3.47 3.52 5.10   HEMOGLOBIN 8.8* 9.6* 9.0* 8.6* 9.5*   HEMATOCRIT 28.2* 30.5* 29.1* 26.9* 29.8*   PLATELETS 233 232 219 198 172   NEUTROS ABS 1.86 1.49*  --   --  3.42   IMMATURE GRANS (ABS) 0.00 0.01  --   --  0.01   LYMPHS ABS 0.95 0.91  --   --  0.93   MONOS ABS 0.32 0.27  --   --  0.53   EOS ABS 0.19 0.16  --   --  0.20   .7* 111.3* 111.9* 109.3* 111.6*         Lab 24  1116 24  0527 24  0433 24  0443   SODIUM 140 139 143 142 137   POTASSIUM 4.2 4.7 4.1 3.9 3.8   CHLORIDE 107 106 109* 107 100   CO2 27.0 27.0 29.0 31.0* 27.0   ANION GAP 6.0 6.0 5.0 4.0* 10.0   BUN 5* 5* 6 5* 4*   CREATININE 0.39* 0.37* 0.36* 0.32* 0.36*   EGFR 121.5 123.0 123.9 127.4 123.9   GLUCOSE 95 78 86 92 91   CALCIUM 8.0* 8.0* 7.9* 8.0* 8.2*         Lab 24  1116 24  0535   TOTAL PROTEIN 5.1*  --    ALBUMIN 2.4*  1.8*   GLOBULIN 2.7  --    ALT (SGPT) <5  --    AST (SGOT) 14  --    BILIRUBIN 0.3  --    ALK PHOS 182*  --                    Lab 01/04/24  0535   FOLATE 3.61*   VITAMIN B 12 161*         Brief Urine Lab Results  (Last result in the past 365 days)        Color   Clarity   Blood   Leuk Est   Nitrite   Protein   CREAT   Urine HCG        01/05/24 1351 Yellow   Cloudy   Negative   Small (1+)   Negative   Trace                   Microbiology Results Abnormal       Procedure Component Value - Date/Time    Anaerobic Culture - Surgical Site, Leg, Left [[id number]]  (Normal) Collected: 12/31/23 0833    Lab Status: Final result Specimen: Surgical Site from Leg, Left Updated: 01/06/24 0551     Anaerobic Culture No anaerobes isolated at 5 days    Fungus Culture - Surgical Site, Leg, Left [[id number]] Collected: 12/31/23 0831    Lab Status: Preliminary result Specimen: Surgical Site from Leg, Left Updated: 01/05/24 1946     Fungus Culture No fungus isolated at less than 1 week    AFB Culture - Surgical Site, Leg, Left [[id number]] Collected: 12/31/23 0831    Lab Status: Preliminary result Specimen: Surgical Site from Leg, Left Updated: 01/05/24 1946     AFB Culture No AFB isolated at less than 1 week     AFB Stain No acid fast bacilli seen on concentrated smear    Fungus Culture - Tissue, Leg, Left [[id number]] Collected: 12/29/23 1139    Lab Status: Preliminary result Specimen: Tissue from Leg, Left Updated: 01/05/24 1330     Fungus Culture No fungus isolated at 1 week    AFB Culture - Tissue, Leg, Left [[id number]] Collected: 12/29/23 1139    Lab Status: Preliminary result Specimen: Tissue from Leg, Left Updated: 01/05/24 1330     AFB Culture No AFB isolated at 1 week     AFB Stain No acid fast bacilli seen on concentrated smear    Tissue / Bone Culture - Surgical Site, Leg, Left [[id number]] Collected: 12/31/23 0831    Lab Status: Final result Specimen: Surgical Site from Leg, Left Updated: 01/04/24 0743     Tissue Culture No  growth at 3 days     Gram Stain Moderate (3+) WBCs per low power field      No organisms seen    Anaerobic Culture - Tissue, Leg, Left [[id number]]  (Normal) Collected: 12/29/23 1139    Lab Status: Final result Specimen: Tissue from Leg, Left Updated: 01/03/24 1001     Anaerobic Culture No anaerobes isolated at 5 days    Blood Culture - Blood, Arm, Right [[id number]]  (Normal) Collected: 12/28/23 2115    Lab Status: Final result Specimen: Blood from Arm, Right Updated: 01/03/24 0030     Blood Culture No growth at 5 days    Blood Culture - Blood, Hand, Right [[id number]]  (Normal) Collected: 12/28/23 2120    Lab Status: Final result Specimen: Blood from Hand, Right Updated: 01/03/24 0030     Blood Culture No growth at 5 days    Wound Culture - Wound, Leg, Left [[id number]] Collected: 12/29/23 1322    Lab Status: Final result Specimen: Wound from Leg, Left Updated: 01/01/24 0711     Wound Culture No growth at 3 days     Gram Stain No organisms seen    MRSA Screen, PCR (Inpatient) - Swab, Nares [[id number]]  (Normal) Collected: 12/29/23 0203    Lab Status: Final result Specimen: Swab from Nares Updated: 12/29/23 0733     MRSA PCR Negative    Narrative:      The negative predictive value of this diagnostic test is high and should only be used to consider de-escalating anti-MRSA therapy. A positive result may indicate colonization with MRSA and must be correlated clinically.  MRSA Negative    COVID PRE-OP / PRE-PROCEDURE SCREENING ORDER (NO ISOLATION) - Swab, Nasopharynx [[id number]]  (Normal) Collected: 12/29/23 0203    Lab Status: Final result Specimen: Swab from Nasopharynx Updated: 12/29/23 5293    Narrative:      The following orders were created for panel order COVID PRE-OP / PRE-PROCEDURE SCREENING ORDER (NO ISOLATION) - Swab, Nasopharynx.  Procedure                               Abnormality         Status                     ---------                               -----------         ------                      Respiratory Panel PCR w/...[[id number]]  Normal              Final result                 Please view results for these tests on the individual orders.    Respiratory Panel PCR w/COVID-19(SARS-CoV-2) ARIELLE/VESNA/KARIE/PAD/COR/SÁNCHEZ In-House, NP Swab in UTM/VTM, 2 HR TAT - Swab, Nasopharynx [[id number]]  (Normal) Collected: 12/29/23 0203    Lab Status: Final result Specimen: Swab from Nasopharynx Updated: 12/29/23 0353     ADENOVIRUS, PCR Not Detected     Coronavirus 229E Not Detected     Coronavirus HKU1 Not Detected     Coronavirus NL63 Not Detected     Coronavirus OC43 Not Detected     COVID19 Not Detected     Human Metapneumovirus Not Detected     Human Rhinovirus/Enterovirus Not Detected     Influenza A PCR Not Detected     Influenza B PCR Not Detected     Parainfluenza Virus 1 Not Detected     Parainfluenza Virus 2 Not Detected     Parainfluenza Virus 3 Not Detected     Parainfluenza Virus 4 Not Detected     RSV, PCR Not Detected     Bordetella pertussis pcr Not Detected     Bordetella parapertussis PCR Not Detected     Chlamydophila pneumoniae PCR Not Detected     Mycoplasma pneumo by PCR Not Detected    Narrative:      In the setting of a positive respiratory panel with a viral infection PLUS a negative procalcitonin without other underlying concern for bacterial infection, consider observing off antibiotics or discontinuation of antibiotics and continue supportive care. If the respiratory panel is positive for atypical bacterial infection (Bordetella pertussis, Chlamydophila pneumoniae, or Mycoplasma pneumoniae), consider antibiotic de-escalation to target atypical bacterial infection.            No radiology results from the last 24 hrs        Current medications:  Scheduled Meds:Pharmacy Consult, , Does not apply, Q12H  acetaminophen, 1,000 mg, Oral, Q8H  cyanocobalamin, 1,000 mcg, Intramuscular, Daily  famotidine, 20 mg, Oral, BID AC  folic acid, 1 mg, Oral, Daily  lactobacillus acidophilus, 1 capsule, Oral,  Daily  levothyroxine, 125 mcg, Oral, Daily  oxyCODONE, 15 mg, Oral, Q6H  pramipexole, 1 mg, Oral, Daily  pregabalin, 200 mg, Oral, TID  senna-docusate sodium, 2 tablet, Oral, BID  sertraline, 50 mg, Oral, Nightly  sodium chloride, 10 mL, Intravenous, Q12H      Continuous Infusions:lactated ringers, 9 mL/hr      PRN Meds:.  senna-docusate sodium **AND** polyethylene glycol **AND** bisacodyl **AND** bisacodyl    diphenhydrAMINE    HYDROmorphone **AND** naloxone    lactated ringers    loperamide    LORazepam    melatonin    ondansetron    sodium chloride    sodium chloride    traZODone    Assessment & Plan   Assessment & Plan     Active Hospital Problems    Diagnosis  POA    **Lower extremity cellulitis [L03.119]  Yes    Anxiety associated with depression [F41.8]  Unknown    RLS (restless legs syndrome) [G25.81]  Unknown    Neuropathy [G62.9]  Unknown    Obesity, morbid, BMI 50 or higher [E66.01]  Unknown    S/P AKA (above knee amputation), left [Z89.612]  Not Applicable    Chronic osteomyelitis [M86.60]  Yes    Acquired hypothyroidism [E03.9]  Yes    Ulcer of right midfoot with fat layer exposed [L97.412]  Yes    Ulcer of left heel, with fat layer exposed [L97.422]  Yes      Resolved Hospital Problems   No resolved problems to display.        Brief Hospital Course to date:  Phoebe Carvajal is a 48 y.o. female with history of depression, hypothyroidism, stills disease, RLS, PAD, anemia, morbid obesity, recurrent osteomyelitis, chronic lower extremity osteomyelitis, recurrent left prosthetic joint infection, bilateral foot wounds who presented to the emergency department for evaluation of fever and worsening erythema with foul odor from her chronic wounds. Previous records from  from October 22 through November 8, 2023 showed intention for proceeding with left AKA but patient waited for second opinion hoping to have the option of BKA.  She is followed by infectious disease-known to Dr. Wang.  She underwent AKA on  12/29/2023 with Dr. Witt, with additional debridement on 12/31/2023     This patient's problems and plans were partially entered by my partner and updated as appropriate by me 01/07/24.    Copied text in this note has been reviewed and is accurate as of 01/07/24.     Gross hematuria  - no longer has menstrual cycle.   - urology follow up 6-8 weeks  - urine culture added on    Osteomyelitis/cellulitis of the left lower extremity  Status post left AKA on 12/29/2023 Dr. Witt  Chronic infected wounds  -Dr. Witt performed debridement again on 12/31/2023  -Patient has complained of excruciating pain postoperatively  -Continue prn IV Dilaudid, prn oxycodone  -ID consulted, Dr. Tayo Arnold following  --wound cultures: Anaerobic culture no anaerobes, tissue culture Gram stain no growth, AFB stain no acid-fast bacilli seen, culture no growth, fungus culture no growth, AFB culture IBF stain noted, MRSA PCR negative  - s/p ceftazidime and daptomycin 1/4/2024  --drain removed 1/2/24  --daily dressing changes Xeroform 4x4, Kerlix, ACE wrap  -- Follow up with PANDA English with Dr. Witt in 2 weeks for wound check and x-rays  -- Continue to wean in anticipation of transfer to rehab  -- Continue scheduled Tylenol, continue scheduled oxycodone 15 mg q6h per home dosing as discussed with palliative care     Right foot ulcer  --PT wound care following  --Offload right foot    Diarrhea  -- Suspect secondary to IV antibiotics, remains afebrile, WBCs WNL  -- Add probiotic daily  -- Imodium as needed  -- 1/4/24 KUB nonobstructive bowel gas pattern, anasarca    Anemia  -- Likely postop but also macrocytic per labs  -- B12 and folate low  -- Start B12 replacement cyanocobalamin 1000 mcg IM daily x 7 days, then weekly x 4 weeks  -- continue folic acid 1 mg daily  -- Recheck B12 and folic acid in 1 month at PCP follow-up    Neuropathy  -Continue Lyrica     Restless leg syndrome  -Continue Mirapex    Morbid  obesity  Hypoxia, resolved  -Likely related to opiate use postoperatively and morbid obesity  -O2 to maintain sats greater than 90 and wean O2 as tolerated  --now on room air     Anxiety  -Continue Zoloft      Expected Discharge Location and Transportation: [facility] once precert obtained  Expected Discharge   Expected Discharge Date: 1/8/2024; Expected Discharge Time:     DVT prophylaxis:  Mechanical DVT prophylaxis orders are present. No lovenox due to hematuria    AM-PAC 6 Clicks Score (PT): 8 (01/07/24 0800)    CODE STATUS:   Code Status and Medical Interventions:   Ordered at: 12/29/23 0121     Code Status (Patient has no pulse and is not breathing):    CPR (Attempt to Resuscitate)     Medical Interventions (Patient has pulse or is breathing):    Full Support       Jocelyne Kinsey, APRN  01/07/24

## 2024-01-07 NOTE — CASE MANAGEMENT/SOCIAL WORK
Continued Stay Note  [facility]     Patient Name: Phoebe Carvajal  MRN: [id number]  Today's Date: 1/7/2024    Admit Date: 12/28/2023    Plan: update   Discharge Plan       Row Name 01/07/24 1031       Plan    Plan update    Plan Comments Spoke with Masood at  again today. Insurance pre cert is still pending today. Weekday CM, please check status with [facility] tomorrow. CM following.                   Discharge Codes    No documentation.                 Expected Discharge Date and Time       Expected Discharge Date Expected Discharge Time    Jan 8, 2024               Elizabeth Guzmán RN

## 2024-01-08 PROBLEM — L03.119 LOWER EXTREMITY CELLULITIS: Status: RESOLVED | Noted: 2023-12-28 | Resolved: 2024-01-08

## 2024-01-08 PROBLEM — L97.422 ULCER OF LEFT HEEL, WITH FAT LAYER EXPOSED: Chronic | Status: RESOLVED | Noted: 2022-03-07 | Resolved: 2024-01-08

## 2024-01-08 PROBLEM — L97.412: Chronic | Status: RESOLVED | Noted: 2022-03-07 | Resolved: 2024-01-08

## 2024-01-08 PROCEDURE — 25010000002 HYDROMORPHONE PER 4 MG: Performed by: NURSE PRACTITIONER

## 2024-01-08 PROCEDURE — 99232 SBSQ HOSP IP/OBS MODERATE 35: CPT | Performed by: PEDIATRICS

## 2024-01-08 PROCEDURE — 29581 APPL MULTLAYER CMPRN SYS LEG: CPT

## 2024-01-08 PROCEDURE — 97597 DBRDMT OPN WND 1ST 20 CM/<: CPT

## 2024-01-08 PROCEDURE — 25010000002 CYANOCOBALAMIN PER 1000 MCG: Performed by: NURSE PRACTITIONER

## 2024-01-08 RX ORDER — L.ACID,PARA/B.BIFIDUM/S.THERM 8B CELL
1 CAPSULE ORAL DAILY
Start: 2024-01-09

## 2024-01-08 RX ORDER — OXYCODONE HYDROCHLORIDE 5 MG/1
5 TABLET ORAL EVERY 6 HOURS PRN
Status: DISCONTINUED | OUTPATIENT
Start: 2024-01-08 | End: 2024-01-13

## 2024-01-08 RX ORDER — LORAZEPAM 0.5 MG/1
0.5 TABLET ORAL DAILY PRN
Start: 2024-01-08

## 2024-01-08 RX ORDER — OXYCODONE HYDROCHLORIDE 15 MG/1
15 TABLET ORAL EVERY 6 HOURS
Start: 2024-01-08

## 2024-01-08 RX ORDER — FAMOTIDINE 20 MG/1
20 TABLET, FILM COATED ORAL
Start: 2024-01-08

## 2024-01-08 RX ORDER — CYANOCOBALAMIN 1000 UG/ML
1000 INJECTION, SOLUTION INTRAMUSCULAR; SUBCUTANEOUS DAILY
Qty: 2 ML | Refills: 0 | Status: SHIPPED | OUTPATIENT
Start: 2024-01-09 | End: 2024-01-11

## 2024-01-08 RX ORDER — TRAZODONE HYDROCHLORIDE 50 MG/1
50 TABLET ORAL NIGHTLY PRN
Start: 2024-01-08

## 2024-01-08 RX ORDER — FOLIC ACID 1 MG/1
1 TABLET ORAL DAILY
Start: 2024-01-09

## 2024-01-08 RX ORDER — ACETAMINOPHEN 500 MG
1000 TABLET ORAL EVERY 8 HOURS SCHEDULED
Start: 2024-01-08

## 2024-01-08 RX ORDER — BUTALBITAL, ACETAMINOPHEN AND CAFFEINE 50; 325; 40 MG/1; MG/1; MG/1
2 TABLET ORAL ONCE
Status: COMPLETED | OUTPATIENT
Start: 2024-01-08 | End: 2024-01-08

## 2024-01-08 RX ADMIN — FAMOTIDINE 20 MG: 20 TABLET, FILM COATED ORAL at 08:22

## 2024-01-08 RX ADMIN — FAMOTIDINE 20 MG: 20 TABLET, FILM COATED ORAL at 16:39

## 2024-01-08 RX ADMIN — Medication 1 CAPSULE: at 08:22

## 2024-01-08 RX ADMIN — SERTRALINE HYDROCHLORIDE 50 MG: 50 TABLET ORAL at 20:10

## 2024-01-08 RX ADMIN — TRAZODONE HYDROCHLORIDE 50 MG: 50 TABLET ORAL at 20:10

## 2024-01-08 RX ADMIN — OXYCODONE HYDROCHLORIDE 15 MG: 15 TABLET ORAL at 17:04

## 2024-01-08 RX ADMIN — ACETAMINOPHEN 1000 MG: 500 TABLET ORAL at 14:41

## 2024-01-08 RX ADMIN — Medication 10 ML: at 20:11

## 2024-01-08 RX ADMIN — Medication 5 MG: at 20:10

## 2024-01-08 RX ADMIN — OXYCODONE HYDROCHLORIDE 15 MG: 15 TABLET ORAL at 00:44

## 2024-01-08 RX ADMIN — PREGABALIN 200 MG: 100 CAPSULE ORAL at 16:39

## 2024-01-08 RX ADMIN — BUTALBITAL, ACETAMINOPHEN AND CAFFEINE 2 TABLET: 325; 50; 40 TABLET ORAL at 11:55

## 2024-01-08 RX ADMIN — HYDROMORPHONE HYDROCHLORIDE 0.5 MG: 1 INJECTION, SOLUTION INTRAMUSCULAR; INTRAVENOUS; SUBCUTANEOUS at 02:32

## 2024-01-08 RX ADMIN — PREGABALIN 200 MG: 100 CAPSULE ORAL at 20:09

## 2024-01-08 RX ADMIN — LEVOTHYROXINE SODIUM 125 MCG: 125 TABLET ORAL at 06:13

## 2024-01-08 RX ADMIN — HYDROMORPHONE HYDROCHLORIDE 0.5 MG: 1 INJECTION, SOLUTION INTRAMUSCULAR; INTRAVENOUS; SUBCUTANEOUS at 08:33

## 2024-01-08 RX ADMIN — OXYCODONE HYDROCHLORIDE 15 MG: 15 TABLET ORAL at 06:13

## 2024-01-08 RX ADMIN — CYANOCOBALAMIN 1000 MCG: 1000 INJECTION, SOLUTION INTRAMUSCULAR; SUBCUTANEOUS at 08:23

## 2024-01-08 RX ADMIN — ACETAMINOPHEN 1000 MG: 500 TABLET ORAL at 06:13

## 2024-01-08 RX ADMIN — PREGABALIN 200 MG: 100 CAPSULE ORAL at 08:22

## 2024-01-08 RX ADMIN — FOLIC ACID 1 MG: 1 TABLET ORAL at 08:23

## 2024-01-08 RX ADMIN — OXYCODONE HYDROCHLORIDE 15 MG: 15 TABLET ORAL at 11:55

## 2024-01-08 RX ADMIN — OXYCODONE HYDROCHLORIDE 5 MG: 5 TABLET ORAL at 20:10

## 2024-01-08 RX ADMIN — PRAMIPEXOLE DIHYDROCHLORIDE 1 MG: 0.25 TABLET ORAL at 08:22

## 2024-01-08 RX ADMIN — LORAZEPAM 0.5 MG: 0.5 TABLET ORAL at 20:10

## 2024-01-08 RX ADMIN — Medication 10 ML: at 08:23
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
## 2024-01-08 NOTE — PLAN OF CARE
Goal Outcome Evaluation:                 Aox4, on RA overnight. Continues rating pain 9-10 out of 10. Dressing on AKA changed overnight.

## 2024-01-08 NOTE — PLAN OF CARE
Goal Outcome Evaluation:  Plan of Care Reviewed With: patient        Progress: improving  Outcome Evaluation: Pt's R plantar foot wound continuing with notable improvements in re-epithelialization of wound edges along with granulation of wound base. Pt also with good improvements in RLE edema and skin integrity with use of MLW. Pt would continue to benefit from further debridement PRN and MLW changes every 2-3 days to promote wound healing.

## 2024-01-08 NOTE — PROGRESS NOTES
[facility]  PROGRESS NOTE    Patient Name: Phoebe Carvajal  : 1975  MRN: [id number]    Date of Admission: 2023  Primary Care Physician: Sofya Benjamin MD    Subjective   Subjective     CC:  F/u osteomyelitis    HPI:   Pt doing well.  Ready to go to rehab.  Has a migraine today with photophobia.  Has taken nurtec in the past.    Objective   Vital Signs:   Temp:  [98.3 °F (36.8 °C)-98.5 °F (36.9 °C)] 98.3 °F (36.8 °C)  Heart Rate:  [56-84] 72  Resp:  [18] 18  BP: (107-111)/(61-65) 111/61  Flow (L/min):  [2] 2     Physical Exam:   Constitutional: Awake, alert, NAD  HENT: NCAT, mucous membranes moist  Respiratory: Clear to auscultation bilaterally, nonlabored  Cardiovascular: RRR, no murmurs, rubs, or gallops  Gastrointestinal: Positive bowel sounds, soft, nontender, nondistended  Musculoskeletal: no RLE edema, L AKA.  Area on incision with some serous drainage.  Area on the medial side that appears darker on the skin around the incision.  Psychiatric: Appropriate affect, cooperative  Neurologic: Oriented x 3, BRIONES, speech clear  Skin: No rashes        Results Reviewed:  LAB RESULTS:      Lab 24  1116 24  0526 24  0527 24  0433 24  0443   WBC 3.34* 2.85* 3.47 3.52 5.10   HEMOGLOBIN 8.8* 9.6* 9.0* 8.6* 9.5*   HEMATOCRIT 28.2* 30.5* 29.1* 26.9* 29.8*   PLATELETS 233 232 219 198 172   NEUTROS ABS 1.86 1.49*  --   --  3.42   IMMATURE GRANS (ABS) 0.00 0.01  --   --  0.01   LYMPHS ABS 0.95 0.91  --   --  0.93   MONOS ABS 0.32 0.27  --   --  0.53   EOS ABS 0.19 0.16  --   --  0.20   .7* 111.3* 111.9* 109.3* 111.6*         Lab 24  1116 24  0526 24  0527 24  0433 24  0443   SODIUM 140 139 143 142 137   POTASSIUM 4.2 4.7 4.1 3.9 3.8   CHLORIDE 107 106 109* 107 100   CO2 27.0 27.0 29.0 31.0* 27.0   ANION GAP 6.0 6.0 5.0 4.0* 10.0   BUN 5* 5* 6 5* 4*   CREATININE 0.39* 0.37* 0.36* 0.32* 0.36*   EGFR 121.5  123.0 123.9 127.4 123.9   GLUCOSE 95 78 86 92 91   CALCIUM 8.0* 8.0* 7.9* 8.0* 8.2*         Lab 01/07/24  1116 01/04/24  0535   TOTAL PROTEIN 5.1*  --    ALBUMIN 2.4* 1.8*   GLOBULIN 2.7  --    ALT (SGPT) <5  --    AST (SGOT) 14  --    BILIRUBIN 0.3  --    ALK PHOS 182*  --                    Lab 01/04/24  0535   FOLATE 3.61*   VITAMIN B 12 161*         Brief Urine Lab Results  (Last result in the past 365 days)        Color   Clarity   Blood   Leuk Est   Nitrite   Protein   CREAT   Urine HCG        01/05/24 1351 Yellow   Cloudy   Negative   Small (1+)   Negative   Trace                   Microbiology Results Abnormal       Procedure Component Value - Date/Time    Fungus Culture - Surgical Site, Leg, Left [[id number]] Collected: 12/31/23 0831    Lab Status: Preliminary result Specimen: Surgical Site from Leg, Left Updated: 01/07/24 1946     Fungus Culture No fungus isolated at 1 week    AFB Culture - Surgical Site, Leg, Left [[id number]] Collected: 12/31/23 0831    Lab Status: Preliminary result Specimen: Surgical Site from Leg, Left Updated: 01/07/24 1946     AFB Culture No AFB isolated at 1 week     AFB Stain No acid fast bacilli seen on concentrated smear    Urine Culture - Urine, Urine, Clean Catch [[id number]]  (Normal) Collected: 01/06/24 1224    Lab Status: Final result Specimen: Urine, Clean Catch Updated: 01/07/24 1429     Urine Culture No growth    Anaerobic Culture - Surgical Site, Leg, Left [[id number]]  (Normal) Collected: 12/31/23 0833    Lab Status: Final result Specimen: Surgical Site from Leg, Left Updated: 01/06/24 0551     Anaerobic Culture No anaerobes isolated at 5 days    Fungus Culture - Tissue, Leg, Left [[id number]] Collected: 12/29/23 1139    Lab Status: Preliminary result Specimen: Tissue from Leg, Left Updated: 01/05/24 1330     Fungus Culture No fungus isolated at 1 week    AFB Culture - Tissue, Leg, Left [[id number]] Collected: 12/29/23 1139    Lab Status: Preliminary result Specimen: Tissue  from Leg, Left Updated: 01/05/24 1330     AFB Culture No AFB isolated at 1 week     AFB Stain No acid fast bacilli seen on concentrated smear    Tissue / Bone Culture - Surgical Site, Leg, Left [[id number]] Collected: 12/31/23 0831    Lab Status: Final result Specimen: Surgical Site from Leg, Left Updated: 01/04/24 0743     Tissue Culture No growth at 3 days     Gram Stain Moderate (3+) WBCs per low power field      No organisms seen    Anaerobic Culture - Tissue, Leg, Left [[id number]]  (Normal) Collected: 12/29/23 1139    Lab Status: Final result Specimen: Tissue from Leg, Left Updated: 01/03/24 1001     Anaerobic Culture No anaerobes isolated at 5 days    Blood Culture - Blood, Arm, Right [[id number]]  (Normal) Collected: 12/28/23 2115    Lab Status: Final result Specimen: Blood from Arm, Right Updated: 01/03/24 0030     Blood Culture No growth at 5 days    Blood Culture - Blood, Hand, Right [[id number]]  (Normal) Collected: 12/28/23 2120    Lab Status: Final result Specimen: Blood from Hand, Right Updated: 01/03/24 0030     Blood Culture No growth at 5 days    Wound Culture - Wound, Leg, Left [[id number]] Collected: 12/29/23 1322    Lab Status: Final result Specimen: Wound from Leg, Left Updated: 01/01/24 0711     Wound Culture No growth at 3 days     Gram Stain No organisms seen    MRSA Screen, PCR (Inpatient) - Swab, Nares [[id number]]  (Normal) Collected: 12/29/23 0203    Lab Status: Final result Specimen: Swab from Nares Updated: 12/29/23 0733     MRSA PCR Negative    Narrative:      The negative predictive value of this diagnostic test is high and should only be used to consider de-escalating anti-MRSA therapy. A positive result may indicate colonization with MRSA and must be correlated clinically.  MRSA Negative    COVID PRE-OP / PRE-PROCEDURE SCREENING ORDER (NO ISOLATION) - Swab, Nasopharynx [[id number]]  (Normal) Collected: 12/29/23 0203    Lab Status: Final result Specimen: Swab from Nasopharynx Updated:  12/29/23 0353    Narrative:      The following orders were created for panel order COVID PRE-OP / PRE-PROCEDURE SCREENING ORDER (NO ISOLATION) - Swab, Nasopharynx.  Procedure                               Abnormality         Status                     ---------                               -----------         ------                     Respiratory Panel PCR w/...[[id number]]  Normal              Final result                 Please view results for these tests on the individual orders.    Respiratory Panel PCR w/COVID-19(SARS-CoV-2) ARIELLE/VESNA/KARIE/PAD/COR/SÁNCHEZ In-House, NP Swab in UTM/VTM, 2 HR TAT - Swab, Nasopharynx [[id number]]  (Normal) Collected: 12/29/23 0203    Lab Status: Final result Specimen: Swab from Nasopharynx Updated: 12/29/23 0353     ADENOVIRUS, PCR Not Detected     Coronavirus 229E Not Detected     Coronavirus HKU1 Not Detected     Coronavirus NL63 Not Detected     Coronavirus OC43 Not Detected     COVID19 Not Detected     Human Metapneumovirus Not Detected     Human Rhinovirus/Enterovirus Not Detected     Influenza A PCR Not Detected     Influenza B PCR Not Detected     Parainfluenza Virus 1 Not Detected     Parainfluenza Virus 2 Not Detected     Parainfluenza Virus 3 Not Detected     Parainfluenza Virus 4 Not Detected     RSV, PCR Not Detected     Bordetella pertussis pcr Not Detected     Bordetella parapertussis PCR Not Detected     Chlamydophila pneumoniae PCR Not Detected     Mycoplasma pneumo by PCR Not Detected    Narrative:      In the setting of a positive respiratory panel with a viral infection PLUS a negative procalcitonin without other underlying concern for bacterial infection, consider observing off antibiotics or discontinuation of antibiotics and continue supportive care. If the respiratory panel is positive for atypical bacterial infection (Bordetella pertussis, Chlamydophila pneumoniae, or Mycoplasma pneumoniae), consider antibiotic de-escalation to target atypical bacterial infection.             No radiology results from the last 24 hrs        Current medications:  Scheduled Meds:Pharmacy Consult, , Does not apply, Q12H  acetaminophen, 1,000 mg, Oral, Q8H  cyanocobalamin, 1,000 mcg, Intramuscular, Daily  famotidine, 20 mg, Oral, BID AC  folic acid, 1 mg, Oral, Daily  lactobacillus acidophilus, 1 capsule, Oral, Daily  levothyroxine, 125 mcg, Oral, Daily  oxyCODONE, 15 mg, Oral, Q6H  pramipexole, 1 mg, Oral, Daily  pregabalin, 200 mg, Oral, TID  senna-docusate sodium, 2 tablet, Oral, BID  sertraline, 50 mg, Oral, Nightly  sodium chloride, 10 mL, Intravenous, Q12H      Continuous Infusions:lactated ringers, 9 mL/hr      PRN Meds:.  senna-docusate sodium **AND** polyethylene glycol **AND** bisacodyl **AND** bisacodyl    diphenhydrAMINE    lactated ringers    loperamide    LORazepam    melatonin    ondansetron    oxyCODONE    sodium chloride    sodium chloride    traZODone    Assessment & Plan   Assessment & Plan     Active Hospital Problems    Diagnosis  POA    Anxiety associated with depression [F41.8]  Yes    RLS (restless legs syndrome) [G25.81]  Yes    Neuropathy [G62.9]  Yes    Obesity, morbid, BMI 50 or higher [E66.01]  Yes    S/P AKA (above knee amputation), left [Z89.612]  Not Applicable    Chronic osteomyelitis [M86.60]  Yes    Acquired hypothyroidism [E03.9]  Yes    Ulcer of right midfoot with fat layer exposed [L97.412]  Yes      Resolved Hospital Problems    Diagnosis Date Resolved POA    **Lower extremity cellulitis [L03.119] 01/08/2024 Yes    Ulcer of left heel, with fat layer exposed [L97.422] 01/08/2024 Yes        Brief Hospital Course to date:  Phoebe Carvajal is a 48 y.o. female with history of depression, hypothyroidism, stills disease, RLS, PAD, anemia, morbid obesity, recurrent osteomyelitis, chronic lower extremity osteomyelitis, recurrent left prosthetic joint infection, bilateral foot wounds who presented to the emergency department for evaluation of fever and worsening  erythema with foul odor from her chronic wounds. Previous records from  from October 22 through November 8, 2023 showed intention for proceeding with left AKA but patient waited for second opinion hoping to have the option of BKA.  She is followed by infectious disease-known to Dr. Wang.  She underwent AKA on 12/29/2023 with Dr. Witt, with additional debridement on 12/31/2023     This patient's problems and plans were partially entered by my partner and updated as appropriate by me 01/08/24.    Copied text in this note has been reviewed and is accurate as of 01/08/24.     Gross hematuria  - no longer has menstrual cycle.   - urology follow up 6-8 weeks      Osteomyelitis/cellulitis of the left lower extremity  Status post left AKA on 12/29/2023 Dr. Witt  Chronic infected wounds  -Dr. Witt performed debridement again on 12/31/2023  --stopped IV dilaudid today as pt will be d/c tomorrow.  --cont scheduled oxycodone with addition of a prn dose for breakthrough pain  --ID consulted, Dr. Tayo Arnold following  --wound cultures: Anaerobic culture no anaerobes, tissue culture Gram stain no growth, AFB stain no acid-fast bacilli seen, culture no growth, fungus culture no growth, AFB culture IBF stain noted, MRSA PCR negative  - s/p ceftazidime and daptomycin 1/4/2024  --drain removed 1/2/24  --daily dressing changes Xeroform 4x4, Kerlix, ACE wrap  -- Follow up with PAN[facility] English with Dr. Witt in 2 weeks for wound check and x-rays  -- Continue scheduled Tylenol, continue scheduled oxycodone 15 mg q6h per home dosing as discussed with palliative care    Migraine HA  --will try dose of fiorecet.     Right foot ulcer  --PT wound care following  --Offload right foot    Diarrhea  -- Suspect secondary to IV antibiotics, remains afebrile, WBCs WNL  -- cont probiotic daily  -- Imodium as needed      Anemia  -- Likely postop but also macrocytic per labs  -- B12 and folate low  -- Start B12 replacement  cyanocobalamin 1000 mcg IM daily x 7 days, then weekly x 4 weeks  -- continue folic acid 1 mg daily  -- Recheck B12 and folic acid in 1 month at PCP follow-up    Neuropathy  -Continue Lyrica     Restless leg syndrome  -Continue Mirapex    Morbid obesity  Hypoxia, resolved  -Likely related to opiate use postoperatively and morbid obesity  -O2 to maintain sats greater than 90 and wean O2 as tolerated  --now on room air     Anxiety  -Continue Zoloft      Expected Discharge Location and Transportation: [facility] once precnatalie obtained  Expected Discharge   Expected Discharge Date: 1/9/2024; Expected Discharge Time:  3:00 PM    DVT prophylaxis:  Mechanical DVT prophylaxis orders are present. No lovenox due to hematuria    AM-PAC 6 Clicks Score (PT): 9 (01/08/24 0800)    CODE STATUS:   Code Status and Medical Interventions:   Ordered at: 12/29/23 0121     Code Status (Patient has no pulse and is not breathing):    CPR (Attempt to Resuscitate)     Medical Interventions (Patient has pulse or is breathing):    Full Support       Zenobia Romo MD  01/08/24
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
## 2024-01-09 ENCOUNTER — ANESTHESIA EVENT (OUTPATIENT)
Dept: PERIOP | Facility: HOSPITAL | Age: 49
End: 2024-01-09
Payer: COMMERCIAL

## 2024-01-09 ENCOUNTER — ANESTHESIA (OUTPATIENT)
Dept: PERIOP | Facility: HOSPITAL | Age: 49
End: 2024-01-09
Payer: COMMERCIAL

## 2024-01-09 ENCOUNTER — APPOINTMENT (OUTPATIENT)
Dept: CT IMAGING | Facility: HOSPITAL | Age: 49
End: 2024-01-09
Payer: COMMERCIAL

## 2024-01-09 PROCEDURE — 87176 TISSUE HOMOGENIZATION CULTR: CPT | Performed by: ORTHOPAEDIC SURGERY

## 2024-01-09 PROCEDURE — 73700 CT LOWER EXTREMITY W/O DYE: CPT

## 2024-01-09 PROCEDURE — 25810000003 LACTATED RINGERS PER 1000 ML: Performed by: ANESTHESIOLOGY

## 2024-01-09 PROCEDURE — 25010000002 SUGAMMADEX 200 MG/2ML SOLUTION: Performed by: ANESTHESIOLOGY

## 2024-01-09 PROCEDURE — 87015 SPECIMEN INFECT AGNT CONCNTJ: CPT | Performed by: ORTHOPAEDIC SURGERY

## 2024-01-09 PROCEDURE — 87205 SMEAR GRAM STAIN: CPT | Performed by: ORTHOPAEDIC SURGERY

## 2024-01-09 PROCEDURE — 25010000002 FENTANYL CITRATE (PF) 50 MCG/ML SOLUTION

## 2024-01-09 PROCEDURE — 87206 SMEAR FLUORESCENT/ACID STAI: CPT | Performed by: ORTHOPAEDIC SURGERY

## 2024-01-09 PROCEDURE — 0KBR0ZZ EXCISION OF LEFT UPPER LEG MUSCLE, OPEN APPROACH: ICD-10-PCS | Performed by: ORTHOPAEDIC SURGERY

## 2024-01-09 PROCEDURE — 25010000002 ONDANSETRON PER 1 MG: Performed by: ANESTHESIOLOGY

## 2024-01-09 PROCEDURE — 87116 MYCOBACTERIA CULTURE: CPT | Performed by: ORTHOPAEDIC SURGERY

## 2024-01-09 PROCEDURE — 25010000002 FENTANYL CITRATE (PF) 100 MCG/2ML SOLUTION: Performed by: ANESTHESIOLOGY

## 2024-01-09 PROCEDURE — 87075 CULTR BACTERIA EXCEPT BLOOD: CPT | Performed by: ORTHOPAEDIC SURGERY

## 2024-01-09 PROCEDURE — 87102 FUNGUS ISOLATION CULTURE: CPT | Performed by: ORTHOPAEDIC SURGERY

## 2024-01-09 PROCEDURE — 25010000002 CYANOCOBALAMIN PER 1000 MCG: Performed by: NURSE PRACTITIONER

## 2024-01-09 PROCEDURE — 25010000002 PROPOFOL 10 MG/ML EMULSION: Performed by: ANESTHESIOLOGY

## 2024-01-09 PROCEDURE — 99232 SBSQ HOSP IP/OBS MODERATE 35: CPT | Performed by: PEDIATRICS

## 2024-01-09 PROCEDURE — 87070 CULTURE OTHR SPECIMN AEROBIC: CPT | Performed by: ORTHOPAEDIC SURGERY

## 2024-01-09 PROCEDURE — 25010000002 HYDROMORPHONE 1 MG/ML SOLUTION

## 2024-01-09 PROCEDURE — 25010000002 CEFAZOLIN PER 500 MG: Performed by: ORTHOPAEDIC SURGERY

## 2024-01-09 PROCEDURE — 25010000002 DEXAMETHASONE PER 1 MG: Performed by: ANESTHESIOLOGY

## 2024-01-09 PROCEDURE — 25010000002 DAPTOMYCIN PER 1 MG: Performed by: ORTHOPAEDIC SURGERY

## 2024-01-09 PROCEDURE — 25010000002 CEFEPIME PER 500 MG: Performed by: ORTHOPAEDIC SURGERY

## 2024-01-09 RX ORDER — HYDROMORPHONE HYDROCHLORIDE 1 MG/ML
0.5 INJECTION, SOLUTION INTRAMUSCULAR; INTRAVENOUS; SUBCUTANEOUS
Status: DISCONTINUED | OUTPATIENT
Start: 2024-01-09 | End: 2024-01-09 | Stop reason: HOSPADM

## 2024-01-09 RX ORDER — SODIUM CHLORIDE 9 MG/ML
40 INJECTION, SOLUTION INTRAVENOUS AS NEEDED
Status: CANCELLED | OUTPATIENT
Start: 2024-01-09

## 2024-01-09 RX ORDER — METRONIDAZOLE 500 MG/100ML
500 INJECTION, SOLUTION INTRAVENOUS EVERY 8 HOURS
Qty: 3000 ML | Refills: 0 | Status: DISCONTINUED | OUTPATIENT
Start: 2024-01-09 | End: 2024-01-14

## 2024-01-09 RX ORDER — FENTANYL CITRATE 50 UG/ML
50 INJECTION, SOLUTION INTRAMUSCULAR; INTRAVENOUS ONCE
Status: COMPLETED | OUTPATIENT
Start: 2024-01-09 | End: 2024-01-09

## 2024-01-09 RX ORDER — FENTANYL CITRATE 50 UG/ML
INJECTION, SOLUTION INTRAMUSCULAR; INTRAVENOUS AS NEEDED
Status: DISCONTINUED | OUTPATIENT
Start: 2024-01-09 | End: 2024-01-09 | Stop reason: SURG

## 2024-01-09 RX ORDER — LORAZEPAM 0.5 MG/1
0.5 TABLET ORAL ONCE
Status: COMPLETED | OUTPATIENT
Start: 2024-01-09 | End: 2024-01-09

## 2024-01-09 RX ORDER — FAMOTIDINE 20 MG/1
20 TABLET, FILM COATED ORAL ONCE
Status: COMPLETED | OUTPATIENT
Start: 2024-01-09 | End: 2024-01-09

## 2024-01-09 RX ORDER — FAMOTIDINE 10 MG/ML
20 INJECTION, SOLUTION INTRAVENOUS ONCE
Status: CANCELLED | OUTPATIENT
Start: 2024-01-09 | End: 2024-01-09

## 2024-01-09 RX ORDER — FENTANYL CITRATE 50 UG/ML
INJECTION, SOLUTION INTRAMUSCULAR; INTRAVENOUS
Status: COMPLETED
Start: 2024-01-09 | End: 2024-01-09

## 2024-01-09 RX ORDER — LIDOCAINE HYDROCHLORIDE 10 MG/ML
INJECTION, SOLUTION EPIDURAL; INFILTRATION; INTRACAUDAL; PERINEURAL AS NEEDED
Status: DISCONTINUED | OUTPATIENT
Start: 2024-01-09 | End: 2024-01-09 | Stop reason: SURG

## 2024-01-09 RX ORDER — SODIUM CHLORIDE 0.9 % (FLUSH) 0.9 %
10 SYRINGE (ML) INJECTION AS NEEDED
Status: CANCELLED | OUTPATIENT
Start: 2024-01-09

## 2024-01-09 RX ORDER — SODIUM CHLORIDE 0.9 % (FLUSH) 0.9 %
10 SYRINGE (ML) INJECTION EVERY 12 HOURS SCHEDULED
Status: CANCELLED | OUTPATIENT
Start: 2024-01-09

## 2024-01-09 RX ORDER — BUTALBITAL, ACETAMINOPHEN AND CAFFEINE 50; 325; 40 MG/1; MG/1; MG/1
2 TABLET ORAL ONCE
Status: COMPLETED | OUTPATIENT
Start: 2024-01-09 | End: 2024-01-09

## 2024-01-09 RX ORDER — PROPOFOL 10 MG/ML
VIAL (ML) INTRAVENOUS AS NEEDED
Status: DISCONTINUED | OUTPATIENT
Start: 2024-01-09 | End: 2024-01-09 | Stop reason: SURG

## 2024-01-09 RX ORDER — ROCURONIUM BROMIDE 10 MG/ML
INJECTION, SOLUTION INTRAVENOUS AS NEEDED
Status: DISCONTINUED | OUTPATIENT
Start: 2024-01-09 | End: 2024-01-09 | Stop reason: SURG

## 2024-01-09 RX ORDER — DEXAMETHASONE SODIUM PHOSPHATE 4 MG/ML
INJECTION, SOLUTION INTRA-ARTICULAR; INTRALESIONAL; INTRAMUSCULAR; INTRAVENOUS; SOFT TISSUE AS NEEDED
Status: DISCONTINUED | OUTPATIENT
Start: 2024-01-09 | End: 2024-01-09 | Stop reason: SURG

## 2024-01-09 RX ORDER — ONDANSETRON 2 MG/ML
INJECTION INTRAMUSCULAR; INTRAVENOUS AS NEEDED
Status: DISCONTINUED | OUTPATIENT
Start: 2024-01-09 | End: 2024-01-09 | Stop reason: SURG

## 2024-01-09 RX ORDER — FENTANYL CITRATE 50 UG/ML
50 INJECTION, SOLUTION INTRAMUSCULAR; INTRAVENOUS
Status: DISCONTINUED | OUTPATIENT
Start: 2024-01-09 | End: 2024-01-09 | Stop reason: HOSPADM

## 2024-01-09 RX ORDER — MAGNESIUM HYDROXIDE 1200 MG/15ML
LIQUID ORAL AS NEEDED
Status: DISCONTINUED | OUTPATIENT
Start: 2024-01-09 | End: 2024-01-09 | Stop reason: HOSPADM

## 2024-01-09 RX ORDER — SODIUM CHLORIDE, SODIUM LACTATE, POTASSIUM CHLORIDE, CALCIUM CHLORIDE 600; 310; 30; 20 MG/100ML; MG/100ML; MG/100ML; MG/100ML
9 INJECTION, SOLUTION INTRAVENOUS CONTINUOUS
Status: DISCONTINUED | OUTPATIENT
Start: 2024-01-09 | End: 2024-01-14

## 2024-01-09 RX ORDER — LIDOCAINE HYDROCHLORIDE 10 MG/ML
0.5 INJECTION, SOLUTION EPIDURAL; INFILTRATION; INTRACAUDAL; PERINEURAL ONCE AS NEEDED
Status: DISCONTINUED | OUTPATIENT
Start: 2024-01-09 | End: 2024-01-09 | Stop reason: HOSPADM

## 2024-01-09 RX ORDER — MIDAZOLAM HYDROCHLORIDE 1 MG/ML
1 INJECTION INTRAMUSCULAR; INTRAVENOUS
Status: DISCONTINUED | OUTPATIENT
Start: 2024-01-09 | End: 2024-01-09 | Stop reason: HOSPADM

## 2024-01-09 RX ADMIN — FENTANYL CITRATE 50 MCG: 50 INJECTION, SOLUTION INTRAMUSCULAR; INTRAVENOUS at 19:53

## 2024-01-09 RX ADMIN — METRONIDAZOLE 500 MG: 500 INJECTION, SOLUTION INTRAVENOUS at 22:59

## 2024-01-09 RX ADMIN — ROCURONIUM BROMIDE 50 MG: 10 SOLUTION INTRAVENOUS at 18:17

## 2024-01-09 RX ADMIN — ACETAMINOPHEN 1000 MG: 500 TABLET ORAL at 00:18

## 2024-01-09 RX ADMIN — FENTANYL CITRATE 100 MCG: 50 INJECTION, SOLUTION INTRAMUSCULAR; INTRAVENOUS at 18:17

## 2024-01-09 RX ADMIN — CYANOCOBALAMIN 1000 MCG: 1000 INJECTION, SOLUTION INTRAMUSCULAR; SUBCUTANEOUS at 08:20

## 2024-01-09 RX ADMIN — Medication: at 20:53

## 2024-01-09 RX ADMIN — LORAZEPAM 0.5 MG: 0.5 TABLET ORAL at 15:07

## 2024-01-09 RX ADMIN — Medication 10 ML: at 08:20

## 2024-01-09 RX ADMIN — BUTALBITAL, ACETAMINOPHEN AND CAFFEINE 2 TABLET: 325; 50; 40 TABLET ORAL at 04:15

## 2024-01-09 RX ADMIN — SODIUM CHLORIDE, POTASSIUM CHLORIDE, SODIUM LACTATE AND CALCIUM CHLORIDE: 600; 310; 30; 20 INJECTION, SOLUTION INTRAVENOUS at 18:11

## 2024-01-09 RX ADMIN — Medication 1 CAPSULE: at 08:19

## 2024-01-09 RX ADMIN — OXYCODONE HYDROCHLORIDE 15 MG: 15 TABLET ORAL at 00:17

## 2024-01-09 RX ADMIN — PREGABALIN 200 MG: 100 CAPSULE ORAL at 08:19

## 2024-01-09 RX ADMIN — HYDROMORPHONE HYDROCHLORIDE 0.5 MG: 1 INJECTION, SOLUTION INTRAMUSCULAR; INTRAVENOUS; SUBCUTANEOUS at 20:00

## 2024-01-09 RX ADMIN — SERTRALINE HYDROCHLORIDE 50 MG: 50 TABLET ORAL at 21:15

## 2024-01-09 RX ADMIN — DEXAMETHASONE SODIUM PHOSPHATE 4 MG: 4 INJECTION, SOLUTION INTRAMUSCULAR; INTRAVENOUS at 18:23

## 2024-01-09 RX ADMIN — ACETAMINOPHEN 1000 MG: 500 TABLET ORAL at 21:15

## 2024-01-09 RX ADMIN — LEVOTHYROXINE SODIUM 125 MCG: 125 TABLET ORAL at 05:37

## 2024-01-09 RX ADMIN — CEFEPIME 2000 MG: 2 INJECTION, POWDER, FOR SOLUTION INTRAVENOUS at 21:15

## 2024-01-09 RX ADMIN — OXYCODONE HYDROCHLORIDE 5 MG: 5 TABLET ORAL at 10:22

## 2024-01-09 RX ADMIN — ACETAMINOPHEN 1000 MG: 500 TABLET ORAL at 14:27

## 2024-01-09 RX ADMIN — Medication 5 MG: at 21:15

## 2024-01-09 RX ADMIN — OXYCODONE HYDROCHLORIDE 15 MG: 15 TABLET ORAL at 12:05

## 2024-01-09 RX ADMIN — HYDROMORPHONE HYDROCHLORIDE 0.5 MG: 1 INJECTION, SOLUTION INTRAMUSCULAR; INTRAVENOUS; SUBCUTANEOUS at 19:44

## 2024-01-09 RX ADMIN — PRAMIPEXOLE DIHYDROCHLORIDE 1 MG: 0.25 TABLET ORAL at 08:19

## 2024-01-09 RX ADMIN — DAPTOMYCIN 500 MG: 500 INJECTION, POWDER, LYOPHILIZED, FOR SOLUTION INTRAVENOUS at 22:00

## 2024-01-09 RX ADMIN — OXYCODONE HYDROCHLORIDE 5 MG: 5 TABLET ORAL at 21:15

## 2024-01-09 RX ADMIN — LIDOCAINE HYDROCHLORIDE 100 MG: 10 INJECTION, SOLUTION EPIDURAL; INFILTRATION; INTRACAUDAL; PERINEURAL at 18:17

## 2024-01-09 RX ADMIN — FAMOTIDINE 20 MG: 20 TABLET, FILM COATED ORAL at 15:53

## 2024-01-09 RX ADMIN — PROPOFOL 150 MG: 10 INJECTION, EMULSION INTRAVENOUS at 18:17

## 2024-01-09 RX ADMIN — LORAZEPAM 0.5 MG: 0.5 TABLET ORAL at 09:29

## 2024-01-09 RX ADMIN — FENTANYL CITRATE 50 MCG: 0.05 INJECTION, SOLUTION INTRAMUSCULAR; INTRAVENOUS at 16:08

## 2024-01-09 RX ADMIN — FAMOTIDINE 20 MG: 20 TABLET, FILM COATED ORAL at 08:19

## 2024-01-09 RX ADMIN — OXYCODONE HYDROCHLORIDE 15 MG: 15 TABLET ORAL at 05:37

## 2024-01-09 RX ADMIN — PREGABALIN 200 MG: 100 CAPSULE ORAL at 21:15

## 2024-01-09 RX ADMIN — SODIUM CHLORIDE 2000 MG: 900 INJECTION INTRAVENOUS at 18:17

## 2024-01-09 RX ADMIN — FOLIC ACID 1 MG: 1 TABLET ORAL at 08:19

## 2024-01-09 RX ADMIN — ONDANSETRON 4 MG: 2 INJECTION INTRAMUSCULAR; INTRAVENOUS at 18:54

## 2024-01-09 RX ADMIN — OXYCODONE HYDROCHLORIDE 15 MG: 15 TABLET ORAL at 23:21

## 2024-01-09 RX ADMIN — SUGAMMADEX 300 MG: 100 INJECTION, SOLUTION INTRAVENOUS at 18:54

## 2024-01-09 NOTE — NURSING NOTE
Woc was planned to follow-up for a follow-up visit today on the right foot wounds.    Per notes and discussion with PT wound care PT wound care was consulted for the right lower extremity and has been applying compression to this right leg which is indicated and has taken over dressings for the foot.    Per pictures wound is healing very nicely.    Will sign off.
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
## 2024-01-09 NOTE — CASE MANAGEMENT/SOCIAL WORK
Continued Stay Note   [facility]     Patient Name: Phoebe Carvajal  MRN: [id number]  Today's Date: 1/9/2024    Admit Date: 12/28/2023    Plan: update   Discharge Plan       Row Name 01/09/24 0935       Plan    Plan update    Patient/Family in Agreement with Plan yes    Plan Comments Patient scheduled for surgery today.  CM will follow up with patient post surgery for discharge planning for [facility].  Will need new PT/OT notes.    Final Discharge Disposition Code 62 - inpatient rehab facility                   Discharge Codes    No documentation.                 Expected Discharge Date and Time       Expected Discharge Date Expected Discharge Time    Jan 9, 2024               Jerica Paredes RN

## 2024-01-09 NOTE — ANESTHESIA PREPROCEDURE EVALUATION
Anesthesia Evaluation     Patient summary reviewed and Nursing notes reviewed   no history of anesthetic complications:   NPO Solid Status: > 8 hours  NPO Liquid Status: > 8 hours           Airway   Mallampati: III  TM distance: <3 FB  Neck ROM: full  Possible difficult intubation  Dental    (+) upper dentures    Pulmonary - negative pulmonary ROS and normal exam   Cardiovascular - normal exam    ECG reviewed      ROS comment: 5/17/23 Echo   ·  Left Ventricle: The left ventricle is not well visualized. The left   ventricle is normal size. There is normal left ventricular myocardial   thickness and mass. The left ventricular systolic function is normal.  The   LVEF as measured by biplane volume is 61%. The left ventricular filling   pressure is indeterminate. The left ventricular wall motion is normal.   ·  Right Ventricle: The right ventricle is normal in size. The right   ventricular systolic function is normal. The estimated right ventricular   systolic pressure is 47 mmHg.   ·  IVC/SVC: Based on the IVC size and respiratory variation, the estimated   right atrial pressure is 15mmHg.   ·  There is no recent study available for direct [id number] comparison.       Neuro/Psych  (+) psychiatric history Depression and Anxiety  (-) seizures, TIA, CVA  GI/Hepatic/Renal/Endo    (+) morbid obesity, liver disease history of elevated LFT, renal disease-, thyroid problem hypothyroidism  (-) GERD, hepatitis    Musculoskeletal     Abdominal   (+) obese   Substance History      OB/GYN          Other   arthritis, blood dyscrasia (HCT 28.2) anemia,     ROS/Med Hx Other: Chronic osteomyelitis              Anesthesia Plan    ASA 3     general     intravenous induction     Anesthetic plan, risks, benefits, and alternatives have been provided, discussed and informed consent has been obtained with: patient.    Plan discussed with CRNA.    CODE STATUS:    Code Status (Patient has no pulse and is not breathing): CPR (Attempt to  Resuscitate)  Medical Interventions (Patient has pulse or is breathing): Full Support

## 2024-01-09 NOTE — PROGRESS NOTES
[facility] Medicine Services  PROGRESS NOTE    Patient Name: Phoebe Carvajal  : 1975  MRN: [id number]    Date of Admission: 2023  Primary Care Physician: Sofya Benjamin MD    Subjective   Subjective     CC:  F/u osteomyelitis    HPI:   Seen by ortho this AM, concerned for fluid collection--seroma vs abscess.  She is worried about procedure.  Having some inc pain as well.    Objective   Vital Signs:   Temp:  [98.1 °F (36.7 °C)-98.8 °F (37.1 °C)] 98.6 °F (37 °C)  Heart Rate:  [59-67] 62  Resp:  [16-18] 16  BP: (102-122)/(60-68) 106/65  Flow (L/min):  [2] 2     Physical Exam:   Constitutional: Awake, alert, NAD  HENT: NCAT, mucous membranes moist  Respiratory: Clear to auscultation bilaterally, nonlabored  Cardiovascular: RRR, no murmurs, rubs, or gallops  Gastrointestinal: Positive bowel sounds, soft, nontender, nondistended  Musculoskeletal: no RLE edema, L AKA--wrapped in ACE bandage.    Psychiatric: Appropriate affect, cooperative  Neurologic: Oriented x 3, BRIONES, speech clear  Skin: No rashes        Results Reviewed:  LAB RESULTS:      Lab 24  0433   WBC 3.34* 2.85* 3.47 3.52   HEMOGLOBIN 8.8* 9.6* 9.0* 8.6*   HEMATOCRIT 28.2* 30.5* 29.1* 26.9*   PLATELETS 233 232 219 198   NEUTROS ABS 1.86 1.49*  --   --    IMMATURE GRANS (ABS) 0.00 0.01  --   --    LYMPHS ABS 0.95 0.91  --   --    MONOS ABS 0.32 0.27  --   --    EOS ABS 0.19 0.16  --   --    .7* 111.3* 111.9* 109.3*         Lab 24  [id number] 24  0524  0433   SODIUM 140 139 143 142   POTASSIUM 4.2 4.7 4.1 3.9   CHLORIDE 107 106 109* 107   CO2 27.0 27.0 29.0 31.0*   ANION GAP 6.0 6.0 5.0 4.0*   BUN 5* 5* 6 5*   CREATININE 0.39* 0.37* 0.36* 0.32*   EGFR 121.5 123.0 123.9 127.4   GLUCOSE 95 78 86 92   CALCIUM 8.0* 8.0* 7.9* 8.0*         Lab 24  1116 24  0535   TOTAL PROTEIN 5.1*  --    ALBUMIN 2.4* 1.8*   GLOBULIN 2.7  --     ALT (SGPT) <5  --    AST (SGOT) 14  --    BILIRUBIN 0.3  --    ALK PHOS 182*  --                    Lab 01/04/24  0535   FOLATE 3.61*   VITAMIN B 12 161*         Brief Urine Lab Results  (Last result in the past 365 days)        Color   Clarity   Blood   Leuk Est   Nitrite   Protein   CREAT   Urine HCG        01/05/24 1351 Yellow   Cloudy   Negative   Small (1+)   Negative   Trace                   Microbiology Results Abnormal       Procedure Component Value - Date/Time    Fungus Culture - Surgical Site, Leg, Left [[id number]] Collected: 12/31/23 0831    Lab Status: Preliminary result Specimen: Surgical Site from Leg, Left Updated: 01/07/24 1946     Fungus Culture No fungus isolated at 1 week    AFB Culture - Surgical Site, Leg, Left [[id number]] Collected: 12/31/23 0831    Lab Status: Preliminary result Specimen: Surgical Site from Leg, Left Updated: 01/07/24 1946     AFB Culture No AFB isolated at 1 week     AFB Stain No acid fast bacilli seen on concentrated smear    Urine Culture - Urine, Urine, Clean Catch [[id number]]  (Normal) Collected: 01/06/24 1224    Lab Status: Final result Specimen: Urine, Clean Catch Updated: 01/07/24 1429     Urine Culture No growth    Anaerobic Culture - Surgical Site, Leg, Left [[id number]]  (Normal) Collected: 12/31/23 0833    Lab Status: Final result Specimen: Surgical Site from Leg, Left Updated: 01/06/24 0551     Anaerobic Culture No anaerobes isolated at 5 days    Fungus Culture - Tissue, Leg, Left [[id number]] Collected: 12/29/23 1139    Lab Status: Preliminary result Specimen: Tissue from Leg, Left Updated: 01/05/24 1330     Fungus Culture No fungus isolated at 1 week    AFB Culture - Tissue, Leg, Left [[id number]] Collected: 12/29/23 1139    Lab Status: Preliminary result Specimen: Tissue from Leg, Left Updated: 01/05/24 1330     AFB Culture No AFB isolated at 1 week     AFB Stain No acid fast bacilli seen on concentrated smear    Tissue / Bone Culture - Surgical Site, Leg,  Left [[id number]] Collected: 12/31/23 0831    Lab Status: Final result Specimen: Surgical Site from Leg, Left Updated: 01/04/24 0743     Tissue Culture No growth at 3 days     Gram Stain Moderate (3+) WBCs per low power field      No organisms seen    Anaerobic Culture - Tissue, Leg, Left [[id number]]  (Normal) Collected: 12/29/23 1139    Lab Status: Final result Specimen: Tissue from Leg, Left Updated: 01/03/24 1001     Anaerobic Culture No anaerobes isolated at 5 days    Blood Culture - Blood, Arm, Right [[id number]]  (Normal) Collected: 12/28/23 2115    Lab Status: Final result Specimen: Blood from Arm, Right Updated: 01/03/24 0030     Blood Culture No growth at 5 days    Blood Culture - Blood, Hand, Right [[id number]]  (Normal) Collected: 12/28/23 2120    Lab Status: Final result Specimen: Blood from Hand, Right Updated: 01/03/24 0030     Blood Culture No growth at 5 days    Wound Culture - Wound, Leg, Left [[id number]] Collected: 12/29/23 1322    Lab Status: Final result Specimen: Wound from Leg, Left Updated: 01/01/24 0711     Wound Culture No growth at 3 days     Gram Stain No organisms seen    MRSA Screen, PCR (Inpatient) - Swab, Nares [[id number]]  (Normal) Collected: 12/29/23 0203    Lab Status: Final result Specimen: Swab from Nares Updated: 12/29/23 0733     MRSA PCR Negative    Narrative:      The negative predictive value of this diagnostic test is high and should only be used to consider de-escalating anti-MRSA therapy. A positive result may indicate colonization with MRSA and must be correlated clinically.  MRSA Negative    COVID PRE-OP / PRE-PROCEDURE SCREENING ORDER (NO ISOLATION) - Swab, Nasopharynx [[id number]]  (Normal) Collected: 12/29/23 0203    Lab Status: Final result Specimen: Swab from Nasopharynx Updated: 12/29/23 0353    Narrative:      The following orders were created for panel order COVID PRE-OP / PRE-PROCEDURE SCREENING ORDER (NO ISOLATION) - Swab, Nasopharynx.  Procedure                                Abnormality         Status                     ---------                               -----------         ------                     Respiratory Panel PCR w/...[[id number]]  Normal              Final result                 Please view results for these tests on the individual orders.    Respiratory Panel PCR w/COVID-19(SARS-CoV-2) ARIELLE/VESNA/KARIE/PAD/COR/SÁNCHEZ In-House, NP Swab in UTM/VTM, 2 HR TAT - Swab, Nasopharynx [[id number]]  (Normal) Collected: 12/29/23 0203    Lab Status: Final result Specimen: Swab from Nasopharynx Updated: 12/29/23 0353     ADENOVIRUS, PCR Not Detected     Coronavirus 229E Not Detected     Coronavirus HKU1 Not Detected     Coronavirus NL63 Not Detected     Coronavirus OC43 Not Detected     COVID19 Not Detected     Human Metapneumovirus Not Detected     Human Rhinovirus/Enterovirus Not Detected     Influenza A PCR Not Detected     Influenza B PCR Not Detected     Parainfluenza Virus 1 Not Detected     Parainfluenza Virus 2 Not Detected     Parainfluenza Virus 3 Not Detected     Parainfluenza Virus 4 Not Detected     RSV, PCR Not Detected     Bordetella pertussis pcr Not Detected     Bordetella parapertussis PCR Not Detected     Chlamydophila pneumoniae PCR Not Detected     Mycoplasma pneumo by PCR Not Detected    Narrative:      In the setting of a positive respiratory panel with a viral infection PLUS a negative procalcitonin without other underlying concern for bacterial infection, consider observing off antibiotics or discontinuation of antibiotics and continue supportive care. If the respiratory panel is positive for atypical bacterial infection (Bordetella pertussis, Chlamydophila pneumoniae, or Mycoplasma pneumoniae), consider antibiotic de-escalation to target atypical bacterial infection.            CT Lower Extremity Left Without Contrast    Result Date: 1/9/2024  CT LOWER EXTREMITY LEFT WO CONTRAST Date of Exam: 1/9/2024 9:42 AM EST Indication: Soft tissue infection suspected,  thigh, xray done. Comparison: None available. Technique: Axial CT images were obtained of the left lower extremity without contrast administration.  Reconstructed coronal and sagittal images were also obtained. Automated exposure control and iterative construction methods were used. Findings: Exam is limited by patient status and lack of intravenous contrast. There is sequela of above-knee amputation at the level of the mid femoral shaft. Extensive surgical changes in the surrounding soft tissues including a distal incision with cutaneous staples and diffuse subcutaneous and deep soft tissue edema. Multiple foci of gas extend from the incision site proximally through the deep soft tissues, however with no evidence of extension to the bone. There is a small gas fluid collection near the deep aspect of the incision site measuring up to 3.3 x 3.6 cm as seen on  axial image 186 and coronal image 126. This also does not appear to extend to bone. The left femoral cortex as well as the osteotomy site demonstrates no irregularity, and margins are sharp. The remaining visualized soft tissues show anterior abdominal wall cutaneous thickening and subcutaneous edema with multiple subcutaneous calcifications, possibly sequela of injection. No discrete fluid collection. Partially visualized right leg edema.     Impression: Impression: Postsurgical changes of above-knee amputation at the level of the mid femoral shaft. Extensive associated soft tissue changes distally including multiple foci of gas extending proximally from the incision site into the deep soft tissues. There is a small  gas fluid collection measuring up to 3.6 cm along the deep aspect of the incision site, without evidence of osseous extension. Otherwise no discrete fluid collection. No evidence of osteomyelitis. Electronically Signed: Jluis Carl MD  1/9/2024 10:22 AM EST  Workstation ID: [id number]         Current medications:  Scheduled Meds:[MAR Hold] Pharmacy  Consult, , Does not apply, Q12H  [MAR Hold] acetaminophen, 1,000 mg, Oral, Q8H  ceFAZolin, 2,000 mg, Intravenous, Once  [MAR Hold] cyanocobalamin, 1,000 mcg, Intramuscular, Daily  ethyl alcohol, 2 swab , Nasal, Once  famotidine, 20 mg, Oral, BID AC  famotidine, 20 mg, Oral, Once  [MAR Hold] folic acid, 1 mg, Oral, Daily  [MAR Hold] lactobacillus acidophilus, 1 capsule, Oral, Daily  [MAR Hold] levothyroxine, 125 mcg, Oral, Daily  [MAR Hold] oxyCODONE, 15 mg, Oral, Q6H  [MAR Hold] pramipexole, 1 mg, Oral, Daily  pregabalin, 200 mg, Oral, TID  [MAR Hold] senna-docusate sodium, 2 tablet, Oral, BID  [MAR Hold] sertraline, 50 mg, Oral, Nightly  [MAR Hold] sodium chloride, 10 mL, Intravenous, Q12H      Continuous Infusions:lactated ringers, 9 mL/hr  lactated ringers, 9 mL/hr      PRN Meds:.  [MAR Hold] senna-docusate sodium **AND** [MAR Hold] polyethylene glycol **AND** [MAR Hold] bisacodyl **AND** [MAR Hold] bisacodyl    [MAR Hold] diphenhydrAMINE    lactated ringers    lidocaine PF 1%    [MAR Hold] loperamide    [MAR Hold] LORazepam    [MAR Hold] melatonin    midazolam    [MAR Hold] ondansetron    [MAR Hold] oxyCODONE    [MAR Hold] sodium chloride    [MAR Hold] sodium chloride    [MAR Hold] traZODone    Assessment & Plan   Assessment & Plan     Active Hospital Problems    Diagnosis  POA    Anxiety associated with depression [F41.8]  Yes    RLS (restless legs syndrome) [G25.81]  Yes    Neuropathy [G62.9]  Yes    Obesity, morbid, BMI 50 or higher [E66.01]  Yes    S/P AKA (above knee amputation), left [Z89.612]  Not Applicable    Chronic osteomyelitis [M86.60]  Yes    Acquired hypothyroidism [E03.9]  Yes    Ulcer of right midfoot with fat layer exposed [L97.412]  Yes      Resolved Hospital Problems    Diagnosis Date Resolved POA    **Lower extremity cellulitis [L03.119] 01/08/2024 Yes    Ulcer of left heel, with fat layer exposed [L97.422] 01/08/2024 Yes        Brief Hospital Course to date:  Phoebe Carvajal is a 48 y.o.  female with history of depression, hypothyroidism, stills disease, RLS, PAD, anemia, morbid obesity, recurrent osteomyelitis, chronic lower extremity osteomyelitis, recurrent left prosthetic joint infection, bilateral foot wounds who presented to the emergency department for evaluation of fever and worsening erythema with foul odor from her chronic wounds. Previous records from  from October 22 through November 8, 2023 showed intention for proceeding with left AKA but patient waited for second opinion hoping to have the option of BKA.  She is followed by infectious disease-known to Dr. Wang.  She underwent AKA on 12/29/2023 with Dr. Witt, with additional debridement on 12/31/2023       Gross hematuria  - no longer has menstrual cycle.   - urology follow up 6-8 weeks    Osteomyelitis/cellulitis of the left lower extremity  Status post left AKA on 12/29/2023 Dr. Witt  Chronic infected wounds  -Dr. Witt performed debridement again on 12/31/2023  --stopped IV dilaudid today as pt will be d/c tomorrow.  --cont scheduled oxycodone with addition of a prn dose for breakthrough pain  --ID consulted, Dr. Tayo Arnold following  --wound cultures: Anaerobic culture no anaerobes, tissue culture Gram stain no growth, AFB stain no acid-fast bacilli seen, culture no growth, fungus culture no growth, AFB culture IBF stain noted, MRSA PCR negative  - s/p ceftazidime and daptomycin 1/4/2024  --drain removed 1/2/24  --daily dressing changes Xeroform 4x4, Kerlix, ACE wrap  --Going back to the OR today for another washout and wound vac placement  -- Follow up with PANDA English with Dr. Witt in 2 weeks for wound check and x-rays  -- Continue scheduled Tylenol, continue scheduled oxycodone 15 mg q6h per home dosing as discussed with palliative care    Migraine HA  --will try dose of fiorecet.     Right foot ulcer  --PT wound care following  --Offload right foot    Diarrhea  -- Suspect secondary to IV  antibiotics, remains afebrile, WBCs WNL  -- cont probiotic daily  -- Imodium as needed      Anemia  -- Likely postop but also macrocytic per labs  -- B12 and folate low  -- Cont B12 replacement cyanocobalamin 1000 mcg IM daily x 7 days, then weekly x 4 weeks  -- continue folic acid 1 mg daily  -- Recheck B12 and folic acid in 1 month at PCP follow-up    Neuropathy  -Continue Lyrica     Restless leg syndrome  -Continue Mirapex    Morbid obesity  Hypoxia, resolved  -Likely related to opiate use postoperatively and morbid obesity  -O2 to maintain sats greater than 90 and wean O2 as tolerated  --now on room air     Anxiety  -Continue Zoloft      Expected Discharge Location and Transportation:   Expected Discharge   Expected Discharge Date: 1/12/2024; Expected Discharge Time:  3:00 PM    DVT prophylaxis:  Mechanical DVT prophylaxis orders are present. No lovenox due to hematuria    AM-PAC 6 Clicks Score (PT): 10 (01/09/24 0800)    CODE STATUS:   Code Status and Medical Interventions:   Ordered at: 12/29/23 0121     Code Status (Patient has no pulse and is not breathing):    CPR (Attempt to Resuscitate)     Medical Interventions (Patient has pulse or is breathing):    Full Support       Zenobia Romo MD  01/09/24

## 2024-01-09 NOTE — PROGRESS NOTES
Phoebe Carvajal       LOS: 12 days   Patient Care Team:  Sofya Benjamin MD as PCP - General (Internal Medicine)  Provider, No Known as PCP - Family Medicine    Chief Complaint:  Status post left above-knee amputation     Subjective     Interval History:     Continues to have wound drainage.  She's developed ischemia skin edges at her medial incision.    Review of Systems:      Gen- No fevers, chills  CV- No chest pain, palpitations  Resp- No cough, dyspnea  GI- No N/V/D, abd pain    Objective     Vital Signs  Vital Signs (last 24 hours)         01/07 0700 01/08 0659 01/08 0700 01/09 0611   Most Recent      Temp (°F) 98 -  98.5    98.1 -  98.8     98.8 (37.1) 01/09 0300    Heart Rate 56 -  84    59 -  72     60 01/09 0015    Resp   18    16 -  18     16 01/09 0300    BP 96/54 -  111/61    112/73 -  122/60     121/68 01/09 0300    SpO2 (%) 89 -  97    90 -  98     97 01/09 0015    Flow (L/min)   2      2     2 01/09 0200              Physical Exam:     Alert, oriented.  No acute distress.  Nonlabored respirations.  Regular rate and rhythm.  Abdomen nondistended.  Left lower extremity: Ischemic changes at the anterior medial skin edge.  Continued serosanguineous drainage which appears to be more copious than a few days ago.     Results Review:     I reviewed the patient's new clinical results.    Medication Review:   Hospital Medications (active)         Dose Frequency Start End    ! Home medications stored in pharmacy, please contact pharmacist prior to patient discharge  Every 12 Hours Scheduled 12/29/2023 --    Route: Does not apply    acetaminophen (TYLENOL) tablet 1,000 mg 1,000 mg Every 8 Hours Scheduled 1/2/2024 --    Admin Instructions: If given for fever, use fever parameter: fever greater than 100.4 °F  Based on patient request - if ordered for moderate or severe pain, provider allows for administration of a medication prescribed for a lower pain scale.    Do not exceed 4 grams of acetaminophen in a 24  hr period. Max dose of 2gm for AST/ALT greater than 120 units/L.    If given for pain, use the following pain scale:   Mild Pain = Pain Score of 1-3, CPOT 1-2  Moderate Pain = Pain Score of 4-6, CPOT 3-4  Severe Pain = Pain Score of 7-10, CPOT 5-8    Route: Oral    bisacodyl (DULCOLAX) EC tablet 5 mg 5 mg Daily PRN 12/29/2023 --    Admin Instructions: Use if no bowel movement after 12 hours.  Swallow whole. Do not crush, split, or chew tablet.    Route: Oral    Linked Group 1: See Hyperspace for full Linked Orders Report.        bisacodyl (DULCOLAX) suppository 10 mg 10 mg Daily PRN 12/29/2023 --    Admin Instructions: Use if no bowel movement after 12 hours.  Hold for diarrhea    Route: Rectal    Linked Group 1: See Hyperspace for full Linked Orders Report.        cyanocobalamin injection 1,000 mcg 1,000 mcg Daily 1/4/2024 1/11/2024    Route: Intramuscular    diphenhydrAMINE (BENADRYL) capsule 25 mg 25 mg Every 6 Hours PRN 12/29/2023 --    Admin Instructions: Caution: Look alike/sound alike drug alert. This med may be ordered in other forms and routes. Before giving verify the last time the drug was given by any route/form.    Route: Oral    famotidine (PEPCID) tablet 20 mg 20 mg 2 Times Daily Before Meals 1/1/2024 --    Route: Oral    folic acid (FOLVITE) tablet 1 mg 1 mg Daily 1/4/2024 --    Route: Oral    lactated ringers infusion 9 mL/hr Continuous PRN 12/31/2023 --    Route: Intravenous    lactobacillus acidophilus (RISAQUAD) capsule 1 capsule 1 capsule Daily 1/3/2024 --    Route: Oral    levothyroxine (SYNTHROID, LEVOTHROID) tablet 125 mcg 125 mcg Daily 12/29/2023 --    Admin Instructions: Take on empty stomach.    Route: Oral    loperamide (IMODIUM) capsule 2 mg 2 mg 4 Times Daily PRN 1/3/2024 --    Admin Instructions: Maximum recommended 16 mg / 24 hours.      Route: Oral    LORazepam (ATIVAN) tablet 0.5 mg 0.5 mg Daily PRN 1/4/2024 --    Admin Instructions: Hold for SBP < 110   Caution: Look alike/sound  alike drug alert    Route: Oral    Cosign for Ordering: Accepted by Jorge Lua MD on 1/7/2024  4:45 PM    melatonin tablet 5 mg 5 mg Nightly PRN 12/29/2023 --    Route: Oral    ondansetron (ZOFRAN) injection 4 mg 4 mg Every 6 Hours PRN 12/29/2023 --    Admin Instructions: If BOTH ondansetron (ZOFRAN) and promethazine (PHENERGAN) are ordered use ondansetron first and THEN promethazine IF ondansetron is ineffective.    Route: Intravenous    oxyCODONE (ROXICODONE) immediate release tablet 15 mg 15 mg Every 6 Hours 1/4/2024 --    Admin Instructions: Based on patient request - if ordered for moderate or severe pain, provider allows for administration of a medication prescribed for a lower pain scale.      If given for pain, use the following pain scale:  Mild Pain = Pain Score of 1-3, CPOT 1-2  Moderate Pain = Pain Score of 4-6, CPOT 3-4  Severe Pain = Pain Score of 7-10, CPOT 5-8    Route: Oral    Cosign for Ordering: Accepted by Jorge Lua MD on 1/7/2024  4:45 PM    oxyCODONE (ROXICODONE) immediate release tablet 5 mg 5 mg Every 6 Hours PRN 1/8/2024 1/15/2024    Admin Instructions: Based on patient request - if ordered for moderate or severe pain, provider allows for administration of a medication prescribed for a lower pain scale.      If given for pain, use the following pain scale:  Mild Pain = Pain Score of 1-3, CPOT 1-2  Moderate Pain = Pain Score of 4-6, CPOT 3-4  Severe Pain = Pain Score of 7-10, CPOT 5-8    Route: Oral    polyethylene glycol (MIRALAX) packet 17 g 17 g Daily PRN 12/29/2023 --    Admin Instructions: Use if no bowel movement after 12 hours. Mix in 6-8 ounces of water.  Use 4-8 ounces of water, tea, or juice for each 17 gram dose.    Route: Oral    Linked Group 1: See Hyperspace for full Linked Orders Report.        pramipexole (MIRAPEX) tablet 1 mg 1 mg Daily 12/29/2023 --    Route: Oral    pregabalin (LYRICA) capsule 200 mg 200 mg 3 Times Daily 12/29/2023 --    Admin Instructions:      Route: Oral    sennosides-docusate (PERICOLACE) 8.6-50 MG per tablet 2 tablet 2 tablet 2 Times Daily 12/29/2023 --    Admin Instructions: HOLD MEDICATION IF PATIENT HAS HAD BOWEL MOVEMENT. Start bowel management regimen if patient has not had a bowel movement after 12 hours.    Route: Oral    Linked Group 1: See Russ for full Linked Orders Report.        sertraline (ZOLOFT) tablet 50 mg 50 mg Nightly 12/29/2023 --    Route: Oral    sodium chloride 0.9 % flush 10 mL 10 mL Every 12 Hours Scheduled 12/29/2023 --    Route: Intravenous    sodium chloride 0.9 % flush 10 mL 10 mL As Needed 12/29/2023 --    Route: Intravenous    sodium chloride 0.9 % infusion 40 mL 40 mL As Needed 12/29/2023 --    Admin Instructions: Following administration of an IV intermittent medication, flush line with 40mL NS at 100mL/hr.    Route: Intravenous    traZODone (DESYREL) tablet 50 mg 50 mg Nightly PRN 12/29/2023 --    Admin Instructions: Take with food.  Caution: Look alike/sound alike drug alert    Route: Oral              Assessment & Plan     48-year-old female status post left above-knee amputation.       Acquired hypothyroidism    Ulcer of right midfoot with fat layer exposed    Chronic osteomyelitis    Anxiety associated with depression    RLS (restless legs syndrome)    Neuropathy    Obesity, morbid, BMI 50 or higher    S/P AKA (above knee amputation), left    At this point I am concerned she has developed at minimal a postoperative seroma, possibly surgical site infection.  Despite negative intraoperative cultures, given her history, I think the safest course I for debridement, irrigation, deep cultures, wound vacuum assisted closure with tentative plan for wound closure later this week.  Plan for preop CT to evaluate for seroma / abscess.    After discussion of risk, benefits, alternatives, she wished to proceed with left above-knee amputation surgical site debridement, irrigation, wound vacuum-assisted closure.    Risks  of surgery were discussed which included but were not limited to pain, bleeding, infection, damage to adjacent structures, need for further surgery, wound healing complications, loss of limb and death.  The patient expressed verbal consent and written consent was obtained for the above procedure.    Adama Witt Jr, MD  01/09/24  06:11 EST

## 2024-01-09 NOTE — ANESTHESIA PROCEDURE NOTES
Airway  Urgency: elective    Date/Time: 1/9/2024 6:20 PM  Airway not difficult    General Information and Staff    Patient location during procedure: OR  Anesthesiologist: Jaspal Lanier MD    Indications and Patient Condition  Indications for airway management: airway protection    Preoxygenated: yes  MILS not maintained throughout  Mask difficulty assessment: 1 - vent by mask    Final Airway Details  Final airway type: endotracheal airway      Successful airway: ETT  Cuffed: yes   Successful intubation technique: direct laryngoscopy  Endotracheal tube insertion site: oral  Blade: Genao  Blade size: 3  ETT size (mm): 7.0  Cormack-Lehane Classification: grade I - full view of glottis  Placement verified by: chest auscultation and capnometry   Measured from: lips  ETT/EBT  to lips (cm): 20  Number of attempts at approach: 1  Assessment: lips, teeth, and gum same as pre-op and atraumatic intubation    Additional Comments  Negative epigastric sounds, Breath sound equal bilaterally with symmetric chest rise and fall

## 2024-01-10 LAB
ANION GAP SERPL CALCULATED.3IONS-SCNC: 9 MMOL/L (ref 5–15)
BASOPHILS # BLD AUTO: 0.01 10*3/MM3 (ref 0–0.2)
BASOPHILS NFR BLD AUTO: 0.2 % (ref 0–1.5)
BUN SERPL-MCNC: 5 MG/DL (ref 6–20)
BUN/CREAT SERPL: 11.9 (ref 7–25)
CALCIUM SPEC-SCNC: 8.3 MG/DL (ref 8.6–10.5)
CHLORIDE SERPL-SCNC: 104 MMOL/L (ref 98–107)
CK SERPL-CCNC: 43 U/L (ref 20–180)
CO2 SERPL-SCNC: 25 MMOL/L (ref 22–29)
CREAT SERPL-MCNC: 0.42 MG/DL (ref 0.57–1)
DEPRECATED RDW RBC AUTO: 59.7 FL (ref 37–54)
EGFRCR SERPLBLD CKD-EPI 2021: 119.3 ML/MIN/1.73
EOSINOPHIL # BLD AUTO: 0.01 10*3/MM3 (ref 0–0.4)
EOSINOPHIL NFR BLD AUTO: 0.2 % (ref 0.3–6.2)
ERYTHROCYTE [DISTWIDTH] IN BLOOD BY AUTOMATED COUNT: 14.9 % (ref 12.3–15.4)
GLUCOSE SERPL-MCNC: 139 MG/DL (ref 65–99)
HCT VFR BLD AUTO: 28.7 % (ref 34–46.6)
HGB BLD-MCNC: 9.2 G/DL (ref 12–15.9)
IMM GRANULOCYTES # BLD AUTO: 0.01 10*3/MM3 (ref 0–0.05)
IMM GRANULOCYTES NFR BLD AUTO: 0.2 % (ref 0–0.5)
LYMPHOCYTES # BLD AUTO: 0.47 10*3/MM3 (ref 0.7–3.1)
LYMPHOCYTES NFR BLD AUTO: 11.1 % (ref 19.6–45.3)
MAGNESIUM SERPL-MCNC: 1.6 MG/DL (ref 1.6–2.6)
MCH RBC QN AUTO: 34.7 PG (ref 26.6–33)
MCHC RBC AUTO-ENTMCNC: 32.1 G/DL (ref 31.5–35.7)
MCV RBC AUTO: 108.3 FL (ref 79–97)
MONOCYTES # BLD AUTO: 0.15 10*3/MM3 (ref 0.1–0.9)
MONOCYTES NFR BLD AUTO: 3.5 % (ref 5–12)
NEUTROPHILS NFR BLD AUTO: 3.58 10*3/MM3 (ref 1.7–7)
NEUTROPHILS NFR BLD AUTO: 84.8 % (ref 42.7–76)
NRBC BLD AUTO-RTO: 0 /100 WBC (ref 0–0.2)
PHOSPHATE SERPL-MCNC: 4.2 MG/DL (ref 2.5–4.5)
PLATELET # BLD AUTO: 270 10*3/MM3 (ref 140–450)
PMV BLD AUTO: 10.2 FL (ref 6–12)
POTASSIUM SERPL-SCNC: 4.4 MMOL/L (ref 3.5–5.2)
RBC # BLD AUTO: 2.65 10*6/MM3 (ref 3.77–5.28)
SODIUM SERPL-SCNC: 138 MMOL/L (ref 136–145)
WBC NRBC COR # BLD AUTO: 4.23 10*3/MM3 (ref 3.4–10.8)

## 2024-01-10 PROCEDURE — 25010000002 DAPTOMYCIN PER 1 MG: Performed by: ORTHOPAEDIC SURGERY

## 2024-01-10 PROCEDURE — 82550 ASSAY OF CK (CPK): CPT

## 2024-01-10 PROCEDURE — 25010000002 HYDROMORPHONE PER 4 MG: Performed by: PEDIATRICS

## 2024-01-10 PROCEDURE — 80048 BASIC METABOLIC PNL TOTAL CA: CPT | Performed by: ORTHOPAEDIC SURGERY

## 2024-01-10 PROCEDURE — 29581 APPL MULTLAYER CMPRN SYS LEG: CPT

## 2024-01-10 PROCEDURE — 25010000002 CYANOCOBALAMIN PER 1000 MCG: Performed by: ORTHOPAEDIC SURGERY

## 2024-01-10 PROCEDURE — 85025 COMPLETE CBC W/AUTO DIFF WBC: CPT | Performed by: ORTHOPAEDIC SURGERY

## 2024-01-10 PROCEDURE — 97530 THERAPEUTIC ACTIVITIES: CPT

## 2024-01-10 PROCEDURE — 83735 ASSAY OF MAGNESIUM: CPT | Performed by: ORTHOPAEDIC SURGERY

## 2024-01-10 PROCEDURE — 63710000001 DIPHENHYDRAMINE PER 50 MG: Performed by: ORTHOPAEDIC SURGERY

## 2024-01-10 PROCEDURE — 97110 THERAPEUTIC EXERCISES: CPT

## 2024-01-10 PROCEDURE — 25010000002 CEFEPIME PER 500 MG: Performed by: ORTHOPAEDIC SURGERY

## 2024-01-10 PROCEDURE — 99232 SBSQ HOSP IP/OBS MODERATE 35: CPT | Performed by: PEDIATRICS

## 2024-01-10 PROCEDURE — 25010000002 ONDANSETRON PER 1 MG: Performed by: ORTHOPAEDIC SURGERY

## 2024-01-10 PROCEDURE — 84100 ASSAY OF PHOSPHORUS: CPT | Performed by: ORTHOPAEDIC SURGERY

## 2024-01-10 PROCEDURE — 97597 DBRDMT OPN WND 1ST 20 CM/<: CPT

## 2024-01-10 PROCEDURE — 25010000002 METRONIDAZOLE 500 MG/100ML SOLUTION: Performed by: ORTHOPAEDIC SURGERY

## 2024-01-10 PROCEDURE — 97606 NEG PRS WND THER DME>50 SQCM: CPT

## 2024-01-10 RX ORDER — HYDROMORPHONE HYDROCHLORIDE 1 MG/ML
0.5 INJECTION, SOLUTION INTRAMUSCULAR; INTRAVENOUS; SUBCUTANEOUS EVERY 4 HOURS PRN
Status: DISPENSED | OUTPATIENT
Start: 2024-01-10 | End: 2024-01-15

## 2024-01-10 RX ORDER — LORAZEPAM 0.5 MG/1
0.5 TABLET ORAL EVERY 12 HOURS PRN
Status: DISCONTINUED | OUTPATIENT
Start: 2024-01-10 | End: 2024-01-19 | Stop reason: HOSPADM

## 2024-01-10 RX ADMIN — PRAMIPEXOLE DIHYDROCHLORIDE 1 MG: 0.25 TABLET ORAL at 09:06

## 2024-01-10 RX ADMIN — CEFEPIME 2000 MG: 2 INJECTION, POWDER, FOR SOLUTION INTRAVENOUS at 21:57

## 2024-01-10 RX ADMIN — Medication 5 MG: at 21:07

## 2024-01-10 RX ADMIN — CEFEPIME 2000 MG: 2 INJECTION, POWDER, FOR SOLUTION INTRAVENOUS at 05:06

## 2024-01-10 RX ADMIN — PREGABALIN 200 MG: 100 CAPSULE ORAL at 21:07

## 2024-01-10 RX ADMIN — OXYCODONE HYDROCHLORIDE 15 MG: 15 TABLET ORAL at 17:08

## 2024-01-10 RX ADMIN — PREGABALIN 200 MG: 100 CAPSULE ORAL at 17:08

## 2024-01-10 RX ADMIN — ONDANSETRON 4 MG: 2 INJECTION INTRAMUSCULAR; INTRAVENOUS at 11:48

## 2024-01-10 RX ADMIN — OXYCODONE HYDROCHLORIDE 15 MG: 15 TABLET ORAL at 10:57

## 2024-01-10 RX ADMIN — TRAZODONE HYDROCHLORIDE 50 MG: 50 TABLET ORAL at 21:57

## 2024-01-10 RX ADMIN — PREGABALIN 200 MG: 100 CAPSULE ORAL at 09:06

## 2024-01-10 RX ADMIN — LEVOTHYROXINE SODIUM 125 MCG: 125 TABLET ORAL at 05:06

## 2024-01-10 RX ADMIN — Medication 10 ML: at 09:06

## 2024-01-10 RX ADMIN — HYDROMORPHONE HYDROCHLORIDE 0.5 MG: 1 INJECTION, SOLUTION INTRAMUSCULAR; INTRAVENOUS; SUBCUTANEOUS at 09:16

## 2024-01-10 RX ADMIN — FOLIC ACID 1 MG: 1 TABLET ORAL at 09:06

## 2024-01-10 RX ADMIN — HYDROMORPHONE HYDROCHLORIDE 0.5 MG: 1 INJECTION, SOLUTION INTRAMUSCULAR; INTRAVENOUS; SUBCUTANEOUS at 14:33

## 2024-01-10 RX ADMIN — CEFEPIME 2000 MG: 2 INJECTION, POWDER, FOR SOLUTION INTRAVENOUS at 14:33

## 2024-01-10 RX ADMIN — OXYCODONE HYDROCHLORIDE 15 MG: 15 TABLET ORAL at 05:06

## 2024-01-10 RX ADMIN — METRONIDAZOLE 500 MG: 500 INJECTION, SOLUTION INTRAVENOUS at 17:08

## 2024-01-10 RX ADMIN — METRONIDAZOLE 500 MG: 500 INJECTION, SOLUTION INTRAVENOUS at 05:09

## 2024-01-10 RX ADMIN — Medication 1 CAPSULE: at 09:06

## 2024-01-10 RX ADMIN — DIPHENHYDRAMINE HYDROCHLORIDE 25 MG: 25 CAPSULE ORAL at 18:05

## 2024-01-10 RX ADMIN — FAMOTIDINE 20 MG: 20 TABLET, FILM COATED ORAL at 09:06

## 2024-01-10 RX ADMIN — ACETAMINOPHEN 1000 MG: 500 TABLET ORAL at 14:33

## 2024-01-10 RX ADMIN — CYANOCOBALAMIN 1000 MCG: 1000 INJECTION, SOLUTION INTRAMUSCULAR; SUBCUTANEOUS at 09:16

## 2024-01-10 RX ADMIN — FAMOTIDINE 20 MG: 20 TABLET, FILM COATED ORAL at 17:09

## 2024-01-10 RX ADMIN — ACETAMINOPHEN 1000 MG: 500 TABLET ORAL at 21:13

## 2024-01-10 RX ADMIN — ACETAMINOPHEN 1000 MG: 500 TABLET ORAL at 05:06

## 2024-01-10 RX ADMIN — OXYCODONE HYDROCHLORIDE 5 MG: 5 TABLET ORAL at 21:07

## 2024-01-10 RX ADMIN — DAPTOMYCIN 500 MG: 500 INJECTION, POWDER, LYOPHILIZED, FOR SOLUTION INTRAVENOUS at 21:14

## 2024-01-10 RX ADMIN — SERTRALINE HYDROCHLORIDE 50 MG: 50 TABLET ORAL at 21:07

## 2024-01-10 NOTE — PLAN OF CARE
Goal Outcome Evaluation:  Plan of Care Reviewed With: patient        Progress: improving  Outcome Evaluation: Improved level of assist with rolling in bed with use of trapeze bar with minAx1.  Con to require use of mechanical lift to transfer from bed to chair safely.  STS x 2 attempts from chair with maxAx2 and BUE support on parallel bars; pt unable to clear buttocks from chair.  Con to demo weakness, decreased balance, pain, and decreased functional endurance limiting indep with mobility and ability to safely navigate home environment.  Will con to benefit from skilled IP PT to improve deficits and promote return to PLOF.  Rec IPR upon d/c for best fxl outcome.      Anticipated Discharge Disposition (PT): inpatient rehabilitation facility

## 2024-01-10 NOTE — ANESTHESIA POSTPROCEDURE EVALUATION
Patient: Phoebe Carvajal    Procedure Summary       Date: 01/09/24 Room / [facility]:  [facility]  [facility]    Anesthesia Start: 1811 Anesthesia Stop: 1940    Procedure: LOWER EXTREMITY DEBRIDEMENT (Left) Diagnosis:       S/P AKA (above knee amputation), left      (S/P AKA (above knee amputation), left [Z89.612])    Surgeons: Adama Witt Jr., MD Provider: Jaspal Lanier MD    Anesthesia Type: general ASA Status: 3            Anesthesia Type: general    Vitals  Vitals Value Taken Time   BP 99/74 01/09/24 1940   Temp 97.4 °F (36.3 °C) 01/09/24 1940   Pulse 75 01/09/24 1940   Resp     SpO2 95 % 01/09/24 1940           Post Anesthesia Care and Evaluation    Patient location during evaluation: PACU  Patient participation: complete - patient participated  Level of consciousness: awake and alert  Pain management: adequate    Airway patency: patent  Anesthetic complications: No anesthetic complications  PONV Status: none  Cardiovascular status: hemodynamically stable and acceptable  Respiratory status: nonlabored ventilation, acceptable and nasal cannula  Hydration status: acceptable
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
## 2024-01-10 NOTE — PLAN OF CARE
Goal Outcome Evaluation:  Plan of Care Reviewed With: patient        Progress: improving  Outcome Evaluation: Pt continuing to demonstrate improvements in R plantar foot wound dimensions along with good granulation of wound base. Pt's RLE edema well managed with current use of MLW. Pt also now with L AKA incision site wound vac placed in OR yesterday by Dr. Witt following I&D of the AKA incision. Planning to leave vac dressing in place until potential repeat I&D w/ secondary closure on 1/12/24.

## 2024-01-10 NOTE — OP NOTE
[facility]     OPERATIVE REPORT      PATIENT NAME:  Phoebe Carvajal                            YOB: 1975       PREOP DIAGNOSIS:   Left above knee amputation devitalized skin / drainage    POSTOP DIAGNOSIS:   Same.    PROCEDURE:   Left     53828: Debridement of skin, subcutaneous tissue, muscle  54560: Wound vacuum assisted closure    SURGEON:     Adama Witt MD    OPERATIVE TEAM:   Circulator: Laurie Ross RN; Adonay Macias RN  Scrub Person: Serina Guthrie; Esteban Carrillo  Nursing Assistant: Sasha Torres    ANESTHETIST:  Anesthesiologist: Jaspal Lanier MD    ANESTHESIA:   Choice    ESTIMATED BLOOD LOSS:   50 ml    TOURNIQUET TIME:   Not utilized    FINDINGS:     Remaining tissue appeared healthy.  VAC with good seal.  No overt signs or symptoms of infection in the residual limb.    IMPLANTS:     none    SPECIMENS:     Specimens       ID Source Type Tests Collected By Collected At Frozen?    1 Leg, Left Tissue ANAEROBIC CULTURE  FUNGAL CULTURE  TISSUE / BONE CULTURE  AFB CULTURE   Adama Witt Jr., MD 1/9/24 1844     This specimen was not marked as sent.    2 Leg, Left Tissue ANAEROBIC CULTURE   Adama Witt Jr., MD 1/9/24 1844     This specimen was not marked as sent.            COMPLICATIONS:    None.    DISPOSITION:    Stable to recovery.     INDICATIONS:    48 y.o. female status post left above-knee amputation, second stage IX days ago with interval development of medial anterior dusky skin edge with persistent and worsening drainage over the past several days.  I had a discussion with her regarding further management.  After discussion of risk, benefits, alternatives, she wished to proceed with left leg debridement, irrigation, wound vacuum-assisted closure with deep culture.  Risks of surgery were discussed which included but were not limited to pain, bleeding, infection, damage to adjacent structures, need for further surgery, wound healing complications,  loss of limb and death.  The patient expressed verbal consent and written consent was obtained for the above procedure.    NARRATIVE:    Patient was identified in preoperative holding.  Operative site was marked in indelible ink.  History, physical, consent were reviewed and updated.  Patient was surrendered to the anesthesia team and transported to the operative suite where anesthesia was induced.  Hip bump was placed on the ipsilateral side.  Operative extremity was prepped and draped in the usual sterile fashion, prepping over the [place].  The operative team donned sterile gowns and gloves and a timeout was called.  All in attendance agreed regarding the patient's identity, proposed operative procedure, and operative site.    I removed her staples, bluntly dissected into the subcutaneous tissue, carefully removed all deep suture.  Using a 15 blade scalpel I debrided devitalized skin and subcutaneous tissue as well as devitalized muscle with the Bovie.  This was an excisional debridement, deepest level was muscle.  I took swab specimens of the deep wound.  There was no myke purulence noted.  I copiously irrigated the wound with Irrisept and saline.    I placed a wound vacuum assisted closure device which was noted to have good seal.    Sterile dressing applied.  Anesthesia was concluded and the patient was transported to the PACU in stable condition.  Counts were correct x2.  There were no apparent complications.  I was present scrubbed for the entire case.    Adama Witt Jr, MD  1/9/2024
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
## 2024-01-10 NOTE — PROGRESS NOTES
[facility] Medicine Services  PROGRESS NOTE    Patient Name: Phoebe Carvajal  : 1975  MRN: [id number]    Date of Admission: 2023  Primary Care Physician: Sofya Benjamin MD    Subjective   Subjective     CC:  F/u osteomyelitis    HPI:   S/p washout and placement of wound VAC  Pt with  more pain and anxiety today.  Very resistant to have family visiting.    Objective   Vital Signs:   Temp:  [97.4 °F (36.3 °C)-99 °F (37.2 °C)] 98.9 °F (37.2 °C)  Heart Rate:  [58-92] 86  Resp:  [15-18] 18  BP: ()/(47-82) 90/57  Flow (L/min):  [2-6] 2     Physical Exam:   Constitutional: Awake, alert, NAD, morbidly obese  HENT: NCAT, mucous membranes moist  Respiratory: Clear to auscultation bilaterally, nonlabored  Cardiovascular: RRR, no murmurs, rubs, or gallops  Gastrointestinal: Positive bowel sounds, soft, nontender, nondistended  Musculoskeletal: no RLE edema- s/p compression wrapping by therapy, L AKA--wound vac in place.     Psychiatric: Appropriate affect, cooperative  Neurologic: Oriented x 3, BRIONES, speech clear  Skin: No rashes        Results Reviewed:  LAB RESULTS:      Lab 01/10/24  0420 01/07/24  1116 01/06/24  0526 01/04/24  0527   WBC 4.23 3.34* 2.85* 3.47   HEMOGLOBIN 9.2* 8.8* 9.6* 9.0*   HEMATOCRIT 28.7* 28.2* 30.5* 29.1*   PLATELETS 270 233 232 219   NEUTROS ABS 3.58 1.86 1.49*  --    IMMATURE GRANS (ABS) 0.01 0.00 0.01  --    LYMPHS ABS 0.47* 0.95 0.91  --    MONOS ABS 0.15 0.32 0.27  --    EOS ABS 0.01 0.19 0.16  --    .3* 109.7* 111.3* 111.9*         Lab 01/10/24  0420 01/07/24  1116 01/06/24  0526 24  0527   SODIUM 138 140 139 143   POTASSIUM 4.4 4.2 4.7 4.1   CHLORIDE 104 107 106 109*   CO2 25.0 27.0 27.0 29.0   ANION GAP 9.0 6.0 6.0 5.0   BUN 5* 5* 5* 6   CREATININE 0.42* 0.39* 0.37* 0.36*   EGFR 119.3 121.5 123.0 123.9   GLUCOSE 139* 95 78 86   CALCIUM 8.3* 8.0* 8.0* 7.9*   MAGNESIUM 1.6  --   --   --    PHOSPHORUS 4.2  --   --   --          Lab  01/07/24  1116 01/04/24  0535   TOTAL PROTEIN 5.1*  --    ALBUMIN 2.4* 1.8*   GLOBULIN 2.7  --    ALT (SGPT) <5  --    AST (SGOT) 14  --    BILIRUBIN 0.3  --    ALK PHOS 182*  --                    Lab 01/04/24  0535   FOLATE 3.61*   VITAMIN B 12 161*         Brief Urine Lab Results  (Last result in the past 365 days)        Color   Clarity   Blood   Leuk Est   Nitrite   Protein   CREAT   Urine HCG        01/05/24 1351 Yellow   Cloudy   Negative   Small (1+)   Negative   Trace                   Microbiology Results Abnormal       Procedure Component Value - Date/Time    AFB Culture - Tissue, Leg, Left [[id number]] Collected: 01/09/24 1844    Lab Status: Preliminary result Specimen: Tissue from Leg, Left Updated: 01/10/24 1356     AFB Stain No acid fast bacilli seen on concentrated smear    Tissue / Bone Culture - Tissue, Leg, Left [[id number]] Collected: 01/09/24 1844    Lab Status: Preliminary result Specimen: Tissue from Leg, Left Updated: 01/10/24 0836     Tissue Culture No growth     Gram Stain Rare (1+) WBCs seen      No organisms seen    Fungus Culture - Surgical Site, Leg, Left [[id number]] Collected: 12/31/23 0831    Lab Status: Preliminary result Specimen: Surgical Site from Leg, Left Updated: 01/07/24 1946     Fungus Culture No fungus isolated at 1 week    AFB Culture - Surgical Site, Leg, Left [[id number]] Collected: 12/31/23 0831    Lab Status: Preliminary result Specimen: Surgical Site from Leg, Left Updated: 01/07/24 1946     AFB Culture No AFB isolated at 1 week     AFB Stain No acid fast bacilli seen on concentrated smear    Urine Culture - Urine, Urine, Clean Catch [[id number]]  (Normal) Collected: 01/06/24 1224    Lab Status: Final result Specimen: Urine, Clean Catch Updated: 01/07/24 1429     Urine Culture No growth    Anaerobic Culture - Surgical Site, Leg, Left [[id number]]  (Normal) Collected: 12/31/23 0833    Lab Status: Final result Specimen: Surgical Site from Leg, Left Updated: 01/06/24 0515      Anaerobic Culture No anaerobes isolated at 5 days    Fungus Culture - Tissue, Leg, Left [[id number]] Collected: 12/29/23 1139    Lab Status: Preliminary result Specimen: Tissue from Leg, Left Updated: 01/05/24 1330     Fungus Culture No fungus isolated at 1 week    AFB Culture - Tissue, Leg, Left [[id number]] Collected: 12/29/23 1139    Lab Status: Preliminary result Specimen: Tissue from Leg, Left Updated: 01/05/24 1330     AFB Culture No AFB isolated at 1 week     AFB Stain No acid fast bacilli seen on concentrated smear    Tissue / Bone Culture - Surgical Site, Leg, Left [[id number]] Collected: 12/31/23 0831    Lab Status: Final result Specimen: Surgical Site from Leg, Left Updated: 01/04/24 0743     Tissue Culture No growth at 3 days     Gram Stain Moderate (3+) WBCs per low power field      No organisms seen    Anaerobic Culture - Tissue, Leg, Left [[id number]]  (Normal) Collected: 12/29/23 1139    Lab Status: Final result Specimen: Tissue from Leg, Left Updated: 01/03/24 1001     Anaerobic Culture No anaerobes isolated at 5 days    Blood Culture - Blood, Arm, Right [[id number]]  (Normal) Collected: 12/28/23 2115    Lab Status: Final result Specimen: Blood from Arm, Right Updated: 01/03/24 0030     Blood Culture No growth at 5 days    Blood Culture - Blood, Hand, Right [[id number]]  (Normal) Collected: 12/28/23 2120    Lab Status: Final result Specimen: Blood from Hand, Right Updated: 01/03/24 0030     Blood Culture No growth at 5 days    Wound Culture - Wound, Leg, Left [[id number]] Collected: 12/29/23 1322    Lab Status: Final result Specimen: Wound from Leg, Left Updated: 01/01/24 0711     Wound Culture No growth at 3 days     Gram Stain No organisms seen    MRSA Screen, PCR (Inpatient) - Swab, Nares [[id number]]  (Normal) Collected: 12/29/23 0203    Lab Status: Final result Specimen: Swab from Nares Updated: 12/29/23 0733     MRSA PCR Negative    Narrative:      The negative predictive value of this diagnostic test  is high and should only be used to consider de-escalating anti-MRSA therapy. A positive result may indicate colonization with MRSA and must be correlated clinically.  MRSA Negative    COVID PRE-OP / PRE-PROCEDURE SCREENING ORDER (NO ISOLATION) - Swab, Nasopharynx [[id number]]  (Normal) Collected: 12/29/23 0203    Lab Status: Final result Specimen: Swab from Nasopharynx Updated: 12/29/23 0353    Narrative:      The following orders were created for panel order COVID PRE-OP / PRE-PROCEDURE SCREENING ORDER (NO ISOLATION) - Swab, Nasopharynx.  Procedure                               Abnormality         Status                     ---------                               -----------         ------                     Respiratory Panel PCR w/...[[id number]]  Normal              Final result                 Please view results for these tests on the individual orders.    Respiratory Panel PCR w/COVID-19(SARS-CoV-2) ARIELLE/VESNA/KARIE/PAD/COR/SÁNCHEZ In-House, NP Swab in UTM/VTM, 2 HR TAT - Swab, Nasopharynx [[id number]]  (Normal) Collected: 12/29/23 0203    Lab Status: Final result Specimen: Swab from Nasopharynx Updated: 12/29/23 0353     ADENOVIRUS, PCR Not Detected     Coronavirus 229E Not Detected     Coronavirus HKU1 Not Detected     Coronavirus NL63 Not Detected     Coronavirus OC43 Not Detected     COVID19 Not Detected     Human Metapneumovirus Not Detected     Human Rhinovirus/Enterovirus Not Detected     Influenza A PCR Not Detected     Influenza B PCR Not Detected     Parainfluenza Virus 1 Not Detected     Parainfluenza Virus 2 Not Detected     Parainfluenza Virus 3 Not Detected     Parainfluenza Virus 4 Not Detected     RSV, PCR Not Detected     Bordetella pertussis pcr Not Detected     Bordetella parapertussis PCR Not Detected     Chlamydophila pneumoniae PCR Not Detected     Mycoplasma pneumo by PCR Not Detected    Narrative:      In the setting of a positive respiratory panel with a viral infection PLUS a negative  procalcitonin without other underlying concern for bacterial infection, consider observing off antibiotics or discontinuation of antibiotics and continue supportive care. If the respiratory panel is positive for atypical bacterial infection (Bordetella pertussis, Chlamydophila pneumoniae, or Mycoplasma pneumoniae), consider antibiotic de-escalation to target atypical bacterial infection.            CT Lower Extremity Left Without Contrast    Result Date: 1/9/2024  CT LOWER EXTREMITY LEFT WO CONTRAST Date of Exam: 1/9/2024 9:42 AM EST Indication: Soft tissue infection suspected, thigh, xray done. Comparison: None available. Technique: Axial CT images were obtained of the left lower extremity without contrast administration.  Reconstructed coronal and sagittal images were also obtained. Automated exposure control and iterative construction methods were used. Findings: Exam is limited by patient status and lack of intravenous contrast. There is sequela of above-knee amputation at the level of the mid femoral shaft. Extensive surgical changes in the surrounding soft tissues including a distal incision with cutaneous staples and diffuse subcutaneous and deep soft tissue edema. Multiple foci of gas extend from the incision site proximally through the deep soft tissues, however with no evidence of extension to the bone. There is a small gas fluid collection near the deep aspect of the incision site measuring up to 3.3 x 3.6 cm as seen on  axial image 186 and coronal image 126. This also does not appear to extend to bone. The left femoral cortex as well as the osteotomy site demonstrates no irregularity, and margins are sharp. The remaining visualized soft tissues show anterior abdominal wall cutaneous thickening and subcutaneous edema with multiple subcutaneous calcifications, possibly sequela of injection. No discrete fluid collection. Partially visualized right leg edema.     Impression: Impression: Postsurgical changes  of above-knee amputation at the level of the mid femoral shaft. Extensive associated soft tissue changes distally including multiple foci of gas extending proximally from the incision site into the deep soft tissues. There is a small  gas fluid collection measuring up to 3.6 cm along the deep aspect of the incision site, without evidence of osseous extension. Otherwise no discrete fluid collection. No evidence of osteomyelitis. Electronically Signed: Jluis Carl MD  1/9/2024 10:22 AM EST  Workstation ID: [id number]         Current medications:  Scheduled Meds:Pharmacy Consult, , Does not apply, Q12H  acetaminophen, 1,000 mg, Oral, Q8H  cefepime, 2,000 mg, Intravenous, Q8H  DAPTOmycin, 6 mg/kg (Adjusted), Intravenous, Q24H  famotidine, 20 mg, Oral, BID AC  folic acid, 1 mg, Oral, Daily  lactobacillus acidophilus, 1 capsule, Oral, Daily  levothyroxine, 125 mcg, Oral, Daily  metroNIDAZOLE, 500 mg, Intravenous, Q8H  oxyCODONE, 15 mg, Oral, Q6H  pramipexole, 1 mg, Oral, Daily  pregabalin, 200 mg, Oral, TID  senna-docusate sodium, 2 tablet, Oral, BID  sertraline, 50 mg, Oral, Nightly  sodium chloride, 10 mL, Intravenous, Q12H      Continuous Infusions:lactated ringers, 9 mL/hr  lactated ringers, 9 mL/hr      PRN Meds:.  senna-docusate sodium **AND** polyethylene glycol **AND** bisacodyl **AND** bisacodyl    diphenhydrAMINE    HYDROmorphone    lactated ringers    loperamide    LORazepam    melatonin    ondansetron    oxyCODONE    sodium chloride    sodium chloride    traZODone    Assessment & Plan   Assessment & Plan     Active Hospital Problems    Diagnosis  POA    Anxiety associated with depression [F41.8]  Yes    RLS (restless legs syndrome) [G25.81]  Yes    Neuropathy [G62.9]  Yes    Obesity, morbid, BMI 50 or higher [E66.01]  Yes    S/P AKA (above knee amputation), left [Z89.612]  Not Applicable    Chronic osteomyelitis [M86.60]  Yes    Acquired hypothyroidism [E03.9]  Yes    Ulcer of right midfoot with fat layer  exposed [L97.412]  Yes      Resolved Hospital Problems    Diagnosis Date Resolved POA    **Lower extremity cellulitis [L03.119] 01/08/2024 Yes    Ulcer of left heel, with fat layer exposed [L97.422] 01/08/2024 Yes        Brief Hospital Course to date:  Phoebe Carvajal is a 48 y.o. female with history of depression, hypothyroidism, stills disease, RLS, PAD, anemia, morbid obesity, recurrent osteomyelitis, chronic lower extremity osteomyelitis, recurrent left prosthetic joint infection, bilateral foot wounds who presented to the emergency department for evaluation of fever and worsening erythema with foul odor from her chronic wounds. Previous records from  from October 22 through November 8, 2023 showed intention for proceeding with left AKA but patient waited for second opinion hoping to have the option of BKA.  She is followed by infectious disease-known to Dr. Wang.  She underwent AKA on 12/29/2023 with Dr. Witt, with additional debridement on 12/31/2023       Gross hematuria  - no longer has menstrual cycle.   - urology follow up 6-8 weeks    Osteomyelitis/cellulitis of the left lower extremity  Status post left AKA on 12/29/2023 Dr. Witt  Chronic infected wounds  -Dr. Witt performed debridement again on 12/31/2023  --cont scheduled oxycodone with addition of a prn dose for breakthrough pain.  Added back IV dilaudid for pain.  --ID consulted, Dr. Tayo Arnold following  --wound cultures: Anaerobic culture no anaerobes, tissue culture Gram stain no growth, AFB stain no acid-fast bacilli seen, culture no growth, fungus culture no growth, AFB culture IBF stain noted, MRSA PCR negative  - s/p ceftazidime and daptomycin 1/4/2024  --drain removed 1/2/24  --s/p washout and wound vac placement on 1/9/24.  Planning on returning to OR on 1/12.  -- Follow up with [facility] with Dr. Witt in 2 weeks for wound check and x-rays  -- Continue scheduled Tylenol, continue scheduled oxycodone 15 mg  q6h per home dosing as discussed with palliative care    Migraine HA  --resolved, cont to monitor.     Right foot ulcer  --PT wound care following  --Offload right foot    Diarrhea  -- Suspect secondary to IV antibiotics, remains afebrile, WBCs WNL  -- cont probiotic daily  -- Imodium as needed    Anemia  -- Likely postop but also macrocytic per labs  -- B12 and folate low  -- Cont B12 replacement cyanocobalamin 1000 mcg IM daily x 7 days, then weekly x 4 weeks  -- continue folic acid 1 mg daily  -- Recheck B12 and folic acid in 1 month at PCP follow-up    Neuropathy  -Continue Lyrica     Restless leg syndrome  -Continue Mirapex    Morbid obesity  Hypoxia, resolved  -Likely related to opiate use postoperatively and morbid obesity  -O2 to maintain sats greater than 90 and wean O2 as tolerated  --now on room air     Anxiety  -Continue Zoloft      Expected Discharge Location and Transportation:   Expected Discharge   Expected Discharge Date: 1/12/2024; Expected Discharge Time:  3:00 PM    DVT prophylaxis:  Mechanical DVT prophylaxis orders are present. No lovenox due to hematuria    AM-PAC 6 Clicks Score (PT): 10 (01/09/24 2100)    CODE STATUS:   Code Status and Medical Interventions:   Ordered at: 12/29/23 0121     Code Status (Patient has no pulse and is not breathing):    CPR (Attempt to Resuscitate)     Medical Interventions (Patient has pulse or is breathing):    Full Support       Zenobia Romo MD  01/10/24

## 2024-01-10 NOTE — PLAN OF CARE
Problem: Adult Inpatient Plan of Care  Goal: Plan of Care Review  Outcome: Ongoing, Progressing  Flowsheets (Taken 1/10/2024 8233)  Progress: improving     Goal Outcome Evaluation:     No acute events overnight    Received from PACU at ~ 2050    NSR to SB, normotensive, afebrile    RA to 2LNC    2 BMs, UOP adequate    A+O x 4    Scheduled and PRN oxy given for pain    Pt able to utilize mobility trapeze for positioning and rolling

## 2024-01-10 NOTE — CASE MANAGEMENT/SOCIAL WORK
Continued Stay Note   [place]     Patient Name: Phoebe Carvajal  MRN: [id number]  Today's Date: 1/10/2024    Admit Date: 12/28/2023    Plan: update   Discharge Plan       Row Name 01/10/24 1532       Plan    Plan update    Patient/Family in Agreement with Plan yes    Plan Comments Per MDR, wound vac in place.  Anticipate surgery Friday.  Spoke with patient at bedside regarding discharge plan who reports pain has improved some.  Patient advises that she is to have another procedure on Friday.  CM advised that [facility] is continuing to follow along throughout her stay and we will move forward with the referral when closer to discharge.  No immediate needs.  CM following.  Patient plan is to discharge to [facility].    Final Discharge Disposition Code 62 - inpatient rehab facility                   Discharge Codes    No documentation.                 Expected Discharge Date and Time       Expected Discharge Date Expected Discharge Time    Jan 12, 2024               Jerica Paredes RN
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
## 2024-01-10 NOTE — PROGRESS NOTES
Phoebe Carvajal       LOS: 13 days   Patient Care Team:  Sofya Benjamin MD as PCP - General (Internal Medicine)  Provider, No Known as PCP - Family Medicine    Chief Complaint:  Status post left above-knee amputation     Subjective     Interval History:     Resting comfortably in bed this morning.  No acute events overnight.  Complaining of increased pain left lower extremity.    Review of Systems:      Gen- No fevers, chills  CV- No chest pain, palpitations  Resp- No cough, dyspnea  GI- No N/V/D, abd pain    Objective     Vital Signs  Vital Signs (last 24 hours)         01/07 0700  01/08 0659 01/08 0700 01/09 0611   Most Recent      Temp (°F) 98 -  98.5    98.1 -  98.8     98.8 (37.1) 01/09 0300    Heart Rate 56 -  84    59 -  72     60 01/09 0015    Resp   18    16 -  18     16 01/09 0300    BP 96/54 -  111/61    112/73 -  122/60     121/68 01/09 0300    SpO2 (%) 89 -  97    90 -  98     97 01/09 0015    Flow (L/min)   2      2     2 01/09 0200              Physical Exam:     Alert, oriented.  No acute distress.  Nonlabored respirations.  Regular rate and rhythm.  Abdomen nondistended.  Left lower extremity: Operative dressing in place and is clean, dry, intact.  Wound vacuum-assisted closure in place with moderate amount of serosanguineous output in canister.     Results Review:     I reviewed the patient's new clinical results.    Medication Review:   Hospital Medications (active)         Dose Frequency Start End    ! Home medications stored in pharmacy, please contact pharmacist prior to patient discharge  Every 12 Hours Scheduled 12/29/2023 --    Route: Does not apply    acetaminophen (TYLENOL) tablet 1,000 mg 1,000 mg Every 8 Hours Scheduled 1/2/2024 --    Admin Instructions: If given for fever, use fever parameter: fever greater than 100.4 °F  Based on patient request - if ordered for moderate or severe pain, provider allows for administration of a medication prescribed for a lower pain scale.    Do  not exceed 4 grams of acetaminophen in a 24 hr period. Max dose of 2gm for AST/ALT greater than 120 units/L.    If given for pain, use the following pain scale:   Mild Pain = Pain Score of 1-3, CPOT 1-2  Moderate Pain = Pain Score of 4-6, CPOT 3-4  Severe Pain = Pain Score of 7-10, CPOT 5-8    Route: Oral    bisacodyl (DULCOLAX) EC tablet 5 mg 5 mg Daily PRN 12/29/2023 --    Admin Instructions: Use if no bowel movement after 12 hours.  Swallow whole. Do not crush, split, or chew tablet.    Route: Oral    Linked Group 1: See Hyperspace for full Linked Orders Report.        bisacodyl (DULCOLAX) suppository 10 mg 10 mg Daily PRN 12/29/2023 --    Admin Instructions: Use if no bowel movement after 12 hours.  Hold for diarrhea    Route: Rectal    Linked Group 1: See Hyperspace for full Linked Orders Report.        cyanocobalamin injection 1,000 mcg 1,000 mcg Daily 1/4/2024 1/11/2024    Route: Intramuscular    diphenhydrAMINE (BENADRYL) capsule 25 mg 25 mg Every 6 Hours PRN 12/29/2023 --    Admin Instructions: Caution: Look alike/sound alike drug alert. This med may be ordered in other forms and routes. Before giving verify the last time the drug was given by any route/form.    Route: Oral    famotidine (PEPCID) tablet 20 mg 20 mg 2 Times Daily Before Meals 1/1/2024 --    Route: Oral    folic acid (FOLVITE) tablet 1 mg 1 mg Daily 1/4/2024 --    Route: Oral    lactated ringers infusion 9 mL/hr Continuous PRN 12/31/2023 --    Route: Intravenous    lactobacillus acidophilus (RISAQUAD) capsule 1 capsule 1 capsule Daily 1/3/2024 --    Route: Oral    levothyroxine (SYNTHROID, LEVOTHROID) tablet 125 mcg 125 mcg Daily 12/29/2023 --    Admin Instructions: Take on empty stomach.    Route: Oral    loperamide (IMODIUM) capsule 2 mg 2 mg 4 Times Daily PRN 1/3/2024 --    Admin Instructions: Maximum recommended 16 mg / 24 hours.      Route: Oral    LORazepam (ATIVAN) tablet 0.5 mg 0.5 mg Daily PRN 1/4/2024 --    Admin Instructions: Hold  for SBP < 110   Caution: Look alike/sound alike drug alert    Route: Oral    Cosign for Ordering: Accepted by Jorge Lua MD on 1/7/2024  4:45 PM    melatonin tablet 5 mg 5 mg Nightly PRN 12/29/2023 --    Route: Oral    ondansetron (ZOFRAN) injection 4 mg 4 mg Every 6 Hours PRN 12/29/2023 --    Admin Instructions: If BOTH ondansetron (ZOFRAN) and promethazine (PHENERGAN) are ordered use ondansetron first and THEN promethazine IF ondansetron is ineffective.    Route: Intravenous    oxyCODONE (ROXICODONE) immediate release tablet 15 mg 15 mg Every 6 Hours 1/4/2024 --    Admin Instructions: Based on patient request - if ordered for moderate or severe pain, provider allows for administration of a medication prescribed for a lower pain scale.      If given for pain, use the following pain scale:  Mild Pain = Pain Score of 1-3, CPOT 1-2  Moderate Pain = Pain Score of 4-6, CPOT 3-4  Severe Pain = Pain Score of 7-10, CPOT 5-8    Route: Oral    Cosign for Ordering: Accepted by Jorge Lua MD on 1/7/2024  4:45 PM    oxyCODONE (ROXICODONE) immediate release tablet 5 mg 5 mg Every 6 Hours PRN 1/8/2024 1/15/2024    Admin Instructions: Based on patient request - if ordered for moderate or severe pain, provider allows for administration of a medication prescribed for a lower pain scale.      If given for pain, use the following pain scale:  Mild Pain = Pain Score of 1-3, CPOT 1-2  Moderate Pain = Pain Score of 4-6, CPOT 3-4  Severe Pain = Pain Score of 7-10, CPOT 5-8    Route: Oral    polyethylene glycol (MIRALAX) packet 17 g 17 g Daily PRN 12/29/2023 --    Admin Instructions: Use if no bowel movement after 12 hours. Mix in 6-8 ounces of water.  Use 4-8 ounces of water, tea, or juice for each 17 gram dose.    Route: Oral    Linked Group 1: See Hyperssergio for full Linked Orders Report.        pramipexole (MIRAPEX) tablet 1 mg 1 mg Daily 12/29/2023 --    Route: Oral    pregabalin (LYRICA) capsule 200 mg 200 mg 3 Times  Daily 12/29/2023 --    Admin Instructions:     Route: Oral    sennosides-docusate (PERICOLACE) 8.6-50 MG per tablet 2 tablet 2 tablet 2 Times Daily 12/29/2023 --    Admin Instructions: HOLD MEDICATION IF PATIENT HAS HAD BOWEL MOVEMENT. Start bowel management regimen if patient has not had a bowel movement after 12 hours.    Route: Oral    Linked Group 1: See Russ for full Linked Orders Report.        sertraline (ZOLOFT) tablet 50 mg 50 mg Nightly 12/29/2023 --    Route: Oral    sodium chloride 0.9 % flush 10 mL 10 mL Every 12 Hours Scheduled 12/29/2023 --    Route: Intravenous    sodium chloride 0.9 % flush 10 mL 10 mL As Needed 12/29/2023 --    Route: Intravenous    sodium chloride 0.9 % infusion 40 mL 40 mL As Needed 12/29/2023 --    Admin Instructions: Following administration of an IV intermittent medication, flush line with 40mL NS at 100mL/hr.    Route: Intravenous    traZODone (DESYREL) tablet 50 mg 50 mg Nightly PRN 12/29/2023 --    Admin Instructions: Take with food.  Caution: Look alike/sound alike drug alert    Route: Oral              Assessment & Plan     48-year-old female status post left above-knee amputation, now status post irrigation, debridement, wound vacuum-assisted closure 1/10/2024      Acquired hypothyroidism    Ulcer of right midfoot with fat layer exposed    Chronic osteomyelitis    Anxiety associated with depression    RLS (restless legs syndrome)    Neuropathy    Obesity, morbid, BMI 50 or higher    S/P AKA (above knee amputation), left    Nonweightbearing left lower extremity.  Plan to leave wound vacuum-assisted closure in place until 1/12/24; follow cultures    Anticipate she will benefit from repeat left lower extremity irrigation, debridement, secondary closure later this week pending OR availability.    PANDA English  01/10/24  07:23 EST

## 2024-01-11 PROCEDURE — 97535 SELF CARE MNGMENT TRAINING: CPT

## 2024-01-11 PROCEDURE — 25010000002 KETOROLAC TROMETHAMINE PER 15 MG: Performed by: NURSE PRACTITIONER

## 2024-01-11 PROCEDURE — 97605 NEG PRS WND THER DME<=50SQCM: CPT

## 2024-01-11 PROCEDURE — 25010000002 CEFEPIME PER 500 MG: Performed by: ORTHOPAEDIC SURGERY

## 2024-01-11 PROCEDURE — 99232 SBSQ HOSP IP/OBS MODERATE 35: CPT | Performed by: PEDIATRICS

## 2024-01-11 PROCEDURE — 97530 THERAPEUTIC ACTIVITIES: CPT

## 2024-01-11 PROCEDURE — 97110 THERAPEUTIC EXERCISES: CPT

## 2024-01-11 PROCEDURE — 25010000002 METRONIDAZOLE 500 MG/100ML SOLUTION: Performed by: ORTHOPAEDIC SURGERY

## 2024-01-11 PROCEDURE — 25010000002 DAPTOMYCIN PER 1 MG: Performed by: ORTHOPAEDIC SURGERY

## 2024-01-11 PROCEDURE — 25810000003 SODIUM CHLORIDE 0.9 % SOLUTION: Performed by: NURSE PRACTITIONER

## 2024-01-11 RX ORDER — KETOROLAC TROMETHAMINE 15 MG/ML
15 INJECTION, SOLUTION INTRAMUSCULAR; INTRAVENOUS ONCE
Status: COMPLETED | OUTPATIENT
Start: 2024-01-11 | End: 2024-01-11

## 2024-01-11 RX ORDER — NALOXONE HCL 0.4 MG/ML
0.4 VIAL (ML) INJECTION
Status: DISCONTINUED | OUTPATIENT
Start: 2024-01-11 | End: 2024-01-19 | Stop reason: HOSPADM

## 2024-01-11 RX ADMIN — SERTRALINE HYDROCHLORIDE 50 MG: 50 TABLET ORAL at 20:36

## 2024-01-11 RX ADMIN — OXYCODONE HYDROCHLORIDE 5 MG: 5 TABLET ORAL at 12:44

## 2024-01-11 RX ADMIN — SODIUM CHLORIDE 250 ML: 9 INJECTION, SOLUTION INTRAVENOUS at 05:46

## 2024-01-11 RX ADMIN — Medication 10 ML: at 08:56

## 2024-01-11 RX ADMIN — TRAZODONE HYDROCHLORIDE 50 MG: 50 TABLET ORAL at 21:30

## 2024-01-11 RX ADMIN — CEFEPIME 2000 MG: 2 INJECTION, POWDER, FOR SOLUTION INTRAVENOUS at 12:43

## 2024-01-11 RX ADMIN — METRONIDAZOLE 500 MG: 500 INJECTION, SOLUTION INTRAVENOUS at 11:08

## 2024-01-11 RX ADMIN — Medication 10 ML: at 20:37

## 2024-01-11 RX ADMIN — CEFEPIME 2000 MG: 2 INJECTION, POWDER, FOR SOLUTION INTRAVENOUS at 06:31

## 2024-01-11 RX ADMIN — Medication 1 CAPSULE: at 08:55

## 2024-01-11 RX ADMIN — OXYCODONE HYDROCHLORIDE 15 MG: 15 TABLET ORAL at 20:36

## 2024-01-11 RX ADMIN — ACETAMINOPHEN 1000 MG: 500 TABLET ORAL at 21:22

## 2024-01-11 RX ADMIN — PREGABALIN 200 MG: 100 CAPSULE ORAL at 08:55

## 2024-01-11 RX ADMIN — FOLIC ACID 1 MG: 1 TABLET ORAL at 08:56

## 2024-01-11 RX ADMIN — METRONIDAZOLE 500 MG: 500 INJECTION, SOLUTION INTRAVENOUS at 17:52

## 2024-01-11 RX ADMIN — CEFEPIME 2000 MG: 2 INJECTION, POWDER, FOR SOLUTION INTRAVENOUS at 20:35

## 2024-01-11 RX ADMIN — ACETAMINOPHEN 1000 MG: 500 TABLET ORAL at 14:09

## 2024-01-11 RX ADMIN — OXYCODONE HYDROCHLORIDE 15 MG: 15 TABLET ORAL at 14:09

## 2024-01-11 RX ADMIN — DAPTOMYCIN 500 MG: 500 INJECTION, POWDER, LYOPHILIZED, FOR SOLUTION INTRAVENOUS at 21:22

## 2024-01-11 RX ADMIN — LEVOTHYROXINE SODIUM 125 MCG: 125 TABLET ORAL at 05:46

## 2024-01-11 RX ADMIN — KETOROLAC TROMETHAMINE 15 MG: 15 INJECTION, SOLUTION INTRAMUSCULAR; INTRAVENOUS at 07:33

## 2024-01-11 RX ADMIN — PREGABALIN 200 MG: 100 CAPSULE ORAL at 16:26

## 2024-01-11 RX ADMIN — PRAMIPEXOLE DIHYDROCHLORIDE 1 MG: 0.25 TABLET ORAL at 08:55

## 2024-01-11 RX ADMIN — METRONIDAZOLE 500 MG: 500 INJECTION, SOLUTION INTRAVENOUS at 02:12

## 2024-01-11 RX ADMIN — FAMOTIDINE 20 MG: 20 TABLET, FILM COATED ORAL at 16:26

## 2024-01-11 RX ADMIN — OXYCODONE HYDROCHLORIDE 15 MG: 15 TABLET ORAL at 07:33

## 2024-01-11 RX ADMIN — OXYCODONE HYDROCHLORIDE 15 MG: 15 TABLET ORAL at 02:19

## 2024-01-11 RX ADMIN — OXYCODONE HYDROCHLORIDE 5 MG: 5 TABLET ORAL at 17:51

## 2024-01-11 RX ADMIN — FAMOTIDINE 20 MG: 20 TABLET, FILM COATED ORAL at 07:33

## 2024-01-11 RX ADMIN — PREGABALIN 200 MG: 100 CAPSULE ORAL at 20:36

## 2024-01-11 RX ADMIN — ACETAMINOPHEN 1000 MG: 500 TABLET ORAL at 05:46
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
## 2024-01-11 NOTE — PROGRESS NOTES
[facility]  PROGRESS NOTE    Patient Name: Phoebe Carvajal  : 1975  MRN: [id number]    Date of Admission: 2023  Primary Care Physician: Sofya Benjamin MD    Subjective   Subjective     CC:  F/u osteomyelitis    HPI:   Patient had difficulty overnight due to pain control and her blood pressure being low.  Remains nervous about going back to the OR tomorrow.    Objective   Vital Signs:   Temp:  [98 °F (36.7 °C)-98.2 °F (36.8 °C)] 98.2 °F (36.8 °C)  Heart Rate:  [53-82] 74  Resp:  [16-18] 18  BP: ()/(53-74) 95/67     Physical Exam:   Constitutional: Awake, alert, NAD, morbidly obese  HENT: NCAT, mucous membranes moist  Respiratory: Clear to auscultation bilaterally, nonlabored  Cardiovascular: RRR, no murmurs, rubs, or gallops  Gastrointestinal: Positive bowel sounds, soft, nontender, nondistended  Musculoskeletal: no RLE edema- s/p compression wrapping by therapy, L AKA--wound vac in place.     Psychiatric: Appropriate affect, cooperative  Neurologic: Oriented x 3, BRIONES, speech clear  Skin: No rashes        Results Reviewed:  LAB RESULTS:      Lab 01/10/24  0420 01/07/24  1116 01/06/24  0526   WBC 4.23 3.34* 2.85*   HEMOGLOBIN 9.2* 8.8* 9.6*   HEMATOCRIT 28.7* 28.2* 30.5*   PLATELETS 270 233 232   NEUTROS ABS 3.58 1.86 1.49*   IMMATURE GRANS (ABS) 0.01 0.00 0.01   LYMPHS ABS 0.47* 0.95 0.91   MONOS ABS 0.15 0.32 0.27   EOS ABS 0.01 0.19 0.16   .3* 109.7* 111.3*         Lab 01/10/24  0420 01/07/24  11124  0526   SODIUM 138 140 139   POTASSIUM 4.4 4.2 4.7   CHLORIDE 104 107 106   CO2 25.0 27.0 27.0   ANION GAP 9.0 6.0 6.0   BUN 5* 5* 5*   CREATININE 0.42* 0.39* 0.37*   EGFR 119.3 121.5 123.0   GLUCOSE 139* 95 78   CALCIUM 8.3* 8.0* 8.0*   MAGNESIUM 1.6  --   --    PHOSPHORUS 4.2  --   --          Lab 24  1116   TOTAL PROTEIN 5.1*   ALBUMIN 2.4*   GLOBULIN 2.7   ALT (SGPT) <5   AST (SGOT) 14   BILIRUBIN 0.3   ALK PHOS 182*                          Brief Urine Lab Results  (Last result in the past 365 days)        Color   Clarity   Blood   Leuk Est   Nitrite   Protein   CREAT   Urine HCG        01/05/24 1351 Yellow   Cloudy   Negative   Small (1+)   Negative   Trace                   Microbiology Results Abnormal       Procedure Component Value - Date/Time    Tissue / Bone Culture - Tissue, Leg, Left [[id number]] Collected: 01/09/24 1844    Lab Status: Preliminary result Specimen: Tissue from Leg, Left Updated: 01/11/24 0821     Tissue Culture No growth at 2 days     Gram Stain Rare (1+) WBCs seen      No organisms seen    AFB Culture - Tissue, Leg, Left [[id number]] Collected: 01/09/24 1844    Lab Status: Preliminary result Specimen: Tissue from Leg, Left Updated: 01/10/24 1356     AFB Stain No acid fast bacilli seen on concentrated smear    Fungus Culture - Surgical Site, Leg, Left [[id number]] Collected: 12/31/23 0831    Lab Status: Preliminary result Specimen: Surgical Site from Leg, Left Updated: 01/07/24 1946     Fungus Culture No fungus isolated at 1 week    AFB Culture - Surgical Site, Leg, Left [[id number]] Collected: 12/31/23 0831    Lab Status: Preliminary result Specimen: Surgical Site from Leg, Left Updated: 01/07/24 1946     AFB Culture No AFB isolated at 1 week     AFB Stain No acid fast bacilli seen on concentrated smear    Urine Culture - Urine, Urine, Clean Catch [[id number]]  (Normal) Collected: 01/06/24 1224    Lab Status: Final result Specimen: Urine, Clean Catch Updated: 01/07/24 1429     Urine Culture No growth    Anaerobic Culture - Surgical Site, Leg, Left [[id number]]  (Normal) Collected: 12/31/23 0833    Lab Status: Final result Specimen: Surgical Site from Leg, Left Updated: 01/06/24 0551     Anaerobic Culture No anaerobes isolated at 5 days    Fungus Culture - Tissue, Leg, Left [[id number]] Collected: 12/29/23 1139    Lab Status: Preliminary result Specimen: Tissue from Leg, Left Updated: 01/05/24 1330     Fungus Culture No  fungus isolated at 1 week    AFB Culture - Tissue, Leg, Left [[id number]] Collected: 12/29/23 1139    Lab Status: Preliminary result Specimen: Tissue from Leg, Left Updated: 01/05/24 1330     AFB Culture No AFB isolated at 1 week     AFB Stain No acid fast bacilli seen on concentrated smear    Tissue / Bone Culture - Surgical Site, Leg, Left [[id number]] Collected: 12/31/23 0831    Lab Status: Final result Specimen: Surgical Site from Leg, Left Updated: 01/04/24 0743     Tissue Culture No growth at 3 days     Gram Stain Moderate (3+) WBCs per low power field      No organisms seen    Anaerobic Culture - Tissue, Leg, Left [[id number]]  (Normal) Collected: 12/29/23 1139    Lab Status: Final result Specimen: Tissue from Leg, Left Updated: 01/03/24 1001     Anaerobic Culture No anaerobes isolated at 5 days    Blood Culture - Blood, Arm, Right [[id number]]  (Normal) Collected: 12/28/23 2115    Lab Status: Final result Specimen: Blood from Arm, Right Updated: 01/03/24 0030     Blood Culture No growth at 5 days    Blood Culture - Blood, Hand, Right [[id number]]  (Normal) Collected: 12/28/23 2120    Lab Status: Final result Specimen: Blood from Hand, Right Updated: 01/03/24 0030     Blood Culture No growth at 5 days    Wound Culture - Wound, Leg, Left [[id number]] Collected: 12/29/23 1322    Lab Status: Final result Specimen: Wound from Leg, Left Updated: 01/01/24 0711     Wound Culture No growth at 3 days     Gram Stain No organisms seen    MRSA Screen, PCR (Inpatient) - Swab, Nares [[id number]]  (Normal) Collected: 12/29/23 0203    Lab Status: Final result Specimen: Swab from Nares Updated: 12/29/23 0733     MRSA PCR Negative    Narrative:      The negative predictive value of this diagnostic test is high and should only be used to consider de-escalating anti-MRSA therapy. A positive result may indicate colonization with MRSA and must be correlated clinically.  MRSA Negative    COVID PRE-OP / PRE-PROCEDURE SCREENING ORDER (NO  ISOLATION) - Swab, Nasopharynx [[id number]]  (Normal) Collected: 12/29/23 0203    Lab Status: Final result Specimen: Swab from Nasopharynx Updated: 12/29/23 0353    Narrative:      The following orders were created for panel order COVID PRE-OP / PRE-PROCEDURE SCREENING ORDER (NO ISOLATION) - Swab, Nasopharynx.  Procedure                               Abnormality         Status                     ---------                               -----------         ------                     Respiratory Panel PCR w/...[[id number]]  Normal              Final result                 Please view results for these tests on the individual orders.    Respiratory Panel PCR w/COVID-19(SARS-CoV-2) ARIELLE/VESNA/KARIE/PAD/COR/SÁNCHEZ In-House, NP Swab in UTM/VTM, 2 HR TAT - Swab, Nasopharynx [[id number]]  (Normal) Collected: 12/29/23 0203    Lab Status: Final result Specimen: Swab from Nasopharynx Updated: 12/29/23 0353     ADENOVIRUS, PCR Not Detected     Coronavirus 229E Not Detected     Coronavirus HKU1 Not Detected     Coronavirus NL63 Not Detected     Coronavirus OC43 Not Detected     COVID19 Not Detected     Human Metapneumovirus Not Detected     Human Rhinovirus/Enterovirus Not Detected     Influenza A PCR Not Detected     Influenza B PCR Not Detected     Parainfluenza Virus 1 Not Detected     Parainfluenza Virus 2 Not Detected     Parainfluenza Virus 3 Not Detected     Parainfluenza Virus 4 Not Detected     RSV, PCR Not Detected     Bordetella pertussis pcr Not Detected     Bordetella parapertussis PCR Not Detected     Chlamydophila pneumoniae PCR Not Detected     Mycoplasma pneumo by PCR Not Detected    Narrative:      In the setting of a positive respiratory panel with a viral infection PLUS a negative procalcitonin without other underlying concern for bacterial infection, consider observing off antibiotics or discontinuation of antibiotics and continue supportive care. If the respiratory panel is positive for atypical bacterial infection  (Bordetella pertussis, Chlamydophila pneumoniae, or Mycoplasma pneumoniae), consider antibiotic de-escalation to target atypical bacterial infection.            No radiology results from the last 24 hrs        Current medications:  Scheduled Meds:Pharmacy Consult, , Does not apply, Q12H  acetaminophen, 1,000 mg, Oral, Q8H  cefepime, 2,000 mg, Intravenous, Q8H  DAPTOmycin, 6 mg/kg (Adjusted), Intravenous, Q24H  famotidine, 20 mg, Oral, BID AC  folic acid, 1 mg, Oral, Daily  lactobacillus acidophilus, 1 capsule, Oral, Daily  levothyroxine, 125 mcg, Oral, Daily  metroNIDAZOLE, 500 mg, Intravenous, Q8H  oxyCODONE, 15 mg, Oral, Q6H  pramipexole, 1 mg, Oral, Daily  pregabalin, 200 mg, Oral, TID  senna-docusate sodium, 2 tablet, Oral, BID  sertraline, 50 mg, Oral, Nightly  sodium chloride, 10 mL, Intravenous, Q12H      Continuous Infusions:lactated ringers, 9 mL/hr  lactated ringers, 9 mL/hr      PRN Meds:.  senna-docusate sodium **AND** polyethylene glycol **AND** bisacodyl **AND** bisacodyl    diphenhydrAMINE    HYDROmorphone    lactated ringers    loperamide    LORazepam    melatonin    naloxone    ondansetron    oxyCODONE    sodium chloride    sodium chloride    traZODone    Assessment & Plan   Assessment & Plan     Active Hospital Problems    Diagnosis  POA    Anxiety associated with depression [F41.8]  Yes    RLS (restless legs syndrome) [G25.81]  Yes    Neuropathy [G62.9]  Yes    Obesity, morbid, BMI 50 or higher [E66.01]  Yes    S/P AKA (above knee amputation), left [Z89.612]  Not Applicable    Chronic osteomyelitis [M86.60]  Yes    Acquired hypothyroidism [E03.9]  Yes    Ulcer of right midfoot with fat layer exposed [L97.412]  Yes      Resolved Hospital Problems    Diagnosis Date Resolved POA    **Lower extremity cellulitis [L03.119] 01/08/2024 Yes    Ulcer of left heel, with fat layer exposed [L97.422] 01/08/2024 Yes        Brief Hospital Course to date:  Phoebe Carvajal is a 48 y.o. female with history of  depression, hypothyroidism, stills disease, RLS, PAD, anemia, morbid obesity, recurrent osteomyelitis, chronic lower extremity osteomyelitis, recurrent left prosthetic joint infection, bilateral foot wounds who presented to the emergency department for evaluation of fever and worsening erythema with foul odor from her chronic wounds. Previous records from  from October 22 through November 8, 2023 showed intention for proceeding with left AKA but patient waited for second opinion hoping to have the option of BKA.  She is followed by infectious disease-known to Dr. Wang.  She underwent AKA on 12/29/2023 with Dr. Witt, with additional debridement on 12/31/2023       Gross hematuria  - no longer has menstrual cycle.   - urology follow up 6-8 weeks    Osteomyelitis/cellulitis of the left lower extremity  Status post left AKA on 12/29/2023 Dr. Witt  Chronic infected wounds  -Dr. Witt performed debridement again on 12/31/2023    --ID consulted, Dr. Tayo Arnold following  --wound cultures: Anaerobic culture no anaerobes, tissue culture Gram stain no growth, AFB stain no acid-fast bacilli seen, culture no growth, fungus culture no growth, AFB culture IBF stain noted, MRSA PCR negative  - s/p ceftazidime and daptomycin 1/4/2024  --drain removed 1/2/24  --s/p washout and wound vac placement on 1/9/24.  Planning on returning to OR on 1/12.    Pain management  -- Continue scheduled Tylenol, continue scheduled oxycodone 15 mg q6h per home dosing as discussed with palliative care  --cont scheduled oxycodone with addition of a prn dose for breakthrough pain.  Added back IV dilaudid for pain.  -- Review of her blood pressure shows that overall she stays in the low 100s for systolic blood pressure.  Have placed parameters on her pain medicines to allow for pain medication to be given if systolic blood pressures are greater than 95.    -- Follow up with [facility] with Dr. Witt in 2 weeks for wound  check and x-rays    Migraine HA  --resolved, cont to monitor.     Right foot ulcer  --PT wound care following  --Offload right foot    Diarrhea  -- Suspect secondary to IV antibiotics, remains afebrile, WBCs WNL  -- cont probiotic daily  -- Imodium as needed    Anemia  -- Likely postop but also macrocytic per labs  -- B12 and folate low  -- Cont B12 replacement cyanocobalamin 1000 mcg IM daily x 7 days, then weekly x 4 weeks  -- continue folic acid 1 mg daily  -- Recheck B12 and folic acid in 1 month at PCP follow-up    Neuropathy  -Continue Lyrica     Restless leg syndrome  -Continue Mirapex    Morbid obesity  Hypoxia, resolved  -Likely related to opiate use postoperatively and morbid obesity  -O2 to maintain sats greater than 90 and wean O2 as tolerated  --now on room air     Anxiety  -Continue Zoloft      Expected Discharge Location and Transportation:   Expected Discharge   Expected Discharge Date: 1/13/2024; Expected Discharge Time:  3:00 PM    DVT prophylaxis:  Mechanical DVT prophylaxis orders are present. No lovenox due to hematuria    AM-PAC 6 Clicks Score (PT): 9 (01/11/24 0733)    CODE STATUS:   Code Status and Medical Interventions:   Ordered at: 12/29/23 0121     Code Status (Patient has no pulse and is not breathing):    CPR (Attempt to Resuscitate)     Medical Interventions (Patient has pulse or is breathing):    Full Support       Zenobia Romo MD  01/11/24

## 2024-01-11 NOTE — PLAN OF CARE
Goal Outcome Evaluation:              Outcome Evaluation: Bolus given overnight for soft BP per APRN. Once bolus completed patient declined to allow this RN to recheck BP prior to giving pain medication. Toradol ordered x1 for migraine this morning by APRN.

## 2024-01-11 NOTE — PLAN OF CARE
Goal Outcome Evaluation:  Plan of Care Reviewed With: patient        Progress: no change  Outcome Evaluation: PT progress note completed.  Pt with slow progress toward therapy goals; inhibited by medical status.  Improved bed mobility with rolling in bed to both L and R side with [place] and use of trapeze bar and bed rails.  Con to utilize mechanical lift to chair for improved safety.  Participated in sitting balance activity to help prepare for transfers.  Provided printed handout of LE ther ex program with good participation and demo of exercises.  Con to demo increased pain, decreased balance, weakness, and decreased functional endurance limiting indep with mobility and ability to safely navigate home environment.  Will con to benefit from skilled IP PT to improve deficits and promote return to PLOF.  Rec IPR upon d/c for best fxl outcome.      Anticipated Discharge Disposition (PT): inpatient rehabilitation facility
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
## 2024-01-11 NOTE — PLAN OF CARE
Goal Outcome Evaluation:  Plan of Care Reviewed With: patient        Progress: improving  Outcome Evaluation: OT re-cert completed with pt's progression toward ADL goals somewhat limited by specialty bed height. Pt gave good effort for dynamic balance activity sitting unsupported in chair this date. Pt completes grooming tasks with GHOTRA, UBD with Bridget Anthony for toileting. Continue to recommend IRF at discharge for optimal outcomes.      Anticipated Discharge Disposition (OT): inpatient rehabilitation facility
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
## 2024-01-11 NOTE — PLAN OF CARE
"Goal Outcome Evaluation:  Plan of Care Reviewed With: patient        Progress: no change  Outcome Evaluation: PT called this AM by RN d/t wound vac alarm signaling loss of wound vac seal. Upon inspection of wound vac dressings pt's wound vac trac pad completely dislodged from dressing with no suction to wound, pt reporting she attempted to scoot up in bed and \"heard a pop\" around 5AM this morning. PT applied new trac pad and reinforced with additional vac tape and re-applied kerlix/ACE wrap to secure. Pt with approximately 25mL serosanguineous drainage in canister, however full 500mL canister changed by nursing.                               "
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
## 2024-01-11 NOTE — PROGRESS NOTES
Phoebe Carvajal       LOS: 14 days   Patient Care Team:  Sofya Benjamin MD as PCP - General (Internal Medicine)  Provider, No Known as PCP - Family Medicine    Chief Complaint:  Status post left above-knee amputation     Subjective     Interval History:     Patient seen in bed this morning.  No acute events overnight.  Pain was improved yesterday, however has increased this morning.  Patient is very anxious, tearful.    Review of Systems:      Gen- No fevers, chills  CV- No chest pain, palpitations  Resp- No cough, dyspnea  GI- No N/V/D, abd pain    Objective     Vital Signs  Vital Signs (last 24 hours)         01/07 0700  01/08 0659 01/08 0700 01/09 0611   Most Recent      Temp (°F) 98 -  98.5    98.1 -  98.8     98.8 (37.1) 01/09 0300    Heart Rate 56 -  84    59 -  72     60 01/09 0015    Resp   18    16 -  18     16 01/09 0300    BP 96/54 -  111/61    112/73 -  122/60     121/68 01/09 0300    SpO2 (%) 89 -  97    90 -  98     97 01/09 0015    Flow (L/min)   2      2     2 01/09 0200              Physical Exam:     Alert, oriented.  No acute distress.  Nonlabored respirations.  Regular rate and rhythm.  Abdomen nondistended.  Left lower extremity: Operative dressing in place and is clean, dry, intact.  Wound vacuum-assisted closure in place with moderate amount of serosanguineous output in canister.  Wound vacuum with loss of seal.     Results Review:     I reviewed the patient's new clinical results.    Medication Review:   Hospital Medications (active)         Dose Frequency Start End    ! Home medications stored in pharmacy, please contact pharmacist prior to patient discharge  Every 12 Hours Scheduled 12/29/2023 --    Route: Does not apply    acetaminophen (TYLENOL) tablet 1,000 mg 1,000 mg Every 8 Hours Scheduled 1/2/2024 --    Admin Instructions: If given for fever, use fever parameter: fever greater than 100.4 °F  Based on patient request - if ordered for moderate or severe pain, provider allows for  administration of a medication prescribed for a lower pain scale.    Do not exceed 4 grams of acetaminophen in a 24 hr period. Max dose of 2gm for AST/ALT greater than 120 units/L.    If given for pain, use the following pain scale:   Mild Pain = Pain Score of 1-3, CPOT 1-2  Moderate Pain = Pain Score of 4-6, CPOT 3-4  Severe Pain = Pain Score of 7-10, CPOT 5-8    Route: Oral    bisacodyl (DULCOLAX) EC tablet 5 mg 5 mg Daily PRN 12/29/2023 --    Admin Instructions: Use if no bowel movement after 12 hours.  Swallow whole. Do not crush, split, or chew tablet.    Route: Oral    Linked Group 1: See Hyperspace for full Linked Orders Report.        bisacodyl (DULCOLAX) suppository 10 mg 10 mg Daily PRN 12/29/2023 --    Admin Instructions: Use if no bowel movement after 12 hours.  Hold for diarrhea    Route: Rectal    Linked Group 1: See Hyperspace for full Linked Orders Report.        cyanocobalamin injection 1,000 mcg 1,000 mcg Daily 1/4/2024 1/11/2024    Route: Intramuscular    diphenhydrAMINE (BENADRYL) capsule 25 mg 25 mg Every 6 Hours PRN 12/29/2023 --    Admin Instructions: Caution: Look alike/sound alike drug alert. This med may be ordered in other forms and routes. Before giving verify the last time the drug was given by any route/form.    Route: Oral    famotidine (PEPCID) tablet 20 mg 20 mg 2 Times Daily Before Meals 1/1/2024 --    Route: Oral    folic acid (FOLVITE) tablet 1 mg 1 mg Daily 1/4/2024 --    Route: Oral    lactated ringers infusion 9 mL/hr Continuous PRN 12/31/2023 --    Route: Intravenous    lactobacillus acidophilus (RISAQUAD) capsule 1 capsule 1 capsule Daily 1/3/2024 --    Route: Oral    levothyroxine (SYNTHROID, LEVOTHROID) tablet 125 mcg 125 mcg Daily 12/29/2023 --    Admin Instructions: Take on empty stomach.    Route: Oral    loperamide (IMODIUM) capsule 2 mg 2 mg 4 Times Daily PRN 1/3/2024 --    Admin Instructions: Maximum recommended 16 mg / 24 hours.      Route: Oral    LORazepam (ATIVAN)  tablet 0.5 mg 0.5 mg Daily PRN 1/4/2024 --    Admin Instructions: Hold for SBP < 110   Caution: Look alike/sound alike drug alert    Route: Oral    Cosign for Ordering: Accepted by Jorge Lua MD on 1/7/2024  4:45 PM    melatonin tablet 5 mg 5 mg Nightly PRN 12/29/2023 --    Route: Oral    ondansetron (ZOFRAN) injection 4 mg 4 mg Every 6 Hours PRN 12/29/2023 --    Admin Instructions: If BOTH ondansetron (ZOFRAN) and promethazine (PHENERGAN) are ordered use ondansetron first and THEN promethazine IF ondansetron is ineffective.    Route: Intravenous    oxyCODONE (ROXICODONE) immediate release tablet 15 mg 15 mg Every 6 Hours 1/4/2024 --    Admin Instructions: Based on patient request - if ordered for moderate or severe pain, provider allows for administration of a medication prescribed for a lower pain scale.      If given for pain, use the following pain scale:  Mild Pain = Pain Score of 1-3, CPOT 1-2  Moderate Pain = Pain Score of 4-6, CPOT 3-4  Severe Pain = Pain Score of 7-10, CPOT 5-8    Route: Oral    Cosign for Ordering: Accepted by Jorge Lua MD on 1/7/2024  4:45 PM    oxyCODONE (ROXICODONE) immediate release tablet 5 mg 5 mg Every 6 Hours PRN 1/8/2024 1/15/2024    Admin Instructions: Based on patient request - if ordered for moderate or severe pain, provider allows for administration of a medication prescribed for a lower pain scale.      If given for pain, use the following pain scale:  Mild Pain = Pain Score of 1-3, CPOT 1-2  Moderate Pain = Pain Score of 4-6, CPOT 3-4  Severe Pain = Pain Score of 7-10, CPOT 5-8    Route: Oral    polyethylene glycol (MIRALAX) packet 17 g 17 g Daily PRN 12/29/2023 --    Admin Instructions: Use if no bowel movement after 12 hours. Mix in 6-8 ounces of water.  Use 4-8 ounces of water, tea, or juice for each 17 gram dose.    Route: Oral    Linked Group 1: See Hyperspace for full Linked Orders Report.        pramipexole (MIRAPEX) tablet 1 mg 1 mg Daily 12/29/2023 --     Route: Oral    pregabalin (LYRICA) capsule 200 mg 200 mg 3 Times Daily 12/29/2023 --    Admin Instructions:     Route: Oral    sennosides-docusate (PERICOLACE) 8.6-50 MG per tablet 2 tablet 2 tablet 2 Times Daily 12/29/2023 --    Admin Instructions: HOLD MEDICATION IF PATIENT HAS HAD BOWEL MOVEMENT. Start bowel management regimen if patient has not had a bowel movement after 12 hours.    Route: Oral    Linked Group 1: See Russ for full Linked Orders Report.        sertraline (ZOLOFT) tablet 50 mg 50 mg Nightly 12/29/2023 --    Route: Oral    sodium chloride 0.9 % flush 10 mL 10 mL Every 12 Hours Scheduled 12/29/2023 --    Route: Intravenous    sodium chloride 0.9 % flush 10 mL 10 mL As Needed 12/29/2023 --    Route: Intravenous    sodium chloride 0.9 % infusion 40 mL 40 mL As Needed 12/29/2023 --    Admin Instructions: Following administration of an IV intermittent medication, flush line with 40mL NS at 100mL/hr.    Route: Intravenous    traZODone (DESYREL) tablet 50 mg 50 mg Nightly PRN 12/29/2023 --    Admin Instructions: Take with food.  Caution: Look alike/sound alike drug alert    Route: Oral              Assessment & Plan     48-year-old female status post left above-knee amputation, now status post irrigation, debridement, wound vacuum-assisted closure 1/10/2024      Acquired hypothyroidism    Ulcer of right midfoot with fat layer exposed    Chronic osteomyelitis    Anxiety associated with depression    RLS (restless legs syndrome)    Neuropathy    Obesity, morbid, BMI 50 or higher    S/P AKA (above knee amputation), left    Nonweightbearing left lower extremity.  Plan to leave wound vacuum-assisted closure in place until 1/12/24; wound VAC has lost seal just prior to my arrival this morning.  VAC inspected, leak appears to be coming from tubing attachment at central amputation site.  Per nursing staff, PT/wound care consulted to evaluate for potential reinforcement, wound VAC change.    Follow  cultures    Discussed further management options including repeat left lower extremity irrigation, debridement, secondary wound closure.  The proposed procedure, risk/benefits, expected perioperative course were discussed with the patient in detail.  Risks discussed include but are not limited to infection, bleeding, injury to adjacent structures, wound healing complications, need for further surgery, DVT/PE, loss of limb, life.  The patient seems to understand and accepts these risks and wishes to proceed with the proposed procedure.    Plan for left lower extremity irrigation, debridement, secondary closure tomorrow 1/12/2024 pending OR availability.  N.p.o. status at midnight [facility].    PANDA English  01/11/24  07:50 EST

## 2024-01-12 ENCOUNTER — ANESTHESIA (OUTPATIENT)
Dept: PERIOP | Facility: HOSPITAL | Age: 49
End: 2024-01-12
Payer: COMMERCIAL

## 2024-01-12 ENCOUNTER — ANESTHESIA EVENT (OUTPATIENT)
Dept: PERIOP | Facility: HOSPITAL | Age: 49
End: 2024-01-12
Payer: COMMERCIAL

## 2024-01-12 LAB
ABO GROUP BLD: NORMAL
ANION GAP SERPL CALCULATED.3IONS-SCNC: 7 MMOL/L (ref 5–15)
BACTERIA SPEC AEROBE CULT: NORMAL
BASOPHILS # BLD AUTO: 0.02 10*3/MM3 (ref 0–0.2)
BASOPHILS NFR BLD AUTO: 0.6 % (ref 0–1.5)
BLD GP AB SCN SERPL QL: NEGATIVE
BUN SERPL-MCNC: 10 MG/DL (ref 6–20)
BUN/CREAT SERPL: 21.3 (ref 7–25)
CALCIUM SPEC-SCNC: 8.3 MG/DL (ref 8.6–10.5)
CHLORIDE SERPL-SCNC: 107 MMOL/L (ref 98–107)
CO2 SERPL-SCNC: 28 MMOL/L (ref 22–29)
CREAT SERPL-MCNC: 0.47 MG/DL (ref 0.57–1)
DEPRECATED RDW RBC AUTO: 62.6 FL (ref 37–54)
EGFRCR SERPLBLD CKD-EPI 2021: 116.1 ML/MIN/1.73
EOSINOPHIL # BLD AUTO: 0.1 10*3/MM3 (ref 0–0.4)
EOSINOPHIL NFR BLD AUTO: 3 % (ref 0.3–6.2)
ERYTHROCYTE [DISTWIDTH] IN BLOOD BY AUTOMATED COUNT: 15.6 % (ref 12.3–15.4)
GLUCOSE SERPL-MCNC: 92 MG/DL (ref 65–99)
GRAM STN SPEC: NORMAL
GRAM STN SPEC: NORMAL
HCT VFR BLD AUTO: 25.7 % (ref 34–46.6)
HGB BLD-MCNC: 7.8 G/DL (ref 12–15.9)
IMM GRANULOCYTES # BLD AUTO: 0.01 10*3/MM3 (ref 0–0.05)
IMM GRANULOCYTES NFR BLD AUTO: 0.3 % (ref 0–0.5)
LYMPHOCYTES # BLD AUTO: 0.87 10*3/MM3 (ref 0.7–3.1)
LYMPHOCYTES NFR BLD AUTO: 26.5 % (ref 19.6–45.3)
MAGNESIUM SERPL-MCNC: 1.7 MG/DL (ref 1.6–2.6)
MCH RBC QN AUTO: 32.9 PG (ref 26.6–33)
MCHC RBC AUTO-ENTMCNC: 30.4 G/DL (ref 31.5–35.7)
MCV RBC AUTO: 108.4 FL (ref 79–97)
MONOCYTES # BLD AUTO: 0.26 10*3/MM3 (ref 0.1–0.9)
MONOCYTES NFR BLD AUTO: 7.9 % (ref 5–12)
NEUTROPHILS NFR BLD AUTO: 2.02 10*3/MM3 (ref 1.7–7)
NEUTROPHILS NFR BLD AUTO: 61.7 % (ref 42.7–76)
NRBC BLD AUTO-RTO: 0 /100 WBC (ref 0–0.2)
PHOSPHATE SERPL-MCNC: 4.2 MG/DL (ref 2.5–4.5)
PLATELET # BLD AUTO: 214 10*3/MM3 (ref 140–450)
PMV BLD AUTO: 10.2 FL (ref 6–12)
POTASSIUM SERPL-SCNC: 3.9 MMOL/L (ref 3.5–5.2)
RBC # BLD AUTO: 2.37 10*6/MM3 (ref 3.77–5.28)
RH BLD: NEGATIVE
SODIUM SERPL-SCNC: 142 MMOL/L (ref 136–145)
T&S EXPIRATION DATE: NORMAL
WBC NRBC COR # BLD AUTO: 3.28 10*3/MM3 (ref 3.4–10.8)

## 2024-01-12 PROCEDURE — 25810000003 LACTATED RINGERS PER 1000 ML: Performed by: ANESTHESIOLOGY

## 2024-01-12 PROCEDURE — 87205 SMEAR GRAM STAIN: CPT | Performed by: INTERNAL MEDICINE

## 2024-01-12 PROCEDURE — 84100 ASSAY OF PHOSPHORUS: CPT | Performed by: PEDIATRICS

## 2024-01-12 PROCEDURE — 25010000002 VANCOMYCIN 1 G RECONSTITUTED SOLUTION: Performed by: ORTHOPAEDIC SURGERY

## 2024-01-12 PROCEDURE — 0KBR0ZZ EXCISION OF LEFT UPPER LEG MUSCLE, OPEN APPROACH: ICD-10-PCS | Performed by: ORTHOPAEDIC SURGERY

## 2024-01-12 PROCEDURE — 25010000002 METRONIDAZOLE 500 MG/100ML SOLUTION: Performed by: ORTHOPAEDIC SURGERY

## 2024-01-12 PROCEDURE — 87070 CULTURE OTHR SPECIMN AEROBIC: CPT | Performed by: INTERNAL MEDICINE

## 2024-01-12 PROCEDURE — 86901 BLOOD TYPING SEROLOGIC RH(D): CPT | Performed by: ANESTHESIOLOGY

## 2024-01-12 PROCEDURE — 86850 RBC ANTIBODY SCREEN: CPT | Performed by: ANESTHESIOLOGY

## 2024-01-12 PROCEDURE — 25010000002 DROPERIDOL PER 5 MG

## 2024-01-12 PROCEDURE — 25010000002 FENTANYL CITRATE (PF) 50 MCG/ML SOLUTION: Performed by: NURSE ANESTHETIST, CERTIFIED REGISTERED

## 2024-01-12 PROCEDURE — 25010000002 CEFEPIME PER 500 MG: Performed by: ORTHOPAEDIC SURGERY

## 2024-01-12 PROCEDURE — 25010000002 FENTANYL CITRATE (PF) 100 MCG/2ML SOLUTION: Performed by: ANESTHESIOLOGY

## 2024-01-12 PROCEDURE — 99232 SBSQ HOSP IP/OBS MODERATE 35: CPT | Performed by: INTERNAL MEDICINE

## 2024-01-12 PROCEDURE — 97605 NEG PRS WND THER DME<=50SQCM: CPT

## 2024-01-12 PROCEDURE — 85025 COMPLETE CBC W/AUTO DIFF WBC: CPT | Performed by: PEDIATRICS

## 2024-01-12 PROCEDURE — 25010000002 ONDANSETRON PER 1 MG: Performed by: NURSE ANESTHETIST, CERTIFIED REGISTERED

## 2024-01-12 PROCEDURE — 25010000002 PROPOFOL 10 MG/ML EMULSION: Performed by: NURSE ANESTHETIST, CERTIFIED REGISTERED

## 2024-01-12 PROCEDURE — 87116 MYCOBACTERIA CULTURE: CPT | Performed by: ORTHOPAEDIC SURGERY

## 2024-01-12 PROCEDURE — 86900 BLOOD TYPING SEROLOGIC ABO: CPT | Performed by: ANESTHESIOLOGY

## 2024-01-12 PROCEDURE — 87102 FUNGUS ISOLATION CULTURE: CPT | Performed by: ORTHOPAEDIC SURGERY

## 2024-01-12 PROCEDURE — 25010000002 HYDROMORPHONE 1 MG/ML SOLUTION

## 2024-01-12 PROCEDURE — 83735 ASSAY OF MAGNESIUM: CPT | Performed by: PEDIATRICS

## 2024-01-12 PROCEDURE — 25010000002 FENTANYL CITRATE (PF) 50 MCG/ML SOLUTION

## 2024-01-12 PROCEDURE — 25010000002 CEFTRIAXONE PER 250 MG: Performed by: ORTHOPAEDIC SURGERY

## 2024-01-12 PROCEDURE — 87075 CULTR BACTERIA EXCEPT BLOOD: CPT | Performed by: ORTHOPAEDIC SURGERY

## 2024-01-12 PROCEDURE — 80048 BASIC METABOLIC PNL TOTAL CA: CPT | Performed by: PEDIATRICS

## 2024-01-12 PROCEDURE — 87206 SMEAR FLUORESCENT/ACID STAI: CPT | Performed by: ORTHOPAEDIC SURGERY

## 2024-01-12 PROCEDURE — 25010000002 SUGAMMADEX 200 MG/2ML SOLUTION: Performed by: NURSE ANESTHETIST, CERTIFIED REGISTERED

## 2024-01-12 DEVICE — DEV CONTRL TISS STRATAFIX SYMM PDS PLUS VIL CT-1 45CM: Type: IMPLANTABLE DEVICE | Site: LEG | Status: FUNCTIONAL

## 2024-01-12 RX ORDER — LABETALOL HYDROCHLORIDE 5 MG/ML
5 INJECTION, SOLUTION INTRAVENOUS
Status: DISCONTINUED | OUTPATIENT
Start: 2024-01-12 | End: 2024-01-12 | Stop reason: HOSPADM

## 2024-01-12 RX ORDER — SODIUM CHLORIDE 0.9 % (FLUSH) 0.9 %
10 SYRINGE (ML) INJECTION EVERY 12 HOURS SCHEDULED
Status: CANCELLED | OUTPATIENT
Start: 2024-01-12

## 2024-01-12 RX ORDER — SUCCINYLCHOLINE/SOD CL,ISO/PF 200MG/10ML
SYRINGE (ML) INTRAVENOUS AS NEEDED
Status: DISCONTINUED | OUTPATIENT
Start: 2024-01-12 | End: 2024-01-12 | Stop reason: SURG

## 2024-01-12 RX ORDER — FENTANYL CITRATE 50 UG/ML
INJECTION, SOLUTION INTRAMUSCULAR; INTRAVENOUS
Status: COMPLETED
Start: 2024-01-12 | End: 2024-01-12

## 2024-01-12 RX ORDER — IPRATROPIUM BROMIDE AND ALBUTEROL SULFATE 2.5; .5 MG/3ML; MG/3ML
3 SOLUTION RESPIRATORY (INHALATION) ONCE AS NEEDED
Status: DISCONTINUED | OUTPATIENT
Start: 2024-01-12 | End: 2024-01-12 | Stop reason: HOSPADM

## 2024-01-12 RX ORDER — SODIUM CHLORIDE 0.9 % (FLUSH) 0.9 %
3 SYRINGE (ML) INJECTION EVERY 12 HOURS SCHEDULED
Status: DISCONTINUED | OUTPATIENT
Start: 2024-01-12 | End: 2024-01-12 | Stop reason: HOSPADM

## 2024-01-12 RX ORDER — FENTANYL CITRATE 50 UG/ML
50 INJECTION, SOLUTION INTRAMUSCULAR; INTRAVENOUS
Status: DISCONTINUED | OUTPATIENT
Start: 2024-01-12 | End: 2024-01-12 | Stop reason: HOSPADM

## 2024-01-12 RX ORDER — DROPERIDOL 2.5 MG/ML
INJECTION, SOLUTION INTRAMUSCULAR; INTRAVENOUS
Status: COMPLETED
Start: 2024-01-12 | End: 2024-01-12

## 2024-01-12 RX ORDER — ROCURONIUM BROMIDE 10 MG/ML
INJECTION, SOLUTION INTRAVENOUS AS NEEDED
Status: DISCONTINUED | OUTPATIENT
Start: 2024-01-12 | End: 2024-01-12 | Stop reason: SURG

## 2024-01-12 RX ORDER — FAMOTIDINE 10 MG/ML
20 INJECTION, SOLUTION INTRAVENOUS ONCE
Status: COMPLETED | OUTPATIENT
Start: 2024-01-12 | End: 2024-01-12

## 2024-01-12 RX ORDER — LIDOCAINE HYDROCHLORIDE 10 MG/ML
INJECTION, SOLUTION EPIDURAL; INFILTRATION; INTRACAUDAL; PERINEURAL AS NEEDED
Status: DISCONTINUED | OUTPATIENT
Start: 2024-01-12 | End: 2024-01-12 | Stop reason: SURG

## 2024-01-12 RX ORDER — PROPOFOL 10 MG/ML
VIAL (ML) INTRAVENOUS AS NEEDED
Status: DISCONTINUED | OUTPATIENT
Start: 2024-01-12 | End: 2024-01-12 | Stop reason: SURG

## 2024-01-12 RX ORDER — DROPERIDOL 2.5 MG/ML
0.62 INJECTION, SOLUTION INTRAMUSCULAR; INTRAVENOUS
Status: DISCONTINUED | OUTPATIENT
Start: 2024-01-12 | End: 2024-01-12 | Stop reason: HOSPADM

## 2024-01-12 RX ORDER — ONDANSETRON 2 MG/ML
INJECTION INTRAMUSCULAR; INTRAVENOUS AS NEEDED
Status: DISCONTINUED | OUTPATIENT
Start: 2024-01-12 | End: 2024-01-12 | Stop reason: SURG

## 2024-01-12 RX ORDER — MAGNESIUM HYDROXIDE 1200 MG/15ML
LIQUID ORAL AS NEEDED
Status: DISCONTINUED | OUTPATIENT
Start: 2024-01-12 | End: 2024-01-12 | Stop reason: HOSPADM

## 2024-01-12 RX ORDER — HYDRALAZINE HYDROCHLORIDE 20 MG/ML
5 INJECTION INTRAMUSCULAR; INTRAVENOUS
Status: DISCONTINUED | OUTPATIENT
Start: 2024-01-12 | End: 2024-01-12 | Stop reason: HOSPADM

## 2024-01-12 RX ORDER — SODIUM CHLORIDE, SODIUM LACTATE, POTASSIUM CHLORIDE, CALCIUM CHLORIDE 600; 310; 30; 20 MG/100ML; MG/100ML; MG/100ML; MG/100ML
9 INJECTION, SOLUTION INTRAVENOUS CONTINUOUS
Status: DISCONTINUED | OUTPATIENT
Start: 2024-01-12 | End: 2024-01-14

## 2024-01-12 RX ORDER — ONDANSETRON 2 MG/ML
4 INJECTION INTRAMUSCULAR; INTRAVENOUS ONCE AS NEEDED
Status: DISCONTINUED | OUTPATIENT
Start: 2024-01-12 | End: 2024-01-12 | Stop reason: HOSPADM

## 2024-01-12 RX ORDER — FENTANYL CITRATE 50 UG/ML
INJECTION, SOLUTION INTRAMUSCULAR; INTRAVENOUS AS NEEDED
Status: DISCONTINUED | OUTPATIENT
Start: 2024-01-12 | End: 2024-01-12

## 2024-01-12 RX ORDER — SODIUM CHLORIDE 9 MG/ML
40 INJECTION, SOLUTION INTRAVENOUS AS NEEDED
Status: CANCELLED | OUTPATIENT
Start: 2024-01-12

## 2024-01-12 RX ORDER — FENTANYL CITRATE 50 UG/ML
50 INJECTION, SOLUTION INTRAMUSCULAR; INTRAVENOUS ONCE
Status: COMPLETED | OUTPATIENT
Start: 2024-01-12 | End: 2024-01-12

## 2024-01-12 RX ORDER — SODIUM CHLORIDE 0.9 % (FLUSH) 0.9 %
10 SYRINGE (ML) INJECTION AS NEEDED
Status: CANCELLED | OUTPATIENT
Start: 2024-01-12

## 2024-01-12 RX ORDER — MEPERIDINE HYDROCHLORIDE 25 MG/ML
12.5 INJECTION INTRAMUSCULAR; INTRAVENOUS; SUBCUTANEOUS
Status: DISCONTINUED | OUTPATIENT
Start: 2024-01-12 | End: 2024-01-12 | Stop reason: HOSPADM

## 2024-01-12 RX ORDER — MIDAZOLAM HYDROCHLORIDE 1 MG/ML
1 INJECTION INTRAMUSCULAR; INTRAVENOUS
Status: DISCONTINUED | OUTPATIENT
Start: 2024-01-12 | End: 2024-01-12 | Stop reason: HOSPADM

## 2024-01-12 RX ORDER — EPHEDRINE SULFATE 50 MG/ML
INJECTION INTRAVENOUS AS NEEDED
Status: DISCONTINUED | OUTPATIENT
Start: 2024-01-12 | End: 2024-01-12 | Stop reason: SURG

## 2024-01-12 RX ORDER — SODIUM CHLORIDE 0.9 % (FLUSH) 0.9 %
3-10 SYRINGE (ML) INJECTION AS NEEDED
Status: DISCONTINUED | OUTPATIENT
Start: 2024-01-12 | End: 2024-01-12 | Stop reason: HOSPADM

## 2024-01-12 RX ORDER — PROMETHAZINE HYDROCHLORIDE 25 MG/1
25 SUPPOSITORY RECTAL ONCE AS NEEDED
Status: DISCONTINUED | OUTPATIENT
Start: 2024-01-12 | End: 2024-01-12 | Stop reason: HOSPADM

## 2024-01-12 RX ORDER — FENTANYL CITRATE 50 UG/ML
INJECTION, SOLUTION INTRAMUSCULAR; INTRAVENOUS AS NEEDED
Status: DISCONTINUED | OUTPATIENT
Start: 2024-01-12 | End: 2024-01-12 | Stop reason: SURG

## 2024-01-12 RX ORDER — LIDOCAINE HYDROCHLORIDE 10 MG/ML
0.5 INJECTION, SOLUTION EPIDURAL; INFILTRATION; INTRACAUDAL; PERINEURAL ONCE AS NEEDED
Status: DISCONTINUED | OUTPATIENT
Start: 2024-01-12 | End: 2024-01-12 | Stop reason: HOSPADM

## 2024-01-12 RX ORDER — PROMETHAZINE HYDROCHLORIDE 25 MG/1
25 TABLET ORAL ONCE AS NEEDED
Status: DISCONTINUED | OUTPATIENT
Start: 2024-01-12 | End: 2024-01-12 | Stop reason: HOSPADM

## 2024-01-12 RX ORDER — HYDROMORPHONE HYDROCHLORIDE 1 MG/ML
0.5 INJECTION, SOLUTION INTRAMUSCULAR; INTRAVENOUS; SUBCUTANEOUS
Status: DISCONTINUED | OUTPATIENT
Start: 2024-01-12 | End: 2024-01-12 | Stop reason: HOSPADM

## 2024-01-12 RX ORDER — VANCOMYCIN HYDROCHLORIDE 1 G/20ML
INJECTION, POWDER, LYOPHILIZED, FOR SOLUTION INTRAVENOUS AS NEEDED
Status: DISCONTINUED | OUTPATIENT
Start: 2024-01-12 | End: 2024-01-12 | Stop reason: HOSPADM

## 2024-01-12 RX ORDER — DROPERIDOL 2.5 MG/ML
0.62 INJECTION, SOLUTION INTRAMUSCULAR; INTRAVENOUS ONCE AS NEEDED
Status: DISCONTINUED | OUTPATIENT
Start: 2024-01-12 | End: 2024-01-12 | Stop reason: HOSPADM

## 2024-01-12 RX ORDER — SODIUM CHLORIDE 9 MG/ML
40 INJECTION, SOLUTION INTRAVENOUS AS NEEDED
Status: DISCONTINUED | OUTPATIENT
Start: 2024-01-12 | End: 2024-01-12 | Stop reason: HOSPADM

## 2024-01-12 RX ORDER — FAMOTIDINE 20 MG/1
20 TABLET, FILM COATED ORAL ONCE
Status: DISCONTINUED | OUTPATIENT
Start: 2024-01-12 | End: 2024-01-12 | Stop reason: HOSPADM

## 2024-01-12 RX ORDER — NALOXONE HCL 0.4 MG/ML
0.4 VIAL (ML) INJECTION AS NEEDED
Status: DISCONTINUED | OUTPATIENT
Start: 2024-01-12 | End: 2024-01-12 | Stop reason: HOSPADM

## 2024-01-12 RX ADMIN — Medication 200 MG: at 12:33

## 2024-01-12 RX ADMIN — METRONIDAZOLE 500 MG: 500 INJECTION, SOLUTION INTRAVENOUS at 01:56

## 2024-01-12 RX ADMIN — OXYCODONE HYDROCHLORIDE 15 MG: 15 TABLET ORAL at 08:25

## 2024-01-12 RX ADMIN — EPHEDRINE SULFATE 10 MG: 50 INJECTION INTRAVENOUS at 12:38

## 2024-01-12 RX ADMIN — OXYCODONE HYDROCHLORIDE 5 MG: 5 TABLET ORAL at 05:07

## 2024-01-12 RX ADMIN — HYDROMORPHONE HYDROCHLORIDE 0.5 MG: 1 INJECTION, SOLUTION INTRAMUSCULAR; INTRAVENOUS; SUBCUTANEOUS at 14:38

## 2024-01-12 RX ADMIN — OXYCODONE HYDROCHLORIDE 15 MG: 15 TABLET ORAL at 20:44

## 2024-01-12 RX ADMIN — ACETAMINOPHEN 1000 MG: 500 TABLET ORAL at 20:44

## 2024-01-12 RX ADMIN — OXYCODONE HYDROCHLORIDE 15 MG: 15 TABLET ORAL at 01:56

## 2024-01-12 RX ADMIN — FENTANYL CITRATE 50 MCG: 50 INJECTION, SOLUTION INTRAMUSCULAR; INTRAVENOUS at 13:36

## 2024-01-12 RX ADMIN — PROPOFOL 200 MG: 10 INJECTION, EMULSION INTRAVENOUS at 12:33

## 2024-01-12 RX ADMIN — FAMOTIDINE 20 MG: 20 TABLET, FILM COATED ORAL at 17:39

## 2024-01-12 RX ADMIN — FAMOTIDINE 20 MG: 10 INJECTION INTRAVENOUS at 11:43

## 2024-01-12 RX ADMIN — SUGAMMADEX 200 MG: 100 INJECTION, SOLUTION INTRAVENOUS at 13:11

## 2024-01-12 RX ADMIN — PREGABALIN 200 MG: 100 CAPSULE ORAL at 08:24

## 2024-01-12 RX ADMIN — Medication 1 CAPSULE: at 08:25

## 2024-01-12 RX ADMIN — FENTANYL CITRATE 50 MCG: 50 INJECTION, SOLUTION INTRAMUSCULAR; INTRAVENOUS at 14:28

## 2024-01-12 RX ADMIN — SERTRALINE HYDROCHLORIDE 50 MG: 50 TABLET ORAL at 20:45

## 2024-01-12 RX ADMIN — DROPERIDOL 0.62 MG: 2.5 INJECTION, SOLUTION INTRAMUSCULAR; INTRAVENOUS at 14:23

## 2024-01-12 RX ADMIN — HYDROMORPHONE HYDROCHLORIDE 0.5 MG: 1 INJECTION, SOLUTION INTRAMUSCULAR; INTRAVENOUS; SUBCUTANEOUS at 14:11

## 2024-01-12 RX ADMIN — FENTANYL CITRATE 50 MCG: 50 INJECTION, SOLUTION INTRAMUSCULAR; INTRAVENOUS at 13:26

## 2024-01-12 RX ADMIN — OXYCODONE HYDROCHLORIDE 15 MG: 15 TABLET ORAL at 16:11

## 2024-01-12 RX ADMIN — LORAZEPAM 0.5 MG: 0.5 TABLET ORAL at 08:24

## 2024-01-12 RX ADMIN — ACETAMINOPHEN 1000 MG: 500 TABLET ORAL at 16:10

## 2024-01-12 RX ADMIN — METRONIDAZOLE 500 MG: 500 INJECTION, SOLUTION INTRAVENOUS at 09:23

## 2024-01-12 RX ADMIN — LORAZEPAM 0.5 MG: 0.5 TABLET ORAL at 22:39

## 2024-01-12 RX ADMIN — CEFEPIME 2000 MG: 2 INJECTION, POWDER, FOR SOLUTION INTRAVENOUS at 04:59

## 2024-01-12 RX ADMIN — LEVOTHYROXINE SODIUM 125 MCG: 125 TABLET ORAL at 05:07

## 2024-01-12 RX ADMIN — FAMOTIDINE 20 MG: 20 TABLET, FILM COATED ORAL at 08:25

## 2024-01-12 RX ADMIN — CEFTRIAXONE 2000 MG: 2 INJECTION, POWDER, FOR SOLUTION INTRAMUSCULAR; INTRAVENOUS at 16:11

## 2024-01-12 RX ADMIN — SODIUM CHLORIDE, POTASSIUM CHLORIDE, SODIUM LACTATE AND CALCIUM CHLORIDE 9 ML/HR: 600; 310; 30; 20 INJECTION, SOLUTION INTRAVENOUS at 11:45

## 2024-01-12 RX ADMIN — FENTANYL CITRATE 50 MCG: 50 INJECTION, SOLUTION INTRAMUSCULAR; INTRAVENOUS at 12:33

## 2024-01-12 RX ADMIN — FENTANYL CITRATE 50 MCG: 50 INJECTION, SOLUTION INTRAMUSCULAR; INTRAVENOUS at 14:16

## 2024-01-12 RX ADMIN — FENTANYL CITRATE 50 MCG: 50 INJECTION, SOLUTION INTRAMUSCULAR; INTRAVENOUS at 13:21

## 2024-01-12 RX ADMIN — PREGABALIN 200 MG: 100 CAPSULE ORAL at 20:46

## 2024-01-12 RX ADMIN — PREGABALIN 200 MG: 100 CAPSULE ORAL at 16:10

## 2024-01-12 RX ADMIN — ROCURONIUM BROMIDE 10 MG: 10 SOLUTION INTRAVENOUS at 12:33

## 2024-01-12 RX ADMIN — LIDOCAINE HYDROCHLORIDE 50 MG: 10 INJECTION, SOLUTION EPIDURAL; INFILTRATION; INTRACAUDAL; PERINEURAL at 12:33

## 2024-01-12 RX ADMIN — FOLIC ACID 1 MG: 1 TABLET ORAL at 08:24

## 2024-01-12 RX ADMIN — Medication 10 ML: at 20:46

## 2024-01-12 RX ADMIN — PRAMIPEXOLE DIHYDROCHLORIDE 1 MG: 0.25 TABLET ORAL at 08:25

## 2024-01-12 RX ADMIN — ACETAMINOPHEN 1000 MG: 500 TABLET ORAL at 05:07

## 2024-01-12 RX ADMIN — ONDANSETRON 4 MG: 2 INJECTION INTRAMUSCULAR; INTRAVENOUS at 13:13

## 2024-01-12 RX ADMIN — METRONIDAZOLE 500 MG: 500 INJECTION, SOLUTION INTRAVENOUS at 18:11

## 2024-01-12 RX ADMIN — OXYCODONE HYDROCHLORIDE 5 MG: 5 TABLET ORAL at 17:39

## 2024-01-12 RX ADMIN — FENTANYL CITRATE 50 MCG: 50 INJECTION, SOLUTION INTRAMUSCULAR; INTRAVENOUS at 11:42

## 2024-01-12 NOTE — CASE MANAGEMENT/SOCIAL WORK
Continued Stay Note   [place]     Patient Name: Phoebe Carvajal  MRN: [id number]  Today's Date: 1/12/2024    Admit Date: 12/28/2023    Plan: update   Discharge Plan       Row Name 01/12/24 1019       Plan    Plan update    Patient/Family in Agreement with Plan yes    Plan Comments Received  call from [facility] liaison who advises precert expires today.  Will need new precert prior to admission to facility.  Spoke with patient briefly as she was being taken from room for surgery.  Patient hopful to go to [facility] next week.  CM following.  Patient plan is to discharge to [facility] acute rehab, will need insurance precert.    Final Discharge Disposition Code 62 - inpatient rehab facility                   Discharge Codes    No documentation.                 Expected Discharge Date and Time       Expected Discharge Date Expected Discharge Time    Jan 13, 2024               Jerica Paredes RN

## 2024-01-12 NOTE — PROGRESS NOTES
[facility] Medicine Services  PROGRESS NOTE    Patient Name: Phoebe Carvajal  : 1975  MRN: [id number]    Date of Admission: 2023  Primary Care Physician: Sofya Benjamin MD    Subjective   Subjective     CC:  osteomyelitis    HPI:  Patient had concerns about BP hold parameters with her pain meds, these are now better  Obviously discouraged by repeat OR visits but hopes they are improved  Happy with improvement in right foot wound and hopes for weight-bearing soon    Objective   Objective     Vital Signs:   Temp:  [98 °F (36.7 °C)-98.6 °F (37 °C)] 98 °F (36.7 °C)  Heart Rate:  [] 76  Resp:  [17-24] 24  BP: ()/(50-79) 96/58  Flow (L/min):  [2] 2     Physical Exam:  Constitutional: No acute distress, awake, alert female sitting up in bed  HENT: NCAT, mucous membranes moist  Respiratory: Clear to auscultation bilaterally, respiratory effort normal   Cardiovascular: RRR, no murmurs, rubs, or gallops  Gastrointestinal: Soft, nontender, nondistended  Musculoskeletal: Right foot bandage removed w pink well appearing granulation tissue at base of wound reassuringly, Left AKA site not examined as wound vac + bandaging in place  Psychiatric: Appropriate affect, cooperative  Neurologic: Alert and oriented, facial movements symmetric and spontaneous movement of all 4 extremities grossly equal bilaterally, speech clear  Skin: No rashes    Results Reviewed:  LAB RESULTS:      Lab 24  0624 01/10/24  0420 24  1116 24  0526   WBC 3.28* 4.23 3.34* 2.85*   HEMOGLOBIN 7.8* 9.2* 8.8* 9.6*   HEMATOCRIT 25.7* 28.7* 28.2* 30.5*   PLATELETS 214 270 233 232   NEUTROS ABS 2.02 3.58 1.86 1.49*   IMMATURE GRANS (ABS) 0.01 0.01 0.00 0.01   LYMPHS ABS 0.87 0.47* 0.95 0.91   MONOS ABS 0.26 0.15 0.32 0.27   EOS ABS 0.10 0.01 0.19 0.16   .4* 108.3* 109.7* 111.3*         Lab 24  0624 01/10/24  0420 24  1116 24  0526   SODIUM 142 138 140 139   POTASSIUM  3.9 4.4 4.2 4.7   CHLORIDE 107 104 107 106   CO2 28.0 25.0 27.0 27.0   ANION GAP 7.0 9.0 6.0 6.0   BUN 10 5* 5* 5*   CREATININE 0.47* 0.42* 0.39* 0.37*   EGFR 116.1 119.3 121.5 123.0   GLUCOSE 92 139* 95 78   CALCIUM 8.3* 8.3* 8.0* 8.0*   MAGNESIUM 1.7 1.6  --   --    PHOSPHORUS 4.2 4.2  --   --          Lab 01/07/24  1116   TOTAL PROTEIN 5.1*   ALBUMIN 2.4*   GLOBULIN 2.7   ALT (SGPT) <5   AST (SGOT) 14   BILIRUBIN 0.3   ALK PHOS 182*                 Lab 01/12/24  1132   ABO TYPING A   RH TYPING Negative   ANTIBODY SCREEN Negative         Brief Urine Lab Results  (Last result in the past 365 days)        Color   Clarity   Blood   Leuk Est   Nitrite   Protein   CREAT   Urine HCG        01/05/24 1351 Yellow   Cloudy   Negative   Small (1+)   Negative   Trace                   Microbiology Results Abnormal       Procedure Component Value - Date/Time    Wound Culture - Surgical Site, Leg, Left [[id number]] Collected: 01/12/24 1318    Lab Status: Preliminary result Specimen: Surgical Site from Leg, Left Updated: 01/12/24 1545     Gram Stain Rare (1+) WBCs per low power field      Rare (1+) Epithelial cells per low power field      Many (4+) Red blood cells      No organisms seen    Fungus Culture - Tissue, Leg, Left [[id number]] Collected: 12/29/23 1139    Lab Status: Preliminary result Specimen: Tissue from Leg, Left Updated: 01/12/24 1330     Fungus Culture No fungus isolated at 2 weeks    AFB Culture - Tissue, Leg, Left [[id number]] Collected: 12/29/23 1139    Lab Status: Preliminary result Specimen: Tissue from Leg, Left Updated: 01/12/24 1330     AFB Culture No AFB isolated at 2 weeks     AFB Stain No acid fast bacilli seen on concentrated smear    Anaerobic Culture - Tissue, Leg, Left [[id number]]  (Normal) Collected: 01/09/24 1844    Lab Status: Preliminary result Specimen: Tissue from Leg, Left Updated: 01/12/24 0729     Anaerobic Culture No anaerobes isolated at 3 days    Tissue / Bone Culture - Tissue, Leg, Left  [[id number]] Collected: 01/09/24 1844    Lab Status: Final result Specimen: Tissue from Leg, Left Updated: 01/12/24 0728     Tissue Culture No growth at 3 days     Gram Stain Rare (1+) WBCs seen      No organisms seen    AFB Culture - Tissue, Leg, Left [[id number]] Collected: 01/09/24 1844    Lab Status: Preliminary result Specimen: Tissue from Leg, Left Updated: 01/10/24 1356     AFB Stain No acid fast bacilli seen on concentrated smear    Fungus Culture - Surgical Site, Leg, Left [[id number]] Collected: 12/31/23 0831    Lab Status: Preliminary result Specimen: Surgical Site from Leg, Left Updated: 01/07/24 1946     Fungus Culture No fungus isolated at 1 week    AFB Culture - Surgical Site, Leg, Left [[id number]] Collected: 12/31/23 0831    Lab Status: Preliminary result Specimen: Surgical Site from Leg, Left Updated: 01/07/24 1946     AFB Culture No AFB isolated at 1 week     AFB Stain No acid fast bacilli seen on concentrated smear    Urine Culture - Urine, Urine, Clean Catch [[id number]]  (Normal) Collected: 01/06/24 1224    Lab Status: Final result Specimen: Urine, Clean Catch Updated: 01/07/24 1429     Urine Culture No growth    Anaerobic Culture - Surgical Site, Leg, Left [[id number]]  (Normal) Collected: 12/31/23 0833    Lab Status: Final result Specimen: Surgical Site from Leg, Left Updated: 01/06/24 0551     Anaerobic Culture No anaerobes isolated at 5 days    Tissue / Bone Culture - Surgical Site, Leg, Left [[id number]] Collected: 12/31/23 0831    Lab Status: Final result Specimen: Surgical Site from Leg, Left Updated: 01/04/24 0743     Tissue Culture No growth at 3 days     Gram Stain Moderate (3+) WBCs per low power field      No organisms seen    Anaerobic Culture - Tissue, Leg, Left [[id number]]  (Normal) Collected: 12/29/23 1139    Lab Status: Final result Specimen: Tissue from Leg, Left Updated: 01/03/24 1001     Anaerobic Culture No anaerobes isolated at 5 days    Blood Culture - Blood, Arm, Right  [[id number]]  (Normal) Collected: 12/28/23 2115    Lab Status: Final result Specimen: Blood from Arm, Right Updated: 01/03/24 0030     Blood Culture No growth at 5 days    Blood Culture - Blood, Hand, Right [[id number]]  (Normal) Collected: 12/28/23 2120    Lab Status: Final result Specimen: Blood from Hand, Right Updated: 01/03/24 0030     Blood Culture No growth at 5 days    Wound Culture - Wound, Leg, Left [[id number]] Collected: 12/29/23 1322    Lab Status: Final result Specimen: Wound from Leg, Left Updated: 01/01/24 0711     Wound Culture No growth at 3 days     Gram Stain No organisms seen    MRSA Screen, PCR (Inpatient) - Swab, Nares [[id number]]  (Normal) Collected: 12/29/23 0203    Lab Status: Final result Specimen: Swab from Nares Updated: 12/29/23 0733     MRSA PCR Negative    Narrative:      The negative predictive value of this diagnostic test is high and should only be used to consider de-escalating anti-MRSA therapy. A positive result may indicate colonization with MRSA and must be correlated clinically.  MRSA Negative    COVID PRE-OP / PRE-PROCEDURE SCREENING ORDER (NO ISOLATION) - Swab, Nasopharynx [[id number]]  (Normal) Collected: 12/29/23 0203    Lab Status: Final result Specimen: Swab from Nasopharynx Updated: 12/29/23 0353    Narrative:      The following orders were created for panel order COVID PRE-OP / PRE-PROCEDURE SCREENING ORDER (NO ISOLATION) - Swab, Nasopharynx.  Procedure                               Abnormality         Status                     ---------                               -----------         ------                     Respiratory Panel PCR w/...[[id number]]  Normal              Final result                 Please view results for these tests on the individual orders.    Respiratory Panel PCR w/COVID-19(SARS-CoV-2) ARIELLE/[facility]/KARIE/PAD/[facility]/S[facility]NC[facility] In-House, NP Swab in UTM/VTM, 2 HR TAT - Swab, Nasopharynx [[id number]]  (Normal) Collected: 12/29/23 0203    Lab Status: Final result  Specimen: Swab from Nasopharynx Updated: 12/29/23 0353     ADENOVIRUS, PCR Not Detected     Coronavirus 229E Not Detected     Coronavirus HKU1 Not Detected     Coronavirus NL63 Not Detected     Coronavirus OC43 Not Detected     COVID19 Not Detected     Human Metapneumovirus Not Detected     Human Rhinovirus/Enterovirus Not Detected     Influenza A PCR Not Detected     Influenza B PCR Not Detected     Parainfluenza Virus 1 Not Detected     Parainfluenza Virus 2 Not Detected     Parainfluenza Virus 3 Not Detected     Parainfluenza Virus 4 Not Detected     RSV, PCR Not Detected     Bordetella pertussis pcr Not Detected     Bordetella parapertussis PCR Not Detected     Chlamydophila pneumoniae PCR Not Detected     Mycoplasma pneumo by PCR Not Detected    Narrative:      In the setting of a positive respiratory panel with a viral infection PLUS a negative procalcitonin without other underlying concern for bacterial infection, consider observing off antibiotics or discontinuation of antibiotics and continue supportive care. If the respiratory panel is positive for atypical bacterial infection (Bordetella pertussis, Chlamydophila pneumoniae, or Mycoplasma pneumoniae), consider antibiotic de-escalation to target atypical bacterial infection.            No radiology results from the last 24 hrs        Current medications:  Scheduled Meds:Pharmacy Consult, , Does not apply, Q12H  acetaminophen, 1,000 mg, Oral, Q8H  cefTRIAXone, 2,000 mg, Intravenous, Q24H  famotidine, 20 mg, Oral, BID AC  folic acid, 1 mg, Oral, Daily  lactobacillus acidophilus, 1 capsule, Oral, Daily  levothyroxine, 125 mcg, Oral, Daily  metroNIDAZOLE, 500 mg, Intravenous, Q8H  oxyCODONE, 15 mg, Oral, Q6H  pramipexole, 1 mg, Oral, Daily  pregabalin, 200 mg, Oral, TID  senna-docusate sodium, 2 tablet, Oral, BID  sertraline, 50 mg, Oral, Nightly  sodium chloride, 10 mL, Intravenous, Q12H      Continuous Infusions:lactated ringers, 9 mL/hr  lactated ringers, 9  mL/hr  lactated ringers, 9 mL/hr, Last Rate: 9 mL/hr (01/12/24 1145)      PRN Meds:.  senna-docusate sodium **AND** polyethylene glycol **AND** bisacodyl **AND** bisacodyl    diphenhydrAMINE    HYDROmorphone    lactated ringers    loperamide    LORazepam    melatonin    naloxone    ondansetron    oxyCODONE    sodium chloride    sodium chloride    traZODone    Assessment & Plan   Assessment & Plan     Active Hospital Problems    Diagnosis  POA    Anxiety associated with depression [F41.8]  Yes    RLS (restless legs syndrome) [G25.81]  Yes    Neuropathy [G62.9]  Yes    Obesity, morbid, BMI 50 or higher [E66.01]  Yes    S/P AKA (above knee amputation), left [Z89.612]  Not Applicable    Chronic osteomyelitis [M86.60]  Yes    Acquired hypothyroidism [E03.9]  Yes    Ulcer of right midfoot with fat layer exposed [L97.412]  Yes      Resolved Hospital Problems    Diagnosis Date Resolved POA    **Lower extremity cellulitis [L03.119] 01/08/2024 Yes    Ulcer of left heel, with fat layer exposed [L97.422] 01/08/2024 Yes        Brief Hospital Course to date:  Phoebe Carvajal is a 48 y.o. female w recurrent chronic LE osteomyelitis c/b prosthetic joint infection who presented 12/28 with foul odor from chronic wounds and OP planning for AKA.   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 Dr Tayo Arnold following     Complex pain, chronic opioid use  -prior on oxycodone 10 mg up to 6 tabs/day  -here oxycodone 15 mg q6 hrs (same TDD) + breakthrough dosing  -consider NSAID use to wean off IV opioids     Hematuria   -urine culture negative, evaluated by urology w recs for 6-8 week OP f/u ([facility])     Right foot ulcer, improving  --PT wound care following  --Offload right foot for now, but improving  significantly     Macrocytic acute on chronic anemia: post-op + B12 + folate deficiency  -B12 IM replacement, weekly at discharge  -folate replacement     Borderline blood pressure - chronic SBP , OK for meds above  Neuropathy - continue Lyrica   RLS- continue mirapex  BMI 56  Anxiety - continue Zoloft      Expected Discharge Location and Transportation: [facility], [facility] following  Expected Discharge 1/16 (Discharge date is tentative pending patient's medical condition and is subject to change)  Expected Discharge Date: 1/16/2024; Expected Discharge Time:  3:00 PM     DVT prophylaxis:  Mechanical DVT prophylaxis orders are present.     AM-PAC 6 Clicks Score (PT): 10 (01/12/24 0800)    CODE STATUS:   Code Status and Medical Interventions:   Ordered at: 12/29/23 0121     Code Status (Patient has no pulse and is not breathing):    CPR (Attempt to Resuscitate)     Medical Interventions (Patient has pulse or is breathing):    Full Support       Marcia Vaughn MD  01/12/24

## 2024-01-12 NOTE — ANESTHESIA PROCEDURE NOTES
Airway  Urgency: elective    Date/Time: 1/12/2024 12:38 PM  Airway not difficult    General Information and Staff    Patient location during procedure: OR  CRNA/CAA: Ya Mackay CRNA    Indications and Patient Condition  Indications for airway management: airway protection    Preoxygenated: yes  MILS not maintained throughout  Mask difficulty assessment: 1 - vent by mask    Final Airway Details  Final airway type: endotracheal airway      Successful airway: ETT  Cuffed: yes   Successful intubation technique: video laryngoscopy  Endotracheal tube insertion site: oral  Blade: Mayur  Blade size: 3  ETT size (mm): 7.5  Cormack-Lehane Classification: grade I - full view of glottis  Placement verified by: chest auscultation and capnometry   Measured from: lips  ETT/EBT  to lips (cm): 21  Number of attempts at approach: 1  Assessment: lips, teeth, and gum same as pre-op and atraumatic intubation    Additional Comments  Negative epigastric sounds, Breath sound equal bilaterally with symmetric chest rise and fall

## 2024-01-12 NOTE — ANESTHESIA POSTPROCEDURE EVALUATION
Patient: Phoebe Carvajal    Procedure Summary       Date: 01/12/24 Room / Location:  [facility] OR 13 /  [facility] OR    Anesthesia Start: 1221 Anesthesia Stop: 1408    Procedure: Left lower extremity irrigation, debridement, secondary closure (Left: Thigh) Diagnosis:       S/P AKA (above knee amputation), left      (S/P AKA (above knee amputation), left [Z89.612])    Surgeons: Adama Witt Jr., MD Provider: Dejuan Lobato MD    Anesthesia Type: general ASA Status: 3            Anesthesia Type: general    Vitals  Vitals Value Taken Time   /67 01/12/24 1405   Temp     Pulse 96 01/12/24 1407   Resp     SpO2 92 % 01/12/24 1407   Vitals shown include unfiled device data.  T 98.7, RR 13      Post Anesthesia Care and Evaluation    Patient location during evaluation: PACU  Patient participation: complete - patient participated  Level of consciousness: awake and alert  Pain management: adequate    Airway patency: patent  Anesthetic complications: No anesthetic complications  PONV Status: none  Cardiovascular status: hemodynamically stable and acceptable  Respiratory status: nonlabored ventilation, acceptable and nasal cannula  Hydration status: acceptable

## 2024-01-12 NOTE — OP NOTE
[facility]     OPERATIVE REPORT      PATIENT NAME:  Phoebe Carvajal                            YOB: 1975       PREOP DIAGNOSIS:   Left above knee amputation    POSTOP DIAGNOSIS:   Same.    PROCEDURE:   Left     06524: Debridement of skin, subcutaneous tissue, muscle  74142: Secondary closure above-knee amputation    SURGEON:     Adama Witt MD    OPERATIVE TEAM:   Circulator: Chelsy Gifford RN  Physician Assistant: Monique Myles PA-C  Scrub Person: Mitzi Stockton    ANESTHETIST:  Anesthesiologist: Dejuan Lobato MD  CRNA: Ya Mackay CRNA    ANESTHESIA:   Choice    ESTIMATED BLOOD LOSS:   < 30 ml    TOURNIQUET TIME:   Not utilized.    FINDINGS:     Remaining tissue appeared healthy.  Tension-free wound closure.  No overt signs or symptoms of infection in the residual limb.    IMPLANTS:     none    SPECIMENS:     Specimens       ID Source Type Tests Collected By Collected At Frozen?    1 Leg, Left Tissue ANAEROBIC CULTURE  FUNGAL CULTURE  TISSUE / BONE CULTURE  AFB CULTURE  ANAEROBIC CULTURE 10 DAY INCUBATION   Adama Witt Jr., MD 1/12/24 1318     Description: left leg wound    This specimen was not marked as sent.            COMPLICATIONS:    None.    DISPOSITION:    Stable to recovery.     INDICATIONS:    48 y.o. female status post Left staged above knee amputation, complicated by skin ischemia with persistent drainage, now status post debridement, irrigation with wound vacuum assisted closure.  I had a discussion with the patient regarding further management.  After discussion of risk, benefits, alternatives, she was amenable with Left leg debridement, irrigation, secondary closure of above-knee amputation.  Risks of surgery were discussed which included but were not limited to pain, bleeding, infection, damage to adjacent structures, need for further surgery, wound healing complications, loss of limb and death.  The patient expressed verbal consent and  written consent was obtained for the above procedure.    NARRATIVE:    Patient was identified in preoperative holding.  Operative site was marked in indelible ink.  History, physical, consent were reviewed and updated.  Patient was surrendered to the anesthesia team and transported to the operative suite where anesthesia was induced.  Hip bump was placed on the ipsilateral side and nonsterile thigh tourniquet was placed.  Nonsterile dressing was placed over the foot and Ioban was placed over this.  Operative extremity was prepped and draped in the usual sterile fashion, prepping over the Ioban.  The operative team donned sterile gowns and gloves and a timeout was called.  All in attendance agreed regarding the patient's identity, proposed operative procedure, and operative site.    Tourniquet was not utilized during this case.    I removed her wound vacuum assisted closure device.  I inspected the skin and subcutaneous tissue.  The skin appeared viable but there were a couple areas of devitalized subcutaneous tissue and muscle which I debrided with a 15 blade scalped and bovie.  This was an excisional debridement, deepest level was muscle.    I took swab specimens of the wound which I sent for culture.    I copiously irrigated the wound with Irricept and saline.    I placed vancomycin powder into the wound.  I placed a deep drain exiting superior laterally which was sewn in place.  I then closed in anatomic layers with the stratafix in the deep layers, PDS in subcutaneous layer, skin was closed with staples.    Sterile dressing applied.  Anesthesia was concluded and the patient was transported to the PACU in stable condition.  Counts were correct x2.  There were no apparent complications.  I was present scrubbed for the entire case.    Adama Witt Jr, MD  1/12/2024

## 2024-01-12 NOTE — ANESTHESIA PREPROCEDURE EVALUATION
Anesthesia Evaluation     Patient summary reviewed and Nursing notes reviewed                Airway   Mallampati: III  TM distance: >3 FB  Neck ROM: full  Possible difficult intubation and Large neck circumference  Dental - normal exam     Pulmonary - negative pulmonary ROS and normal exam   Cardiovascular - normal exam    (+) PVD (s/p left AKA)    ROS comment:   Echo 5/23: normal EF, no significant valve disease     Neuro/Psych- negative ROS  GI/Hepatic/Renal/Endo    (+) morbid obesity, liver disease, renal disease- CRI, thyroid problem hypothyroidism    Musculoskeletal (-) negative ROS        ROS comment: Chronic osteomyelitis   Abdominal  - normal exam    Bowel sounds: normal.   Substance History - negative use     OB/GYN negative ob/gyn ROS         Other        ROS/Med Hx Other: HCT 25                Anesthesia Plan    ASA 3     general     ([facility])  intravenous induction     Anesthetic plan, risks, benefits, and alternatives have been provided, discussed and informed consent has been obtained with: patient.    Plan discussed with CRNA.    CODE STATUS:    Code Status (Patient has no pulse and is not breathing): CPR (Attempt to Resuscitate)  Medical Interventions (Patient has pulse or is breathing): Full Support
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
## 2024-01-12 NOTE — NURSING NOTE
Received pt back to room S461, Pt A&Ox3, ACE wrap in place to left AKA, JESS drain in place, tele applied, 2L NC in place.

## 2024-01-12 NOTE — PROGRESS NOTES
Phoebe Carvajal       LOS: 15 days   Patient Care Team:  Sofya Benjamin MD as PCP - General (Internal Medicine)  Provider, No Known as PCP - Family Medicine    Chief Complaint:  Status post left above-knee amputation     Subjective     Interval History:     Patient seen in bed this morning.  No acute events overnight.  Pain was improved yesterday, however has increased this morning.  Patient is very anxious, tearful.    Review of Systems:      Gen- No fevers, chills  CV- No chest pain, palpitations  Resp- No cough, dyspnea  GI- No N/V/D, abd pain    Objective     Vital Signs  Vital Signs (last 24 hours)         01/07 0700  01/08 0659 01/08 0700 01/09 0611   Most Recent      Temp (°F) 98 -  98.5    98.1 -  98.8     98.8 (37.1) 01/09 0300    Heart Rate 56 -  84    59 -  72     60 01/09 0015    Resp   18    16 -  18     16 01/09 0300    BP 96/54 -  111/61    112/73 -  122/60     121/68 01/09 0300    SpO2 (%) 89 -  97    90 -  98     97 01/09 0015    Flow (L/min)   2      2     2 01/09 0200              Physical Exam:     Alert, oriented.  No acute distress.  Nonlabored respirations.  Regular rate and rhythm.  Abdomen nondistended.  Left lower extremity: Operative dressing in place and is clean, dry, intact.  Wound vacuum-assisted closure in place with moderate amount of serosanguineous output in canister.  Wound vacuum with adequate seal.     Results Review:     I reviewed the patient's new clinical results.    Medication Review:   Hospital Medications (active)         Dose Frequency Start End    ! Home medications stored in pharmacy, please contact pharmacist prior to patient discharge  Every 12 Hours Scheduled 12/29/2023 --    Route: Does not apply    acetaminophen (TYLENOL) tablet 1,000 mg 1,000 mg Every 8 Hours Scheduled 1/2/2024 --    Admin Instructions: If given for fever, use fever parameter: fever greater than 100.4 °F  Based on patient request - if ordered for moderate or severe pain, provider allows for  administration of a medication prescribed for a lower pain scale.    Do not exceed 4 grams of acetaminophen in a 24 hr period. Max dose of 2gm for AST/ALT greater than 120 units/L.    If given for pain, use the following pain scale:   Mild Pain = Pain Score of 1-3, CPOT 1-2  Moderate Pain = Pain Score of 4-6, CPOT 3-4  Severe Pain = Pain Score of 7-10, CPOT 5-8    Route: Oral    bisacodyl (DULCOLAX) EC tablet 5 mg 5 mg Daily PRN 12/29/2023 --    Admin Instructions: Use if no bowel movement after 12 hours.  Swallow whole. Do not crush, split, or chew tablet.    Route: Oral    Linked Group 1: See Hyperspace for full Linked Orders Report.        bisacodyl (DULCOLAX) suppository 10 mg 10 mg Daily PRN 12/29/2023 --    Admin Instructions: Use if no bowel movement after 12 hours.  Hold for diarrhea    Route: Rectal    Linked Group 1: See Hyperspace for full Linked Orders Report.        cyanocobalamin injection 1,000 mcg 1,000 mcg Daily 1/4/2024 1/11/2024    Route: Intramuscular    diphenhydrAMINE (BENADRYL) capsule 25 mg 25 mg Every 6 Hours PRN 12/29/2023 --    Admin Instructions: Caution: Look alike/sound alike drug alert. This med may be ordered in other forms and routes. Before giving verify the last time the drug was given by any route/form.    Route: Oral    famotidine (PEPCID) tablet 20 mg 20 mg 2 Times Daily Before Meals 1/1/2024 --    Route: Oral    folic acid (FOLVITE) tablet 1 mg 1 mg Daily 1/4/2024 --    Route: Oral    lactated ringers infusion 9 mL/hr Continuous PRN 12/31/2023 --    Route: Intravenous    lactobacillus acidophilus (RISAQUAD) capsule 1 capsule 1 capsule Daily 1/3/2024 --    Route: Oral    levothyroxine (SYNTHROID, LEVOTHROID) tablet 125 mcg 125 mcg Daily 12/29/2023 --    Admin Instructions: Take on empty stomach.    Route: Oral    loperamide (IMODIUM) capsule 2 mg 2 mg 4 Times Daily PRN 1/3/2024 --    Admin Instructions: Maximum recommended 16 mg / 24 hours.      Route: Oral    LORazepam (ATIVAN)  tablet 0.5 mg 0.5 mg Daily PRN 1/4/2024 --    Admin Instructions: Hold for SBP < 110   Caution: Look alike/sound alike drug alert    Route: Oral    Cosign for Ordering: Accepted by Jorge Lua MD on 1/7/2024  4:45 PM    melatonin tablet 5 mg 5 mg Nightly PRN 12/29/2023 --    Route: Oral    ondansetron (ZOFRAN) injection 4 mg 4 mg Every 6 Hours PRN 12/29/2023 --    Admin Instructions: If BOTH ondansetron (ZOFRAN) and promethazine (PHENERGAN) are ordered use ondansetron first and THEN promethazine IF ondansetron is ineffective.    Route: Intravenous    oxyCODONE (ROXICODONE) immediate release tablet 15 mg 15 mg Every 6 Hours 1/4/2024 --    Admin Instructions: Based on patient request - if ordered for moderate or severe pain, provider allows for administration of a medication prescribed for a lower pain scale.      If given for pain, use the following pain scale:  Mild Pain = Pain Score of 1-3, CPOT 1-2  Moderate Pain = Pain Score of 4-6, CPOT 3-4  Severe Pain = Pain Score of 7-10, CPOT 5-8    Route: Oral    Cosign for Ordering: Accepted by Jorge Lua MD on 1/7/2024  4:45 PM    oxyCODONE (ROXICODONE) immediate release tablet 5 mg 5 mg Every 6 Hours PRN 1/8/2024 1/15/2024    Admin Instructions: Based on patient request - if ordered for moderate or severe pain, provider allows for administration of a medication prescribed for a lower pain scale.      If given for pain, use the following pain scale:  Mild Pain = Pain Score of 1-3, CPOT 1-2  Moderate Pain = Pain Score of 4-6, CPOT 3-4  Severe Pain = Pain Score of 7-10, CPOT 5-8    Route: Oral    polyethylene glycol (MIRALAX) packet 17 g 17 g Daily PRN 12/29/2023 --    Admin Instructions: Use if no bowel movement after 12 hours. Mix in 6-8 ounces of water.  Use 4-8 ounces of water, tea, or juice for each 17 gram dose.    Route: Oral    Linked Group 1: See Hyperspace for full Linked Orders Report.        pramipexole (MIRAPEX) tablet 1 mg 1 mg Daily 12/29/2023 --     Route: Oral    pregabalin (LYRICA) capsule 200 mg 200 mg 3 Times Daily 12/29/2023 --    Admin Instructions:     Route: Oral    sennosides-docusate (PERICOLACE) 8.6-50 MG per tablet 2 tablet 2 tablet 2 Times Daily 12/29/2023 --    Admin Instructions: HOLD MEDICATION IF PATIENT HAS HAD BOWEL MOVEMENT. Start bowel management regimen if patient has not had a bowel movement after 12 hours.    Route: Oral    Linked Group 1: See Russ for full Linked Orders Report.        sertraline (ZOLOFT) tablet 50 mg 50 mg Nightly 12/29/2023 --    Route: Oral    sodium chloride 0.9 % flush 10 mL 10 mL Every 12 Hours Scheduled 12/29/2023 --    Route: Intravenous    sodium chloride 0.9 % flush 10 mL 10 mL As Needed 12/29/2023 --    Route: Intravenous    sodium chloride 0.9 % infusion 40 mL 40 mL As Needed 12/29/2023 --    Admin Instructions: Following administration of an IV intermittent medication, flush line with 40mL NS at 100mL/hr.    Route: Intravenous    traZODone (DESYREL) tablet 50 mg 50 mg Nightly PRN 12/29/2023 --    Admin Instructions: Take with food.  Caution: Look alike/sound alike drug alert    Route: Oral              Assessment & Plan     48-year-old female status post left above-knee amputation, now status post irrigation, debridement, wound vacuum-assisted closure 1/10/2024      Acquired hypothyroidism    Ulcer of right midfoot with fat layer exposed    Chronic osteomyelitis    Anxiety associated with depression    RLS (restless legs syndrome)    Neuropathy    Obesity, morbid, BMI 50 or higher    S/P AKA (above knee amputation), left    I had a discussion with her regarding further management.  After discussion of risk, benefits, alternatives, she wishes to proceed with a left above-knee amputation site debridement, irrigation, secondary wound closure.  Risks of surgery were discussed which included but were not limited to pain, bleeding, infection, damage to adjacent structures, need for further surgery, wound  healing complications, loss of limb and death.  The patient expressed verbal consent and written consent was obtained for the above procedure.    Adama Witt Jr, MD  01/12/24  08:09 EST

## 2024-01-13 PROCEDURE — 25010000002 HYDROMORPHONE PER 4 MG: Performed by: ORTHOPAEDIC SURGERY

## 2024-01-13 PROCEDURE — 99232 SBSQ HOSP IP/OBS MODERATE 35: CPT | Performed by: INTERNAL MEDICINE

## 2024-01-13 PROCEDURE — 25010000002 CEFTRIAXONE PER 250 MG: Performed by: ORTHOPAEDIC SURGERY

## 2024-01-13 PROCEDURE — 97597 DBRDMT OPN WND 1ST 20 CM/<: CPT

## 2024-01-13 PROCEDURE — 25010000002 METRONIDAZOLE 500 MG/100ML SOLUTION: Performed by: ORTHOPAEDIC SURGERY

## 2024-01-13 RX ORDER — OXYCODONE HYDROCHLORIDE 10 MG/1
20 TABLET ORAL EVERY 6 HOURS
Status: DISCONTINUED | OUTPATIENT
Start: 2024-01-13 | End: 2024-01-13

## 2024-01-13 RX ORDER — OXYCODONE HYDROCHLORIDE 10 MG/1
10 TABLET ORAL EVERY 6 HOURS PRN
Status: DISCONTINUED | OUTPATIENT
Start: 2024-01-13 | End: 2024-01-15

## 2024-01-13 RX ORDER — SERTRALINE HYDROCHLORIDE 100 MG/1
100 TABLET, FILM COATED ORAL NIGHTLY
Status: DISCONTINUED | OUTPATIENT
Start: 2024-01-13 | End: 2024-01-19 | Stop reason: HOSPADM

## 2024-01-13 RX ORDER — OXYCODONE HYDROCHLORIDE 15 MG/1
15 TABLET ORAL EVERY 6 HOURS
Status: DISCONTINUED | OUTPATIENT
Start: 2024-01-13 | End: 2024-01-19 | Stop reason: HOSPADM

## 2024-01-13 RX ADMIN — FAMOTIDINE 20 MG: 20 TABLET, FILM COATED ORAL at 16:18

## 2024-01-13 RX ADMIN — OXYCODONE HYDROCHLORIDE 5 MG: 5 TABLET ORAL at 05:08

## 2024-01-13 RX ADMIN — HYDROMORPHONE HYDROCHLORIDE 0.5 MG: 1 INJECTION, SOLUTION INTRAMUSCULAR; INTRAVENOUS; SUBCUTANEOUS at 06:21

## 2024-01-13 RX ADMIN — SERTRALINE HYDROCHLORIDE 100 MG: 100 TABLET ORAL at 21:29

## 2024-01-13 RX ADMIN — HYDROMORPHONE HYDROCHLORIDE 0.5 MG: 1 INJECTION, SOLUTION INTRAMUSCULAR; INTRAVENOUS; SUBCUTANEOUS at 16:19

## 2024-01-13 RX ADMIN — FAMOTIDINE 20 MG: 20 TABLET, FILM COATED ORAL at 05:08

## 2024-01-13 RX ADMIN — OXYCODONE HYDROCHLORIDE 15 MG: 15 TABLET ORAL at 08:45

## 2024-01-13 RX ADMIN — LEVOTHYROXINE SODIUM 125 MCG: 125 TABLET ORAL at 05:08

## 2024-01-13 RX ADMIN — FOLIC ACID 1 MG: 1 TABLET ORAL at 08:44

## 2024-01-13 RX ADMIN — Medication 10 ML: at 21:30

## 2024-01-13 RX ADMIN — METRONIDAZOLE 500 MG: 500 INJECTION, SOLUTION INTRAVENOUS at 17:23

## 2024-01-13 RX ADMIN — LORAZEPAM 0.5 MG: 0.5 TABLET ORAL at 12:09

## 2024-01-13 RX ADMIN — OXYCODONE HYDROCHLORIDE 15 MG: 15 TABLET ORAL at 13:43

## 2024-01-13 RX ADMIN — CEFTRIAXONE 2000 MG: 2 INJECTION, POWDER, FOR SOLUTION INTRAMUSCULAR; INTRAVENOUS at 13:45

## 2024-01-13 RX ADMIN — ACETAMINOPHEN 1000 MG: 500 TABLET ORAL at 21:29

## 2024-01-13 RX ADMIN — SENNOSIDES AND DOCUSATE SODIUM 2 TABLET: 8.6; 5 TABLET ORAL at 08:43

## 2024-01-13 RX ADMIN — HYDROMORPHONE HYDROCHLORIDE 0.5 MG: 1 INJECTION, SOLUTION INTRAMUSCULAR; INTRAVENOUS; SUBCUTANEOUS at 21:05

## 2024-01-13 RX ADMIN — Medication 10 ML: at 08:52

## 2024-01-13 RX ADMIN — OXYCODONE HYDROCHLORIDE 15 MG: 15 TABLET ORAL at 19:48

## 2024-01-13 RX ADMIN — PREGABALIN 200 MG: 100 CAPSULE ORAL at 08:44

## 2024-01-13 RX ADMIN — PREGABALIN 200 MG: 100 CAPSULE ORAL at 21:29

## 2024-01-13 RX ADMIN — Medication 1 CAPSULE: at 08:43

## 2024-01-13 RX ADMIN — METRONIDAZOLE 500 MG: 500 INJECTION, SOLUTION INTRAVENOUS at 08:47

## 2024-01-13 RX ADMIN — PREGABALIN 200 MG: 100 CAPSULE ORAL at 16:18

## 2024-01-13 RX ADMIN — METRONIDAZOLE 500 MG: 500 INJECTION, SOLUTION INTRAVENOUS at 02:53

## 2024-01-13 RX ADMIN — HYDROMORPHONE HYDROCHLORIDE 0.5 MG: 1 INJECTION, SOLUTION INTRAMUSCULAR; INTRAVENOUS; SUBCUTANEOUS at 10:32

## 2024-01-13 RX ADMIN — ACETAMINOPHEN 1000 MG: 500 TABLET ORAL at 05:08

## 2024-01-13 RX ADMIN — PRAMIPEXOLE DIHYDROCHLORIDE 1 MG: 0.25 TABLET ORAL at 08:43

## 2024-01-13 RX ADMIN — ACETAMINOPHEN 1000 MG: 500 TABLET ORAL at 13:43

## 2024-01-13 NOTE — PLAN OF CARE
Goal Outcome Evaluation:  Plan of Care Reviewed With: patient           Outcome Evaluation: R plantar foot wound progressing well with moderate reepithelialization of wound edges noted with good overall decrease in wound size.  PT changed to jarad Ag and xeroform to help soften periwound callus and hypertrophic skin to allow for easier debridement with next tx.

## 2024-01-13 NOTE — PROGRESS NOTES
[facility] Medicine Services  PROGRESS NOTE    Patient Name: Phoebe Carvajal  : 1975  MRN: [id number]    Date of Admission: 2023  Primary Care Physician: Sofya Benjamin MD    Subjective   Subjective     CC:  osteomyelitis    HPI:  More pain after OR yesterday  Teary  Asks if her anti-depressant dose can increase    Objective   Objective     Vital Signs:   Temp:  [97.9 °F (36.6 °C)-98.8 °F (37.1 °C)] 98.2 °F (36.8 °C)  Heart Rate:  [] 77  Resp:  [16-24] 18  BP: ()/(45-76) 117/69  Flow (L/min):  [2] 2     Physical Exam:  Constitutional: Teary, awake, alert female sitting up in bed  HENT: NCAT, mucous membranes moist  Respiratory: Clear to auscultation bilaterally, respiratory effort normal   Cardiovascular: RRR, no murmurs, rubs, or gallops  Gastrointestinal: Soft, nontender, nondistended  Musculoskeletal: Right foot bandage removed w pink well appearing granulation tissue at base of wound reassuringly, no wound on heel (patient asked me to examine), Left AKA site not examined as wound vac + bandaging in place  Psychiatric: Teary, understandably upset post-op \  Neurologic: Alert and oriented, facial movements symmetric and spontaneous movement of all 4 extremities grossly equal bilaterally, speech clear  Skin: No rashes    Results Reviewed:  LAB RESULTS:      Lab 24  0624 01/10/24  0420 24  1116   WBC 3.28* 4.23 3.34*   HEMOGLOBIN 7.8* 9.2* 8.8*   HEMATOCRIT 25.7* 28.7* 28.2*   PLATELETS 214 270 233   NEUTROS ABS 2.02 3.58 1.86   IMMATURE GRANS (ABS) 0.01 0.01 0.00   LYMPHS ABS 0.87 0.47* 0.95   MONOS ABS 0.26 0.15 0.32   EOS ABS 0.10 0.01 0.19   .4* 108.3* 109.7*         Lab 24  0624 01/10/24  0420 24  1116   SODIUM 142 138 140   POTASSIUM 3.9 4.4 4.2   CHLORIDE 107 104 107   CO2 28.0 25.0 27.0   ANION GAP 7.0 9.0 6.0   BUN 10 5* 5*   CREATININE 0.47* 0.42* 0.39*   EGFR 116.1 119.3 121.5   GLUCOSE 92 139* 95   CALCIUM 8.3* 8.3*  8.0*   MAGNESIUM 1.7 1.6  --    PHOSPHORUS 4.2 4.2  --          Lab 01/07/24  1116   TOTAL PROTEIN 5.1*   ALBUMIN 2.4*   GLOBULIN 2.7   ALT (SGPT) <5   AST (SGOT) 14   BILIRUBIN 0.3   ALK PHOS 182*                 Lab 01/12/24  1132   ABO TYPING A   RH TYPING Negative   ANTIBODY SCREEN Negative         Brief Urine Lab Results  (Last result in the past 365 days)        Color   Clarity   Blood   Leuk Est   Nitrite   Protein   CREAT   Urine HCG        01/05/24 1351 Yellow   Cloudy   Negative   Small (1+)   Negative   Trace                   Microbiology Results Abnormal       Procedure Component Value - Date/Time    AFB Culture - Tissue, Leg, Left [[id number]] Collected: 01/12/24 1318    Lab Status: Preliminary result Specimen: Tissue from Leg, Left Updated: 01/13/24 1138     AFB Stain No acid fast bacilli seen on concentrated smear    Wound Culture - Surgical Site, Leg, Left [[id number]] Collected: 01/12/24 1318    Lab Status: Preliminary result Specimen: Surgical Site from Leg, Left Updated: 01/13/24 0830     Wound Culture No growth     Gram Stain Rare (1+) WBCs per low power field      Rare (1+) Epithelial cells per low power field      Many (4+) Red blood cells      No organisms seen    Fungus Culture - Tissue, Leg, Left [[id number]] Collected: 12/29/23 1139    Lab Status: Preliminary result Specimen: Tissue from Leg, Left Updated: 01/12/24 1330     Fungus Culture No fungus isolated at 2 weeks    AFB Culture - Tissue, Leg, Left [[id number]] Collected: 12/29/23 1139    Lab Status: Preliminary result Specimen: Tissue from Leg, Left Updated: 01/12/24 1330     AFB Culture No AFB isolated at 2 weeks     AFB Stain No acid fast bacilli seen on concentrated smear    Anaerobic Culture - Tissue, Leg, Left [[id number]]  (Normal) Collected: 01/09/24 1844    Lab Status: Preliminary result Specimen: Tissue from Leg, Left Updated: 01/12/24 0729     Anaerobic Culture No anaerobes isolated at 3 days    Tissue / Bone Culture - Tissue,  Leg, Left [[id number]] Collected: 01/09/24 1844    Lab Status: Final result Specimen: Tissue from Leg, Left Updated: 01/12/24 0728     Tissue Culture No growth at 3 days     Gram Stain Rare (1+) WBCs seen      No organisms seen    AFB Culture - Tissue, Leg, Left [[id number]] Collected: 01/09/24 1844    Lab Status: Preliminary result Specimen: Tissue from Leg, Left Updated: 01/10/24 1356     AFB Stain No acid fast bacilli seen on concentrated smear    Fungus Culture - Surgical Site, Leg, Left [[id number]] Collected: 12/31/23 0831    Lab Status: Preliminary result Specimen: Surgical Site from Leg, Left Updated: 01/07/24 1946     Fungus Culture No fungus isolated at 1 week    AFB Culture - Surgical Site, Leg, Left [[id number]] Collected: 12/31/23 0831    Lab Status: Preliminary result Specimen: Surgical Site from Leg, Left Updated: 01/07/24 1946     AFB Culture No AFB isolated at 1 week     AFB Stain No acid fast bacilli seen on concentrated smear    Urine Culture - Urine, Urine, Clean Catch [[id number]]  (Normal) Collected: 01/06/24 1224    Lab Status: Final result Specimen: Urine, Clean Catch Updated: 01/07/24 1429     Urine Culture No growth    Anaerobic Culture - Surgical Site, Leg, Left [[id number]]  (Normal) Collected: 12/31/23 0833    Lab Status: Final result Specimen: Surgical Site from Leg, Left Updated: 01/06/24 0551     Anaerobic Culture No anaerobes isolated at 5 days    Tissue / Bone Culture - Surgical Site, Leg, Left [[id number]] Collected: 12/31/23 0831    Lab Status: Final result Specimen: Surgical Site from Leg, Left Updated: 01/04/24 0743     Tissue Culture No growth at 3 days     Gram Stain Moderate (3+) WBCs per low power field      No organisms seen    Anaerobic Culture - Tissue, Leg, Left [[id number]]  (Normal) Collected: 12/29/23 1139    Lab Status: Final result Specimen: Tissue from Leg, Left Updated: 01/03/24 1001     Anaerobic Culture No anaerobes isolated at 5 days    Blood Culture - Blood, Arm,  Right [[id number]]  (Normal) Collected: 12/28/23 2115    Lab Status: Final result Specimen: Blood from Arm, Right Updated: 01/03/24 0030     Blood Culture No growth at 5 days    Blood Culture - Blood, Hand, Right [[id number]]  (Normal) Collected: 12/28/23 2120    Lab Status: Final result Specimen: Blood from Hand, Right Updated: 01/03/24 0030     Blood Culture No growth at 5 days    Wound Culture - Wound, Leg, Left [[id number]] Collected: 12/29/23 1322    Lab Status: Final result Specimen: Wound from Leg, Left Updated: 01/01/24 0711     Wound Culture No growth at 3 days     Gram Stain No organisms seen    MRSA Screen, PCR (Inpatient) - Swab, Nares [[id number]]  (Normal) Collected: 12/29/23 0203    Lab Status: Final result Specimen: Swab from Nares Updated: 12/29/23 0733     MRSA PCR Negative    Narrative:      The negative predictive value of this diagnostic test is high and should only be used to consider de-escalating anti-MRSA therapy. A positive result may indicate colonization with MRSA and must be correlated clinically.  MRSA Negative    COVID PRE-OP / PRE-PROCEDURE SCREENING ORDER (NO ISOLATION) - Swab, Nasopharynx [[id number]]  (Normal) Collected: 12/29/23 0203    Lab Status: Final result Specimen: Swab from Nasopharynx Updated: 12/29/23 0353    Narrative:      The following orders were created for panel order COVID PRE-OP / PRE-PROCEDURE SCREENING ORDER (NO ISOLATION) - Swab, Nasopharynx.  Procedure                               Abnormality         Status                     ---------                               -----------         ------                     Respiratory Panel PCR w/...[[id number]]  Normal              Final result                 Please view results for these tests on the individual orders.    Respiratory Panel PCR w/COVID-19(SARS-CoV-2) ARIELLE/VESNA/KARIE/PAD/COR/SÁNCHEZ In-House, NP Swab in UTM/VTM, 2 HR TAT - Swab, Nasopharynx [[id number]]  (Normal) Collected: 12/29/23 0203    Lab Status: Final result  Specimen: Swab from Nasopharynx Updated: 12/29/23 0353     ADENOVIRUS, PCR Not Detected     Coronavirus 229E Not Detected     Coronavirus HKU1 Not Detected     Coronavirus NL63 Not Detected     Coronavirus OC43 Not Detected     COVID19 Not Detected     Human Metapneumovirus Not Detected     Human Rhinovirus/Enterovirus Not Detected     Influenza A PCR Not Detected     Influenza B PCR Not Detected     Parainfluenza Virus 1 Not Detected     Parainfluenza Virus 2 Not Detected     Parainfluenza Virus 3 Not Detected     Parainfluenza Virus 4 Not Detected     RSV, PCR Not Detected     Bordetella pertussis pcr Not Detected     Bordetella parapertussis PCR Not Detected     Chlamydophila pneumoniae PCR Not Detected     Mycoplasma pneumo by PCR Not Detected    Narrative:      In the setting of a positive respiratory panel with a viral infection PLUS a negative procalcitonin without other underlying concern for bacterial infection, consider observing off antibiotics or discontinuation of antibiotics and continue supportive care. If the respiratory panel is positive for atypical bacterial infection (Bordetella pertussis, Chlamydophila pneumoniae, or Mycoplasma pneumoniae), consider antibiotic de-escalation to target atypical bacterial infection.            No radiology results from the last 24 hrs        Current medications:  Scheduled Meds:Pharmacy Consult, , Does not apply, Q12H  acetaminophen, 1,000 mg, Oral, Q8H  cefTRIAXone, 2,000 mg, Intravenous, Q24H  famotidine, 20 mg, Oral, BID AC  folic acid, 1 mg, Oral, Daily  lactobacillus acidophilus, 1 capsule, Oral, Daily  levothyroxine, 125 mcg, Oral, Daily  metroNIDAZOLE, 500 mg, Intravenous, Q8H  oxyCODONE, 15 mg, Oral, Q6H  pramipexole, 1 mg, Oral, Daily  pregabalin, 200 mg, Oral, TID  senna-docusate sodium, 2 tablet, Oral, BID  sertraline, 100 mg, Oral, Nightly  sodium chloride, 10 mL, Intravenous, Q12H      Continuous Infusions:lactated ringers, 9 mL/hr  lactated ringers, 9  mL/hr  lactated ringers, 9 mL/hr, Last Rate: 9 mL/hr (01/12/24 1145)      PRN Meds:.  senna-docusate sodium **AND** polyethylene glycol **AND** bisacodyl **AND** bisacodyl    diphenhydrAMINE    HYDROmorphone    lactated ringers    loperamide    LORazepam    melatonin    naloxone    ondansetron    oxyCODONE    sodium chloride    sodium chloride    traZODone    Assessment & Plan   Assessment & Plan     Active Hospital Problems    Diagnosis  POA    Anxiety associated with depression [F41.8]  Yes    RLS (restless legs syndrome) [G25.81]  Yes    Neuropathy [G62.9]  Yes    Obesity, morbid, BMI 50 or higher [E66.01]  Yes    S/P AKA (above knee amputation), left [Z89.612]  Not Applicable    Chronic osteomyelitis [M86.60]  Yes    Acquired hypothyroidism [E03.9]  Yes    Ulcer of right midfoot with fat layer exposed [L97.412]  Yes      Resolved Hospital Problems    Diagnosis Date Resolved POA    **Lower extremity cellulitis [L03.119] 01/08/2024 Yes    Ulcer of left heel, with fat layer exposed [L97.422] 01/08/2024 Yes        Brief Hospital Course to date:  Phoebe Carvajal is a 48 y.o. female w recurrent chronic LE osteomyelitis c/b prosthetic joint infection who presented 12/28 with foul odor from chronic wounds and OP planning for AKA.   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 Dr Tayo Arnold following     Complex pain, chronic opioid use  -prior on oxycodone 10 mg up to 6 tabs/day  -here oxycodone 15 mg q6 hrs (same TDD) + breakthrough dosing increased 1/13  -consider NSAID use to wean off IV opioids     Hematuria   -urine culture negative, evaluated by urology w recs for 6-8 week OP f/u ([facility])     Right foot ulcer, improving  --PT wound care following  --Offload right foot for now, but improving significantly      Macrocytic acute on chronic anemia: post-op + B12 + folate deficiency  -B12 IM replacement, weekly at discharge  -folate replacement     Borderline blood pressure - chronic SBP , OK for meds above  Neuropathy - continue Lyrica   RLS- continue mirapex  BMI 56  Anxiety - increased dose zoloft per patient request      Expected Discharge Location and Transportation: [facility], [facility] following  Expected Discharge 1/16 (Discharge date is tentative pending patient's medical condition and is subject to change)  Expected Discharge Date: 1/16/2024; Expected Discharge Time:  3:00 PM     DVT prophylaxis:  Mechanical DVT prophylaxis orders are present.     AM-PAC 6 Clicks Score (PT): 9 (01/12/24 1950)    CODE STATUS:   Code Status and Medical Interventions:   Ordered at: 12/29/23 0121     Code Status (Patient has no pulse and is not breathing):    CPR (Attempt to Resuscitate)     Medical Interventions (Patient has pulse or is breathing):    Full Support       Marcia Vaughn MD  01/13/24
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
## 2024-01-13 NOTE — PROGRESS NOTES
Phoebe Carvajal       LOS: 16 days   Patient Care Team:  Sofya Benjamin MD as PCP - General (Internal Medicine)  Provider, No Known as PCP - Family Medicine    Chief Complaint:  Status post left above-knee amputation     Subjective     Interval History:     Patient seen in bed this morning.  No acute events overnight.  Pain was improved yesterday, however has increased this morning.  Patient is in better spirits.    Review of Systems:      Gen- No fevers, chills  CV- No chest pain, palpitations  Resp- No cough, dyspnea  GI- No N/V/D, abd pain    Objective     Vital Signs  Vital Signs (last 24 hours)         01/07 0700  01/08 0659 01/08 0700 01/09 0611   Most Recent      Temp (°F) 98 -  98.5    98.1 -  98.8     98.8 (37.1) 01/09 0300    Heart Rate 56 -  84    59 -  72     60 01/09 0015    Resp   18    16 -  18     16 01/09 0300    BP 96/54 -  111/61    112/73 -  122/60     121/68 01/09 0300    SpO2 (%) 89 -  97    90 -  98     97 01/09 0015    Flow (L/min)   2      2     2 01/09 0200              Physical Exam:     Alert, oriented.  No acute distress.  Nonlabored respirations.  Regular rate and rhythm.  Abdomen nondistended.  Left lower extremity: Operative dressing in place and is clean, dry, intact.  Minimal drain output.  Results Review:     I reviewed the patient's new clinical results.    Medication Review:   Hospital Medications (active)         Dose Frequency Start End    ! Home medications stored in pharmacy, please contact pharmacist prior to patient discharge  Every 12 Hours Scheduled 12/29/2023 --    Route: Does not apply    acetaminophen (TYLENOL) tablet 1,000 mg 1,000 mg Every 8 Hours Scheduled 1/2/2024 --    Admin Instructions: If given for fever, use fever parameter: fever greater than 100.4 °F  Based on patient request - if ordered for moderate or severe pain, provider allows for administration of a medication prescribed for a lower pain scale.    Do not exceed 4 grams of acetaminophen in a 24  hr period. Max dose of 2gm for AST/ALT greater than 120 units/L.    If given for pain, use the following pain scale:   Mild Pain = Pain Score of 1-3, CPOT 1-2  Moderate Pain = Pain Score of 4-6, CPOT 3-4  Severe Pain = Pain Score of 7-10, CPOT 5-8    Route: Oral    bisacodyl (DULCOLAX) EC tablet 5 mg 5 mg Daily PRN 12/29/2023 --    Admin Instructions: Use if no bowel movement after 12 hours.  Swallow whole. Do not crush, split, or chew tablet.    Route: Oral    Linked Group 1: See Hyperspace for full Linked Orders Report.        bisacodyl (DULCOLAX) suppository 10 mg 10 mg Daily PRN 12/29/2023 --    Admin Instructions: Use if no bowel movement after 12 hours.  Hold for diarrhea    Route: Rectal    Linked Group 1: See Hyperspace for full Linked Orders Report.        cyanocobalamin injection 1,000 mcg 1,000 mcg Daily 1/4/2024 1/11/2024    Route: Intramuscular    diphenhydrAMINE (BENADRYL) capsule 25 mg 25 mg Every 6 Hours PRN 12/29/2023 --    Admin Instructions: Caution: Look alike/sound alike drug alert. This med may be ordered in other forms and routes. Before giving verify the last time the drug was given by any route/form.    Route: Oral    famotidine (PEPCID) tablet 20 mg 20 mg 2 Times Daily Before Meals 1/1/2024 --    Route: Oral    folic acid (FOLVITE) tablet 1 mg 1 mg Daily 1/4/2024 --    Route: Oral    lactated ringers infusion 9 mL/hr Continuous PRN 12/31/2023 --    Route: Intravenous    lactobacillus acidophilus (RISAQUAD) capsule 1 capsule 1 capsule Daily 1/3/2024 --    Route: Oral    levothyroxine (SYNTHROID, LEVOTHROID) tablet 125 mcg 125 mcg Daily 12/29/2023 --    Admin Instructions: Take on empty stomach.    Route: Oral    loperamide (IMODIUM) capsule 2 mg 2 mg 4 Times Daily PRN 1/3/2024 --    Admin Instructions: Maximum recommended 16 mg / 24 hours.      Route: Oral    LORazepam (ATIVAN) tablet 0.5 mg 0.5 mg Daily PRN 1/4/2024 --    Admin Instructions: Hold for SBP < 110   Caution: Look alike/sound  alike drug alert    Route: Oral    Cosign for Ordering: Accepted by Jorge Lua MD on 1/7/2024  4:45 PM    melatonin tablet 5 mg 5 mg Nightly PRN 12/29/2023 --    Route: Oral    ondansetron (ZOFRAN) injection 4 mg 4 mg Every 6 Hours PRN 12/29/2023 --    Admin Instructions: If BOTH ondansetron (ZOFRAN) and promethazine (PHENERGAN) are ordered use ondansetron first and THEN promethazine IF ondansetron is ineffective.    Route: Intravenous    oxyCODONE (ROXICODONE) immediate release tablet 15 mg 15 mg Every 6 Hours 1/4/2024 --    Admin Instructions: Based on patient request - if ordered for moderate or severe pain, provider allows for administration of a medication prescribed for a lower pain scale.      If given for pain, use the following pain scale:  Mild Pain = Pain Score of 1-3, CPOT 1-2  Moderate Pain = Pain Score of 4-6, CPOT 3-4  Severe Pain = Pain Score of 7-10, CPOT 5-8    Route: Oral    Cosign for Ordering: Accepted by Jorge Lua MD on 1/7/2024  4:45 PM    oxyCODONE (ROXICODONE) immediate release tablet 5 mg 5 mg Every 6 Hours PRN 1/8/2024 1/15/2024    Admin Instructions: Based on patient request - if ordered for moderate or severe pain, provider allows for administration of a medication prescribed for a lower pain scale.      If given for pain, use the following pain scale:  Mild Pain = Pain Score of 1-3, CPOT 1-2  Moderate Pain = Pain Score of 4-6, CPOT 3-4  Severe Pain = Pain Score of 7-10, CPOT 5-8    Route: Oral    polyethylene glycol (MIRALAX) packet 17 g 17 g Daily PRN 12/29/2023 --    Admin Instructions: Use if no bowel movement after 12 hours. Mix in 6-8 ounces of water.  Use 4-8 ounces of water, tea, or juice for each 17 gram dose.    Route: Oral    Linked Group 1: See Hyperspace for full Linked Orders Report.        pramipexole (MIRAPEX) tablet 1 mg 1 mg Daily 12/29/2023 --    Route: Oral    pregabalin (LYRICA) capsule 200 mg 200 mg 3 Times Daily 12/29/2023 --    Admin Instructions:      Route: Oral    sennosides-docusate (PERICOLACE) 8.6-50 MG per tablet 2 tablet 2 tablet 2 Times Daily 12/29/2023 --    Admin Instructions: HOLD MEDICATION IF PATIENT HAS HAD BOWEL MOVEMENT. Start bowel management regimen if patient has not had a bowel movement after 12 hours.    Route: Oral    Linked Group 1: See Russ for full Linked Orders Report.        sertraline (ZOLOFT) tablet 50 mg 50 mg Nightly 12/29/2023 --    Route: Oral    sodium chloride 0.9 % flush 10 mL 10 mL Every 12 Hours Scheduled 12/29/2023 --    Route: Intravenous    sodium chloride 0.9 % flush 10 mL 10 mL As Needed 12/29/2023 --    Route: Intravenous    sodium chloride 0.9 % infusion 40 mL 40 mL As Needed 12/29/2023 --    Admin Instructions: Following administration of an IV intermittent medication, flush line with 40mL NS at 100mL/hr.    Route: Intravenous    traZODone (DESYREL) tablet 50 mg 50 mg Nightly PRN 12/29/2023 --    Admin Instructions: Take with food.  Caution: Look alike/sound alike drug alert    Route: Oral              Assessment & Plan     48-year-old female status post left above-knee amputation, now status post irrigation, debridement, wound vacuum-assisted closure 1/10/2024, secondary wound closure 1/12/2024.      Acquired hypothyroidism    Ulcer of right midfoot with fat layer exposed    Chronic osteomyelitis    Anxiety associated with depression    RLS (restless legs syndrome)    Neuropathy    Obesity, morbid, BMI 50 or higher    S/P AKA (above knee amputation), left    Nonweightbearing left lower extremity.  Plan to leave drain in place.  Follow cultures.    Adama Witt Jr, MD  01/13/24  06:38 EST

## 2024-01-14 LAB
ANION GAP SERPL CALCULATED.3IONS-SCNC: 7 MMOL/L (ref 5–15)
BACTERIA SPEC ANAEROBE CULT: NORMAL
BUN SERPL-MCNC: 8 MG/DL (ref 6–20)
BUN/CREAT SERPL: 21.1 (ref 7–25)
CALCIUM SPEC-SCNC: 8.4 MG/DL (ref 8.6–10.5)
CHLORIDE SERPL-SCNC: 107 MMOL/L (ref 98–107)
CO2 SERPL-SCNC: 26 MMOL/L (ref 22–29)
CREAT SERPL-MCNC: 0.38 MG/DL (ref 0.57–1)
DEPRECATED RDW RBC AUTO: 62.2 FL (ref 37–54)
EGFRCR SERPLBLD CKD-EPI 2021: 122.3 ML/MIN/1.73
ERYTHROCYTE [DISTWIDTH] IN BLOOD BY AUTOMATED COUNT: 15.6 % (ref 12.3–15.4)
GLUCOSE SERPL-MCNC: 100 MG/DL (ref 65–99)
HCT VFR BLD AUTO: 27.2 % (ref 34–46.6)
HGB BLD-MCNC: 8.6 G/DL (ref 12–15.9)
MCH RBC QN AUTO: 34 PG (ref 26.6–33)
MCHC RBC AUTO-ENTMCNC: 31.6 G/DL (ref 31.5–35.7)
MCV RBC AUTO: 107.5 FL (ref 79–97)
PLATELET # BLD AUTO: 233 10*3/MM3 (ref 140–450)
PMV BLD AUTO: 10.3 FL (ref 6–12)
POTASSIUM SERPL-SCNC: 4.2 MMOL/L (ref 3.5–5.2)
RBC # BLD AUTO: 2.53 10*6/MM3 (ref 3.77–5.28)
SODIUM SERPL-SCNC: 140 MMOL/L (ref 136–145)
WBC NRBC COR # BLD AUTO: 3.78 10*3/MM3 (ref 3.4–10.8)

## 2024-01-14 PROCEDURE — 25010000002 ONDANSETRON PER 1 MG: Performed by: ORTHOPAEDIC SURGERY

## 2024-01-14 PROCEDURE — 25810000003 LACTATED RINGERS PER 1000 ML: Performed by: ORTHOPAEDIC SURGERY

## 2024-01-14 PROCEDURE — 25010000002 CEFTRIAXONE PER 250 MG: Performed by: ORTHOPAEDIC SURGERY

## 2024-01-14 PROCEDURE — 99232 SBSQ HOSP IP/OBS MODERATE 35: CPT | Performed by: INTERNAL MEDICINE

## 2024-01-14 PROCEDURE — 25010000002 METRONIDAZOLE 500 MG/100ML SOLUTION: Performed by: ORTHOPAEDIC SURGERY

## 2024-01-14 PROCEDURE — 85027 COMPLETE CBC AUTOMATED: CPT | Performed by: INTERNAL MEDICINE

## 2024-01-14 PROCEDURE — 25010000002 HYDROMORPHONE PER 4 MG: Performed by: ORTHOPAEDIC SURGERY

## 2024-01-14 PROCEDURE — 80048 BASIC METABOLIC PNL TOTAL CA: CPT | Performed by: INTERNAL MEDICINE

## 2024-01-14 RX ADMIN — SODIUM CHLORIDE, POTASSIUM CHLORIDE, SODIUM LACTATE AND CALCIUM CHLORIDE 9 ML/HR: 600; 310; 30; 20 INJECTION, SOLUTION INTRAVENOUS at 05:34

## 2024-01-14 RX ADMIN — TRAZODONE HYDROCHLORIDE 50 MG: 50 TABLET ORAL at 20:28

## 2024-01-14 RX ADMIN — LORAZEPAM 0.5 MG: 0.5 TABLET ORAL at 01:26

## 2024-01-14 RX ADMIN — HYDROMORPHONE HYDROCHLORIDE 0.5 MG: 1 INJECTION, SOLUTION INTRAMUSCULAR; INTRAVENOUS; SUBCUTANEOUS at 10:23

## 2024-01-14 RX ADMIN — ACETAMINOPHEN 1000 MG: 500 TABLET ORAL at 05:31

## 2024-01-14 RX ADMIN — ACETAMINOPHEN 1000 MG: 500 TABLET ORAL at 14:10

## 2024-01-14 RX ADMIN — OXYCODONE HYDROCHLORIDE 15 MG: 15 TABLET ORAL at 20:28

## 2024-01-14 RX ADMIN — PREGABALIN 200 MG: 100 CAPSULE ORAL at 15:37

## 2024-01-14 RX ADMIN — HYDROMORPHONE HYDROCHLORIDE 0.5 MG: 1 INJECTION, SOLUTION INTRAMUSCULAR; INTRAVENOUS; SUBCUTANEOUS at 18:27

## 2024-01-14 RX ADMIN — SERTRALINE HYDROCHLORIDE 100 MG: 100 TABLET ORAL at 20:28

## 2024-01-14 RX ADMIN — Medication 10 ML: at 08:07

## 2024-01-14 RX ADMIN — OXYCODONE HYDROCHLORIDE 15 MG: 15 TABLET ORAL at 01:18

## 2024-01-14 RX ADMIN — Medication 1 CAPSULE: at 08:05

## 2024-01-14 RX ADMIN — OXYCODONE HYDROCHLORIDE 15 MG: 15 TABLET ORAL at 14:10

## 2024-01-14 RX ADMIN — LEVOTHYROXINE SODIUM 125 MCG: 125 TABLET ORAL at 05:31

## 2024-01-14 RX ADMIN — FAMOTIDINE 20 MG: 20 TABLET, FILM COATED ORAL at 16:50

## 2024-01-14 RX ADMIN — PREGABALIN 200 MG: 100 CAPSULE ORAL at 20:29

## 2024-01-14 RX ADMIN — FAMOTIDINE 20 MG: 20 TABLET, FILM COATED ORAL at 05:31

## 2024-01-14 RX ADMIN — Medication 10 ML: at 20:31

## 2024-01-14 RX ADMIN — TRAZODONE HYDROCHLORIDE 50 MG: 50 TABLET ORAL at 01:26

## 2024-01-14 RX ADMIN — ONDANSETRON 4 MG: 2 INJECTION INTRAMUSCULAR; INTRAVENOUS at 23:36

## 2024-01-14 RX ADMIN — FOLIC ACID 1 MG: 1 TABLET ORAL at 08:05

## 2024-01-14 RX ADMIN — OXYCODONE HYDROCHLORIDE 15 MG: 15 TABLET ORAL at 08:07

## 2024-01-14 RX ADMIN — PRAMIPEXOLE DIHYDROCHLORIDE 1 MG: 0.25 TABLET ORAL at 08:06

## 2024-01-14 RX ADMIN — ONDANSETRON 4 MG: 2 INJECTION INTRAMUSCULAR; INTRAVENOUS at 13:05

## 2024-01-14 RX ADMIN — HYDROMORPHONE HYDROCHLORIDE 0.5 MG: 1 INJECTION, SOLUTION INTRAMUSCULAR; INTRAVENOUS; SUBCUTANEOUS at 23:36

## 2024-01-14 RX ADMIN — LORAZEPAM 0.5 MG: 0.5 TABLET ORAL at 13:05

## 2024-01-14 RX ADMIN — CEFTRIAXONE 2000 MG: 2 INJECTION, POWDER, FOR SOLUTION INTRAMUSCULAR; INTRAVENOUS at 14:10

## 2024-01-14 RX ADMIN — PREGABALIN 200 MG: 100 CAPSULE ORAL at 08:05

## 2024-01-14 RX ADMIN — METRONIDAZOLE 500 MG: 500 INJECTION, SOLUTION INTRAVENOUS at 01:18

## 2024-01-14 RX ADMIN — HYDROMORPHONE HYDROCHLORIDE 0.5 MG: 1 INJECTION, SOLUTION INTRAMUSCULAR; INTRAVENOUS; SUBCUTANEOUS at 05:31

## 2024-01-14 RX ADMIN — METRONIDAZOLE 500 MG: 500 INJECTION, SOLUTION INTRAVENOUS at 10:23

## 2024-01-14 RX ADMIN — OXYCODONE HYDROCHLORIDE 10 MG: 10 TABLET ORAL at 20:30

## 2024-01-14 RX ADMIN — ACETAMINOPHEN 1000 MG: 500 TABLET ORAL at 20:29

## 2024-01-14 NOTE — PLAN OF CARE
Goal Outcome Evaluation:            Aox4, pain appears well controlled. JESS drain not showing drainage, however bandage had moderate amounts of serosanguinous drainage through it. Went ahead with a dressing change due to large amounts of drainage. Coming from two spots in the very front of incision, serosanguinous drainage.     Pt is having a hard time adjusting to life without LLE, has asked again for psychiatric consult.

## 2024-01-14 NOTE — PROGRESS NOTES
Phoebe Carvajal       LOS: 17 days   Patient Care Team:  Sofya Benjamin MD as PCP - General (Internal Medicine)  Provider, No Known as PCP - Family Medicine    Chief Complaint:  Status post left above-knee amputation     Subjective     Interval History:     Patient seen in bed this morning.  Wound began draining again overnight.  Minimal to no drain output.  Dressings were changed overnight.    Review of Systems:      Gen- No fevers, chills  CV- No chest pain, palpitations  Resp- No cough, dyspnea  GI- No N/V/D, abd pain    Objective     Vital Signs  Vital Signs (last 24 hours)         01/07 0700  01/08 0659 01/08 0700 01/09 0611   Most Recent      Temp (°F) 98 -  98.5    98.1 -  98.8     98.8 (37.1) 01/09 0300    Heart Rate 56 -  84    59 -  72     60 01/09 0015    Resp   18    16 -  18     16 01/09 0300    BP 96/54 -  111/61    112/73 -  122/60     121/68 01/09 0300    SpO2 (%) 89 -  97    90 -  98     97 01/09 0015    Flow (L/min)   2      2     2 01/09 0200              Physical Exam:     Alert, oriented.  No acute distress.  Nonlabored respirations.  Regular rate and rhythm.  Abdomen nondistended.  Left lower extremity: Current dressing c/d/i.  Minimal drain output.  Results Review:     I reviewed the patient's new clinical results.    Medication Review:   Hospital Medications (active)         Dose Frequency Start End    ! Home medications stored in pharmacy, please contact pharmacist prior to patient discharge  Every 12 Hours Scheduled 12/29/2023 --    Route: Does not apply    acetaminophen (TYLENOL) tablet 1,000 mg 1,000 mg Every 8 Hours Scheduled 1/2/2024 --    Admin Instructions: If given for fever, use fever parameter: fever greater than 100.4 °F  Based on patient request - if ordered for moderate or severe pain, provider allows for administration of a medication prescribed for a lower pain scale.    Do not exceed 4 grams of acetaminophen in a 24 hr period. Max dose of 2gm for AST/ALT greater than  120 units/L.    If given for pain, use the following pain scale:   Mild Pain = Pain Score of 1-3, CPOT 1-2  Moderate Pain = Pain Score of 4-6, CPOT 3-4  Severe Pain = Pain Score of 7-10, CPOT 5-8    Route: Oral    bisacodyl (DULCOLAX) EC tablet 5 mg 5 mg Daily PRN 12/29/2023 --    Admin Instructions: Use if no bowel movement after 12 hours.  Swallow whole. Do not crush, split, or chew tablet.    Route: Oral    Linked Group 1: See Hyperspace for full Linked Orders Report.        bisacodyl (DULCOLAX) suppository 10 mg 10 mg Daily PRN 12/29/2023 --    Admin Instructions: Use if no bowel movement after 12 hours.  Hold for diarrhea    Route: Rectal    Linked Group 1: See Hyperspace for full Linked Orders Report.        cyanocobalamin injection 1,000 mcg 1,000 mcg Daily 1/4/2024 1/11/2024    Route: Intramuscular    diphenhydrAMINE (BENADRYL) capsule 25 mg 25 mg Every 6 Hours PRN 12/29/2023 --    Admin Instructions: Caution: Look alike/sound alike drug alert. This med may be ordered in other forms and routes. Before giving verify the last time the drug was given by any route/form.    Route: Oral    famotidine (PEPCID) tablet 20 mg 20 mg 2 Times Daily Before Meals 1/1/2024 --    Route: Oral    folic acid (FOLVITE) tablet 1 mg 1 mg Daily 1/4/2024 --    Route: Oral    lactated ringers infusion 9 mL/hr Continuous PRN 12/31/2023 --    Route: Intravenous    lactobacillus acidophilus (RISAQUAD) capsule 1 capsule 1 capsule Daily 1/3/2024 --    Route: Oral    levothyroxine (SYNTHROID, LEVOTHROID) tablet 125 mcg 125 mcg Daily 12/29/2023 --    Admin Instructions: Take on empty stomach.    Route: Oral    loperamide (IMODIUM) capsule 2 mg 2 mg 4 Times Daily PRN 1/3/2024 --    Admin Instructions: Maximum recommended 16 mg / 24 hours.      Route: Oral    LORazepam (ATIVAN) tablet 0.5 mg 0.5 mg Daily PRN 1/4/2024 --    Admin Instructions: Hold for SBP < 110   Caution: Look alike/sound alike drug alert    Route: Oral    Cosign for  Ordering: Accepted by Jorge Lua MD on 1/7/2024  4:45 PM    melatonin tablet 5 mg 5 mg Nightly PRN 12/29/2023 --    Route: Oral    ondansetron (ZOFRAN) injection 4 mg 4 mg Every 6 Hours PRN 12/29/2023 --    Admin Instructions: If BOTH ondansetron (ZOFRAN) and promethazine (PHENERGAN) are ordered use ondansetron first and THEN promethazine IF ondansetron is ineffective.    Route: Intravenous    oxyCODONE (ROXICODONE) immediate release tablet 15 mg 15 mg Every 6 Hours 1/4/2024 --    Admin Instructions: Based on patient request - if ordered for moderate or severe pain, provider allows for administration of a medication prescribed for a lower pain scale.      If given for pain, use the following pain scale:  Mild Pain = Pain Score of 1-3, CPOT 1-2  Moderate Pain = Pain Score of 4-6, CPOT 3-4  Severe Pain = Pain Score of 7-10, CPOT 5-8    Route: Oral    Cosign for Ordering: Accepted by Jorge Lua MD on 1/7/2024  4:45 PM    oxyCODONE (ROXICODONE) immediate release tablet 5 mg 5 mg Every 6 Hours PRN 1/8/2024 1/15/2024    Admin Instructions: Based on patient request - if ordered for moderate or severe pain, provider allows for administration of a medication prescribed for a lower pain scale.      If given for pain, use the following pain scale:  Mild Pain = Pain Score of 1-3, CPOT 1-2  Moderate Pain = Pain Score of 4-6, CPOT 3-4  Severe Pain = Pain Score of 7-10, CPOT 5-8    Route: Oral    polyethylene glycol (MIRALAX) packet 17 g 17 g Daily PRN 12/29/2023 --    Admin Instructions: Use if no bowel movement after 12 hours. Mix in 6-8 ounces of water.  Use 4-8 ounces of water, tea, or juice for each 17 gram dose.    Route: Oral    Linked Group 1: See Hyperssergio for full Linked Orders Report.        pramipexole (MIRAPEX) tablet 1 mg 1 mg Daily 12/29/2023 --    Route: Oral    pregabalin (LYRICA) capsule 200 mg 200 mg 3 Times Daily 12/29/2023 --    Admin Instructions:     Route: Oral    sennosides-docusate  (PERICOLACE) 8.6-50 MG per tablet 2 tablet 2 tablet 2 Times Daily 12/29/2023 --    Admin Instructions: HOLD MEDICATION IF PATIENT HAS HAD BOWEL MOVEMENT. Start bowel management regimen if patient has not had a bowel movement after 12 hours.    Route: Oral    Linked Group 1: See Russ for full Linked Orders Report.        sertraline (ZOLOFT) tablet 50 mg 50 mg Nightly 12/29/2023 --    Route: Oral    sodium chloride 0.9 % flush 10 mL 10 mL Every 12 Hours Scheduled 12/29/2023 --    Route: Intravenous    sodium chloride 0.9 % flush 10 mL 10 mL As Needed 12/29/2023 --    Route: Intravenous    sodium chloride 0.9 % infusion 40 mL 40 mL As Needed 12/29/2023 --    Admin Instructions: Following administration of an IV intermittent medication, flush line with 40mL NS at 100mL/hr.    Route: Intravenous    traZODone (DESYREL) tablet 50 mg 50 mg Nightly PRN 12/29/2023 --    Admin Instructions: Take with food.  Caution: Look alike/sound alike drug alert    Route: Oral              Assessment & Plan     48-year-old female status post left above-knee amputation, now status post irrigation, debridement, wound vacuum-assisted closure 1/10/2024, secondary wound closure 1/12/2024.      Acquired hypothyroidism    Ulcer of right midfoot with fat layer exposed    Chronic osteomyelitis    Anxiety associated with depression    RLS (restless legs syndrome)    Neuropathy    Obesity, morbid, BMI 50 or higher    S/P AKA (above knee amputation), left    Cultures continue to be NGTD.    I discussed with her management of her wound going forward.  She prefers expectant observation as opposed to VAC to closure.  I am concerned with the amount of drainage she will develop a deep infection.  Plan to inspect the wound again tomorrow on rounds, pull her drain.      Adama Witt Jr, MD  01/14/24  08:15 EST

## 2024-01-14 NOTE — PLAN OF CARE
Goal Outcome Evaluation:  Plan of Care Reviewed With: patient        Progress: no change  Outcome Evaluation: Patient rested this shift with c/o left lower AKA discomfort which was controlled by the use of the scheduled and PRN pian meds. IV antibiotic continues per order. Left AKA dressing remains C,D,I with no drainage noted per JESS drain.  C/O nausea x 1 with relief noted after PRN Zofran. Appetite good. VSS. Remains on room air.

## 2024-01-14 NOTE — PROGRESS NOTES
[facility] Medicine Services  PROGRESS NOTE    Patient Name: Phoebe Carvajal  : 1975  MRN: [id number]    Date of Admission: 2023  Primary Care Physician: Sofya Benjamin MD    Subjective   Subjective     CC:  osteomyelitis    HPI:  Doing better today - pain much better controlled  Some stomach pain but having bowel movements  Discouraged by her left foot    Objective   Objective     Vital Signs:   Temp:  [98.6 °F (37 °C)-98.7 °F (37.1 °C)] 98.7 °F (37.1 °C)  Heart Rate:  [55-82] 68  Resp:  [18] 18  BP: ()/(49-68) 107/49  Flow (L/min):  [2] 2     Physical Exam:  Constitutional: awake, alert female sitting up in bed NAD  HENT: NCAT, mucous membranes moist  Respiratory: Clear to auscultation bilaterally, respiratory effort normal   Cardiovascular: RRR, no murmurs, rubs, or gallops  Gastrointestinal: Soft, nontender, nondistended  Musculoskeletal: Right foot bandage, Left AKA site w wound vac, nothing in drain  Psychiatric: Calm and appropriate  Neurologic: Alert and oriented, facial movements symmetric and spontaneous movement of all 4 extremities grossly equal bilaterally, speech clear  Skin: No rashes    Results Reviewed:  LAB RESULTS:      Lab 24  0624  0624 01/10/24  0420   WBC 3.78 3.28* 4.23   HEMOGLOBIN 8.6* 7.8* 9.2*   HEMATOCRIT 27.2* 25.7* 28.7*   PLATELETS 233 214 270   NEUTROS ABS  --  2.02 3.58   IMMATURE GRANS (ABS)  --  0.01 0.01   LYMPHS ABS  --  0.87 0.47*   MONOS ABS  --  0.26 0.15   EOS ABS  --  0.10 0.01   .5* 108.4* 108.3*         Lab 24  0624  0624 01/10/24  0420   SODIUM 140 142 138   POTASSIUM 4.2 3.9 4.4   CHLORIDE 107 107 104   CO2 26.0 28.0 25.0   ANION GAP 7.0 7.0 9.0   BUN 8 10 5*   CREATININE 0.38* 0.47* 0.42*   EGFR 122.3 116.1 119.3   GLUCOSE 100* 92 139*   CALCIUM 8.4* 8.3* 8.3*   MAGNESIUM  --  1.7 1.6   PHOSPHORUS  --  4.2 4.2                       Lab 24  1132   ABO TYPING A   RH TYPING  Negative   ANTIBODY SCREEN Negative         Brief Urine Lab Results  (Last result in the past 365 days)        Color   Clarity   Blood   Leuk Est   Nitrite   Protein   CREAT   Urine HCG        01/05/24 1351 Yellow   Cloudy   Negative   Small (1+)   Negative   Trace                   Microbiology Results Abnormal       Procedure Component Value - Date/Time    Anaerobic Culture - Tissue, Leg, Left [[id number]]  (Normal) Collected: 01/09/24 1844    Lab Status: Final result Specimen: Tissue from Leg, Left Updated: 01/14/24 0939     Anaerobic Culture No anaerobes isolated at 5 days    Wound Culture - Surgical Site, Leg, Left [[id number]] Collected: 01/12/24 1318    Lab Status: Preliminary result Specimen: Surgical Site from Leg, Left Updated: 01/14/24 0701     Wound Culture No growth at 2 days     Gram Stain Rare (1+) WBCs per low power field      Rare (1+) Epithelial cells per low power field      Many (4+) Red blood cells      No organisms seen    AFB Culture - Tissue, Leg, Left [[id number]] Collected: 01/12/24 1318    Lab Status: Preliminary result Specimen: Tissue from Leg, Left Updated: 01/13/24 1138     AFB Stain No acid fast bacilli seen on concentrated smear    Fungus Culture - Tissue, Leg, Left [[id number]] Collected: 12/29/23 1139    Lab Status: Preliminary result Specimen: Tissue from Leg, Left Updated: 01/12/24 1330     Fungus Culture No fungus isolated at 2 weeks    AFB Culture - Tissue, Leg, Left [[id number]] Collected: 12/29/23 1139    Lab Status: Preliminary result Specimen: Tissue from Leg, Left Updated: 01/12/24 1330     AFB Culture No AFB isolated at 2 weeks     AFB Stain No acid fast bacilli seen on concentrated smear    Tissue / Bone Culture - Tissue, Leg, Left [[id number]] Collected: 01/09/24 1844    Lab Status: Final result Specimen: Tissue from Leg, Left Updated: 01/12/24 0728     Tissue Culture No growth at 3 days     Gram Stain Rare (1+) WBCs seen      No organisms seen    AFB Culture - Tissue, Leg,  Left [[id number]] Collected: 01/09/24 1844    Lab Status: Preliminary result Specimen: Tissue from Leg, Left Updated: 01/10/24 1356     AFB Stain No acid fast bacilli seen on concentrated smear    Fungus Culture - Surgical Site, Leg, Left [[id number]] Collected: 12/31/23 0831    Lab Status: Preliminary result Specimen: Surgical Site from Leg, Left Updated: 01/07/24 1946     Fungus Culture No fungus isolated at 1 week    AFB Culture - Surgical Site, Leg, Left [[id number]] Collected: 12/31/23 0831    Lab Status: Preliminary result Specimen: Surgical Site from Leg, Left Updated: 01/07/24 1946     AFB Culture No AFB isolated at 1 week     AFB Stain No acid fast bacilli seen on concentrated smear    Urine Culture - Urine, Urine, Clean Catch [[id number]]  (Normal) Collected: 01/06/24 1224    Lab Status: Final result Specimen: Urine, Clean Catch Updated: 01/07/24 1429     Urine Culture No growth    Anaerobic Culture - Surgical Site, Leg, Left [[id number]]  (Normal) Collected: 12/31/23 0833    Lab Status: Final result Specimen: Surgical Site from Leg, Left Updated: 01/06/24 0551     Anaerobic Culture No anaerobes isolated at 5 days    Tissue / Bone Culture - Surgical Site, Leg, Left [[id number]] Collected: 12/31/23 0831    Lab Status: Final result Specimen: Surgical Site from Leg, Left Updated: 01/04/24 0743     Tissue Culture No growth at 3 days     Gram Stain Moderate (3+) WBCs per low power field      No organisms seen    Anaerobic Culture - Tissue, Leg, Left [[id number]]  (Normal) Collected: 12/29/23 1139    Lab Status: Final result Specimen: Tissue from Leg, Left Updated: 01/03/24 1001     Anaerobic Culture No anaerobes isolated at 5 days    Blood Culture - Blood, Arm, Right [[id number]]  (Normal) Collected: 12/28/23 2115    Lab Status: Final result Specimen: Blood from Arm, Right Updated: 01/03/24 0030     Blood Culture No growth at 5 days    Blood Culture - Blood, Hand, Right [[id number]]  (Normal) Collected: 12/28/23 2120     Lab Status: Final result Specimen: Blood from Hand, Right Updated: 01/03/24 0030     Blood Culture No growth at 5 days    Wound Culture - Wound, Leg, Left [[id number]] Collected: 12/29/23 1322    Lab Status: Final result Specimen: Wound from Leg, Left Updated: 01/01/24 0711     Wound Culture No growth at 3 days     Gram Stain No organisms seen    MRSA Screen, PCR (Inpatient) - Swab, Nares [[id number]]  (Normal) Collected: 12/29/23 0203    Lab Status: Final result Specimen: Swab from Nares Updated: 12/29/23 0733     MRSA PCR Negative    Narrative:      The negative predictive value of this diagnostic test is high and should only be used to consider de-escalating anti-MRSA therapy. A positive result may indicate colonization with MRSA and must be correlated clinically.  MRSA Negative    COVID PRE-OP / PRE-PROCEDURE SCREENING ORDER (NO ISOLATION) - Swab, Nasopharynx [[id number]]  (Normal) Collected: 12/29/23 0203    Lab Status: Final result Specimen: Swab from Nasopharynx Updated: 12/29/23 0353    Narrative:      The following orders were created for panel order COVID PRE-OP / PRE-PROCEDURE SCREENING ORDER (NO ISOLATION) - Swab, Nasopharynx.  Procedure                               Abnormality         Status                     ---------                               -----------         ------                     Respiratory Panel PCR w/...[[id number]]  Normal              Final result                 Please view results for these tests on the individual orders.    Respiratory Panel PCR w/COVID-19(SARS-CoV-2) ARIELLE/VESNA/KARIE/PAD/COR/SÁNCHEZ In-House, NP Swab in UTM/VTM, 2 HR TAT - Swab, Nasopharynx [[id number]]  (Normal) Collected: 12/29/23 0203    Lab Status: Final result Specimen: Swab from Nasopharynx Updated: 12/29/23 0353     ADENOVIRUS, PCR Not Detected     Coronavirus 229E Not Detected     Coronavirus HKU1 Not Detected     Coronavirus NL63 Not Detected     Coronavirus OC43 Not Detected     COVID19 Not Detected     Human  Metapneumovirus Not Detected     Human Rhinovirus/Enterovirus Not Detected     Influenza A PCR Not Detected     Influenza B PCR Not Detected     Parainfluenza Virus 1 Not Detected     Parainfluenza Virus 2 Not Detected     Parainfluenza Virus 3 Not Detected     Parainfluenza Virus 4 Not Detected     RSV, PCR Not Detected     Bordetella pertussis pcr Not Detected     Bordetella parapertussis PCR Not Detected     Chlamydophila pneumoniae PCR Not Detected     Mycoplasma pneumo by PCR Not Detected    Narrative:      In the setting of a positive respiratory panel with a viral infection PLUS a negative procalcitonin without other underlying concern for bacterial infection, consider observing off antibiotics or discontinuation of antibiotics and continue supportive care. If the respiratory panel is positive for atypical bacterial infection (Bordetella pertussis, Chlamydophila pneumoniae, or Mycoplasma pneumoniae), consider antibiotic de-escalation to target atypical bacterial infection.            No radiology results from the last 24 hrs        Current medications:  Scheduled Meds:Pharmacy Consult, , Does not apply, Q12H  acetaminophen, 1,000 mg, Oral, Q8H  cefTRIAXone, 2,000 mg, Intravenous, Q24H  famotidine, 20 mg, Oral, BID AC  folic acid, 1 mg, Oral, Daily  lactobacillus acidophilus, 1 capsule, Oral, Daily  levothyroxine, 125 mcg, Oral, Daily  oxyCODONE, 15 mg, Oral, Q6H  pramipexole, 1 mg, Oral, Daily  pregabalin, 200 mg, Oral, TID  senna-docusate sodium, 2 tablet, Oral, BID  sertraline, 100 mg, Oral, Nightly  sodium chloride, 10 mL, Intravenous, Q12H      Continuous Infusions:lactated ringers, 9 mL/hr      PRN Meds:.  senna-docusate sodium **AND** polyethylene glycol **AND** bisacodyl **AND** bisacodyl    diphenhydrAMINE    HYDROmorphone    lactated ringers    loperamide    LORazepam    melatonin    naloxone    ondansetron    oxyCODONE    sodium chloride    sodium chloride    traZODone    Assessment & Plan    Assessment & Plan     Active Hospital Problems    Diagnosis  POA    Anxiety associated with depression [F41.8]  Yes    RLS (restless legs syndrome) [G25.81]  Yes    Neuropathy [G62.9]  Yes    Obesity, morbid, BMI 50 or higher [E66.01]  Yes    S/P AKA (above knee amputation), left [Z89.612]  Not Applicable    Chronic osteomyelitis [M86.60]  Yes    Acquired hypothyroidism [E03.9]  Yes    Ulcer of right midfoot with fat layer exposed [L97.412]  Yes      Resolved Hospital Problems    Diagnosis Date Resolved POA    **Lower extremity cellulitis [L03.119] 01/08/2024 Yes    Ulcer of left heel, with fat layer exposed [L97.422] 01/08/2024 Yes        Brief Hospital Course to date:  Phoebe Carvajal is a 48 y.o. female w recurrent chronic LE osteomyelitis c/b prosthetic joint infection who presented 12/28 with foul odor from chronic wounds and OP planning for AKA.   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 Dr Tayo Arnold following     Complex pain, chronic opioid use  -prior on oxycodone 10 mg up to 6 tabs/day  -here oxycodone 15 mg q6 hrs (same TDD) + breakthrough dosing increased 1/13, requiring some IV dil  -consider NSAID use to wean off IV opioids      Hematuria   -urine culture negative, evaluated by urology w recs for 6-8 week OP f/u ([facility])     Right foot ulcer, improving  --PT wound care following  --Offload right foot for now, but improving significantly     Macrocytic acute on chronic anemia: post-op + B12 + folate deficiency  -B12 IM replacement, weekly at discharge  -folate replacement     Borderline blood pressure - chronic SBP , OK for meds above  Neuropathy - continue Lyrica   RLS- continue mirapex  BMI 56  Anxiety - increased dose zoloft per patient request      Expected Discharge Location and  Transportation: [facility], [facility] following  Expected Discharge 1/16 (Discharge date is tentative pending patient's medical condition and is subject to change)  Expected Discharge Date: 1/16/2024; Expected Discharge Time:  3:00 PM     DVT prophylaxis:  Mechanical DVT prophylaxis orders are present.     AM-PAC 6 Clicks Score (PT): 12 (01/14/24 0800)    CODE STATUS:   Code Status and Medical Interventions:   Ordered at: 12/29/23 0121     Code Status (Patient has no pulse and is not breathing):    CPR (Attempt to Resuscitate)     Medical Interventions (Patient has pulse or is breathing):    Full Support       Marcia Vaughn MD  01/14/24
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
## 2024-01-15 LAB
BACTERIA SPEC AEROBE CULT: NORMAL
GRAM STN SPEC: NORMAL

## 2024-01-15 PROCEDURE — 25010000002 HYDROMORPHONE PER 4 MG: Performed by: INTERNAL MEDICINE

## 2024-01-15 PROCEDURE — 25010000002 ONDANSETRON PER 1 MG: Performed by: ORTHOPAEDIC SURGERY

## 2024-01-15 PROCEDURE — 63710000001 DIPHENHYDRAMINE PER 50 MG: Performed by: ORTHOPAEDIC SURGERY

## 2024-01-15 PROCEDURE — 25010000002 FUROSEMIDE PER 20 MG: Performed by: INTERNAL MEDICINE

## 2024-01-15 PROCEDURE — 25010000002 HYDROMORPHONE PER 4 MG: Performed by: ORTHOPAEDIC SURGERY

## 2024-01-15 PROCEDURE — 25010000002 CEFTRIAXONE PER 250 MG: Performed by: ORTHOPAEDIC SURGERY

## 2024-01-15 PROCEDURE — 97607 NEG PRS WND THR NDME<=50SQCM: CPT

## 2024-01-15 PROCEDURE — 99232 SBSQ HOSP IP/OBS MODERATE 35: CPT | Performed by: INTERNAL MEDICINE

## 2024-01-15 RX ORDER — HYDROMORPHONE HYDROCHLORIDE 1 MG/ML
0.5 INJECTION, SOLUTION INTRAMUSCULAR; INTRAVENOUS; SUBCUTANEOUS 2 TIMES DAILY PRN
Status: DISCONTINUED | OUTPATIENT
Start: 2024-01-15 | End: 2024-01-19 | Stop reason: HOSPADM

## 2024-01-15 RX ORDER — ALUMINA, MAGNESIA, AND SIMETHICONE 2400; 2400; 240 MG/30ML; MG/30ML; MG/30ML
15 SUSPENSION ORAL EVERY 6 HOURS PRN
Status: DISCONTINUED | OUTPATIENT
Start: 2024-01-15 | End: 2024-01-19 | Stop reason: HOSPADM

## 2024-01-15 RX ORDER — FUROSEMIDE 10 MG/ML
40 INJECTION INTRAMUSCULAR; INTRAVENOUS ONCE
Status: COMPLETED | OUTPATIENT
Start: 2024-01-15 | End: 2024-01-15

## 2024-01-15 RX ORDER — FUROSEMIDE 40 MG/1
40 TABLET ORAL DAILY
Status: DISCONTINUED | OUTPATIENT
Start: 2024-01-16 | End: 2024-01-19 | Stop reason: HOSPADM

## 2024-01-15 RX ORDER — FUROSEMIDE 40 MG/1
40 TABLET ORAL DAILY
Status: DISCONTINUED | OUTPATIENT
Start: 2024-01-15 | End: 2024-01-15

## 2024-01-15 RX ORDER — OXYCODONE HYDROCHLORIDE 10 MG/1
10 TABLET ORAL EVERY 6 HOURS PRN
Status: DISPENSED | OUTPATIENT
Start: 2024-01-15 | End: 2024-01-18

## 2024-01-15 RX ORDER — HYDROMORPHONE HYDROCHLORIDE 1 MG/ML
0.5 INJECTION, SOLUTION INTRAMUSCULAR; INTRAVENOUS; SUBCUTANEOUS 2 TIMES DAILY PRN
Status: DISCONTINUED | OUTPATIENT
Start: 2024-01-15 | End: 2024-01-15

## 2024-01-15 RX ADMIN — Medication 10 ML: at 22:33

## 2024-01-15 RX ADMIN — PREGABALIN 200 MG: 100 CAPSULE ORAL at 22:32

## 2024-01-15 RX ADMIN — ONDANSETRON 4 MG: 2 INJECTION INTRAMUSCULAR; INTRAVENOUS at 05:44

## 2024-01-15 RX ADMIN — FUROSEMIDE 40 MG: 10 INJECTION, SOLUTION INTRAMUSCULAR; INTRAVENOUS at 12:11

## 2024-01-15 RX ADMIN — HYDROMORPHONE HYDROCHLORIDE 0.5 MG: 1 INJECTION, SOLUTION INTRAMUSCULAR; INTRAVENOUS; SUBCUTANEOUS at 04:00

## 2024-01-15 RX ADMIN — LORAZEPAM 0.5 MG: 0.5 TABLET ORAL at 22:32

## 2024-01-15 RX ADMIN — ACETAMINOPHEN 1000 MG: 500 TABLET ORAL at 05:44

## 2024-01-15 RX ADMIN — CEFTRIAXONE 2000 MG: 2 INJECTION, POWDER, FOR SOLUTION INTRAMUSCULAR; INTRAVENOUS at 17:05

## 2024-01-15 RX ADMIN — PREGABALIN 200 MG: 100 CAPSULE ORAL at 17:05

## 2024-01-15 RX ADMIN — LORAZEPAM 0.5 MG: 0.5 TABLET ORAL at 08:45

## 2024-01-15 RX ADMIN — SENNOSIDES AND DOCUSATE SODIUM 2 TABLET: 8.6; 5 TABLET ORAL at 22:32

## 2024-01-15 RX ADMIN — ONDANSETRON 4 MG: 2 INJECTION INTRAMUSCULAR; INTRAVENOUS at 17:42

## 2024-01-15 RX ADMIN — PREGABALIN 200 MG: 100 CAPSULE ORAL at 08:41

## 2024-01-15 RX ADMIN — TRAZODONE HYDROCHLORIDE 50 MG: 50 TABLET ORAL at 22:32

## 2024-01-15 RX ADMIN — ONDANSETRON 4 MG: 2 INJECTION INTRAMUSCULAR; INTRAVENOUS at 08:45

## 2024-01-15 RX ADMIN — ACETAMINOPHEN 1000 MG: 500 TABLET ORAL at 22:32

## 2024-01-15 RX ADMIN — HYDROMORPHONE HYDROCHLORIDE 0.5 MG: 1 INJECTION, SOLUTION INTRAMUSCULAR; INTRAVENOUS; SUBCUTANEOUS at 12:11

## 2024-01-15 RX ADMIN — LEVOTHYROXINE SODIUM 125 MCG: 125 TABLET ORAL at 05:44

## 2024-01-15 RX ADMIN — OXYCODONE HYDROCHLORIDE 15 MG: 15 TABLET ORAL at 14:00

## 2024-01-15 RX ADMIN — DIPHENHYDRAMINE HYDROCHLORIDE 25 MG: 25 CAPSULE ORAL at 22:32

## 2024-01-15 RX ADMIN — FAMOTIDINE 20 MG: 20 TABLET, FILM COATED ORAL at 08:39

## 2024-01-15 RX ADMIN — FOLIC ACID 1 MG: 1 TABLET ORAL at 08:39

## 2024-01-15 RX ADMIN — Medication 10 ML: at 08:42

## 2024-01-15 RX ADMIN — OXYCODONE HYDROCHLORIDE 10 MG: 10 TABLET ORAL at 05:44

## 2024-01-15 RX ADMIN — OXYCODONE HYDROCHLORIDE 15 MG: 15 TABLET ORAL at 08:40

## 2024-01-15 RX ADMIN — Medication 1 CAPSULE: at 08:39

## 2024-01-15 RX ADMIN — OXYCODONE HYDROCHLORIDE 15 MG: 15 TABLET ORAL at 19:45

## 2024-01-15 RX ADMIN — SERTRALINE HYDROCHLORIDE 100 MG: 100 TABLET ORAL at 22:32

## 2024-01-15 RX ADMIN — PRAMIPEXOLE DIHYDROCHLORIDE 1 MG: 0.25 TABLET ORAL at 08:40
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
## 2024-01-15 NOTE — PROGRESS NOTES
Phoebe Carvajal       LOS: 18 days   Patient Care Team:  Sofya Benjamin MD as PCP - General (Internal Medicine)  Provider, No Known as PCP - Family Medicine    Chief Complaint:  Status post left above-knee amputation     Subjective     Interval History:     Patient seen in bed this morning.  Wound began draining again overnight.  Minimal to no drain output.  Dressings were changed overnight.    Review of Systems:      Gen- No fevers, chills  CV- No chest pain, palpitations  Resp- No cough, dyspnea  GI- No N/V/D, abd pain    Objective     Vital Signs  Vital Signs (last 24 hours)         01/07 0700  01/08 0659 01/08 0700 01/09 0611   Most Recent      Temp (°F) 98 -  98.5    98.1 -  98.8     98.8 (37.1) 01/09 0300    Heart Rate 56 -  84    59 -  72     60 01/09 0015    Resp   18    16 -  18     16 01/09 0300    BP 96/54 -  111/61    112/73 -  122/60     121/68 01/09 0300    SpO2 (%) 89 -  97    90 -  98     97 01/09 0015    Flow (L/min)   2      2     2 01/09 0200              Physical Exam:     Alert, oriented.  No acute distress.  Nonlabored respirations.  Regular rate and rhythm.  Abdomen nondistended.  Left lower extremity: Current dressing c/d/i.  Minimal drain output.  Results Review:     I reviewed the patient's new clinical results.    Medication Review:   Hospital Medications (active)         Dose Frequency Start End    ! Home medications stored in pharmacy, please contact pharmacist prior to patient discharge  Every 12 Hours Scheduled 12/29/2023 --    Route: Does not apply    acetaminophen (TYLENOL) tablet 1,000 mg 1,000 mg Every 8 Hours Scheduled 1/2/2024 --    Admin Instructions: If given for fever, use fever parameter: fever greater than 100.4 °F  Based on patient request - if ordered for moderate or severe pain, provider allows for administration of a medication prescribed for a lower pain scale.    Do not exceed 4 grams of acetaminophen in a 24 hr period. Max dose of 2gm for AST/ALT greater than  120 units/L.    If given for pain, use the following pain scale:   Mild Pain = Pain Score of 1-3, CPOT 1-2  Moderate Pain = Pain Score of 4-6, CPOT 3-4  Severe Pain = Pain Score of 7-10, CPOT 5-8    Route: Oral    bisacodyl (DULCOLAX) EC tablet 5 mg 5 mg Daily PRN 12/29/2023 --    Admin Instructions: Use if no bowel movement after 12 hours.  Swallow whole. Do not crush, split, or chew tablet.    Route: Oral    Linked Group 1: See Hyperspace for full Linked Orders Report.        bisacodyl (DULCOLAX) suppository 10 mg 10 mg Daily PRN 12/29/2023 --    Admin Instructions: Use if no bowel movement after 12 hours.  Hold for diarrhea    Route: Rectal    Linked Group 1: See Hyperspace for full Linked Orders Report.        cyanocobalamin injection 1,000 mcg 1,000 mcg Daily 1/4/2024 1/11/2024    Route: Intramuscular    diphenhydrAMINE (BENADRYL) capsule 25 mg 25 mg Every 6 Hours PRN 12/29/2023 --    Admin Instructions: Caution: Look alike/sound alike drug alert. This med may be ordered in other forms and routes. Before giving verify the last time the drug was given by any route/form.    Route: Oral    famotidine (PEPCID) tablet 20 mg 20 mg 2 Times Daily Before Meals 1/1/2024 --    Route: Oral    folic acid (FOLVITE) tablet 1 mg 1 mg Daily 1/4/2024 --    Route: Oral    lactated ringers infusion 9 mL/hr Continuous PRN 12/31/2023 --    Route: Intravenous    lactobacillus acidophilus (RISAQUAD) capsule 1 capsule 1 capsule Daily 1/3/2024 --    Route: Oral    levothyroxine (SYNTHROID, LEVOTHROID) tablet 125 mcg 125 mcg Daily 12/29/2023 --    Admin Instructions: Take on empty stomach.    Route: Oral    loperamide (IMODIUM) capsule 2 mg 2 mg 4 Times Daily PRN 1/3/2024 --    Admin Instructions: Maximum recommended 16 mg / 24 hours.      Route: Oral    LORazepam (ATIVAN) tablet 0.5 mg 0.5 mg Daily PRN 1/4/2024 --    Admin Instructions: Hold for SBP < 110   Caution: Look alike/sound alike drug alert    Route: Oral    Cosign for  Ordering: Accepted by Jorge Lua MD on 1/7/2024  4:45 PM    melatonin tablet 5 mg 5 mg Nightly PRN 12/29/2023 --    Route: Oral    ondansetron (ZOFRAN) injection 4 mg 4 mg Every 6 Hours PRN 12/29/2023 --    Admin Instructions: If BOTH ondansetron (ZOFRAN) and promethazine (PHENERGAN) are ordered use ondansetron first and THEN promethazine IF ondansetron is ineffective.    Route: Intravenous    oxyCODONE (ROXICODONE) immediate release tablet 15 mg 15 mg Every 6 Hours 1/4/2024 --    Admin Instructions: Based on patient request - if ordered for moderate or severe pain, provider allows for administration of a medication prescribed for a lower pain scale.      If given for pain, use the following pain scale:  Mild Pain = Pain Score of 1-3, CPOT 1-2  Moderate Pain = Pain Score of 4-6, CPOT 3-4  Severe Pain = Pain Score of 7-10, CPOT 5-8    Route: Oral    Cosign for Ordering: Accepted by Jorge Lua MD on 1/7/2024  4:45 PM    oxyCODONE (ROXICODONE) immediate release tablet 5 mg 5 mg Every 6 Hours PRN 1/8/2024 1/15/2024    Admin Instructions: Based on patient request - if ordered for moderate or severe pain, provider allows for administration of a medication prescribed for a lower pain scale.      If given for pain, use the following pain scale:  Mild Pain = Pain Score of 1-3, CPOT 1-2  Moderate Pain = Pain Score of 4-6, CPOT 3-4  Severe Pain = Pain Score of 7-10, CPOT 5-8    Route: Oral    polyethylene glycol (MIRALAX) packet 17 g 17 g Daily PRN 12/29/2023 --    Admin Instructions: Use if no bowel movement after 12 hours. Mix in 6-8 ounces of water.  Use 4-8 ounces of water, tea, or juice for each 17 gram dose.    Route: Oral    Linked Group 1: See Hyperssergio for full Linked Orders Report.        pramipexole (MIRAPEX) tablet 1 mg 1 mg Daily 12/29/2023 --    Route: Oral    pregabalin (LYRICA) capsule 200 mg 200 mg 3 Times Daily 12/29/2023 --    Admin Instructions:     Route: Oral    sennosides-docusate  (PERICOLACE) 8.6-50 MG per tablet 2 tablet 2 tablet 2 Times Daily 12/29/2023 --    Admin Instructions: HOLD MEDICATION IF PATIENT HAS HAD BOWEL MOVEMENT. Start bowel management regimen if patient has not had a bowel movement after 12 hours.    Route: Oral    Linked Group 1: See Russ for full Linked Orders Report.        sertraline (ZOLOFT) tablet 50 mg 50 mg Nightly 12/29/2023 --    Route: Oral    sodium chloride 0.9 % flush 10 mL 10 mL Every 12 Hours Scheduled 12/29/2023 --    Route: Intravenous    sodium chloride 0.9 % flush 10 mL 10 mL As Needed 12/29/2023 --    Route: Intravenous    sodium chloride 0.9 % infusion 40 mL 40 mL As Needed 12/29/2023 --    Admin Instructions: Following administration of an IV intermittent medication, flush line with 40mL NS at 100mL/hr.    Route: Intravenous    traZODone (DESYREL) tablet 50 mg 50 mg Nightly PRN 12/29/2023 --    Admin Instructions: Take with food.  Caution: Look alike/sound alike drug alert    Route: Oral              Assessment & Plan     48-year-old female status post left above-knee amputation, now status post irrigation, debridement, wound vacuum-assisted closure 1/10/2024, secondary wound closure 1/12/2024.      Acquired hypothyroidism    Ulcer of right midfoot with fat layer exposed    Chronic osteomyelitis    Anxiety associated with depression    RLS (restless legs syndrome)    Neuropathy    Obesity, morbid, BMI 50 or higher    S/P AKA (above knee amputation), left    Cultures continue to be NGTD.    I discussed with her management of her wound going forward.  She prefers expectant observation as opposed to VAC to closure.  I am concerned with the amount of drainage she will develop a deep infection.  I removed drain today.  Plan to reach out to PT wound care about the possibility of incional VAC to help manage her persistent drainage.    Adama Witt Jr, MD  01/15/24  07:47 EST

## 2024-01-15 NOTE — PLAN OF CARE
Goal Outcome Evaluation:  Plan of Care Reviewed With: patient        Progress: no change  Outcome Evaluation: Pt now s/p I&D and secondary closure of L AKA incision. Pt with intact incision line however with moderate serosanguineous drainage. PT applied Prevena incisional wound vac dressing and connected tubing to VAC Ulta unit with 1000mL canister to help manage drainage from incision line. Prevena incisional wound vac dressing may remain in place for up to 7 days.

## 2024-01-15 NOTE — PROGRESS NOTES
[facility] Medicine Services  PROGRESS NOTE    Patient Name: Phoebe Carvajal  : 1975  MRN: [id number]    Date of Admission: 2023  Primary Care Physician: Sofya Benjamin MD    Subjective   Subjective     CC:  osteomyelitis    HPI:  We discuss her leg swelling for some time - she would like to try diuresis  Reports only needing dilaudid for wound changes at times  Teary when she discusses her leg as she has been intermittently during stay. But hopeful for future    Objective   Objective     Vital Signs:   Temp:  [98.3 °F (36.8 °C)-98.9 °F (37.2 °C)] 98.7 °F (37.1 °C)  Resp:  [18] 18  BP: ()/(46-71) 100/71     Physical Exam:  Constitutional: awake, alert female sitting up in bed NAD  HENT: NCAT, mucous membranes moist  Respiratory: Clear to auscultation bilaterally, respiratory effort normal   Cardiovascular: RRR, no murmurs, rubs, or gallops. Pitting edema bilateral legs  Gastrointestinal: Soft, nontender, nondistended  Musculoskeletal: Right foot bandage, Left AKA site w wound vac  Psychiatric: Calm and appropriate  Neurologic: Alert and oriented, facial movements symmetric, speech clear  Skin: No rashes    Results Reviewed:  LAB RESULTS:      Lab 24  0624  0624 01/10/24  0420   WBC 3.78 3.28* 4.23   HEMOGLOBIN 8.6* 7.8* 9.2*   HEMATOCRIT 27.2* 25.7* 28.7*   PLATELETS 233 214 270   NEUTROS ABS  --  2.02 3.58   IMMATURE GRANS (ABS)  --  0.01 0.01   LYMPHS ABS  --  0.87 0.47*   MONOS ABS  --  0.26 0.15   EOS ABS  --  0.10 0.01   .5* 108.4* 108.3*         Lab 24  0624  0624 01/10/24  0420   SODIUM 140 142 138   POTASSIUM 4.2 3.9 4.4   CHLORIDE 107 107 104   CO2 26.0 28.0 25.0   ANION GAP 7.0 7.0 9.0   BUN 8 10 5*   CREATININE 0.38* 0.47* 0.42*   EGFR 122.3 116.1 119.3   GLUCOSE 100* 92 139*   CALCIUM 8.4* 8.3* 8.3*   MAGNESIUM  --  1.7 1.6   PHOSPHORUS  --  4.2 4.2                       Lab 24  1132   ABO TYPING A   RH TYPING  Negative   ANTIBODY SCREEN Negative         Brief Urine Lab Results  (Last result in the past 365 days)        Color   Clarity   Blood   Leuk Est   Nitrite   Protein   CREAT   Urine HCG        01/05/24 1351 Yellow   Cloudy   Negative   Small (1+)   Negative   Trace                   Microbiology Results Abnormal       Procedure Component Value - Date/Time    Wound Culture - Surgical Site, Leg, Left [[id number]] Collected: 01/12/24 1318    Lab Status: Final result Specimen: Surgical Site from Leg, Left Updated: 01/15/24 0830     Wound Culture No growth at 3 days     Gram Stain Rare (1+) WBCs per low power field      Rare (1+) Epithelial cells per low power field      Many (4+) Red blood cells      No organisms seen    Anaerobic Culture 10 Day Incubation - Tissue, Leg, Left [[id number]]  (Normal) Collected: 01/12/24 1318    Lab Status: Preliminary result Specimen: Tissue from Leg, Left Updated: 01/15/24 0733     Anaerobic Culture No anaerobes isolated at 3 days    Fungus Culture - Tissue, Leg, Left [[id number]] Collected: 01/09/24 1844    Lab Status: Preliminary result Specimen: Tissue from Leg, Left Updated: 01/14/24 2101     Fungus Culture No fungus isolated at less than 1 week    AFB Culture - Tissue, Leg, Left [[id number]] Collected: 01/09/24 1844    Lab Status: Preliminary result Specimen: Tissue from Leg, Left Updated: 01/14/24 2101     AFB Culture No AFB isolated at less than 1 week     AFB Stain No acid fast bacilli seen on concentrated smear    Fungus Culture - Surgical Site, Leg, Left [[id number]] Collected: 12/31/23 0831    Lab Status: Preliminary result Specimen: Surgical Site from Leg, Left Updated: 01/14/24 1946     Fungus Culture No fungus isolated at 2 weeks    AFB Culture - Surgical Site, Leg, Left [[id number]] Collected: 12/31/23 0831    Lab Status: Preliminary result Specimen: Surgical Site from Leg, Left Updated: 01/14/24 1946     AFB Culture No AFB isolated at 2 weeks     AFB Stain No acid fast bacilli  seen on concentrated smear    Anaerobic Culture - Tissue, Leg, Left [[id number]]  (Normal) Collected: 01/09/24 1844    Lab Status: Final result Specimen: Tissue from Leg, Left Updated: 01/14/24 0939     Anaerobic Culture No anaerobes isolated at 5 days    AFB Culture - Tissue, Leg, Left [[id number]] Collected: 01/12/24 1318    Lab Status: Preliminary result Specimen: Tissue from Leg, Left Updated: 01/13/24 1138     AFB Stain No acid fast bacilli seen on concentrated smear    Fungus Culture - Tissue, Leg, Left [[id number]] Collected: 12/29/23 1139    Lab Status: Preliminary result Specimen: Tissue from Leg, Left Updated: 01/12/24 1330     Fungus Culture No fungus isolated at 2 weeks    AFB Culture - Tissue, Leg, Left [[id number]] Collected: 12/29/23 1139    Lab Status: Preliminary result Specimen: Tissue from Leg, Left Updated: 01/12/24 1330     AFB Culture No AFB isolated at 2 weeks     AFB Stain No acid fast bacilli seen on concentrated smear    Tissue / Bone Culture - Tissue, Leg, Left [[id number]] Collected: 01/09/24 1844    Lab Status: Final result Specimen: Tissue from Leg, Left Updated: 01/12/24 0728     Tissue Culture No growth at 3 days     Gram Stain Rare (1+) WBCs seen      No organisms seen    Urine Culture - Urine, Urine, Clean Catch [[id number]]  (Normal) Collected: 01/06/24 1224    Lab Status: Final result Specimen: Urine, Clean Catch Updated: 01/07/24 1429     Urine Culture No growth    Anaerobic Culture - Surgical Site, Leg, Left [[id number]]  (Normal) Collected: 12/31/23 0833    Lab Status: Final result Specimen: Surgical Site from Leg, Left Updated: 01/06/24 0551     Anaerobic Culture No anaerobes isolated at 5 days    Tissue / Bone Culture - Surgical Site, Leg, Left [[id number]] Collected: 12/31/23 0831    Lab Status: Final result Specimen: Surgical Site from Leg, Left Updated: 01/04/24 0743     Tissue Culture No growth at 3 days     Gram Stain Moderate (3+) WBCs per low power field      No organisms seen     Anaerobic Culture - Tissue, Leg, Left [[id number]]  (Normal) Collected: 12/29/23 1139    Lab Status: Final result Specimen: Tissue from Leg, Left Updated: 01/03/24 1001     Anaerobic Culture No anaerobes isolated at 5 days    Blood Culture - Blood, Arm, Right [[id number]]  (Normal) Collected: 12/28/23 2115    Lab Status: Final result Specimen: Blood from Arm, Right Updated: 01/03/24 0030     Blood Culture No growth at 5 days    Blood Culture - Blood, Hand, Right [[id number]]  (Normal) Collected: 12/28/23 2120    Lab Status: Final result Specimen: Blood from Hand, Right Updated: 01/03/24 0030     Blood Culture No growth at 5 days    Wound Culture - Wound, Leg, Left [[id number]] Collected: 12/29/23 1322    Lab Status: Final result Specimen: Wound from Leg, Left Updated: 01/01/24 0711     Wound Culture No growth at 3 days     Gram Stain No organisms seen    MRSA Screen, PCR (Inpatient) - Swab, Nares [[id number]]  (Normal) Collected: 12/29/23 0203    Lab Status: Final result Specimen: Swab from Nares Updated: 12/29/23 0733     MRSA PCR Negative    Narrative:      The negative predictive value of this diagnostic test is high and should only be used to consider de-escalating anti-MRSA therapy. A positive result may indicate colonization with MRSA and must be correlated clinically.  MRSA Negative    COVID PRE-OP / PRE-PROCEDURE SCREENING ORDER (NO ISOLATION) - Swab, Nasopharynx [[id number]]  (Normal) Collected: 12/29/23 0203    Lab Status: Final result Specimen: Swab from Nasopharynx Updated: 12/29/23 0353    Narrative:      The following orders were created for panel order COVID PRE-OP / PRE-PROCEDURE SCREENING ORDER (NO ISOLATION) - Swab, Nasopharynx.  Procedure                               Abnormality         Status                     ---------                               -----------         ------                     Respiratory Panel PCR w/...[[id number]]  Normal              Final result                 Please view  results for these tests on the individual orders.    Respiratory Panel PCR w/COVID-19(SARS-CoV-2) ARIELLE/VESNA/KARIE/PAD/COR/SÁNCHEZ In-House, NP Swab in UTM/VTM, 2 HR TAT - Swab, Nasopharynx [[id number]]  (Normal) Collected: 12/29/23 0203    Lab Status: Final result Specimen: Swab from Nasopharynx Updated: 12/29/23 0353     ADENOVIRUS, PCR Not Detected     Coronavirus 229E Not Detected     Coronavirus HKU1 Not Detected     Coronavirus NL63 Not Detected     Coronavirus OC43 Not Detected     COVID19 Not Detected     Human Metapneumovirus Not Detected     Human Rhinovirus/Enterovirus Not Detected     Influenza A PCR Not Detected     Influenza B PCR Not Detected     Parainfluenza Virus 1 Not Detected     Parainfluenza Virus 2 Not Detected     Parainfluenza Virus 3 Not Detected     Parainfluenza Virus 4 Not Detected     RSV, PCR Not Detected     Bordetella pertussis pcr Not Detected     Bordetella parapertussis PCR Not Detected     Chlamydophila pneumoniae PCR Not Detected     Mycoplasma pneumo by PCR Not Detected    Narrative:      In the setting of a positive respiratory panel with a viral infection PLUS a negative procalcitonin without other underlying concern for bacterial infection, consider observing off antibiotics or discontinuation of antibiotics and continue supportive care. If the respiratory panel is positive for atypical bacterial infection (Bordetella pertussis, Chlamydophila pneumoniae, or Mycoplasma pneumoniae), consider antibiotic de-escalation to target atypical bacterial infection.            No radiology results from the last 24 hrs        Current medications:  Scheduled Meds:Pharmacy Consult, , Does not apply, Q12H  acetaminophen, 1,000 mg, Oral, Q8H  cefTRIAXone, 2,000 mg, Intravenous, Q24H  folic acid, 1 mg, Oral, Daily  [START ON 1/16/2024] furosemide, 40 mg, Oral, Daily  lactobacillus acidophilus, 1 capsule, Oral, Daily  levothyroxine, 125 mcg, Oral, Daily  oxyCODONE, 15 mg, Oral, Q6H  pramipexole, 1 mg,  Oral, Daily  pregabalin, 200 mg, Oral, TID  senna-docusate sodium, 2 tablet, Oral, BID  sertraline, 100 mg, Oral, Nightly  sodium chloride, 10 mL, Intravenous, Q12H      Continuous Infusions:lactated ringers, 9 mL/hr      PRN Meds:.  aluminum-magnesium hydroxide-simethicone    senna-docusate sodium **AND** polyethylene glycol **AND** bisacodyl **AND** bisacodyl    diphenhydrAMINE    HYDROmorphone    lactated ringers    loperamide    LORazepam    melatonin    naloxone    ondansetron    oxyCODONE    sodium chloride    sodium chloride    traZODone    Assessment & Plan   Assessment & Plan     Active Hospital Problems    Diagnosis  POA    Anxiety associated with depression [F41.8]  Yes    RLS (restless legs syndrome) [G25.81]  Yes    Neuropathy [G62.9]  Yes    Obesity, morbid, BMI 50 or higher [E66.01]  Yes    S/P AKA (above knee amputation), left [Z89.612]  Not Applicable    Chronic osteomyelitis [M86.60]  Yes    Acquired hypothyroidism [E03.9]  Yes    Ulcer of right midfoot with fat layer exposed [L97.412]  Yes      Resolved Hospital Problems    Diagnosis Date Resolved POA    **Lower extremity cellulitis [L03.119] 01/08/2024 Yes    Ulcer of left heel, with fat layer exposed [L97.422] 01/08/2024 Yes        Brief Hospital Course to date:  Phoebe Carvajal is a 48 y.o. female w recurrent chronic LE osteomyelitis c/b prosthetic joint infection who presented 12/28 with foul odor from chronic wounds and OP planning for AKA.   S/p LLE AKA 12/29, additional debridement 12/31, 1/9, 1/12. 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 Watching daily now  -PT wound care  -IV rocephin, no growth cultures. Dr Tayo Arnold following     Complex pain, chronic opioid use  -prior on oxycodone 10 mg up to 6 tabs/day  -here oxycodone 15 mg q6 hrs (same TDD) + breakthrough dosing increased 1/13. IV dilaudid only for wound changes for  now     Hematuria   -urine culture negative, evaluated by urology w recs for 6-8 week OP f/u ([facility])    CHFpEF (data deficit) - on chronic lasix w significant leg swelling, unclear if CHF v venostasis. Wraps per wound care, IV lasix 1, then watch thereafter. Likely factor in above poor wound healing        Right foot ulcer, improving  --PT wound care following  --Offload right foot for now, but improving significantly     Macrocytic acute on chronic anemia: post-op + B12 + folate deficiency  -B12 IM replacement, weekly at discharge  -folate replacement    Borderline blood pressure - chronic SBP , OK for meds above  Neuropathy - continue Lyrica   RLS- continue mirapex  BMI 56  Anxiety - increased dose zoloft per patient request      Expected Discharge Location and Transportation: [facility], [facility] following  Expected Discharge 1/16 (Discharge date is tentative pending patient's medical condition and is subject to change)  Expected Discharge Date: 1/16/2024; Expected Discharge Time:  3:00 PM     DVT prophylaxis:  Mechanical DVT prophylaxis orders are present.     AM-PAC 6 Clicks Score (PT): 12 (01/15/24 0802)    CODE STATUS:   Code Status and Medical Interventions:   Ordered at: 12/29/23 0121     Code Status (Patient has no pulse and is not breathing):    CPR (Attempt to Resuscitate)     Medical Interventions (Patient has pulse or is breathing):    Full Support       Marcia Vaughn MD  01/15/24

## 2024-01-15 NOTE — CASE MANAGEMENT/SOCIAL WORK
Continued Stay Note  MIRNA Mancia     Patient Name: Phoebe Carvajal  MRN: [id number]  Today's Date: 1/15/2024    Admit Date: 12/28/2023    Plan: update   Discharge Plan       Row Name 01/15/24 1243       Plan    Plan update    Patient/Family in Agreement with Plan yes    Plan Comments Per MDR, patient not ready to discharge at this time, ortho continuing to monitor surgical site.  Spoke with patient at bedside regarding discharge plan who indicates still has some pain, no immediate discharge needs verbalized.  CM following.  Patient plan is to discharge to [facility] acute rehab when ready, will need new precert.    Final Discharge Disposition Code 62 - inpatient rehab facility                   Discharge Codes    No documentation.                 Expected Discharge Date and Time       Expected Discharge Date Expected Discharge Time    Jan 16, 2024               Jerica Paredes RN
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
## 2024-01-16 LAB
FUNGUS WND CULT: NORMAL
MYCOBACTERIUM SPEC CULT: NORMAL
NIGHT BLUE STAIN TISS: NORMAL

## 2024-01-16 PROCEDURE — 25010000002 ONDANSETRON PER 1 MG: Performed by: ORTHOPAEDIC SURGERY

## 2024-01-16 PROCEDURE — 97530 THERAPEUTIC ACTIVITIES: CPT

## 2024-01-16 PROCEDURE — 63710000001 DIPHENHYDRAMINE PER 50 MG: Performed by: ORTHOPAEDIC SURGERY

## 2024-01-16 PROCEDURE — 97605 NEG PRS WND THER DME<=50SQCM: CPT

## 2024-01-16 PROCEDURE — 97535 SELF CARE MNGMENT TRAINING: CPT

## 2024-01-16 PROCEDURE — 99232 SBSQ HOSP IP/OBS MODERATE 35: CPT | Performed by: INTERNAL MEDICINE

## 2024-01-16 RX ADMIN — LORAZEPAM 0.5 MG: 0.5 TABLET ORAL at 21:33

## 2024-01-16 RX ADMIN — OXYCODONE HYDROCHLORIDE 15 MG: 15 TABLET ORAL at 14:17

## 2024-01-16 RX ADMIN — DIPHENHYDRAMINE HYDROCHLORIDE 25 MG: 25 CAPSULE ORAL at 04:04

## 2024-01-16 RX ADMIN — Medication 1 CAPSULE: at 08:10

## 2024-01-16 RX ADMIN — PREGABALIN 200 MG: 100 CAPSULE ORAL at 21:31

## 2024-01-16 RX ADMIN — SERTRALINE HYDROCHLORIDE 100 MG: 100 TABLET ORAL at 21:31

## 2024-01-16 RX ADMIN — DIPHENHYDRAMINE HYDROCHLORIDE 25 MG: 25 CAPSULE ORAL at 14:19

## 2024-01-16 RX ADMIN — ACETAMINOPHEN 1000 MG: 500 TABLET ORAL at 14:16

## 2024-01-16 RX ADMIN — TRAZODONE HYDROCHLORIDE 50 MG: 50 TABLET ORAL at 21:32

## 2024-01-16 RX ADMIN — PREGABALIN 200 MG: 100 CAPSULE ORAL at 08:09

## 2024-01-16 RX ADMIN — ACETAMINOPHEN 1000 MG: 500 TABLET ORAL at 05:59

## 2024-01-16 RX ADMIN — LOPERAMIDE HYDROCHLORIDE 2 MG: 2 CAPSULE ORAL at 14:20

## 2024-01-16 RX ADMIN — OXYCODONE HYDROCHLORIDE 15 MG: 15 TABLET ORAL at 02:38

## 2024-01-16 RX ADMIN — OXYCODONE HYDROCHLORIDE 10 MG: 10 TABLET ORAL at 11:51

## 2024-01-16 RX ADMIN — Medication 10 ML: at 08:12

## 2024-01-16 RX ADMIN — OXYCODONE HYDROCHLORIDE 10 MG: 10 TABLET ORAL at 06:02

## 2024-01-16 RX ADMIN — OXYCODONE HYDROCHLORIDE 15 MG: 15 TABLET ORAL at 08:08

## 2024-01-16 RX ADMIN — LEVOTHYROXINE SODIUM 125 MCG: 125 TABLET ORAL at 05:59

## 2024-01-16 RX ADMIN — FUROSEMIDE 40 MG: 40 TABLET ORAL at 08:09

## 2024-01-16 RX ADMIN — Medication 5 MG: at 21:31

## 2024-01-16 RX ADMIN — ACETAMINOPHEN 1000 MG: 500 TABLET ORAL at 21:31

## 2024-01-16 RX ADMIN — PREGABALIN 200 MG: 100 CAPSULE ORAL at 16:31

## 2024-01-16 RX ADMIN — OXYCODONE HYDROCHLORIDE 15 MG: 15 TABLET ORAL at 21:32

## 2024-01-16 RX ADMIN — ONDANSETRON 4 MG: 2 INJECTION INTRAMUSCULAR; INTRAVENOUS at 04:04

## 2024-01-16 RX ADMIN — FOLIC ACID 1 MG: 1 TABLET ORAL at 08:10

## 2024-01-16 RX ADMIN — PRAMIPEXOLE DIHYDROCHLORIDE 1 MG: 0.25 TABLET ORAL at 08:10

## 2024-01-16 RX ADMIN — OXYCODONE HYDROCHLORIDE 10 MG: 10 TABLET ORAL at 18:01
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
## 2024-01-16 NOTE — PROGRESS NOTES
[facility] Medicine Services  PROGRESS NOTE    Patient Name: Phoebe Carvajal  : 1975  MRN: [id number]    Date of Admission: 2023  Primary Care Physician: Sofya Benjamin MD    Subjective   Subjective     CC:  osteomyelitis    HPI:  Leg swelling much improved which is helping her significantly to be comfortable  This is very good news for her  Wound vac in place  Hopeful    Objective   Objective     Vital Signs:   Temp:  [98.4 °F (36.9 °C)-98.5 °F (36.9 °C)] 98.4 °F (36.9 °C)  Heart Rate:  [60-77] 75  Resp:  [18-20] 18  BP: ()/(44-88) 97/44     Physical Exam:  Constitutional: awake, alert female sitting up in bed NAD  HENT: NCAT, mucous membranes moist  Respiratory: Clear to auscultation bilaterally, respiratory effort normal . Edema significantly improved from prior exam, not pitting  Cardiovascular: RRR, no murmurs, rubs, or gallops. Pitting edema bilateral legs  Gastrointestinal: Soft, nontender, nondistended  Musculoskeletal: Right foot bandage, Left AKA site w wound vac in place w minimal drainage  Psychiatric: Calm and appropriate  Neurologic: Alert and oriented, facial movements symmetric, speech clear  Skin: No rashes    Results Reviewed:  LAB RESULTS:      Lab 24  0624  0624 01/10/24  0420   WBC 3.78 3.28* 4.23   HEMOGLOBIN 8.6* 7.8* 9.2*   HEMATOCRIT 27.2* 25.7* 28.7*   PLATELETS 233 214 270   NEUTROS ABS  --  2.02 3.58   IMMATURE GRANS (ABS)  --  0.01 0.01   LYMPHS ABS  --  0.87 0.47*   MONOS ABS  --  0.26 0.15   EOS ABS  --  0.10 0.01   .5* 108.4* 108.3*         Lab 24  0624  0624 01/10/24  0420   SODIUM 140 142 138   POTASSIUM 4.2 3.9 4.4   CHLORIDE 107 107 104   CO2 26.0 28.0 25.0   ANION GAP 7.0 7.0 9.0   BUN 8 10 5*   CREATININE 0.38* 0.47* 0.42*   EGFR 122.3 116.1 119.3   GLUCOSE 100* 92 139*   CALCIUM 8.4* 8.3* 8.3*   MAGNESIUM  --  1.7 1.6   PHOSPHORUS  --  4.2 4.2                       Lab 24  1132   ABO  TYPING A   RH TYPING Negative   ANTIBODY SCREEN Negative         Brief Urine Lab Results  (Last result in the past 365 days)        Color   Clarity   Blood   Leuk Est   Nitrite   Protein   CREAT   Urine HCG        01/05/24 1351 Yellow   Cloudy   Negative   Small (1+)   Negative   Trace                   Microbiology Results Abnormal       Procedure Component Value - Date/Time    Wound Culture - Surgical Site, Leg, Left [[id number]] Collected: 01/12/24 1318    Lab Status: Final result Specimen: Surgical Site from Leg, Left Updated: 01/15/24 0830     Wound Culture No growth at 3 days     Gram Stain Rare (1+) WBCs per low power field      Rare (1+) Epithelial cells per low power field      Many (4+) Red blood cells      No organisms seen    Anaerobic Culture 10 Day Incubation - Tissue, Leg, Left [[id number]]  (Normal) Collected: 01/12/24 1318    Lab Status: Preliminary result Specimen: Tissue from Leg, Left Updated: 01/15/24 0733     Anaerobic Culture No anaerobes isolated at 3 days    Fungus Culture - Tissue, Leg, Left [[id number]] Collected: 01/09/24 1844    Lab Status: Preliminary result Specimen: Tissue from Leg, Left Updated: 01/14/24 2101     Fungus Culture No fungus isolated at less than 1 week    AFB Culture - Tissue, Leg, Left [[id number]] Collected: 01/09/24 1844    Lab Status: Preliminary result Specimen: Tissue from Leg, Left Updated: 01/14/24 2101     AFB Culture No AFB isolated at less than 1 week     AFB Stain No acid fast bacilli seen on concentrated smear    Fungus Culture - Surgical Site, Leg, Left [[id number]] Collected: 12/31/23 0831    Lab Status: Preliminary result Specimen: Surgical Site from Leg, Left Updated: 01/14/24 1946     Fungus Culture No fungus isolated at 2 weeks    AFB Culture - Surgical Site, Leg, Left [[id number]] Collected: 12/31/23 0831    Lab Status: Preliminary result Specimen: Surgical Site from Leg, Left Updated: 01/14/24 1946     AFB Culture No AFB isolated at 2 weeks     AFB Stain  No acid fast bacilli seen on concentrated smear    Anaerobic Culture - Tissue, Leg, Left [[id number]]  (Normal) Collected: 01/09/24 1844    Lab Status: Final result Specimen: Tissue from Leg, Left Updated: 01/14/24 0939     Anaerobic Culture No anaerobes isolated at 5 days    AFB Culture - Tissue, Leg, Left [[id number]] Collected: 01/12/24 1318    Lab Status: Preliminary result Specimen: Tissue from Leg, Left Updated: 01/13/24 1138     AFB Stain No acid fast bacilli seen on concentrated smear    Fungus Culture - Tissue, Leg, Left [[id number]] Collected: 12/29/23 1139    Lab Status: Preliminary result Specimen: Tissue from Leg, Left Updated: 01/12/24 1330     Fungus Culture No fungus isolated at 2 weeks    AFB Culture - Tissue, Leg, Left [[id number]] Collected: 12/29/23 1139    Lab Status: Preliminary result Specimen: Tissue from Leg, Left Updated: 01/12/24 1330     AFB Culture No AFB isolated at 2 weeks     AFB Stain No acid fast bacilli seen on concentrated smear    Tissue / Bone Culture - Tissue, Leg, Left [[id number]] Collected: 01/09/24 1844    Lab Status: Final result Specimen: Tissue from Leg, Left Updated: 01/12/24 0728     Tissue Culture No growth at 3 days     Gram Stain Rare (1+) WBCs seen      No organisms seen    Urine Culture - Urine, Urine, Clean Catch [[id number]]  (Normal) Collected: 01/06/24 1224    Lab Status: Final result Specimen: Urine, Clean Catch Updated: 01/07/24 1429     Urine Culture No growth    Anaerobic Culture - Surgical Site, Leg, Left [[id number]]  (Normal) Collected: 12/31/23 0833    Lab Status: Final result Specimen: Surgical Site from Leg, Left Updated: 01/06/24 0551     Anaerobic Culture No anaerobes isolated at 5 days    Tissue / Bone Culture - Surgical Site, Leg, Left [[id number]] Collected: 12/31/23 0831    Lab Status: Final result Specimen: Surgical Site from Leg, Left Updated: 01/04/24 0743     Tissue Culture No growth at 3 days     Gram Stain Moderate (3+) WBCs per low power field       No organisms seen    Anaerobic Culture - Tissue, Leg, Left [[id number]]  (Normal) Collected: 12/29/23 1139    Lab Status: Final result Specimen: Tissue from Leg, Left Updated: 01/03/24 1001     Anaerobic Culture No anaerobes isolated at 5 days    Blood Culture - Blood, Arm, Right [[id number]]  (Normal) Collected: 12/28/23 2115    Lab Status: Final result Specimen: Blood from Arm, Right Updated: 01/03/24 0030     Blood Culture No growth at 5 days    Blood Culture - Blood, Hand, Right [[id number]]  (Normal) Collected: 12/28/23 2120    Lab Status: Final result Specimen: Blood from Hand, Right Updated: 01/03/24 0030     Blood Culture No growth at 5 days    Wound Culture - Wound, Leg, Left [[id number]] Collected: 12/29/23 1322    Lab Status: Final result Specimen: Wound from Leg, Left Updated: 01/01/24 0711     Wound Culture No growth at 3 days     Gram Stain No organisms seen    MRSA Screen, PCR (Inpatient) - Swab, Nares [[id number]]  (Normal) Collected: 12/29/23 0203    Lab Status: Final result Specimen: Swab from Nares Updated: 12/29/23 0733     MRSA PCR Negative    Narrative:      The negative predictive value of this diagnostic test is high and should only be used to consider de-escalating anti-MRSA therapy. A positive result may indicate colonization with MRSA and must be correlated clinically.  MRSA Negative    COVID PRE-OP / PRE-PROCEDURE SCREENING ORDER (NO ISOLATION) - Swab, Nasopharynx [[id number]]  (Normal) Collected: 12/29/23 0203    Lab Status: Final result Specimen: Swab from Nasopharynx Updated: 12/29/23 0353    Narrative:      The following orders were created for panel order COVID PRE-OP / PRE-PROCEDURE SCREENING ORDER (NO ISOLATION) - Swab, Nasopharynx.  Procedure                               Abnormality         Status                     ---------                               -----------         ------                     Respiratory Panel PCR w/...[[id number]]  Normal              Final result                  Please view results for these tests on the individual orders.    Respiratory Panel PCR w/COVID-19(SARS-CoV-2) ARIELLE/VESNA/KARIE/PAD/COR/SÁNCHEZ In-House, NP Swab in UTM/VTM, 2 HR TAT - Swab, Nasopharynx [[id number]]  (Normal) Collected: 12/29/23 0203    Lab Status: Final result Specimen: Swab from Nasopharynx Updated: 12/29/23 0353     ADENOVIRUS, PCR Not Detected     Coronavirus 229E Not Detected     Coronavirus HKU1 Not Detected     Coronavirus NL63 Not Detected     Coronavirus OC43 Not Detected     COVID19 Not Detected     Human Metapneumovirus Not Detected     Human Rhinovirus/Enterovirus Not Detected     Influenza A PCR Not Detected     Influenza B PCR Not Detected     Parainfluenza Virus 1 Not Detected     Parainfluenza Virus 2 Not Detected     Parainfluenza Virus 3 Not Detected     Parainfluenza Virus 4 Not Detected     RSV, PCR Not Detected     Bordetella pertussis pcr Not Detected     Bordetella parapertussis PCR Not Detected     Chlamydophila pneumoniae PCR Not Detected     Mycoplasma pneumo by PCR Not Detected    Narrative:      In the setting of a positive respiratory panel with a viral infection PLUS a negative procalcitonin without other underlying concern for bacterial infection, consider observing off antibiotics or discontinuation of antibiotics and continue supportive care. If the respiratory panel is positive for atypical bacterial infection (Bordetella pertussis, Chlamydophila pneumoniae, or Mycoplasma pneumoniae), consider antibiotic de-escalation to target atypical bacterial infection.            No radiology results from the last 24 hrs        Current medications:  Scheduled Meds:Pharmacy Consult, , Does not apply, Q12H  acetaminophen, 1,000 mg, Oral, Q8H  folic acid, 1 mg, Oral, Daily  furosemide, 40 mg, Oral, Daily  lactobacillus acidophilus, 1 capsule, Oral, Daily  levothyroxine, 125 mcg, Oral, Daily  oxyCODONE, 15 mg, Oral, Q6H  pramipexole, 1 mg, Oral, Daily  pregabalin, 200 mg, Oral,  TID  senna-docusate sodium, 2 tablet, Oral, BID  sertraline, 100 mg, Oral, Nightly  sodium chloride, 10 mL, Intravenous, Q12H      Continuous Infusions:lactated ringers, 9 mL/hr      PRN Meds:.  aluminum-magnesium hydroxide-simethicone    senna-docusate sodium **AND** polyethylene glycol **AND** bisacodyl **AND** bisacodyl    diphenhydrAMINE    HYDROmorphone    lactated ringers    loperamide    LORazepam    melatonin    naloxone    ondansetron    oxyCODONE    sodium chloride    sodium chloride    traZODone    Assessment & Plan   Assessment & Plan     Active Hospital Problems    Diagnosis  POA    Anxiety associated with depression [F41.8]  Yes    RLS (restless legs syndrome) [G25.81]  Yes    Neuropathy [G62.9]  Yes    Obesity, morbid, BMI 50 or higher [E66.01]  Yes    S/P AKA (above knee amputation), left [Z89.612]  Not Applicable    Chronic osteomyelitis [M86.60]  Yes    Acquired hypothyroidism [E03.9]  Yes    Ulcer of right midfoot with fat layer exposed [L97.412]  Yes      Resolved Hospital Problems    Diagnosis Date Resolved POA    **Lower extremity cellulitis [L03.119] 01/08/2024 Yes    Ulcer of left heel, with fat layer exposed [L97.422] 01/08/2024 Yes        Brief Hospital Course to date:  Phoebe Carvajal is a 48 y.o. female w recurrent chronic LE osteomyelitis c/b prosthetic joint infection who presented 12/28 with foul odor from chronic wounds and OP planning for AKA.   S/p LLE AKA 12/29, additional debridement 12/31, 1/9, 1/12. 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 Wound vac at discharge as current plan   -PT wound care  -IV rocephin, no growth cultures, unclear duration. Dr Tayo Arnold following     Complex pain, chronic opioid use  -prior on oxycodone 10 mg up to 6 tabs/day  -here oxycodone 15 mg q6 hrs (same TDD) + breakthrough dosing increased 1/13. IV dilaudid only for wound changes for  now     Hematuria   -urine culture negative, evaluated by urology w recs for 6-8 week OP f/u ([facility])    CHFpEF (data deficit) - on chronic lasix w significant leg swelling, unclear if CHF v venostasis. Wraps per wound care, IV lasix x 1 1/15 w significant improvement, now PO lasix only    Right foot ulcer, improving  --PT wound care following  --Offload right foot for now, but improving significantly     Macrocytic acute on chronic anemia: post-op + B12 + folate deficiency  -B12 IM replacement, weekly at discharge  -folate replacement    Borderline blood pressure - chronic SBP , OK for meds above  Neuropathy - continue Lyrica   RLS- continue mirapex  BMI 56  Anxiety - increased dose zoloft per patient request      Expected Discharge Location and Transportation: [facility], [facility] following  Expected Discharge 1/19 (Discharge date is tentative pending patient's medical condition and is subject to change)  Expected Discharge Date: 1/19/2024; Expected Discharge Time:  3:00 PM     DVT prophylaxis:  Mechanical DVT prophylaxis orders are present.     AM-PAC 6 Clicks Score (PT): 10 (01/16/24 1300)    CODE STATUS:   Code Status and Medical Interventions:   Ordered at: 12/29/23 0121     Code Status (Patient has no pulse and is not breathing):    CPR (Attempt to Resuscitate)     Medical Interventions (Patient has pulse or is breathing):    Full Support       Marcia Vaughn MD  01/16/24

## 2024-01-16 NOTE — PLAN OF CARE
Goal Outcome Evaluation:  Plan of Care Reviewed With: patient        Progress: improving  Outcome Evaluation: Pt with good effort and significant improvements this session noted by improved level of assist with bed mobility and progressing to standing, along with standing balance activities.  Rolling in bed with CGA and use of trapeze bar and bed rails.  STS x 2 reps with modAx2 and BUE support on parallel bars.  Standing balance with CGAx2/minAx2 and BUE support on parallel bars.  Con to present below baseline with decreased balance, weakness, decreased functional endurance, and pain limiting indep with mobility and ability to safely navigate home environment.  Will con to benefit from skilled IP PT to improve deficits and promote return to PLOF.  Rec IPR upon d/c for best fxl outcome.      Anticipated Discharge Disposition (PT): inpatient rehabilitation facility

## 2024-01-16 NOTE — PROGRESS NOTES
Phoebe Carvajal       LOS: 19 days   Patient Care Team:  Sofya Benjamin MD as PCP - General (Internal Medicine)  Provider, No Known as PCP - Family Medicine    Chief Complaint:  Status post left above-knee amputation     Subjective     Interval History:     Incisional wound VAC placed yesterday.    Review of Systems:      Gen- No fevers, chills  CV- No chest pain, palpitations  Resp- No cough, dyspnea  GI- No N/V/D, abd pain    Objective     Vital Signs  Vital Signs (last 24 hours)         01/07 0700  01/08 0659 01/08 0700 01/09 0611   Most Recent      Temp (°F) 98 -  98.5    98.1 -  98.8     98.8 (37.1) 01/09 0300    Heart Rate 56 -  84    59 -  72     60 01/09 0015    Resp   18    16 -  18     16 01/09 0300    BP 96/54 -  111/61    112/73 -  122/60     121/68 01/09 0300    SpO2 (%) 89 -  97    90 -  98     97 01/09 0015    Flow (L/min)   2      2     2 01/09 0200              Physical Exam:     Alert, oriented.  No acute distress.  Nonlabored respirations.  Regular rate and rhythm.  Abdomen nondistended.  Left lower extremity: Current dressing c/d/i.  Incisional wound VAC in place with minimal output.  Results Review:     I reviewed the patient's new clinical results.    Medication Review:   Hospital Medications (active)         Dose Frequency Start End    ! Home medications stored in pharmacy, please contact pharmacist prior to patient discharge  Every 12 Hours Scheduled 12/29/2023 --    Route: Does not apply    acetaminophen (TYLENOL) tablet 1,000 mg 1,000 mg Every 8 Hours Scheduled 1/2/2024 --    Admin Instructions: If given for fever, use fever parameter: fever greater than 100.4 °F  Based on patient request - if ordered for moderate or severe pain, provider allows for administration of a medication prescribed for a lower pain scale.    Do not exceed 4 grams of acetaminophen in a 24 hr period. Max dose of 2gm for AST/ALT greater than 120 units/L.    If given for pain, use the following pain scale:   Mild  Pain = Pain Score of 1-3, CPOT 1-2  Moderate Pain = Pain Score of 4-6, CPOT 3-4  Severe Pain = Pain Score of 7-10, CPOT 5-8    Route: Oral    bisacodyl (DULCOLAX) EC tablet 5 mg 5 mg Daily PRN 12/29/2023 --    Admin Instructions: Use if no bowel movement after 12 hours.  Swallow whole. Do not crush, split, or chew tablet.    Route: Oral    Linked Group 1: See Hyperspace for full Linked Orders Report.        bisacodyl (DULCOLAX) suppository 10 mg 10 mg Daily PRN 12/29/2023 --    Admin Instructions: Use if no bowel movement after 12 hours.  Hold for diarrhea    Route: Rectal    Linked Group 1: See Hyperspace for full Linked Orders Report.        cyanocobalamin injection 1,000 mcg 1,000 mcg Daily 1/4/2024 1/11/2024    Route: Intramuscular    diphenhydrAMINE (BENADRYL) capsule 25 mg 25 mg Every 6 Hours PRN 12/29/2023 --    Admin Instructions: Caution: Look alike/sound alike drug alert. This med may be ordered in other forms and routes. Before giving verify the last time the drug was given by any route/form.    Route: Oral    famotidine (PEPCID) tablet 20 mg 20 mg 2 Times Daily Before Meals 1/1/2024 --    Route: Oral    folic acid (FOLVITE) tablet 1 mg 1 mg Daily 1/4/2024 --    Route: Oral    lactated ringers infusion 9 mL/hr Continuous PRN 12/31/2023 --    Route: Intravenous    lactobacillus acidophilus (RISAQUAD) capsule 1 capsule 1 capsule Daily 1/3/2024 --    Route: Oral    levothyroxine (SYNTHROID, LEVOTHROID) tablet 125 mcg 125 mcg Daily 12/29/2023 --    Admin Instructions: Take on empty stomach.    Route: Oral    loperamide (IMODIUM) capsule 2 mg 2 mg 4 Times Daily PRN 1/3/2024 --    Admin Instructions: Maximum recommended 16 mg / 24 hours.      Route: Oral    LORazepam (ATIVAN) tablet 0.5 mg 0.5 mg Daily PRN 1/4/2024 --    Admin Instructions: Hold for SBP < 110   Caution: Look alike/sound alike drug alert    Route: Oral    Cosign for Ordering: Accepted by Jorge Lua MD on 1/7/2024  4:45 PM    melatonin  tablet 5 mg 5 mg Nightly PRN 12/29/2023 --    Route: Oral    ondansetron (ZOFRAN) injection 4 mg 4 mg Every 6 Hours PRN 12/29/2023 --    Admin Instructions: If BOTH ondansetron (ZOFRAN) and promethazine (PHENERGAN) are ordered use ondansetron first and THEN promethazine IF ondansetron is ineffective.    Route: Intravenous    oxyCODONE (ROXICODONE) immediate release tablet 15 mg 15 mg Every 6 Hours 1/4/2024 --    Admin Instructions: Based on patient request - if ordered for moderate or severe pain, provider allows for administration of a medication prescribed for a lower pain scale.      If given for pain, use the following pain scale:  Mild Pain = Pain Score of 1-3, CPOT 1-2  Moderate Pain = Pain Score of 4-6, CPOT 3-4  Severe Pain = Pain Score of 7-10, CPOT 5-8    Route: Oral    Cosign for Ordering: Accepted by Jorge Lua MD on 1/7/2024  4:45 PM    oxyCODONE (ROXICODONE) immediate release tablet 5 mg 5 mg Every 6 Hours PRN 1/8/2024 1/15/2024    Admin Instructions: Based on patient request - if ordered for moderate or severe pain, provider allows for administration of a medication prescribed for a lower pain scale.      If given for pain, use the following pain scale:  Mild Pain = Pain Score of 1-3, CPOT 1-2  Moderate Pain = Pain Score of 4-6, CPOT 3-4  Severe Pain = Pain Score of 7-10, CPOT 5-8    Route: Oral    polyethylene glycol (MIRALAX) packet 17 g 17 g Daily PRN 12/29/2023 --    Admin Instructions: Use if no bowel movement after 12 hours. Mix in 6-8 ounces of water.  Use 4-8 ounces of water, tea, or juice for each 17 gram dose.    Route: Oral    Linked Group 1: See Hyperssergio for full Linked Orders Report.        pramipexole (MIRAPEX) tablet 1 mg 1 mg Daily 12/29/2023 --    Route: Oral    pregabalin (LYRICA) capsule 200 mg 200 mg 3 Times Daily 12/29/2023 --    Admin Instructions:     Route: Oral    sennosides-docusate (PERICOLACE) 8.6-50 MG per tablet 2 tablet 2 tablet 2 Times Daily 12/29/2023 --     Admin Instructions: HOLD MEDICATION IF PATIENT HAS HAD BOWEL MOVEMENT. Start bowel management regimen if patient has not had a bowel movement after 12 hours.    Route: Oral    Linked Group 1: See Russ for full Linked Orders Report.        sertraline (ZOLOFT) tablet 50 mg 50 mg Nightly 12/29/2023 --    Route: Oral    sodium chloride 0.9 % flush 10 mL 10 mL Every 12 Hours Scheduled 12/29/2023 --    Route: Intravenous    sodium chloride 0.9 % flush 10 mL 10 mL As Needed 12/29/2023 --    Route: Intravenous    sodium chloride 0.9 % infusion 40 mL 40 mL As Needed 12/29/2023 --    Admin Instructions: Following administration of an IV intermittent medication, flush line with 40mL NS at 100mL/hr.    Route: Intravenous    traZODone (DESYREL) tablet 50 mg 50 mg Nightly PRN 12/29/2023 --    Admin Instructions: Take with food.  Caution: Look alike/sound alike drug alert    Route: Oral              Assessment & Plan     48-year-old female status post left above-knee amputation, now status post irrigation, debridement, wound vacuum-assisted closure 1/10/2024, secondary wound closure 1/12/2024.      Acquired hypothyroidism    Ulcer of right midfoot with fat layer exposed    Chronic osteomyelitis    Anxiety associated with depression    RLS (restless legs syndrome)    Neuropathy    Obesity, morbid, BMI 50 or higher    S/P AKA (above knee amputation), left    Cultures continue to be NGTD.    Continue incisional wound VAC.  Agree with discontinuation of antibiotics given negative cultures.    Continue PT.  I think she would be okay for discharge to rehab if they can accommodate incisional wound VAC.    Adama Witt Jr, MD  01/16/24  07:20 EST

## 2024-01-16 NOTE — PLAN OF CARE
Goal Outcome Evaluation:  Plan of Care Reviewed With: patient        Progress: improving  Outcome Evaluation: Pt improved to Mod A x 2 for STS x 2 reps and Min A for static standing balance using parallel bars. Pt is very motivated to regain independence and participate in therapy session. Cont POC.      Anticipated Discharge Disposition (OT): inpatient rehabilitation facility
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
## 2024-01-17 PROCEDURE — 25010000002 ONDANSETRON PER 1 MG: Performed by: ORTHOPAEDIC SURGERY

## 2024-01-17 PROCEDURE — 97605 NEG PRS WND THER DME<=50SQCM: CPT

## 2024-01-17 PROCEDURE — 29581 APPL MULTLAYER CMPRN SYS LEG: CPT

## 2024-01-17 PROCEDURE — 99232 SBSQ HOSP IP/OBS MODERATE 35: CPT | Performed by: STUDENT IN AN ORGANIZED HEALTH CARE EDUCATION/TRAINING PROGRAM

## 2024-01-17 RX ADMIN — PRAMIPEXOLE DIHYDROCHLORIDE 1 MG: 0.25 TABLET ORAL at 09:07

## 2024-01-17 RX ADMIN — LORAZEPAM 0.5 MG: 0.5 TABLET ORAL at 09:16

## 2024-01-17 RX ADMIN — FUROSEMIDE 40 MG: 40 TABLET ORAL at 10:47

## 2024-01-17 RX ADMIN — Medication 10 ML: at 09:10

## 2024-01-17 RX ADMIN — OXYCODONE HYDROCHLORIDE 10 MG: 10 TABLET ORAL at 13:20

## 2024-01-17 RX ADMIN — LOPERAMIDE HYDROCHLORIDE 2 MG: 2 CAPSULE ORAL at 09:18

## 2024-01-17 RX ADMIN — Medication 10 ML: at 20:32

## 2024-01-17 RX ADMIN — LEVOTHYROXINE SODIUM 125 MCG: 125 TABLET ORAL at 06:51

## 2024-01-17 RX ADMIN — OXYCODONE HYDROCHLORIDE 15 MG: 15 TABLET ORAL at 02:11

## 2024-01-17 RX ADMIN — Medication 1 CAPSULE: at 09:07

## 2024-01-17 RX ADMIN — TRAZODONE HYDROCHLORIDE 50 MG: 50 TABLET ORAL at 20:44

## 2024-01-17 RX ADMIN — ACETAMINOPHEN 1000 MG: 500 TABLET ORAL at 15:25

## 2024-01-17 RX ADMIN — PREGABALIN 200 MG: 100 CAPSULE ORAL at 20:30

## 2024-01-17 RX ADMIN — FOLIC ACID 1 MG: 1 TABLET ORAL at 09:07

## 2024-01-17 RX ADMIN — OXYCODONE HYDROCHLORIDE 10 MG: 10 TABLET ORAL at 00:37

## 2024-01-17 RX ADMIN — PREGABALIN 200 MG: 100 CAPSULE ORAL at 09:07

## 2024-01-17 RX ADMIN — SERTRALINE HYDROCHLORIDE 100 MG: 100 TABLET ORAL at 20:30

## 2024-01-17 RX ADMIN — OXYCODONE HYDROCHLORIDE 15 MG: 15 TABLET ORAL at 09:08

## 2024-01-17 RX ADMIN — Medication 5 MG: at 20:44

## 2024-01-17 RX ADMIN — ONDANSETRON 4 MG: 2 INJECTION INTRAMUSCULAR; INTRAVENOUS at 09:17

## 2024-01-17 RX ADMIN — OXYCODONE HYDROCHLORIDE 15 MG: 15 TABLET ORAL at 20:30

## 2024-01-17 RX ADMIN — PREGABALIN 200 MG: 100 CAPSULE ORAL at 15:26

## 2024-01-17 RX ADMIN — OXYCODONE HYDROCHLORIDE 15 MG: 15 TABLET ORAL at 15:26

## 2024-01-17 RX ADMIN — ACETAMINOPHEN 1000 MG: 500 TABLET ORAL at 06:52

## 2024-01-17 RX ADMIN — OXYCODONE HYDROCHLORIDE 10 MG: 10 TABLET ORAL at 06:52

## 2024-01-17 RX ADMIN — ONDANSETRON 4 MG: 2 INJECTION INTRAMUSCULAR; INTRAVENOUS at 19:30

## 2024-01-17 RX ADMIN — ACETAMINOPHEN 1000 MG: 500 TABLET ORAL at 20:30
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
## 2024-01-17 NOTE — PLAN OF CARE
Goal Outcome Evaluation:  Plan of Care Reviewed With: patient           Outcome Evaluation: wound vac intact with no issues noted. PT changed R plantar foot wound dressing. area now reepithelialized. PT discussed with pt need to maintain dressing and NWB on freshly healed wound for another 1-2 weeks at minimum to allow for maturation of wound bed to limit potential breakdown.

## 2024-01-17 NOTE — CASE MANAGEMENT/SOCIAL WORK
Continued Stay Note   [facility]     Patient Name: Phoebe Carvajal  MRN: [id number]  Today's Date: 1/17/2024    Admit Date: 12/28/2023    Plan: update   Discharge Plan       Row Name 01/17/24 1142       Plan    Plan update    Patient/Family in Agreement with Plan yes    Plan Comments Spoke to [facility] liaison who advises patient still pending insurance precert.  Spoke with patient at bedside regarding discharge plan and advised that that insurance is still pending.  Patient verbalizes understanding, no new discharge needs verbalized.  CM following.  Patient plan is to discharge to [facility] pending insurance approval.    Final Discharge Disposition Code 62 - inpatient rehab facility                   Discharge Codes    No documentation.                 Expected Discharge Date and Time       Expected Discharge Date Expected Discharge Time    Jan 19, 2024               Jerica Paredes RN

## 2024-01-17 NOTE — PROGRESS NOTES
[facility] Medicine Services  PROGRESS NOTE    Patient Name: Phoebe Carvajal  : 1975  MRN: [id number]    Date of Admission: 2023  Primary Care Physician: Sofya Benjamin MD    Subjective   Subjective     CC:  osteomyelitis    HPI:  Patient uncomfortable in bed, reports she feels her wound vac is too tight and is the reason of her pain. Continues to work on her crafts     Objective   Objective     Vital Signs:   Temp:  [98.4 °F (36.9 °C)-98.9 °F (37.2 °C)] 98.9 °F (37.2 °C)  Heart Rate:  [70-77] 70  Resp:  [16-20] 16  BP: ()/(44-88) 94/51     Physical Exam:  Constitutional: awake, alert female sitting up in bed NAD  HENT: NCAT, mucous membranes moist  Respiratory: Clear to auscultation bilaterally, respiratory effort normal . Edema significantly improved from prior exam, not pitting  Cardiovascular: RRR, no murmurs, rubs, or gallops. Pitting edema bilateral legs  Gastrointestinal: Soft, nontender, nondistended  Musculoskeletal: Right foot bandage, Left AKA site w wound vac in place w minimal drainage  Psychiatric: Calm and appropriate  Neurologic: Alert and oriented, facial movements symmetric, speech clear  Skin: No rashes    Results Reviewed:  LAB RESULTS:      Lab 24  0629 24  0624   WBC 3.78 3.28*   HEMOGLOBIN 8.6* 7.8*   HEMATOCRIT 27.2* 25.7*   PLATELETS 233 214   NEUTROS ABS  --  2.02   IMMATURE GRANS (ABS)  --  0.01   LYMPHS ABS  --  0.87   MONOS ABS  --  0.26   EOS ABS  --  0.10   .5* 108.4*         Lab 24  0629 24  0624   SODIUM 140 142   POTASSIUM 4.2 3.9   CHLORIDE 107 107   CO2 26.0 28.0   ANION GAP 7.0 7.0   BUN 8 10   CREATININE 0.38* 0.47*   EGFR 122.3 116.1   GLUCOSE 100* 92   CALCIUM 8.4* 8.3*   MAGNESIUM  --  1.7   PHOSPHORUS  --  4.2                       Lab 24  1132   ABO TYPING A   RH TYPING Negative   ANTIBODY SCREEN Negative         Brief Urine Lab Results  (Last result in the past 365 days)        Color    Clarity   Blood   Leuk Est   Nitrite   Protein   CREAT   Urine HCG        01/05/24 1351 Yellow   Cloudy   Negative   Small (1+)   Negative   Trace                   Microbiology Results Abnormal       Procedure Component Value - Date/Time    Anaerobic Culture 10 Day Incubation - Tissue, Leg, Left [[id number]]  (Normal) Collected: 01/12/24 1318    Lab Status: Preliminary result Specimen: Tissue from Leg, Left Updated: 01/17/24 0809     Anaerobic Culture No anaerobes isolated at 5 days    Fungus Culture - Tissue, Leg, Left [[id number]] Collected: 01/09/24 1844    Lab Status: Preliminary result Specimen: Tissue from Leg, Left Updated: 01/16/24 2100     Fungus Culture No fungus isolated at 1 week    AFB Culture - Tissue, Leg, Left [[id number]] Collected: 01/09/24 1844    Lab Status: Preliminary result Specimen: Tissue from Leg, Left Updated: 01/16/24 2100     AFB Culture No AFB isolated at 1 week     AFB Stain No acid fast bacilli seen on concentrated smear    Wound Culture - Surgical Site, Leg, Left [[id number]] Collected: 01/12/24 1318    Lab Status: Final result Specimen: Surgical Site from Leg, Left Updated: 01/15/24 0830     Wound Culture No growth at 3 days     Gram Stain Rare (1+) WBCs per low power field      Rare (1+) Epithelial cells per low power field      Many (4+) Red blood cells      No organisms seen    Fungus Culture - Surgical Site, Leg, Left [[id number]] Collected: 12/31/23 0831    Lab Status: Preliminary result Specimen: Surgical Site from Leg, Left Updated: 01/14/24 1946     Fungus Culture No fungus isolated at 2 weeks    AFB Culture - Surgical Site, Leg, Left [[id number]] Collected: 12/31/23 0831    Lab Status: Preliminary result Specimen: Surgical Site from Leg, Left Updated: 01/14/24 1946     AFB Culture No AFB isolated at 2 weeks     AFB Stain No acid fast bacilli seen on concentrated smear    Anaerobic Culture - Tissue, Leg, Left [[id number]]  (Normal) Collected: 01/09/24 1844    Lab Status: Final  result Specimen: Tissue from Leg, Left Updated: 01/14/24 0939     Anaerobic Culture No anaerobes isolated at 5 days    AFB Culture - Tissue, Leg, Left [[id number]] Collected: 01/12/24 1318    Lab Status: Preliminary result Specimen: Tissue from Leg, Left Updated: 01/13/24 1138     AFB Stain No acid fast bacilli seen on concentrated smear    Fungus Culture - Tissue, Leg, Left [[id number]] Collected: 12/29/23 1139    Lab Status: Preliminary result Specimen: Tissue from Leg, Left Updated: 01/12/24 1330     Fungus Culture No fungus isolated at 2 weeks    AFB Culture - Tissue, Leg, Left [[id number]] Collected: 12/29/23 1139    Lab Status: Preliminary result Specimen: Tissue from Leg, Left Updated: 01/12/24 1330     AFB Culture No AFB isolated at 2 weeks     AFB Stain No acid fast bacilli seen on concentrated smear    Tissue / Bone Culture - Tissue, Leg, Left [[id number]] Collected: 01/09/24 1844    Lab Status: Final result Specimen: Tissue from Leg, Left Updated: 01/12/24 0728     Tissue Culture No growth at 3 days     Gram Stain Rare (1+) WBCs seen      No organisms seen    Urine Culture - Urine, Urine, Clean Catch [[id number]]  (Normal) Collected: 01/06/24 1224    Lab Status: Final result Specimen: Urine, Clean Catch Updated: 01/07/24 1429     Urine Culture No growth    Anaerobic Culture - Surgical Site, Leg, Left [[id number]]  (Normal) Collected: 12/31/23 0833    Lab Status: Final result Specimen: Surgical Site from Leg, Left Updated: 01/06/24 0551     Anaerobic Culture No anaerobes isolated at 5 days    Tissue / Bone Culture - Surgical Site, Leg, Left [[id number]] Collected: 12/31/23 0831    Lab Status: Final result Specimen: Surgical Site from Leg, Left Updated: 01/04/24 0743     Tissue Culture No growth at 3 days     Gram Stain Moderate (3+) WBCs per low power field      No organisms seen    Anaerobic Culture - Tissue, Leg, Left [[id number]]  (Normal) Collected: 12/29/23 1139    Lab Status: Final result Specimen: Tissue  from Leg, Left Updated: 01/03/24 1001     Anaerobic Culture No anaerobes isolated at 5 days    Blood Culture - Blood, Arm, Right [[id number]]  (Normal) Collected: 12/28/23 2115    Lab Status: Final result Specimen: Blood from Arm, Right Updated: 01/03/24 0030     Blood Culture No growth at 5 days    Blood Culture - Blood, Hand, Right [[id number]]  (Normal) Collected: 12/28/23 2120    Lab Status: Final result Specimen: Blood from Hand, Right Updated: 01/03/24 0030     Blood Culture No growth at 5 days    Wound Culture - Wound, Leg, Left [[id number]] Collected: 12/29/23 1322    Lab Status: Final result Specimen: Wound from Leg, Left Updated: 01/01/24 0711     Wound Culture No growth at 3 days     Gram Stain No organisms seen    MRSA Screen, PCR (Inpatient) - Swab, Nares [[id number]]  (Normal) Collected: 12/29/23 0203    Lab Status: Final result Specimen: Swab from Nares Updated: 12/29/23 0733     MRSA PCR Negative    Narrative:      The negative predictive value of this diagnostic test is high and should only be used to consider de-escalating anti-MRSA therapy. A positive result may indicate colonization with MRSA and must be correlated clinically.  MRSA Negative    COVID PRE-OP / PRE-PROCEDURE SCREENING ORDER (NO ISOLATION) - Swab, Nasopharynx [[id number]]  (Normal) Collected: 12/29/23 0203    Lab Status: Final result Specimen: Swab from Nasopharynx Updated: 12/29/23 0353    Narrative:      The following orders were created for panel order COVID PRE-OP / PRE-PROCEDURE SCREENING ORDER (NO ISOLATION) - Swab, Nasopharynx.  Procedure                               Abnormality         Status                     ---------                               -----------         ------                     Respiratory Panel PCR w/...[[id number]]  Normal              Final result                 Please view results for these tests on the individual orders.    Respiratory Panel PCR w/COVID-19(SARS-CoV-2) ARIELLE/VESNA/KARIE/PAD/COR/SÁNCHEZ In-House, NP  Swab in UTM/VTM, 2 HR TAT - Swab, Nasopharynx [[id number]]  (Normal) Collected: 12/29/23 0203    Lab Status: Final result Specimen: Swab from Nasopharynx Updated: 12/29/23 0353     ADENOVIRUS, PCR Not Detected     Coronavirus 229E Not Detected     Coronavirus HKU1 Not Detected     Coronavirus NL63 Not Detected     Coronavirus OC43 Not Detected     COVID19 Not Detected     Human Metapneumovirus Not Detected     Human Rhinovirus/Enterovirus Not Detected     Influenza A PCR Not Detected     Influenza B PCR Not Detected     Parainfluenza Virus 1 Not Detected     Parainfluenza Virus 2 Not Detected     Parainfluenza Virus 3 Not Detected     Parainfluenza Virus 4 Not Detected     RSV, PCR Not Detected     Bordetella pertussis pcr Not Detected     Bordetella parapertussis PCR Not Detected     Chlamydophila pneumoniae PCR Not Detected     Mycoplasma pneumo by PCR Not Detected    Narrative:      In the setting of a positive respiratory panel with a viral infection PLUS a negative procalcitonin without other underlying concern for bacterial infection, consider observing off antibiotics or discontinuation of antibiotics and continue supportive care. If the respiratory panel is positive for atypical bacterial infection (Bordetella pertussis, Chlamydophila pneumoniae, or Mycoplasma pneumoniae), consider antibiotic de-escalation to target atypical bacterial infection.            No radiology results from the last 24 hrs        Current medications:  Scheduled Meds:Pharmacy Consult, , Does not apply, Q12H  acetaminophen, 1,000 mg, Oral, Q8H  folic acid, 1 mg, Oral, Daily  furosemide, 40 mg, Oral, Daily  lactobacillus acidophilus, 1 capsule, Oral, Daily  levothyroxine, 125 mcg, Oral, Daily  oxyCODONE, 15 mg, Oral, Q6H  pramipexole, 1 mg, Oral, Daily  pregabalin, 200 mg, Oral, TID  senna-docusate sodium, 2 tablet, Oral, BID  sertraline, 100 mg, Oral, Nightly  sodium chloride, 10 mL, Intravenous, Q12H      Continuous Infusions:lactated  ringers, 9 mL/hr      PRN Meds:.  aluminum-magnesium hydroxide-simethicone    senna-docusate sodium **AND** polyethylene glycol **AND** bisacodyl **AND** bisacodyl    diphenhydrAMINE    HYDROmorphone    lactated ringers    loperamide    LORazepam    melatonin    naloxone    ondansetron    oxyCODONE    sodium chloride    sodium chloride    traZODone    Assessment & Plan   Assessment & Plan     Active Hospital Problems    Diagnosis  POA    Anxiety associated with depression [F41.8]  Yes    RLS (restless legs syndrome) [G25.81]  Yes    Neuropathy [G62.9]  Yes    Obesity, morbid, BMI 50 or higher [E66.01]  Yes    S/P AKA (above knee amputation), left [Z89.612]  Not Applicable    Chronic osteomyelitis [M86.60]  Yes    Acquired hypothyroidism [E03.9]  Yes    Ulcer of right midfoot with fat layer exposed [L97.412]  Yes      Resolved Hospital Problems    Diagnosis Date Resolved POA    **Lower extremity cellulitis [L03.119] 01/08/2024 Yes    Ulcer of left heel, with fat layer exposed [L97.422] 01/08/2024 Yes        Brief Hospital Course to date:  Phoebe Carvajal is a 48 y.o. female w recurrent chronic LE osteomyelitis c/b prosthetic joint infection who presented 12/28 with foul odor from chronic wounds S/p LLE AKA 12/29, additional debridement 12/31, 1/9, 1/12. 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 Wound vac at discharge as current plan   -PT wound care  - Dr Tayo Arnold ID recommended to stop antibiotics     Complex pain, chronic opioid use  -prior on oxycodone 10 mg up to 6 tabs/day  -here oxycodone 15 mg q6 hrs (same TDD) + breakthrough dosing increased 1/13. IV dilaudid only for wound changes for now     Hematuria   -urine culture negative, evaluated by urology w recs for 6-8 week OP f/u ([facility])    HFpEF (data deficit) - on chronic lasix w significant leg swelling, unclear if CHF v venostasis. Wraps per  wound care, IV lasix x 1 1/15 w significant improvement, now PO lasix 40 mg     Right foot ulcer, improving  --PT wound care following  --Offload right foot for now, but improving significantly     Macrocytic acute on chronic anemia: post-op + B12 + folate deficiency  -B12 IM replacement, weekly at discharge  -Daily folate replacement    Borderline blood pressure - chronic SBP , OK for meds above  Neuropathy - continue Lyrica   RLS- continue mirapex  BMI 56 - complicates all aspects of care   Anxiety - increased dose zoloft per patient request      Expected Discharge Location and Transportation: [facility], [facility] following  Expected Discharge 1/19 (Discharge date is tentative pending patient's medical condition and is subject to change)  Expected Discharge Date: 1/19/2024; Expected Discharge Time:  3:00 PM     DVT prophylaxis:  Mechanical DVT prophylaxis orders are present.     AM-PAC 6 Clicks Score (PT): 10 (01/16/24 2000)    CODE STATUS:   Code Status and Medical Interventions:   Ordered at: 12/29/23 0121     Code Status (Patient has no pulse and is not breathing):    CPR (Attempt to Resuscitate)     Medical Interventions (Patient has pulse or is breathing):    Full Support       Gertrude Love MD  01/17/24    Gertrude Cain MD  Internal Medicine Pediatrics PGY-4  [facility]    Available through [employer]
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
## 2024-01-17 NOTE — PROGRESS NOTES
Phoebe Carvajal       LOS: 20 days   Patient Care Team:  Sofya Benjamin MD as PCP - General (Internal Medicine)  Provider, No Known as PCP - Family Medicine    Chief Complaint:  Status post left above-knee amputation     Subjective     Interval History:     Sitting up comfortably in bed this morning.  Pain tolerable, no acute events overnight.     Review of Systems:      Gen- No fevers, chills  CV- No chest pain, palpitations  Resp- No cough, dyspnea  GI- No N/V/D, abd pain    Objective     Vital Signs  Vital Signs (last 24 hours)         01/07 0700 01/08 0659 01/08 0700 01/09 0611   Most Recent      Temp (°F) 98 -  98.5    98.1 -  98.8     98.8 (37.1) 01/09 0300    Heart Rate 56 -  84    59 -  72     60 01/09 0015    Resp   18    16 -  18     16 01/09 0300    BP 96/54 -  111/61    112/73 -  122/60     121/68 01/09 0300    SpO2 (%) 89 -  97    90 -  98     97 01/09 0015    Flow (L/min)   2      2     2 01/09 0200              Physical Exam:     Alert, oriented.  No acute distress.  Nonlabored respirations.  Regular rate and rhythm.  Abdomen nondistended.  Left lower extremity: Current dressing c/d/i.  Incisional wound VAC in place with minimal output.    Results Review:     I reviewed the patient's new clinical results.    Medication Review:   Hospital Medications (active)         Dose Frequency Start End    ! Home medications stored in pharmacy, please contact pharmacist prior to patient discharge  Every 12 Hours Scheduled 12/29/2023 --    Route: Does not apply    acetaminophen (TYLENOL) tablet 1,000 mg 1,000 mg Every 8 Hours Scheduled 1/2/2024 --    Admin Instructions: If given for fever, use fever parameter: fever greater than 100.4 °F  Based on patient request - if ordered for moderate or severe pain, provider allows for administration of a medication prescribed for a lower pain scale.    Do not exceed 4 grams of acetaminophen in a 24 hr period. Max dose of 2gm for AST/ALT greater than 120 units/L.    If  given for pain, use the following pain scale:   Mild Pain = Pain Score of 1-3, CPOT 1-2  Moderate Pain = Pain Score of 4-6, CPOT 3-4  Severe Pain = Pain Score of 7-10, CPOT 5-8    Route: Oral    bisacodyl (DULCOLAX) EC tablet 5 mg 5 mg Daily PRN 12/29/2023 --    Admin Instructions: Use if no bowel movement after 12 hours.  Swallow whole. Do not crush, split, or chew tablet.    Route: Oral    Linked Group 1: See Hyperspace for full Linked Orders Report.        bisacodyl (DULCOLAX) suppository 10 mg 10 mg Daily PRN 12/29/2023 --    Admin Instructions: Use if no bowel movement after 12 hours.  Hold for diarrhea    Route: Rectal    Linked Group 1: See Hyperspace for full Linked Orders Report.        cyanocobalamin injection 1,000 mcg 1,000 mcg Daily 1/4/2024 1/11/2024    Route: Intramuscular    diphenhydrAMINE (BENADRYL) capsule 25 mg 25 mg Every 6 Hours PRN 12/29/2023 --    Admin Instructions: Caution: Look alike/sound alike drug alert. This med may be ordered in other forms and routes. Before giving verify the last time the drug was given by any route/form.    Route: Oral    famotidine (PEPCID) tablet 20 mg 20 mg 2 Times Daily Before Meals 1/1/2024 --    Route: Oral    folic acid (FOLVITE) tablet 1 mg 1 mg Daily 1/4/2024 --    Route: Oral    lactated ringers infusion 9 mL/hr Continuous PRN 12/31/2023 --    Route: Intravenous    lactobacillus acidophilus (RISAQUAD) capsule 1 capsule 1 capsule Daily 1/3/2024 --    Route: Oral    levothyroxine (SYNTHROID, LEVOTHROID) tablet 125 mcg 125 mcg Daily 12/29/2023 --    Admin Instructions: Take on empty stomach.    Route: Oral    loperamide (IMODIUM) capsule 2 mg 2 mg 4 Times Daily PRN 1/3/2024 --    Admin Instructions: Maximum recommended 16 mg / 24 hours.      Route: Oral    LORazepam (ATIVAN) tablet 0.5 mg 0.5 mg Daily PRN 1/4/2024 --    Admin Instructions: Hold for SBP < 110   Caution: Look alike/sound alike drug alert    Route: Oral    Cosign for Ordering: Accepted by  Jorge Lua MD on 1/7/2024  4:45 PM    melatonin tablet 5 mg 5 mg Nightly PRN 12/29/2023 --    Route: Oral    ondansetron (ZOFRAN) injection 4 mg 4 mg Every 6 Hours PRN 12/29/2023 --    Admin Instructions: If BOTH ondansetron (ZOFRAN) and promethazine (PHENERGAN) are ordered use ondansetron first and THEN promethazine IF ondansetron is ineffective.    Route: Intravenous    oxyCODONE (ROXICODONE) immediate release tablet 15 mg 15 mg Every 6 Hours 1/4/2024 --    Admin Instructions: Based on patient request - if ordered for moderate or severe pain, provider allows for administration of a medication prescribed for a lower pain scale.      If given for pain, use the following pain scale:  Mild Pain = Pain Score of 1-3, CPOT 1-2  Moderate Pain = Pain Score of 4-6, CPOT 3-4  Severe Pain = Pain Score of 7-10, CPOT 5-8    Route: Oral    Cosign for Ordering: Accepted by Jorge Lua MD on 1/7/2024  4:45 PM    oxyCODONE (ROXICODONE) immediate release tablet 5 mg 5 mg Every 6 Hours PRN 1/8/2024 1/15/2024    Admin Instructions: Based on patient request - if ordered for moderate or severe pain, provider allows for administration of a medication prescribed for a lower pain scale.      If given for pain, use the following pain scale:  Mild Pain = Pain Score of 1-3, CPOT 1-2  Moderate Pain = Pain Score of 4-6, CPOT 3-4  Severe Pain = Pain Score of 7-10, CPOT 5-8    Route: Oral    polyethylene glycol (MIRALAX) packet 17 g 17 g Daily PRN 12/29/2023 --    Admin Instructions: Use if no bowel movement after 12 hours. Mix in 6-8 ounces of water.  Use 4-8 ounces of water, tea, or juice for each 17 gram dose.    Route: Oral    Linked Group 1: See [facility] for full Linked Orders Report.        pramipexole (MIRAPEX) tablet 1 mg 1 mg Daily 12/29/2023 --    Route: Oral    pregabalin (LYRICA) capsule 200 mg 200 mg 3 Times Daily 12/29/2023 --    Admin Instructions:     Route: Oral    sennosides-docusate (PERICOLACE) 8.6-50 MG per  tablet 2 tablet 2 tablet 2 Times Daily 12/29/2023 --    Admin Instructions: HOLD MEDICATION IF PATIENT HAS HAD BOWEL MOVEMENT. Start bowel management regimen if patient has not had a bowel movement after 12 hours.    Route: Oral    Linked Group 1: See [facility] for full Linked Orders Report.        sertraline (ZOLOFT) tablet 50 mg 50 mg Nightly 12/29/2023 --    Route: Oral    sodium chloride 0.9 % flush 10 mL 10 mL Every 12 Hours Scheduled 12/29/2023 --    Route: Intravenous    sodium chloride 0.9 % flush 10 mL 10 mL As Needed 12/29/2023 --    Route: Intravenous    sodium chloride 0.9 % infusion 40 mL 40 mL As Needed 12/29/2023 --    Admin Instructions: Following administration of an IV intermittent medication, flush line with 40mL NS at 100mL/hr.    Route: Intravenous    traZODone (DESYREL) tablet 50 mg 50 mg Nightly PRN 12/29/2023 --    Admin Instructions: Take with food.  Caution: Look alike/sound alike drug alert    Route: Oral              Assessment & Plan     48-year-old female status post left above-knee amputation, now status post irrigation, debridement, wound vacuum-assisted closure 1/10/2024, secondary wound closure 1/12/2024.      Acquired hypothyroidism    Ulcer of right midfoot with fat layer exposed    Chronic osteomyelitis    Anxiety associated with depression    RLS (restless legs syndrome)    Neuropathy    Obesity, morbid, BMI 50 or higher    S/P AKA (above knee amputation), left    Cultures continue to be NGTD.    Continue incisional wound VAC.  Agree with discontinuation of antibiotics given negative cultures.    Continue PT. Tentative plan for discharge to [facility] rehabilitation if they can accommodate incisional wound VAC.    In clinic in 2 weeks for wound inspection, x-rays.    [facility]  [address], [address]  [place], [id number]  Please schedule at [phone]      PANDA English  01/17/24  07:42 EST
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
## 2024-01-18 PROBLEM — M86.472 CHRONIC OSTEOMYELITIS OF LEFT FOOT WITH DRAINING SINUS: Status: RESOLVED | Noted: 2023-10-03 | Resolved: 2024-01-18

## 2024-01-18 PROCEDURE — 25010000002 ONDANSETRON PER 1 MG: Performed by: ORTHOPAEDIC SURGERY

## 2024-01-18 PROCEDURE — 97605 NEG PRS WND THER DME<=50SQCM: CPT

## 2024-01-18 PROCEDURE — 99232 SBSQ HOSP IP/OBS MODERATE 35: CPT | Performed by: STUDENT IN AN ORGANIZED HEALTH CARE EDUCATION/TRAINING PROGRAM

## 2024-01-18 PROCEDURE — 63710000001 DIPHENHYDRAMINE PER 50 MG: Performed by: ORTHOPAEDIC SURGERY

## 2024-01-18 RX ORDER — NALOXONE HCL 0.4 MG/ML
0.4 VIAL (ML) INJECTION
Status: CANCELLED
Start: 2024-01-18

## 2024-01-18 RX ORDER — FLUCONAZOLE 100 MG/1
400 TABLET ORAL EVERY 24 HOURS
Qty: 40 TABLET | Refills: 0 | Status: DISCONTINUED | OUTPATIENT
Start: 2024-01-18 | End: 2024-01-19 | Stop reason: HOSPADM

## 2024-01-18 RX ORDER — OXYCODONE HYDROCHLORIDE 15 MG/1
15 TABLET ORAL EVERY 6 HOURS
Status: CANCELLED
Start: 2024-01-18 | End: 2024-01-22

## 2024-01-18 RX ADMIN — ONDANSETRON 4 MG: 2 INJECTION INTRAMUSCULAR; INTRAVENOUS at 03:31

## 2024-01-18 RX ADMIN — FLUCONAZOLE 400 MG: 100 TABLET ORAL at 08:07

## 2024-01-18 RX ADMIN — Medication 5 MG: at 20:42

## 2024-01-18 RX ADMIN — OXYCODONE HYDROCHLORIDE 10 MG: 10 TABLET ORAL at 03:40

## 2024-01-18 RX ADMIN — ACETAMINOPHEN 1000 MG: 500 TABLET ORAL at 13:05

## 2024-01-18 RX ADMIN — Medication 10 ML: at 20:44

## 2024-01-18 RX ADMIN — Medication 10 ML: at 08:08

## 2024-01-18 RX ADMIN — LEVOTHYROXINE SODIUM 125 MCG: 125 TABLET ORAL at 05:49

## 2024-01-18 RX ADMIN — PREGABALIN 200 MG: 100 CAPSULE ORAL at 16:41

## 2024-01-18 RX ADMIN — FOLIC ACID 1 MG: 1 TABLET ORAL at 08:07

## 2024-01-18 RX ADMIN — Medication 1 CAPSULE: at 08:07

## 2024-01-18 RX ADMIN — DIPHENHYDRAMINE HYDROCHLORIDE 25 MG: 25 CAPSULE ORAL at 14:23

## 2024-01-18 RX ADMIN — OXYCODONE HYDROCHLORIDE 15 MG: 15 TABLET ORAL at 13:05

## 2024-01-18 RX ADMIN — SERTRALINE HYDROCHLORIDE 100 MG: 100 TABLET ORAL at 20:42

## 2024-01-18 RX ADMIN — LORAZEPAM 0.5 MG: 0.5 TABLET ORAL at 11:06

## 2024-01-18 RX ADMIN — ACETAMINOPHEN 1000 MG: 500 TABLET ORAL at 05:49

## 2024-01-18 RX ADMIN — OXYCODONE HYDROCHLORIDE 15 MG: 15 TABLET ORAL at 19:00

## 2024-01-18 RX ADMIN — OXYCODONE HYDROCHLORIDE 15 MG: 15 TABLET ORAL at 08:14

## 2024-01-18 RX ADMIN — OXYCODONE HYDROCHLORIDE 15 MG: 15 TABLET ORAL at 01:47

## 2024-01-18 RX ADMIN — PRAMIPEXOLE DIHYDROCHLORIDE 1 MG: 0.25 TABLET ORAL at 08:07

## 2024-01-18 RX ADMIN — PREGABALIN 200 MG: 100 CAPSULE ORAL at 08:07

## 2024-01-18 RX ADMIN — PREGABALIN 200 MG: 100 CAPSULE ORAL at 20:42

## 2024-01-18 NOTE — PLAN OF CARE
Problem: Fall Injury Risk  Goal: Absence of Fall and Fall-Related Injury  Outcome: Ongoing, Not Progressing  Intervention: Identify and Manage Contributors  Recent Flowsheet Documentation  Taken 1/18/2024 1800 by Matty Lobato RN  Medication Review/Management:   medications reviewed   high-risk medications identified   dosing adjusted  Intervention: Promote Injury-Free Environment  Recent Flowsheet Documentation  Taken 1/18/2024 1800 by Matty Lobato RN  Safety Promotion/Fall Prevention:   activity supervised   assistive device/personal items within reach   clutter free environment maintained   fall prevention program maintained   gait belt   lighting adjusted   muscle strengthening facilitated   nonskid shoes/slippers when out of bed   room organization consistent   safety round/check completed     Problem: Skin Injury Risk Increased  Goal: Skin Health and Integrity  Outcome: Ongoing, Not Progressing  Intervention: Optimize Skin Protection  Recent Flowsheet Documentation  Taken 1/18/2024 1800 by Matty Lobato RN  Pressure Reduction Techniques:   frequent weight shift encouraged   weight shift assistance provided   pressure points protected   rest period provided between sit times   positioned off wounds   heels elevated off bed  Head of Bed (HOB) Positioning: HOB at 20-30 degrees  Pressure Reduction Devices:   specialty bed utilized   pressure-redistributing mattress utilized   heel offloading device utilized   positioning supports utilized  Skin Protection:   adhesive use limited   tubing/devices free from skin contact   skin-to-device areas padded   skin-to-skin areas padded   transparent dressing maintained     Problem: Adjustment to Illness (Sepsis/Septic Shock)  Goal: Optimal Coping  Outcome: Ongoing, Not Progressing     Problem: Bleeding (Sepsis/Septic Shock)  Goal: Absence of Bleeding  Outcome: Ongoing, Not Progressing     Problem: Glycemic Control Impaired (Sepsis/Septic Shock)  Goal:  Received pt from transport. Pt noticeably dyspneic, on 3L nasal cannula oxygen with oxygen saturation at 93%. Pt pulled from stretcher to bed, continues with HE and face reddened. Instructed pt to relax and breath and allow the staff to assist him get situation in bed. Pt verbalized understanding. HR 130s-140s d/t exertion. Once pt settled in bed, HR down to 110s-120s. Pt remains tachypneic. Pt denies CP. Oriented to room, call bell within reach and bed in low and locked position. Will continue to monitor closely. Blood Glucose Level Within Desired Range  Outcome: Ongoing, Not Progressing     Problem: Infection Progression (Sepsis/Septic Shock)  Goal: Absence of Infection Signs and Symptoms  Outcome: Ongoing, Not Progressing  Intervention: Initiate Sepsis Management  Recent Flowsheet Documentation  Taken 1/18/2024 1800 by Matty Lboato RN  Infection Prevention:   rest/sleep promoted   personal protective equipment utilized  Isolation Precautions: precautions maintained  Intervention: Promote Recovery  Recent Flowsheet Documentation  Taken 1/18/2024 1800 by Matty Lobato RN  Activity Management: activity encouraged  Sleep/Rest Enhancement: awakenings minimized     Problem: Nutrition Impaired (Sepsis/Septic Shock)  Goal: Optimal Nutrition Intake  Outcome: Ongoing, Not Progressing     Problem: Adult Inpatient Plan of Care  Goal: Plan of Care Review  Outcome: Ongoing, Not Progressing  Flowsheets (Taken 1/18/2024 1852)  Progress: no change  Plan of Care Reviewed With: patient  Goal: Patient-Specific Goal (Individualized)  Outcome: Ongoing, Not Progressing  Goal: Absence of Hospital-Acquired Illness or Injury  Outcome: Ongoing, Not Progressing  Intervention: Identify and Manage Fall Risk  Recent Flowsheet Documentation  Taken 1/18/2024 1800 by Matty Lobato RN  Safety Promotion/Fall Prevention:   activity supervised   assistive device/personal items within reach   clutter free environment maintained   fall prevention program maintained   gait belt   lighting adjusted   muscle strengthening facilitated   nonskid shoes/slippers when out of bed   room organization consistent   safety round/check completed  Intervention: Prevent Skin Injury  Recent Flowsheet Documentation  Taken 1/18/2024 1800 by Matty Lobato RN  Body Position:   turned   tilted   weight shifting  Skin Protection:   adhesive use limited   tubing/devices free from skin contact   skin-to-device areas padded   skin-to-skin areas padded    transparent dressing maintained  Intervention: Prevent and Manage VTE (Venous Thromboembolism) Risk  Recent Flowsheet Documentation  Taken 1/18/2024 1800 by Matty Lobato RN  Activity Management: activity encouraged  Range of Motion: ROM (range of motion) performed  Intervention: Prevent Infection  Recent Flowsheet Documentation  Taken 1/18/2024 1800 by Matty Lobato RN  Infection Prevention:   rest/sleep promoted   personal protective equipment utilized  Goal: Optimal Comfort and Wellbeing  Outcome: Ongoing, Not Progressing  Intervention: Monitor Pain and Promote Comfort  Recent Flowsheet Documentation  Taken 1/18/2024 1800 by Matty Lobato RN  Pain Management Interventions:   position adjusted   pillow support provided   unnecessary movement minimized  Goal: Readiness for Transition of Care  Outcome: Ongoing, Not Progressing   Goal Outcome Evaluation:  Plan of Care Reviewed With: patient        Progress: no change

## 2024-01-18 NOTE — PLAN OF CARE
Goal Outcome Evaluation:  Plan of Care Reviewed With: patient        Progress: improving  Outcome Evaluation: a/o x 4; VSS, RA; pleasant, PRN given for pain control; wound vac to L aka intact; no acute changes to report from overnight; will continue POC         Problem: Skin Injury Risk Increased  Goal: Skin Health and Integrity  Intervention: Optimize Skin Protection  Recent Flowsheet Documentation  Taken 1/18/2024 0600 by Theodore Morgan RN  Pressure Reduction Techniques:   frequent weight shift encouraged   heels elevated off bed   positioned off wounds   pressure points protected   weight shift assistance provided  Pressure Reduction Devices:   specialty bed utilized   pressure-redistributing mattress utilized   heel offloading device utilized   positioning supports utilized  Skin Protection:   adhesive use limited   incontinence pads utilized   transparent dressing maintained   tubing/devices free from skin contact   skin-to-device areas padded  Taken 1/18/2024 0400 by Theodore Morgan RN  Pressure Reduction Techniques:   frequent weight shift encouraged   heels elevated off bed   positioned off wounds   pressure points protected   weight shift assistance provided  Head of Bed (HOB) Positioning: HOB elevated  Pressure Reduction Devices:   specialty bed utilized   pressure-redistributing mattress utilized   positioning supports utilized   heel offloading device utilized  Skin Protection:   adhesive use limited   incontinence pads utilized   transparent dressing maintained   tubing/devices free from skin contact   skin-to-device areas padded  Taken 1/18/2024 0200 by Theodore Morgan RN  Pressure Reduction Techniques:   pressure points protected   positioned off wounds   heels elevated off bed   frequent weight shift encouraged   weight shift assistance provided  Head of Bed (HOB) Positioning: HOB elevated  Pressure Reduction Devices:   specialty bed utilized   pressure-redistributing mattress utilized    positioning supports utilized   heel offloading device utilized  Skin Protection:   adhesive use limited   incontinence pads utilized   transparent dressing maintained   tubing/devices free from skin contact   skin-to-device areas padded  Taken 1/17/2024 2343 by Theodore Morgan RN  Pressure Reduction Techniques:   positioned off wounds   pressure points protected   heels elevated off bed   frequent weight shift encouraged   weight shift assistance provided  Head of Bed ([facility]) Positioning: [facility] elevated  Pressure Reduction Devices:   specialty bed utilized   pressure-redistributing mattress utilized   positioning supports utilized   heel offloading device utilized  Skin Protection:   adhesive use limited   incontinence pads utilized   transparent dressing maintained   tubing/devices free from skin contact   skin-to-device areas padded   silicone foam dressing in place   skin sealant/moisture barrier applied  Taken 1/17/2024 2200 by Theodore Morgan RN  Pressure Reduction Techniques:   pressure points protected   positioned off wounds   heels elevated off bed   frequent weight shift encouraged   weight shift assistance provided  Head of Bed ([facility]) Positioning: [facility] elevated  Pressure Reduction Devices:   pressure-redistributing mattress utilized   positioning supports utilized   heel offloading device utilized   specialty bed utilized  Skin Protection:   adhesive use limited   incontinence pads utilized   transparent dressing maintained   tubing/devices free from skin contact   skin-to-device areas padded   skin sealant/moisture barrier applied   silicone foam dressing in place  Taken 1/17/2024 2030 by Theodore Morgan RN  Pressure Reduction Techniques:   frequent weight shift encouraged   heels elevated off bed   positioned off wounds   pressure points protected   weight shift assistance provided  Head of Bed ([facility]) Positioning: [facility] elevated  Pressure Reduction Devices:   positioning supports utilized    pressure-redistributing mattress utilized   heel offloading device utilized   specialty bed utilized  Skin Protection:   adhesive use limited   incontinence pads utilized   transparent dressing maintained   tubing/devices free from skin contact   skin-to-device areas padded   skin sealant/moisture barrier applied   silicone foam dressing in place   pulse oximeter probe site changed     Problem: Bleeding (Sepsis/Septic Shock)  Goal: Absence of Bleeding  Intervention: Monitor and Manage Bleeding  Recent Flowsheet Documentation  Taken 1/17/2024 2030 by Theodore Morgan RN  Bleeding Management: dressing monitored     Problem: Infection Progression (Sepsis/Septic Shock)  Goal: Absence of Infection Signs and Symptoms  Intervention: Initiate Sepsis Management  Recent Flowsheet Documentation  Taken 1/18/2024 0600 by Theodore Morgan RN  Isolation Precautions: precautions maintained  Taken 1/18/2024 0400 by Theodore Morgan RN  Isolation Precautions: precautions maintained  Taken 1/18/2024 0200 by Theodore Morgan RN  Isolation Precautions: precautions maintained  Taken 1/17/2024 2343 by Theodore Morgan RN  Isolation Precautions: precautions maintained  Taken 1/17/2024 2200 by Theodore Morgan RN  Stabilization Measures: legs elevated  Isolation Precautions: precautions maintained  Taken 1/17/2024 2030 by Theodore Morgan RN  Infection Prevention:   personal protective equipment utilized   rest/sleep promoted   single patient room provided   visitors restricted/screened   cohorting utilized  Isolation Precautions: precautions maintained

## 2024-01-18 NOTE — PROGRESS NOTES
[facility] Medicine Services  PROGRESS NOTE    Patient Name: Phoebe Carvajal  : 1975  MRN: [id number]    Date of Admission: 2023  Primary Care Physician: Sofya Benjamin MD    Subjective   Subjective     CC:  osteomyelitis    HPI:  Patient uncomfortable in bed, eager to leave the hospital.     Objective   Objective     Vital Signs:   Temp:  [97.3 °F (36.3 °C)-98.5 °F (36.9 °C)] 98.2 °F (36.8 °C)  Heart Rate:  [55-76] 64  Resp:  [16-18] 18  BP: ()/(45-65) 102/65     Physical Exam:  Constitutional: awake, alert female sitting up in bed NAD  HENT: NCAT, mucous membranes moist  Respiratory: Clear to auscultation bilaterally, respiratory effort normal . Edema significantly improved from prior exam, not pitting  Cardiovascular: RRR, no murmurs, rubs, or gallops. Pitting edema bilateral legs  Gastrointestinal: Soft, nontender, nondistended  Musculoskeletal: Right foot bandage, Left AKA site w wound vac in place w minimal drainage  Psychiatric: Calm and appropriate  Neurologic: Alert and oriented, facial movements symmetric, speech clear  Skin: No rashes    Results Reviewed:  LAB RESULTS:      Lab 24  0629 24  0624   WBC 3.78 3.28*   HEMOGLOBIN 8.6* 7.8*   HEMATOCRIT 27.2* 25.7*   PLATELETS 233 214   NEUTROS ABS  --  2.02   IMMATURE GRANS (ABS)  --  0.01   LYMPHS ABS  --  0.87   MONOS ABS  --  0.26   EOS ABS  --  0.10   .5* 108.4*         Lab 24  0629 24  0624   SODIUM 140 142   POTASSIUM 4.2 3.9   CHLORIDE 107 107   CO2 26.0 28.0   ANION GAP 7.0 7.0   BUN 8 10   CREATININE 0.38* 0.47*   EGFR 122.3 116.1   GLUCOSE 100* 92   CALCIUM 8.4* 8.3*   MAGNESIUM  --  1.7   PHOSPHORUS  --  4.2                       Lab 24  1132   ABO TYPING A   RH TYPING Negative   ANTIBODY SCREEN Negative         Brief Urine Lab Results  (Last result in the past 365 days)        Color   Clarity   Blood   Leuk Est   Nitrite   Protein   CREAT   Urine HCG         01/05/24 1351 Yellow   Cloudy   Negative   Small (1+)   Negative   Trace                   Microbiology Results Abnormal       Procedure Component Value - Date/Time    AFB Culture - Tissue, Leg, Left [[id number]] Collected: 01/12/24 1318    Lab Status: Preliminary result Specimen: Tissue from Leg, Left Updated: 01/17/24 1515     AFB Culture No AFB isolated at less than 1 week     AFB Stain No acid fast bacilli seen on concentrated smear    Anaerobic Culture 10 Day Incubation - Tissue, Leg, Left [[id number]]  (Normal) Collected: 01/12/24 1318    Lab Status: Preliminary result Specimen: Tissue from Leg, Left Updated: 01/17/24 0809     Anaerobic Culture No anaerobes isolated at 5 days    Fungus Culture - Tissue, Leg, Left [[id number]] Collected: 01/09/24 1844    Lab Status: Preliminary result Specimen: Tissue from Leg, Left Updated: 01/16/24 2100     Fungus Culture No fungus isolated at 1 week    AFB Culture - Tissue, Leg, Left [[id number]] Collected: 01/09/24 1844    Lab Status: Preliminary result Specimen: Tissue from Leg, Left Updated: 01/16/24 2100     AFB Culture No AFB isolated at 1 week     AFB Stain No acid fast bacilli seen on concentrated smear    Wound Culture - Surgical Site, Leg, Left [[id number]] Collected: 01/12/24 1318    Lab Status: Final result Specimen: Surgical Site from Leg, Left Updated: 01/15/24 0830     Wound Culture No growth at 3 days     Gram Stain Rare (1+) WBCs per low power field      Rare (1+) Epithelial cells per low power field      Many (4+) Red blood cells      No organisms seen    Fungus Culture - Surgical Site, Leg, Left [[id number]] Collected: 12/31/23 0831    Lab Status: Preliminary result Specimen: Surgical Site from Leg, Left Updated: 01/14/24 1946     Fungus Culture No fungus isolated at 2 weeks    AFB Culture - Surgical Site, Leg, Left [[id number]] Collected: 12/31/23 0831    Lab Status: Preliminary result Specimen: Surgical Site from Leg, Left Updated: 01/14/24 1946     AFB Culture  No AFB isolated at 2 weeks     AFB Stain No acid fast bacilli seen on concentrated smear    Anaerobic Culture - Tissue, Leg, Left [[id number]]  (Normal) Collected: 01/09/24 1844    Lab Status: Final result Specimen: Tissue from Leg, Left Updated: 01/14/24 0939     Anaerobic Culture No anaerobes isolated at 5 days    Fungus Culture - Tissue, Leg, Left [[id number]] Collected: 12/29/23 1139    Lab Status: Preliminary result Specimen: Tissue from Leg, Left Updated: 01/12/24 1330     Fungus Culture No fungus isolated at 2 weeks    AFB Culture - Tissue, Leg, Left [[id number]] Collected: 12/29/23 1139    Lab Status: Preliminary result Specimen: Tissue from Leg, Left Updated: 01/12/24 1330     AFB Culture No AFB isolated at 2 weeks     AFB Stain No acid fast bacilli seen on concentrated smear    Tissue / Bone Culture - Tissue, Leg, Left [[id number]] Collected: 01/09/24 1844    Lab Status: Final result Specimen: Tissue from Leg, Left Updated: 01/12/24 0728     Tissue Culture No growth at 3 days     Gram Stain Rare (1+) WBCs seen      No organisms seen    Urine Culture - Urine, Urine, Clean Catch [[id number]]  (Normal) Collected: 01/06/24 1224    Lab Status: Final result Specimen: Urine, Clean Catch Updated: 01/07/24 1429     Urine Culture No growth    Anaerobic Culture - Surgical Site, Leg, Left [[id number]]  (Normal) Collected: 12/31/23 0833    Lab Status: Final result Specimen: Surgical Site from Leg, Left Updated: 01/06/24 0551     Anaerobic Culture No anaerobes isolated at 5 days    Tissue / Bone Culture - Surgical Site, Leg, Left [[id number]] Collected: 12/31/23 0831    Lab Status: Final result Specimen: Surgical Site from Leg, Left Updated: 01/04/24 0743     Tissue Culture No growth at 3 days     Gram Stain Moderate (3+) WBCs per low power field      No organisms seen    Anaerobic Culture - Tissue, Leg, Left [[id number]]  (Normal) Collected: 12/29/23 1139    Lab Status: Final result Specimen: Tissue from Leg, Left Updated:  01/03/24 1001     Anaerobic Culture No anaerobes isolated at 5 days    Blood Culture - Blood, Arm, Right [[id number]]  (Normal) Collected: 12/28/23 2115    Lab Status: Final result Specimen: Blood from Arm, Right Updated: 01/03/24 0030     Blood Culture No growth at 5 days    Blood Culture - Blood, Hand, Right [[id number]]  (Normal) Collected: 12/28/23 2120    Lab Status: Final result Specimen: Blood from Hand, Right Updated: 01/03/24 0030     Blood Culture No growth at 5 days    Wound Culture - Wound, Leg, Left [[id number]] Collected: 12/29/23 1322    Lab Status: Final result Specimen: Wound from Leg, Left Updated: 01/01/24 0711     Wound Culture No growth at 3 days     Gram Stain No organisms seen    MRSA Screen, PCR (Inpatient) - Swab, Nares [[id number]]  (Normal) Collected: 12/29/23 0203    Lab Status: Final result Specimen: Swab from Nares Updated: 12/29/23 0733     MRSA PCR Negative    Narrative:      The negative predictive value of this diagnostic test is high and should only be used to consider de-escalating anti-MRSA therapy. A positive result may indicate colonization with MRSA and must be correlated clinically.  MRSA Negative    COVID PRE-OP / PRE-PROCEDURE SCREENING ORDER (NO ISOLATION) - Swab, Nasopharynx [[id number]]  (Normal) Collected: 12/29/23 0203    Lab Status: Final result Specimen: Swab from Nasopharynx Updated: 12/29/23 0353    Narrative:      The following orders were created for panel order COVID PRE-OP / PRE-PROCEDURE SCREENING ORDER (NO ISOLATION) - Swab, Nasopharynx.  Procedure                               Abnormality         Status                     ---------                               -----------         ------                     Respiratory Panel PCR w/...[[id number]]  Normal              Final result                 Please view results for these tests on the individual orders.    Respiratory Panel PCR w/COVID-19(SARS-CoV-2) ARIELLE/VESNA/KARIE/PAD/COR/SÁNCHEZ In-House, NP Swab in UTM/VTM, 2 HR  TAT - Swab, Nasopharynx [[id number]]  (Normal) Collected: 12/29/23 0203    Lab Status: Final result Specimen: Swab from Nasopharynx Updated: 12/29/23 0353     ADENOVIRUS, PCR Not Detected     Coronavirus 229E Not Detected     Coronavirus HKU1 Not Detected     Coronavirus NL63 Not Detected     Coronavirus OC43 Not Detected     COVID19 Not Detected     Human Metapneumovirus Not Detected     Human Rhinovirus/Enterovirus Not Detected     Influenza A PCR Not Detected     Influenza B PCR Not Detected     Parainfluenza Virus 1 Not Detected     Parainfluenza Virus 2 Not Detected     Parainfluenza Virus 3 Not Detected     Parainfluenza Virus 4 Not Detected     RSV, PCR Not Detected     Bordetella pertussis pcr Not Detected     Bordetella parapertussis PCR Not Detected     Chlamydophila pneumoniae PCR Not Detected     Mycoplasma pneumo by PCR Not Detected    Narrative:      In the setting of a positive respiratory panel with a viral infection PLUS a negative procalcitonin without other underlying concern for bacterial infection, consider observing off antibiotics or discontinuation of antibiotics and continue supportive care. If the respiratory panel is positive for atypical bacterial infection (Bordetella pertussis, Chlamydophila pneumoniae, or Mycoplasma pneumoniae), consider antibiotic de-escalation to target atypical bacterial infection.            No radiology results from the last 24 hrs        Current medications:  Scheduled Meds:Pharmacy Consult, , Does not apply, Q12H  acetaminophen, 1,000 mg, Oral, Q8H  fluconazole, 400 mg, Oral, Q24H  folic acid, 1 mg, Oral, Daily  furosemide, 40 mg, Oral, Daily  lactobacillus acidophilus, 1 capsule, Oral, Daily  levothyroxine, 125 mcg, Oral, Daily  oxyCODONE, 15 mg, Oral, Q6H  pramipexole, 1 mg, Oral, Daily  pregabalin, 200 mg, Oral, TID  senna-docusate sodium, 2 tablet, Oral, BID  sertraline, 100 mg, Oral, Nightly  sodium chloride, 10 mL, Intravenous, Q12H      Continuous  Infusions:lactated ringers, 9 mL/hr      PRN Meds:.  aluminum-magnesium hydroxide-simethicone    senna-docusate sodium **AND** polyethylene glycol **AND** bisacodyl **AND** bisacodyl    diphenhydrAMINE    HYDROmorphone    lactated ringers    loperamide    LORazepam    melatonin    naloxone    ondansetron    oxyCODONE    sodium chloride    sodium chloride    traZODone    Assessment & Plan   Assessment & Plan     Active Hospital Problems    Diagnosis  POA    Anxiety associated with depression [F41.8]  Yes    RLS (restless legs syndrome) [G25.81]  Yes    Neuropathy [G62.9]  Yes    Obesity, morbid, BMI 50 or higher [E66.01]  Yes    S/P AKA (above knee amputation), left [Z89.612]  Not Applicable    Chronic osteomyelitis [M86.60]  Yes    Acquired hypothyroidism [E03.9]  Yes    Ulcer of right midfoot with fat layer exposed [L97.412]  Yes      Resolved Hospital Problems    Diagnosis Date Resolved POA    **Lower extremity cellulitis [L03.119] 01/08/2024 Yes    Ulcer of left heel, with fat layer exposed [L97.422] 01/08/2024 Yes        Brief Hospital Course to date:  Phoebe Carvajal is a 48 y.o. female w recurrent chronic LE osteomyelitis c/b prosthetic joint infection who presented 12/28 with foul odor from chronic wounds S/p LLE AKA 12/29, additional debridement 12/31, 1/9, 1/12. 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 Wound vac at discharge as current plan   -PT wound care  - Dr Tayo Arnold ID recommended to stop antibiotics     Complex pain, chronic opioid use  -prior on oxycodone 10 mg up to 6 tabs/day  -here oxycodone 15 mg q6 hrs (same TDD) + breakthrough dosing increased 1/13. IV dilaudid only for wound changes for now     Hematuria   -urine culture negative, evaluated by urology w recs for 6-8 week OP f/u ([facility])    HFpEF (data deficit) - on chronic lasix w significant leg swelling, unclear if CHF v  venostasis. Wraps per wound care, IV lasix x 1 1/15 w significant improvement, now PO lasix 40 mg daily     Right foot ulcer, improving  --PT wound care following  --Offload right foot for now, but improving significantly     Macrocytic acute on chronic anemia: post-op + B12 + folate deficiency  -B12 IM replacement, weekly at discharge  -Daily folate replacement    Borderline blood pressure - chronic SBP , OK for meds above  Neuropathy - continue Lyrica   RLS- continue mirapex  BMI 56 - complicates all aspects of care   Anxiety - increased dose zoloft per patient request      Expected Discharge Location and Transportation: [facility] following  Expected Discharge 1/19 (Discharge date is tentative pending bed availability in )   Expected Discharge Date: 1/19/2024; Expected Discharge Time:  3:00 PM     DVT prophylaxis:  Mechanical DVT prophylaxis orders are present.     AM-PAC 6 Clicks Score (PT): 10 (01/17/24 2030)    CODE STATUS:   Code Status and Medical Interventions:   Ordered at: 12/29/23 0121     Code Status (Patient has no pulse and is not breathing):    CPR (Attempt to Resuscitate)     Medical Interventions (Patient has pulse or is breathing):    Full Support       Gertrude Love MD  01/18/24    Gertrude Cain MD  Internal Medicine Pediatrics PGY-4  [facility]    Available through [employer]
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
## 2024-01-18 NOTE — PROGRESS NOTES
Continued Stay Note  [facility]     Patient Name: Phoebe Carvajal  MRN: [id number]  Today's Date: 1/18/2024    Admit Date: 12/28/2023    Plan: [facility] Acute Rehab   Discharge Plan       Row Name 01/18/24 1322       Plan    Plan [facility]    Plan Comments Contacted [facility] rep, Chari [phone], who reports they have received insurance approval for acute rehab and they are able to accept her in transfer tomorrow.  [facility] ambulance transport arranged for 10:30a.m. 01/19/2024.  Spoke with patient at bedside who is aware of and in agreement with above.  Patient will need dictated transfer summary for dishcarge.    Final Discharge Disposition Code 62 - inpatient rehab facility                   Discharge Codes    No documentation.                 Expected Discharge Date and Time       Expected Discharge Date Expected Discharge Time    Jan 19, 2024               Elizabeth Llanes RN

## 2024-01-19 VITALS
SYSTOLIC BLOOD PRESSURE: 107 MMHG | TEMPERATURE: 98.1 F | BODY MASS INDEX: 53.92 KG/M2 | OXYGEN SATURATION: 95 % | HEIGHT: 62 IN | HEART RATE: 69 BPM | RESPIRATION RATE: 16 BRPM | WEIGHT: 293 LBS | DIASTOLIC BLOOD PRESSURE: 62 MMHG

## 2024-01-19 LAB
FUNGUS WND CULT: NORMAL
MYCOBACTERIUM SPEC CULT: NORMAL
MYCOBACTERIUM SPEC CULT: NORMAL
NIGHT BLUE STAIN TISS: NORMAL
NIGHT BLUE STAIN TISS: NORMAL

## 2024-01-19 PROCEDURE — 97597 DBRDMT OPN WND 1ST 20 CM/<: CPT

## 2024-01-19 PROCEDURE — 99239 HOSP IP/OBS DSCHRG MGMT >30: CPT | Performed by: STUDENT IN AN ORGANIZED HEALTH CARE EDUCATION/TRAINING PROGRAM

## 2024-01-19 PROCEDURE — 97605 NEG PRS WND THER DME<=50SQCM: CPT

## 2024-01-19 PROCEDURE — 29581 APPL MULTLAYER CMPRN SYS LEG: CPT

## 2024-01-19 RX ORDER — FLUCONAZOLE 200 MG/1
400 TABLET ORAL EVERY 24 HOURS
Qty: 9 TABLET | Refills: 0 | Status: SHIPPED | OUTPATIENT
Start: 2024-01-19 | End: 2024-01-28

## 2024-01-19 RX ORDER — SERTRALINE HYDROCHLORIDE 100 MG/1
100 TABLET, FILM COATED ORAL NIGHTLY
Qty: 30 TABLET | Refills: 0 | Status: SHIPPED | OUTPATIENT
Start: 2024-01-19 | End: 2024-02-18

## 2024-01-19 RX ORDER — FUROSEMIDE 40 MG/1
40 TABLET ORAL DAILY
Qty: 30 TABLET | Refills: 0 | Status: SHIPPED | OUTPATIENT
Start: 2024-01-19 | End: 2024-02-18

## 2024-01-19 RX ORDER — ALUMINA, MAGNESIA, AND SIMETHICONE 2400; 2400; 240 MG/30ML; MG/30ML; MG/30ML
15 SUSPENSION ORAL EVERY 6 HOURS PRN
Qty: 100 ML | Refills: 0 | Status: SHIPPED | OUTPATIENT
Start: 2024-01-19 | End: 2024-02-18

## 2024-01-19 RX ADMIN — OXYCODONE HYDROCHLORIDE 15 MG: 15 TABLET ORAL at 01:10

## 2024-01-19 RX ADMIN — FUROSEMIDE 40 MG: 40 TABLET ORAL at 08:42

## 2024-01-19 RX ADMIN — PREGABALIN 200 MG: 100 CAPSULE ORAL at 08:43

## 2024-01-19 RX ADMIN — PRAMIPEXOLE DIHYDROCHLORIDE 1 MG: 0.25 TABLET ORAL at 08:42

## 2024-01-19 RX ADMIN — OXYCODONE HYDROCHLORIDE 15 MG: 15 TABLET ORAL at 08:42

## 2024-01-19 RX ADMIN — FOLIC ACID 1 MG: 1 TABLET ORAL at 08:42

## 2024-01-19 RX ADMIN — LEVOTHYROXINE SODIUM 125 MCG: 125 TABLET ORAL at 05:34

## 2024-01-19 RX ADMIN — FLUCONAZOLE 400 MG: 100 TABLET ORAL at 09:28

## 2024-01-19 RX ADMIN — Medication 1 CAPSULE: at 08:42

## 2024-01-19 NOTE — PLAN OF CARE
Goal Outcome Evaluation:  Plan of Care Reviewed With: patient           Outcome Evaluation: R plantar wound progressing well with moderate reepithelialization noted. PT was able to lighlty debride a moderate amount of periwound callus to help improve skin integrity and limit potential for breakdown.  PT also transitioned pt to Portable Prevena wound vac to allow for d/c to rehab. dressing may remain intact for 7 days. PT also educated pt on management of wound vac and need for daily charging. Pt verb understanding.
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
## 2024-01-19 NOTE — DISCHARGE SUMMARY
[facility] Medicine Services  DISCHARGE SUMMARY    Patient Name: Phoebe Carvajal  : 1975  MRN: [id number]    Date of Admission: 2023  6:08 PM  Date of Discharge:  2024  Primary Care Physician: Sofya Benjamin MD    Consults       Date and Time Order Name Status Description    2024  6:40 PM Inpatient Urology Consult Completed     1/3/2024  3:17 PM Inpatient Palliative Care MD Consult Completed     2023  1:21 AM Inpatient Infectious Diseases Consult Completed     2023  1:21 AM Inpatient Orthopedic Surgery Consult Completed             Hospital Course     Presenting Problem: Left leg wounds     Active Hospital Problems    Diagnosis  POA    Anxiety associated with depression [F41.8]  Yes    RLS (restless legs syndrome) [G25.81]  Yes    Neuropathy [G62.9]  Yes    Obesity, morbid, BMI 50 or higher [E66.01]  Yes    S/P AKA (above knee amputation), left [Z89.612]  Not Applicable    Chronic osteomyelitis [M86.60]  Yes    Acquired hypothyroidism [E03.9]  Yes    Ulcer of right midfoot with fat layer exposed [L97.412]  Yes      Resolved Hospital Problems    Diagnosis Date Resolved POA    **Lower extremity cellulitis [L03.119] 2024 Yes    Ulcer of left heel, with fat layer exposed [L97.422] 2024 Yes          Hospital Course:  Phoebe Carvajal is a 48 y.o. female w recurrent chronic LE osteomyelitis c/b prosthetic joint infection who presented  with foul odor from chronic wounds and OP planning for AKA.   S/p LLE AKA , additional debridement , , .      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 Wound vac at discharge as current plan   -No evidence of osteomyelitis from surgical path amputation site   -Repeat debridements required due to incisional ischemia   - Tissue culture + for yeast  - ID following; discontinued antibiotics due to path reports. Will  continue fluconazole 400 mg daily pending yeast ID   - Will [facility] up with [facility] in 2 weeks.   - ID follow up outpatient   - PCP in 1 week if possible      Complex pain, chronic opioid use  -prior on oxycodone 10 mg up to 6 tabs/day  -here oxycodone 15 mg q6 hrs (same TDD) + breakthrough dosing increased 1/13.   - Will discharge on oxycodone 15 mg q6h PRN for 4 days      Hematuria   -urine culture negative, evaluated by urology w recs for 6-8 week OP f/u ([facility])     HFpEF (data deficit) - on chronic lasix w significant leg swelling, unclear if CHF v venostasis. Wraps per wound care  Increase daily Furosemide to 40 mg PO     Right midfoot ulcer, improving  -Offload right foot for now, but improving significantly  - Continue to monitor      Macrocytic acute on chronic anemia: post-op + B12 + folate deficiency  -B12 IM replacement, weekly at discharge  -Continue daily folate replacement     Neuropathy - continue Lyrica   RLS- continue Pramipexole   Anxiety - Increased to 100 mg Sertraline nightly  per patient request plus ativan 0.5 mg q12h PRN (has been requiring once a day in the past 3 days)   Hypothyroidism: Levothyroxine 125 mcg         Discharge Follow Up Recommendations for outpatient labs/diagnostics:   Follow up with PCP in 1 week  Continue fluconazole until Yeast ID comes back   Follow up with Ortho in 2 weeks  Follow up with Urology for Hematuria     Day of Discharge     HPI:   Nervous about traveling to  due to the weather condition. Trying to eat her oatmeal since her stomach was bothering her. Reports her pain is control     Review of Systems   All other systems reviewed and are negative.        Vital Signs:   Temp:  [97.6 °F (36.4 °C)-98.4 °F (36.9 °C)] 98.1 °F (36.7 °C)  Heart Rate:  [61-72] 65  Resp:  [16-18] 16  BP: ()/(47-68) 106/68      Physical Exam:  Constitutional: awake, alert female sitting up in bed NAD  HENT: NCAT, mucous membranes moist  Respiratory: Clear to  auscultation bilaterally, respiratory effort normal . Edema significantly improved from prior exam, not pitting  Cardiovascular: RRR, no murmurs, rubs, or gallops. Pitting edema bilateral legs  Gastrointestinal: Soft, nontender, nondistended  Musculoskeletal: Right foot bandage, Left AKA site w wound vac in place w minimal drainage  Psychiatric: Calm and appropriate  Neurologic: Alert and oriented, facial movements symmetric, speech clear  Skin: No rashes    Pertinent  and/or Most Recent Results     LAB RESULTS:      Lab 01/14/24  0629   WBC 3.78   HEMOGLOBIN 8.6*   HEMATOCRIT 27.2*   PLATELETS 233   .5*         Lab 01/14/24  0629   SODIUM 140   POTASSIUM 4.2   CHLORIDE 107   CO2 26.0   ANION GAP 7.0   BUN 8   CREATININE 0.38*   EGFR 122.3   GLUCOSE 100*   CALCIUM 8.4*                     Lab 01/12/24  1132   ABO TYPING A   RH TYPING Negative   ANTIBODY SCREEN Negative         Brief Urine Lab Results  (Last result in the past 365 days)        Color   Clarity   Blood   Leuk Est   Nitrite   Protein   CREAT   Urine HCG        01/05/24 1351 Yellow   Cloudy   Negative   Small (1+)   Negative   Trace                 Microbiology Results (last 10 days)       Procedure Component Value - Date/Time    Fungus Culture - Tissue, Leg, Left [[id number]]  (Abnormal) Collected: 01/12/24 1318    Lab Status: Preliminary result Specimen: Tissue from Leg, Left Updated: 01/17/24 1040     Fungus Culture Scant growth (1+) Yeast isolated    AFB Culture - Tissue, Leg, Left [[id number]] Collected: 01/12/24 1318    Lab Status: Preliminary result Specimen: Tissue from Leg, Left Updated: 01/17/24 1515     AFB Culture No AFB isolated at less than 1 week     AFB Stain No acid fast bacilli seen on concentrated smear    Anaerobic Culture 10 Day Incubation - Tissue, Leg, Left [[id number]]  (Normal) Collected: 01/12/24 1318    Lab Status: Preliminary result Specimen: Tissue from Leg, Left Updated: 01/17/24 0809     Anaerobic Culture No anaerobes  isolated at 5 days    Wound Culture - Surgical Site, Leg, Left [[id number]] Collected: 01/12/24 1318    Lab Status: Final result Specimen: Surgical Site from Leg, Left Updated: 01/15/24 0830     Wound Culture No growth at 3 days     Gram Stain Rare (1+) WBCs per low power field      Rare (1+) Epithelial cells per low power field      Many (4+) Red blood cells      No organisms seen    Anaerobic Culture - Tissue, Leg, Left [[id number]]  (Normal) Collected: 01/09/24 1844    Lab Status: Final result Specimen: Tissue from Leg, Left Updated: 01/14/24 0939     Anaerobic Culture No anaerobes isolated at 5 days    Fungus Culture - Tissue, Leg, Left [[id number]] Collected: 01/09/24 1844    Lab Status: Preliminary result Specimen: Tissue from Leg, Left Updated: 01/16/24 2100     Fungus Culture No fungus isolated at 1 week    Tissue / Bone Culture - Tissue, Leg, Left [[id number]] Collected: 01/09/24 1844    Lab Status: Final result Specimen: Tissue from Leg, Left Updated: 01/12/24 0728     Tissue Culture No growth at 3 days     Gram Stain Rare (1+) WBCs seen      No organisms seen    AFB Culture - Tissue, Leg, Left [[id number]] Collected: 01/09/24 1844    Lab Status: Preliminary result Specimen: Tissue from Leg, Left Updated: 01/16/24 2100     AFB Culture No AFB isolated at 1 week     AFB Stain No acid fast bacilli seen on concentrated smear            CT Lower Extremity Left Without Contrast    Result Date: 1/9/2024  CT LOWER EXTREMITY LEFT WO CONTRAST Date of Exam: 1/9/2024 9:42 AM EST Indication: Soft tissue infection suspected, thigh, xray done. Comparison: None available. Technique: Axial CT images were obtained of the left lower extremity without contrast administration.  Reconstructed coronal and sagittal images were also obtained. Automated exposure control and iterative construction methods were used. Findings: Exam is limited by patient status and lack of intravenous contrast. There is sequela of above-knee amputation at  the level of the mid femoral shaft. Extensive surgical changes in the surrounding soft tissues including a distal incision with cutaneous staples and diffuse subcutaneous and deep soft tissue edema. Multiple foci of gas extend from the incision site proximally through the deep soft tissues, however with no evidence of extension to the bone. There is a small gas fluid collection near the deep aspect of the incision site measuring up to 3.3 x 3.6 cm as seen on  axial image 186 and coronal image 126. This also does not appear to extend to bone. The left femoral cortex as well as the osteotomy site demonstrates no irregularity, and margins are sharp. The remaining visualized soft tissues show anterior abdominal wall cutaneous thickening and subcutaneous edema with multiple subcutaneous calcifications, possibly sequela of injection. No discrete fluid collection. Partially visualized right leg edema.     Impression: Postsurgical changes of above-knee amputation at the level of the mid femoral shaft. Extensive associated soft tissue changes distally including multiple foci of gas extending proximally from the incision site into the deep soft tissues. There is a small  gas fluid collection measuring up to 3.6 cm along the deep aspect of the incision site, without evidence of osseous extension. Otherwise no discrete fluid collection. No evidence of osteomyelitis. Electronically Signed: Jluis Carl MD  1/9/2024 10:22 AM EST  Workstation ID: [id number]                 Plan for Follow-up of Pending Labs/Results: Yeast ID   Pending Labs       Order Current Status    AFB Culture - Surgical Site, Leg, Left Preliminary result    AFB Culture - Tissue, Leg, Left Preliminary result    AFB Culture - Tissue, Leg, Left Preliminary result    AFB Culture - Tissue, Leg, Left Preliminary result    Anaerobic Culture 10 Day Incubation - Tissue, Leg, Left Preliminary result    Fungus Culture - Surgical Site, Leg, Left Preliminary result     Fungus Culture - Tissue, Leg, Left Preliminary result    Fungus Culture - Tissue, Leg, Left Preliminary result    Fungus Culture - Tissue, Leg, Left Preliminary result          Discharge Details        Discharge Medications        New Medications        Instructions Start Date   acetaminophen 500 MG tablet  Commonly known as: TYLENOL   1,000 mg, Oral, Every 8 Hours Scheduled      aluminum-magnesium hydroxide-simethicone 400-400-40 MG/5ML suspension  Commonly known as: MAALOX MAX   15 mL, Oral, Every 6 Hours PRN      famotidine 20 MG tablet  Commonly known as: PEPCID   20 mg, Oral, 2 Times Daily Before Meals      fluconazole 200 MG tablet  Commonly known as: DIFLUCAN   400 mg, Oral, Every 24 Hours      folic acid 1 MG tablet  Commonly known as: FOLVITE   1 mg, Oral, Daily      lactobacillus acidophilus capsule capsule   1 capsule, Oral, Daily      PHARMACY CONSULT   Does not apply, Every 12 Hours Scheduled             Changes to Medications        Instructions Start Date   furosemide 40 MG tablet  Commonly known as: LASIX  What changed:   medication strength  how much to take   40 mg, Oral, Daily      LORazepam 1 MG tablet  Commonly known as: ATIVAN  What changed: Another medication with the same name was added. Make sure you understand how and when to take each.   1 mg, Oral      LORazepam 0.5 MG tablet  Commonly known as: ATIVAN  What changed: You were already taking a medication with the same name, and this prescription was added. Make sure you understand how and when to take each.   0.5 mg, Oral, Daily PRN      oxyCODONE 15 MG immediate release tablet  Commonly known as: ROXICODONE  What changed:   medication strength  when to take this  reasons to take this   15 mg, Oral, Every 6 Hours      sertraline 100 MG tablet  Commonly known as: ZOLOFT  What changed:   medication strength  See the new instructions.   100 mg, Oral, Nightly      traZODone 50 MG tablet  Commonly known as: DESYREL  What changed:   when to take  this  reasons to take this   50 mg, Oral, Nightly PRN             Continue These Medications        Instructions Start Date   ascorbic acid 1000 MG tablet  Commonly known as: VITAMIN C   1.5 tablets, Oral, Daily      diphenhydrAMINE 25 MG tablet  Commonly known as: BENADRYL   25 mg, Oral      levothyroxine 125 MCG tablet  Commonly known as: SYNTHROID, LEVOTHROID   125 mcg, Oral, Daily      pramipexole 0.5 MG tablet  Commonly known as: MIRAPEX   1 mg, Oral, Daily      pregabalin 200 MG capsule  Commonly known as: LYRICA   200 mg, Oral, 3 Times Daily      vitamin D 1.25 MG (17947 UT) capsule capsule  Commonly known as: ERGOCALCIFEROL   50,000 Units, Oral, Weekly      zinc gluconate 50 MG tablet   50 mg, Oral, Daily             ASK your doctor about these medications        Instructions Start Date   cyanocobalamin 1000 MCG/ML injection  Ask about: Should I take this medication?   1,000 mcg, Intramuscular, Daily               Allergies   Allergen Reactions    Vicodin [Hydrocodone-Acetaminophen] Itching         Discharge Disposition:  Rehab Facility or Unit (DC - External)    Diet:  Hospital:  Diet Order   Procedures    Diet: Regular/House Diet; Texture: Regular Texture (IDDSI 7); Fluid Consistency: Thin (IDDSI 0)       Diet Instructions       Diet: Regular/House Diet; Regular Texture (IDDSI 7); Thin (IDDSI 0)      Discharge Diet: Regular/House Diet    Texture: Regular Texture (IDDSI 7)    Fluid Consistency: Thin (IDDSI 0)             Activity:  Activity Instructions       Other Activity Restrictions      Type of Restriction: Other    Explain Other Restrictions: non weight bearing on right LE            Restrictions or Other Recommendations:  Non bearing weight on Right LE        CODE STATUS:    Code Status and Medical Interventions:   Ordered at: 12/29/23 0121     Code Status (Patient has no pulse and is not breathing):    CPR (Attempt to Resuscitate)     Medical Interventions (Patient has pulse or is breathing):     Full Support       Future Appointments   Date Time Provider Department Center   1/19/2024 10:30 AM [facility] 8 [facility]   2/19/2024  3:50 PM Samy Kinsey MD [facility]       Additional Instructions for the Follow-ups that You Need to Schedule       Discharge Follow-up with PCP   As directed       Currently Documented PCP:    Sofya Benjamin MD    PCP Phone Number:    [phone]     Follow Up Details: 1 week        Discharge Follow-up with Specified Provider: [facility]; 2 Weeks   As directed      To: [facility]   Follow Up: 2 Weeks                      Gertrude Love MD  01/19/24      Time Spent on Discharge:  I spent 44 minutes on this discharge activity which included: face-to-face encounter with the patient, reviewing the data in the system, coordination of the care with the nursing staff as well as consultants, documentation, and entering orders.      Attending Attestation       I have performed an independent face-to-face diagnostic evaluation including performing an independent physical examination.  The documented plan of care above was reviewed and developed with Dr. Cain,  resident, who performed portions of the examination and documentation for this patient's care under my direct supervision.      Brief HPI    Patient without complaints, anxious for discharge today. Denies fevers, chills, sweats.     Attending Physical Exam:  Temp:  [97.6 °F (36.4 °C)-98.4 °F (36.9 °C)] 98.1 °F (36.7 °C)  Heart Rate:  [61-72] 69  Resp:  [16-18] 16  BP: ()/(47-68) 107/62    General appearance: alert, awake, oriented, no acute distress   Cardiovascular: RRR, no murmurs or rubs, radial pulse full 2/4 BL   Respiratory: CTAB, no crackles or wheezes, oxygenating well on room air   Abdomen: soft, non-tender, no organomegaly, bowel sounds normoactive  MSK: L AKA  w wound vac, no evidence of leakage, BL LE edema     Neuro/CNS: alert and oriented x3,  normal speech    Assessment and Plan:    See assessment and plan documented by resident above and updated/edited by me as appropriate.    Ollie Trinh, DO  01/19/24

## 2024-01-19 NOTE — CASE MANAGEMENT/SOCIAL WORK
Case Management Discharge Note      Final Note: Patient has orders for discharge. Spoke with  ambulace coordinator who advise that her run is still scheduled and on time. Spoke to [facility] liaison who advises they are expecting her today to [facility]. Spoke with patient at bedside regarding discharge plan and advised that in spite of the weather her ambulance has confirmed her run today as on time. Patient verbalizes understanding and agreement with plan for [facility] today. No new discharge needs verbalized. [facility]  [facility] scheduled for 1030. Nursing to call report to [phone]. [facility] liaison will pull discharge summary from [facility].         Selected Continued Care - Admitted Since 12/28/2023       Destination Coordination complete.      Service Provider Selected Services Address Phone Fax Patient Preferred    [facility] [address] [address] [phone] --              Durable Medical Equipment    No services have been selected for the patient.                Dialysis/Infusion    No services have been selected for the patient.                Home Medical Care    No services have been selected for the patient.                Therapy    No services have been selected for the patient.                Community Resources    No services have been selected for the patient.                Community & DME    No services have been selected for the patient.                         Final Discharge Disposition Code: 62 - inpatient rehab facility
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
## 2024-01-21 LAB
FUNGUS WND CULT: NORMAL
MYCOBACTERIUM SPEC CULT: NORMAL
NIGHT BLUE STAIN TISS: NORMAL

## 2024-01-22 LAB
BACTERIA SPEC ANAEROBE CULT: NORMAL
FUNGUS WND CULT: ABNORMAL

## 2024-01-23 LAB
FUNGUS WND CULT: NORMAL
MYCOBACTERIUM SPEC CULT: NORMAL
NIGHT BLUE STAIN TISS: NORMAL

## 2024-01-28 LAB
FUNGUS WND CULT: NORMAL
MYCOBACTERIUM SPEC CULT: NORMAL
NIGHT BLUE STAIN TISS: NORMAL

## 2024-01-30 LAB
FUNGUS WND CULT: NORMAL
MYCOBACTERIUM SPEC CULT: NORMAL
NIGHT BLUE STAIN TISS: NORMAL

## 2024-02-04 LAB
FUNGUS WND CULT: NORMAL
MYCOBACTERIUM SPEC CULT: NORMAL
NIGHT BLUE STAIN TISS: NORMAL

## 2024-02-05 ENCOUNTER — HOSPITAL ENCOUNTER (INPATIENT)
Facility: HOSPITAL | Age: 49
LOS: 8 days | Discharge: NURSING, REHAB OR LONG-TERM CARE | End: 2024-02-13
Attending: EMERGENCY MEDICINE | Admitting: HOSPITALIST
Payer: COMMERCIAL

## 2024-02-05 DIAGNOSIS — L08.9 WOUND INFECTION: Primary | ICD-10-CM

## 2024-02-05 DIAGNOSIS — T87.40 AMPUTATION STUMP INFECTION: ICD-10-CM

## 2024-02-05 DIAGNOSIS — T14.8XXA WOUND INFECTION: Primary | ICD-10-CM

## 2024-02-05 PROBLEM — M86.9 OSTEOMYELITIS: Status: ACTIVE | Noted: 2024-02-05

## 2024-02-05 LAB
ALBUMIN SERPL-MCNC: 2.8 G/DL (ref 3.5–5.2)
ALBUMIN/GLOB SERPL: 0.8 G/DL
ALP SERPL-CCNC: 215 U/L (ref 39–117)
ALT SERPL W P-5'-P-CCNC: 6 U/L (ref 1–33)
ANION GAP SERPL CALCULATED.3IONS-SCNC: 7 MMOL/L (ref 5–15)
AST SERPL-CCNC: 28 U/L (ref 1–32)
BASOPHILS # BLD AUTO: 0.02 10*3/MM3 (ref 0–0.2)
BASOPHILS NFR BLD AUTO: 0.5 % (ref 0–1.5)
BILIRUB SERPL-MCNC: 0.5 MG/DL (ref 0–1.2)
BUN SERPL-MCNC: 6 MG/DL (ref 6–20)
BUN/CREAT SERPL: 10.3 (ref 7–25)
CALCIUM SPEC-SCNC: 8.3 MG/DL (ref 8.6–10.5)
CHLORIDE SERPL-SCNC: 101 MMOL/L (ref 98–107)
CO2 SERPL-SCNC: 31 MMOL/L (ref 22–29)
CREAT SERPL-MCNC: 0.58 MG/DL (ref 0.57–1)
D-LACTATE SERPL-SCNC: 1.3 MMOL/L (ref 0.5–2)
DEPRECATED RDW RBC AUTO: 62.5 FL (ref 37–54)
EGFRCR SERPLBLD CKD-EPI 2021: 110.4 ML/MIN/1.73
EOSINOPHIL # BLD AUTO: 0.27 10*3/MM3 (ref 0–0.4)
EOSINOPHIL NFR BLD AUTO: 7.2 % (ref 0.3–6.2)
ERYTHROCYTE [DISTWIDTH] IN BLOOD BY AUTOMATED COUNT: 17.2 % (ref 12.3–15.4)
GLOBULIN UR ELPH-MCNC: 3.7 GM/DL
GLUCOSE SERPL-MCNC: 88 MG/DL (ref 65–99)
HCT VFR BLD AUTO: 31.7 % (ref 34–46.6)
HGB BLD-MCNC: 9.8 G/DL (ref 12–15.9)
IMM GRANULOCYTES # BLD AUTO: 0.01 10*3/MM3 (ref 0–0.05)
IMM GRANULOCYTES NFR BLD AUTO: 0.3 % (ref 0–0.5)
LYMPHOCYTES # BLD AUTO: 1.14 10*3/MM3 (ref 0.7–3.1)
LYMPHOCYTES NFR BLD AUTO: 30.4 % (ref 19.6–45.3)
MCH RBC QN AUTO: 30.5 PG (ref 26.6–33)
MCHC RBC AUTO-ENTMCNC: 30.9 G/DL (ref 31.5–35.7)
MCV RBC AUTO: 98.8 FL (ref 79–97)
MONOCYTES # BLD AUTO: 0.34 10*3/MM3 (ref 0.1–0.9)
MONOCYTES NFR BLD AUTO: 9.1 % (ref 5–12)
NEUTROPHILS NFR BLD AUTO: 1.97 10*3/MM3 (ref 1.7–7)
NEUTROPHILS NFR BLD AUTO: 52.5 % (ref 42.7–76)
NRBC BLD AUTO-RTO: 0 /100 WBC (ref 0–0.2)
PLATELET # BLD AUTO: 242 10*3/MM3 (ref 140–450)
PMV BLD AUTO: 10.8 FL (ref 6–12)
POTASSIUM SERPL-SCNC: 4.3 MMOL/L (ref 3.5–5.2)
PROCALCITONIN SERPL-MCNC: <0.02 NG/ML (ref 0–0.25)
PROT SERPL-MCNC: 6.5 G/DL (ref 6–8.5)
RBC # BLD AUTO: 3.21 10*6/MM3 (ref 3.77–5.28)
SODIUM SERPL-SCNC: 139 MMOL/L (ref 136–145)
WBC NRBC COR # BLD AUTO: 3.75 10*3/MM3 (ref 3.4–10.8)

## 2024-02-05 PROCEDURE — 87205 SMEAR GRAM STAIN: CPT | Performed by: EMERGENCY MEDICINE

## 2024-02-05 PROCEDURE — 83605 ASSAY OF LACTIC ACID: CPT | Performed by: EMERGENCY MEDICINE

## 2024-02-05 PROCEDURE — 80053 COMPREHEN METABOLIC PANEL: CPT | Performed by: EMERGENCY MEDICINE

## 2024-02-05 PROCEDURE — 87040 BLOOD CULTURE FOR BACTERIA: CPT | Performed by: EMERGENCY MEDICINE

## 2024-02-05 PROCEDURE — 25010000002 HYDROMORPHONE 1 MG/ML SOLUTION: Performed by: EMERGENCY MEDICINE

## 2024-02-05 PROCEDURE — 25010000002 CYANOCOBALAMIN PER 1000 MCG: Performed by: INTERNAL MEDICINE

## 2024-02-05 PROCEDURE — 36415 COLL VENOUS BLD VENIPUNCTURE: CPT

## 2024-02-05 PROCEDURE — 85025 COMPLETE CBC W/AUTO DIFF WBC: CPT | Performed by: EMERGENCY MEDICINE

## 2024-02-05 PROCEDURE — 87077 CULTURE AEROBIC IDENTIFY: CPT | Performed by: EMERGENCY MEDICINE

## 2024-02-05 PROCEDURE — 99285 EMERGENCY DEPT VISIT HI MDM: CPT

## 2024-02-05 PROCEDURE — 25010000002 ONDANSETRON PER 1 MG: Performed by: EMERGENCY MEDICINE

## 2024-02-05 PROCEDURE — 25010000002 MEROPENEM PER 100 MG: Performed by: INTERNAL MEDICINE

## 2024-02-05 PROCEDURE — 99223 1ST HOSP IP/OBS HIGH 75: CPT | Performed by: INTERNAL MEDICINE

## 2024-02-05 PROCEDURE — 84145 PROCALCITONIN (PCT): CPT | Performed by: EMERGENCY MEDICINE

## 2024-02-05 PROCEDURE — 25810000003 SODIUM CHLORIDE 0.9 % SOLUTION: Performed by: EMERGENCY MEDICINE

## 2024-02-05 PROCEDURE — 25010000002 ENOXAPARIN PER 10 MG: Performed by: INTERNAL MEDICINE

## 2024-02-05 PROCEDURE — 87186 SC STD MICRODIL/AGAR DIL: CPT | Performed by: EMERGENCY MEDICINE

## 2024-02-05 PROCEDURE — 87070 CULTURE OTHR SPECIMN AEROBIC: CPT | Performed by: EMERGENCY MEDICINE

## 2024-02-05 RX ORDER — POLYETHYLENE GLYCOL 3350 17 G/17G
17 POWDER, FOR SOLUTION ORAL DAILY PRN
Status: DISCONTINUED | OUTPATIENT
Start: 2024-02-05 | End: 2024-02-07

## 2024-02-05 RX ORDER — OXYCODONE HYDROCHLORIDE 15 MG/1
15 TABLET ORAL ONCE
Status: COMPLETED | OUTPATIENT
Start: 2024-02-05 | End: 2024-02-05

## 2024-02-05 RX ORDER — POTASSIUM CHLORIDE 750 MG/1
10 CAPSULE, EXTENDED RELEASE ORAL DAILY
Status: DISCONTINUED | OUTPATIENT
Start: 2024-02-05 | End: 2024-02-13 | Stop reason: HOSPADM

## 2024-02-05 RX ORDER — BISACODYL 5 MG/1
5 TABLET, DELAYED RELEASE ORAL DAILY PRN
Status: DISCONTINUED | OUTPATIENT
Start: 2024-02-05 | End: 2024-02-07

## 2024-02-05 RX ORDER — BISACODYL 10 MG
10 SUPPOSITORY, RECTAL RECTAL DAILY PRN
Status: DISCONTINUED | OUTPATIENT
Start: 2024-02-05 | End: 2024-02-07

## 2024-02-05 RX ORDER — BISACODYL 10 MG
10 SUPPOSITORY, RECTAL RECTAL DAILY PRN
COMMUNITY
End: 2024-02-13 | Stop reason: HOSPADM

## 2024-02-05 RX ORDER — SPIRONOLACTONE 25 MG/1
25 TABLET ORAL DAILY
COMMUNITY
End: 2024-02-13 | Stop reason: HOSPADM

## 2024-02-05 RX ORDER — POTASSIUM CHLORIDE 750 MG/1
10 TABLET, FILM COATED, EXTENDED RELEASE ORAL DAILY
COMMUNITY

## 2024-02-05 RX ORDER — LORAZEPAM 0.5 MG/1
0.5 TABLET ORAL 2 TIMES DAILY PRN
Status: DISCONTINUED | OUTPATIENT
Start: 2024-02-05 | End: 2024-02-13 | Stop reason: HOSPADM

## 2024-02-05 RX ORDER — DIPHENHYDRAMINE HYDROCHLORIDE, ZINC ACETATE 2; .1 G/100G; G/100G
1 CREAM TOPICAL EVERY 4 HOURS PRN
COMMUNITY
End: 2024-02-13 | Stop reason: HOSPADM

## 2024-02-05 RX ORDER — LANOLIN ALCOHOL/MO/W.PET/CERES
3 CREAM (GRAM) TOPICAL NIGHTLY PRN
COMMUNITY

## 2024-02-05 RX ORDER — AMOXICILLIN 250 MG
2 CAPSULE ORAL 2 TIMES DAILY
Status: DISCONTINUED | OUTPATIENT
Start: 2024-02-05 | End: 2024-02-07

## 2024-02-05 RX ORDER — MIDODRINE HYDROCHLORIDE 5 MG/1
7.5 TABLET ORAL
COMMUNITY
End: 2024-02-13 | Stop reason: HOSPADM

## 2024-02-05 RX ORDER — TRAZODONE HYDROCHLORIDE 50 MG/1
50 TABLET ORAL NIGHTLY PRN
Status: DISCONTINUED | OUTPATIENT
Start: 2024-02-05 | End: 2024-02-13 | Stop reason: HOSPADM

## 2024-02-05 RX ORDER — ACETAMINOPHEN 325 MG/1
650 TABLET ORAL EVERY 4 HOURS PRN
COMMUNITY

## 2024-02-05 RX ORDER — CYANOCOBALAMIN 1000 UG/ML
1000 INJECTION, SOLUTION INTRAMUSCULAR; SUBCUTANEOUS
Status: DISCONTINUED | OUTPATIENT
Start: 2024-02-05 | End: 2024-02-13 | Stop reason: HOSPADM

## 2024-02-05 RX ORDER — CLONIDINE HYDROCHLORIDE 0.1 MG/1
0.1 TABLET ORAL EVERY 6 HOURS PRN
COMMUNITY

## 2024-02-05 RX ORDER — OXYCODONE HCL 10 MG/1
10 TABLET, FILM COATED, EXTENDED RELEASE ORAL EVERY 6 HOURS PRN
COMMUNITY

## 2024-02-05 RX ORDER — PRAMIPEXOLE DIHYDROCHLORIDE 1 MG/1
1 TABLET ORAL DAILY
Status: DISCONTINUED | OUTPATIENT
Start: 2024-02-05 | End: 2024-02-13 | Stop reason: HOSPADM

## 2024-02-05 RX ORDER — AMMONIUM LACTATE 12 G/100G
LOTION TOPICAL
COMMUNITY
End: 2024-02-13 | Stop reason: HOSPADM

## 2024-02-05 RX ORDER — FLUTICASONE PROPIONATE 50 MCG
2 SPRAY, SUSPENSION (ML) NASAL NIGHTLY
COMMUNITY

## 2024-02-05 RX ORDER — CLONIDINE HYDROCHLORIDE 0.1 MG/1
0.1 TABLET ORAL EVERY 6 HOURS PRN
Status: DISCONTINUED | OUTPATIENT
Start: 2024-02-05 | End: 2024-02-13 | Stop reason: HOSPADM

## 2024-02-05 RX ORDER — LEVOFLOXACIN 750 MG/1
750 TABLET, FILM COATED ORAL DAILY
Status: DISCONTINUED | OUTPATIENT
Start: 2024-02-05 | End: 2024-02-07

## 2024-02-05 RX ORDER — ONDANSETRON 4 MG/1
4 TABLET, FILM COATED ORAL EVERY 6 HOURS PRN
COMMUNITY

## 2024-02-05 RX ORDER — OXYCODONE HYDROCHLORIDE 10 MG/1
10 TABLET ORAL EVERY 4 HOURS PRN
Status: DISCONTINUED | OUTPATIENT
Start: 2024-02-05 | End: 2024-02-13 | Stop reason: HOSPADM

## 2024-02-05 RX ORDER — ACETAMINOPHEN 325 MG/1
650 TABLET ORAL EVERY 4 HOURS PRN
Status: DISCONTINUED | OUTPATIENT
Start: 2024-02-05 | End: 2024-02-13 | Stop reason: HOSPADM

## 2024-02-05 RX ORDER — SPIRONOLACTONE 25 MG/1
25 TABLET ORAL DAILY
Status: DISCONTINUED | OUTPATIENT
Start: 2024-02-05 | End: 2024-02-06

## 2024-02-05 RX ORDER — LEVOTHYROXINE SODIUM 175 UG/1
175 TABLET ORAL DAILY
Status: DISCONTINUED | OUTPATIENT
Start: 2024-02-05 | End: 2024-02-13 | Stop reason: HOSPADM

## 2024-02-05 RX ORDER — ENOXAPARIN SODIUM 100 MG/ML
40 INJECTION SUBCUTANEOUS DAILY
COMMUNITY

## 2024-02-05 RX ORDER — SERTRALINE HYDROCHLORIDE 100 MG/1
100 TABLET, FILM COATED ORAL NIGHTLY
Status: DISCONTINUED | OUTPATIENT
Start: 2024-02-05 | End: 2024-02-13 | Stop reason: HOSPADM

## 2024-02-05 RX ORDER — POLYETHYLENE GLYCOL 3350 17 G/17G
17 POWDER, FOR SOLUTION ORAL DAILY PRN
COMMUNITY
End: 2024-02-13 | Stop reason: HOSPADM

## 2024-02-05 RX ORDER — FAMOTIDINE 20 MG/1
20 TABLET, FILM COATED ORAL
Status: DISCONTINUED | OUTPATIENT
Start: 2024-02-05 | End: 2024-02-13 | Stop reason: HOSPADM

## 2024-02-05 RX ORDER — ALBUTEROL SULFATE 2.5 MG/3ML
2.5 SOLUTION RESPIRATORY (INHALATION) EVERY 6 HOURS PRN
COMMUNITY

## 2024-02-05 RX ORDER — FUROSEMIDE 40 MG/1
40 TABLET ORAL DAILY
Status: DISCONTINUED | OUTPATIENT
Start: 2024-02-05 | End: 2024-02-09

## 2024-02-05 RX ORDER — SODIUM CHLORIDE 0.9 % (FLUSH) 0.9 %
10 SYRINGE (ML) INJECTION EVERY 12 HOURS SCHEDULED
Status: DISCONTINUED | OUTPATIENT
Start: 2024-02-05 | End: 2024-02-13 | Stop reason: HOSPADM

## 2024-02-05 RX ORDER — NITROGLYCERIN 0.4 MG/1
0.4 TABLET SUBLINGUAL
Status: DISCONTINUED | OUTPATIENT
Start: 2024-02-05 | End: 2024-02-13 | Stop reason: HOSPADM

## 2024-02-05 RX ORDER — SODIUM CHLORIDE 9 MG/ML
40 INJECTION, SOLUTION INTRAVENOUS AS NEEDED
Status: DISCONTINUED | OUTPATIENT
Start: 2024-02-05 | End: 2024-02-13 | Stop reason: HOSPADM

## 2024-02-05 RX ORDER — FLUCONAZOLE 100 MG/1
200 TABLET ORAL DAILY
Status: DISCONTINUED | OUTPATIENT
Start: 2024-02-05 | End: 2024-02-13 | Stop reason: HOSPADM

## 2024-02-05 RX ORDER — CHOLECALCIFEROL (VITAMIN D3) 125 MCG
2.5 CAPSULE ORAL NIGHTLY PRN
Status: DISCONTINUED | OUTPATIENT
Start: 2024-02-05 | End: 2024-02-13 | Stop reason: HOSPADM

## 2024-02-05 RX ORDER — FLUCONAZOLE 200 MG/1
200 TABLET ORAL DAILY
COMMUNITY

## 2024-02-05 RX ORDER — CALCIUM CARBONATE 500 MG/1
1 TABLET, CHEWABLE ORAL EVERY 8 HOURS PRN
COMMUNITY

## 2024-02-05 RX ORDER — ENOXAPARIN SODIUM 100 MG/ML
60 INJECTION SUBCUTANEOUS EVERY 12 HOURS SCHEDULED
Status: DISCONTINUED | OUTPATIENT
Start: 2024-02-05 | End: 2024-02-13 | Stop reason: HOSPADM

## 2024-02-05 RX ORDER — PREGABALIN 100 MG/1
200 CAPSULE ORAL 3 TIMES DAILY
Status: DISCONTINUED | OUTPATIENT
Start: 2024-02-05 | End: 2024-02-13 | Stop reason: HOSPADM

## 2024-02-05 RX ORDER — ONDANSETRON 2 MG/ML
4 INJECTION INTRAMUSCULAR; INTRAVENOUS ONCE
Status: COMPLETED | OUTPATIENT
Start: 2024-02-05 | End: 2024-02-05

## 2024-02-05 RX ORDER — MAGNESIUM OXIDE 400 MG/1
400 TABLET ORAL DAILY
COMMUNITY

## 2024-02-05 RX ORDER — ALBUTEROL SULFATE 2.5 MG/3ML
2.5 SOLUTION RESPIRATORY (INHALATION) EVERY 6 HOURS PRN
Status: DISCONTINUED | OUTPATIENT
Start: 2024-02-05 | End: 2024-02-13 | Stop reason: HOSPADM

## 2024-02-05 RX ORDER — SODIUM CHLORIDE 0.9 % (FLUSH) 0.9 %
10 SYRINGE (ML) INJECTION AS NEEDED
Status: DISCONTINUED | OUTPATIENT
Start: 2024-02-05 | End: 2024-02-13 | Stop reason: HOSPADM

## 2024-02-05 RX ORDER — MIDODRINE HYDROCHLORIDE 10 MG/1
7.5 TABLET ORAL
Status: DISCONTINUED | OUTPATIENT
Start: 2024-02-05 | End: 2024-02-06

## 2024-02-05 RX ORDER — LEVOFLOXACIN 750 MG/1
750 TABLET, FILM COATED ORAL DAILY
COMMUNITY
Start: 2024-02-02 | End: 2024-02-13 | Stop reason: HOSPADM

## 2024-02-05 RX ADMIN — MEROPENEM 1000 MG: 1 INJECTION, POWDER, FOR SOLUTION INTRAVENOUS at 20:52

## 2024-02-05 RX ADMIN — MEROPENEM 1000 MG: 1 INJECTION, POWDER, FOR SOLUTION INTRAVENOUS at 16:06

## 2024-02-05 RX ADMIN — SODIUM CHLORIDE 1000 ML: 9 INJECTION, SOLUTION INTRAVENOUS at 11:44

## 2024-02-05 RX ADMIN — SENNOSIDES AND DOCUSATE SODIUM 2 TABLET: 8.6; 5 TABLET ORAL at 20:52

## 2024-02-05 RX ADMIN — OXYCODONE HYDROCHLORIDE 15 MG: 15 TABLET ORAL at 14:04

## 2024-02-05 RX ADMIN — CYANOCOBALAMIN 1000 MCG: 1000 INJECTION, SOLUTION INTRAMUSCULAR; SUBCUTANEOUS at 16:28

## 2024-02-05 RX ADMIN — SERTRALINE HYDROCHLORIDE 100 MG: 100 TABLET ORAL at 20:52

## 2024-02-05 RX ADMIN — PRAMIPEXOLE DIHYDROCHLORIDE 1 MG: 1 TABLET ORAL at 16:06

## 2024-02-05 RX ADMIN — SODIUM CHLORIDE 1000 ML: 9 INJECTION, SOLUTION INTRAVENOUS at 13:06

## 2024-02-05 RX ADMIN — MIDODRINE HYDROCHLORIDE 7.5 MG: 10 TABLET ORAL at 16:07

## 2024-02-05 RX ADMIN — ENOXAPARIN SODIUM 60 MG: 60 INJECTION SUBCUTANEOUS at 20:52

## 2024-02-05 RX ADMIN — PREGABALIN 200 MG: 100 CAPSULE ORAL at 20:52

## 2024-02-05 RX ADMIN — HYDROMORPHONE HYDROCHLORIDE 1 MG: 1 INJECTION, SOLUTION INTRAMUSCULAR; INTRAVENOUS; SUBCUTANEOUS at 11:44

## 2024-02-05 RX ADMIN — FLUCONAZOLE 200 MG: 100 TABLET ORAL at 16:05

## 2024-02-05 RX ADMIN — LEVOFLOXACIN 750 MG: 750 TABLET, FILM COATED ORAL at 16:28

## 2024-02-05 RX ADMIN — ONDANSETRON 4 MG: 2 INJECTION INTRAMUSCULAR; INTRAVENOUS at 11:44

## 2024-02-05 RX ADMIN — OXYCODONE HYDROCHLORIDE 10 MG: 10 TABLET ORAL at 18:27

## 2024-02-05 RX ADMIN — PREGABALIN 200 MG: 100 CAPSULE ORAL at 16:06

## 2024-02-05 RX ADMIN — FAMOTIDINE 20 MG: 20 TABLET ORAL at 16:08
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
## 2024-02-05 NOTE — LETTER
EMS Transport Request  For use at [facility], [place], [place], [place], and [place] only   Patient Name: Phoebe Carvajal : 1975   Weight:111 kg (243 lb 12.8 oz) Pick-up Location: [facility] BLS/ALS: BLS/ALS: BLS   Insurance: [employer] BLUE CROSS Auth End Date:   Pre-Cert #: D/C Summary complete:    Destination: Other [place]   Contact Precautions: Other contact   Equipment (O2, Fluids, etc.): O2, settings 2L   Arrive By Date/Time: 24 Stretcher/WC: Stretcher   CM Requesting: Kelsey Guillen RN Ext: [phone]   Notes/Medical Necessity: Impaired functional mobility, balance, gait and endurance     ______________________________________________________________________    *Only 2 patient bags OR 1 carry-on size bag are permitted.  Wheelchairs and walkers CANNOT transported with the patient. Acknowledge: Yes

## 2024-02-05 NOTE — Clinical Note
Level of Care: Telemetry [5]   Diagnosis: Amputation stump infection [715122]   Admitting Physician: KENDALL DE LA ROSA [[id number]]   Attending Physician: KENDALL DE LA ROSA [[id number]]

## 2024-02-06 ENCOUNTER — ANESTHESIA (OUTPATIENT)
Dept: PERIOP | Facility: HOSPITAL | Age: 49
End: 2024-02-06
Payer: COMMERCIAL

## 2024-02-06 ENCOUNTER — ANESTHESIA EVENT (OUTPATIENT)
Dept: PERIOP | Facility: HOSPITAL | Age: 49
End: 2024-02-06
Payer: COMMERCIAL

## 2024-02-06 LAB
ANION GAP SERPL CALCULATED.3IONS-SCNC: 7 MMOL/L (ref 5–15)
BUN SERPL-MCNC: 4 MG/DL (ref 6–20)
BUN/CREAT SERPL: 9.5 (ref 7–25)
CALCIUM SPEC-SCNC: 8.2 MG/DL (ref 8.6–10.5)
CHLORIDE SERPL-SCNC: 103 MMOL/L (ref 98–107)
CK SERPL-CCNC: 19 U/L (ref 20–180)
CO2 SERPL-SCNC: 27 MMOL/L (ref 22–29)
CREAT SERPL-MCNC: 0.42 MG/DL (ref 0.57–1)
CRP SERPL-MCNC: 3.71 MG/DL (ref 0–0.5)
DEPRECATED RDW RBC AUTO: 63.2 FL (ref 37–54)
EGFRCR SERPLBLD CKD-EPI 2021: 119.3 ML/MIN/1.73
ERYTHROCYTE [DISTWIDTH] IN BLOOD BY AUTOMATED COUNT: 17.4 % (ref 12.3–15.4)
ERYTHROCYTE [SEDIMENTATION RATE] IN BLOOD: 39 MM/HR (ref 0–30)
FUNGUS WND CULT: NORMAL
GLUCOSE SERPL-MCNC: 70 MG/DL (ref 65–99)
HCT VFR BLD AUTO: 29.2 % (ref 34–46.6)
HGB BLD-MCNC: 8.9 G/DL (ref 12–15.9)
MCH RBC QN AUTO: 30.4 PG (ref 26.6–33)
MCHC RBC AUTO-ENTMCNC: 30.5 G/DL (ref 31.5–35.7)
MCV RBC AUTO: 99.7 FL (ref 79–97)
MRSA DNA SPEC QL NAA+PROBE: NORMAL
MYCOBACTERIUM SPEC CULT: NORMAL
NIGHT BLUE STAIN TISS: NORMAL
PLATELET # BLD AUTO: 193 10*3/MM3 (ref 140–450)
PMV BLD AUTO: 11.2 FL (ref 6–12)
POTASSIUM SERPL-SCNC: 4.3 MMOL/L (ref 3.5–5.2)
RBC # BLD AUTO: 2.93 10*6/MM3 (ref 3.77–5.28)
SODIUM SERPL-SCNC: 137 MMOL/L (ref 136–145)
WBC NRBC COR # BLD AUTO: 3.24 10*3/MM3 (ref 3.4–10.8)

## 2024-02-06 PROCEDURE — 85027 COMPLETE CBC AUTOMATED: CPT | Performed by: INTERNAL MEDICINE

## 2024-02-06 PROCEDURE — 25010000002 VANCOMYCIN 10 G RECONSTITUTED SOLUTION: Performed by: INTERNAL MEDICINE

## 2024-02-06 PROCEDURE — 25010000002 SUGAMMADEX 200 MG/2ML SOLUTION: Performed by: ANESTHESIOLOGY

## 2024-02-06 PROCEDURE — 25010000002 MEROPENEM PER 100 MG: Performed by: INTERNAL MEDICINE

## 2024-02-06 PROCEDURE — 93010 ELECTROCARDIOGRAM REPORT: CPT | Performed by: INTERNAL MEDICINE

## 2024-02-06 PROCEDURE — 0KBR0ZZ EXCISION OF LEFT UPPER LEG MUSCLE, OPEN APPROACH: ICD-10-PCS | Performed by: ORTHOPAEDIC SURGERY

## 2024-02-06 PROCEDURE — 25010000002 PHENYLEPHRINE 10 MG/ML SOLUTION: Performed by: ANESTHESIOLOGY

## 2024-02-06 PROCEDURE — 82550 ASSAY OF CK (CPK): CPT | Performed by: INTERNAL MEDICINE

## 2024-02-06 PROCEDURE — 85652 RBC SED RATE AUTOMATED: CPT | Performed by: INTERNAL MEDICINE

## 2024-02-06 PROCEDURE — 25010000002 DEXAMETHASONE PER 1 MG: Performed by: ANESTHESIOLOGY

## 2024-02-06 PROCEDURE — 99232 SBSQ HOSP IP/OBS MODERATE 35: CPT | Performed by: STUDENT IN AN ORGANIZED HEALTH CARE EDUCATION/TRAINING PROGRAM

## 2024-02-06 PROCEDURE — 25010000002 CLINDAMYCIN 900 MG/50ML SOLUTION: Performed by: ORTHOPAEDIC SURGERY

## 2024-02-06 PROCEDURE — 80048 BASIC METABOLIC PNL TOTAL CA: CPT | Performed by: INTERNAL MEDICINE

## 2024-02-06 PROCEDURE — 25810000003 SODIUM CHLORIDE 0.9 % SOLUTION: Performed by: INTERNAL MEDICINE

## 2024-02-06 PROCEDURE — 87116 MYCOBACTERIA CULTURE: CPT | Performed by: ORTHOPAEDIC SURGERY

## 2024-02-06 PROCEDURE — 87070 CULTURE OTHR SPECIMN AEROBIC: CPT | Performed by: ORTHOPAEDIC SURGERY

## 2024-02-06 PROCEDURE — 97530 THERAPEUTIC ACTIVITIES: CPT

## 2024-02-06 PROCEDURE — 93005 ELECTROCARDIOGRAM TRACING: CPT | Performed by: INTERNAL MEDICINE

## 2024-02-06 PROCEDURE — 87075 CULTR BACTERIA EXCEPT BLOOD: CPT | Performed by: ORTHOPAEDIC SURGERY

## 2024-02-06 PROCEDURE — 25010000002 HYDROMORPHONE 1 MG/ML SOLUTION

## 2024-02-06 PROCEDURE — 25010000002 PROPOFOL 10 MG/ML EMULSION: Performed by: ANESTHESIOLOGY

## 2024-02-06 PROCEDURE — 25010000002 FENTANYL CITRATE (PF) 100 MCG/2ML SOLUTION: Performed by: ANESTHESIOLOGY

## 2024-02-06 PROCEDURE — 25810000003 LACTATED RINGERS PER 1000 ML: Performed by: ANESTHESIOLOGY

## 2024-02-06 PROCEDURE — 87102 FUNGUS ISOLATION CULTURE: CPT | Performed by: ORTHOPAEDIC SURGERY

## 2024-02-06 PROCEDURE — 25010000002 HYDROMORPHONE 1 MG/ML SOLUTION: Performed by: INTERNAL MEDICINE

## 2024-02-06 PROCEDURE — 87205 SMEAR GRAM STAIN: CPT | Performed by: ORTHOPAEDIC SURGERY

## 2024-02-06 PROCEDURE — 87206 SMEAR FLUORESCENT/ACID STAI: CPT | Performed by: ORTHOPAEDIC SURGERY

## 2024-02-06 PROCEDURE — 25010000002 VANCOMYCIN 1 G RECONSTITUTED SOLUTION: Performed by: ORTHOPAEDIC SURGERY

## 2024-02-06 PROCEDURE — 25010000002 ONDANSETRON PER 1 MG: Performed by: ANESTHESIOLOGY

## 2024-02-06 PROCEDURE — 87641 MR-STAPH DNA AMP PROBE: CPT | Performed by: INTERNAL MEDICINE

## 2024-02-06 PROCEDURE — 25010000002 MIDAZOLAM PER 1 MG: Performed by: ANESTHESIOLOGY

## 2024-02-06 PROCEDURE — 86140 C-REACTIVE PROTEIN: CPT | Performed by: INTERNAL MEDICINE

## 2024-02-06 PROCEDURE — 97161 PT EVAL LOW COMPLEX 20 MIN: CPT

## 2024-02-06 DEVICE — KT SEAL HEMOS ABS FLOSEAL MATRX 1.5/FAST/PREP 5000/IU 10ML: Type: IMPLANTABLE DEVICE | Site: LEG | Status: FUNCTIONAL

## 2024-02-06 RX ORDER — FENTANYL CITRATE 50 UG/ML
50 INJECTION, SOLUTION INTRAMUSCULAR; INTRAVENOUS
Status: DISCONTINUED | OUTPATIENT
Start: 2024-02-06 | End: 2024-02-06 | Stop reason: HOSPADM

## 2024-02-06 RX ORDER — FAMOTIDINE 20 MG/1
20 TABLET, FILM COATED ORAL ONCE
Status: CANCELLED | OUTPATIENT
Start: 2024-02-06 | End: 2024-02-06

## 2024-02-06 RX ORDER — LIDOCAINE HYDROCHLORIDE 10 MG/ML
0.5 INJECTION, SOLUTION EPIDURAL; INFILTRATION; INTRACAUDAL; PERINEURAL ONCE AS NEEDED
Status: DISCONTINUED | OUTPATIENT
Start: 2024-02-06 | End: 2024-02-06 | Stop reason: HOSPADM

## 2024-02-06 RX ORDER — HYDROMORPHONE HYDROCHLORIDE 1 MG/ML
0.5 INJECTION, SOLUTION INTRAMUSCULAR; INTRAVENOUS; SUBCUTANEOUS
Status: DISCONTINUED | OUTPATIENT
Start: 2024-02-06 | End: 2024-02-06 | Stop reason: HOSPADM

## 2024-02-06 RX ORDER — CLINDAMYCIN PHOSPHATE 900 MG/50ML
900 INJECTION, SOLUTION INTRAVENOUS ONCE
Status: COMPLETED | OUTPATIENT
Start: 2024-02-06 | End: 2024-02-06

## 2024-02-06 RX ORDER — ONDANSETRON 2 MG/ML
INJECTION INTRAMUSCULAR; INTRAVENOUS AS NEEDED
Status: DISCONTINUED | OUTPATIENT
Start: 2024-02-06 | End: 2024-02-06 | Stop reason: SDUPTHER

## 2024-02-06 RX ORDER — MAGNESIUM HYDROXIDE 1200 MG/15ML
LIQUID ORAL AS NEEDED
Status: DISCONTINUED | OUTPATIENT
Start: 2024-02-06 | End: 2024-02-06 | Stop reason: HOSPADM

## 2024-02-06 RX ORDER — SODIUM CHLORIDE, SODIUM LACTATE, POTASSIUM CHLORIDE, CALCIUM CHLORIDE 600; 310; 30; 20 MG/100ML; MG/100ML; MG/100ML; MG/100ML
9 INJECTION, SOLUTION INTRAVENOUS CONTINUOUS
Status: DISCONTINUED | OUTPATIENT
Start: 2024-02-06 | End: 2024-02-13 | Stop reason: HOSPADM

## 2024-02-06 RX ORDER — HYDROXYZINE HYDROCHLORIDE 25 MG/1
25 TABLET, FILM COATED ORAL 3 TIMES DAILY PRN
Status: DISCONTINUED | OUTPATIENT
Start: 2024-02-06 | End: 2024-02-13 | Stop reason: HOSPADM

## 2024-02-06 RX ORDER — DEXAMETHASONE SODIUM PHOSPHATE 4 MG/ML
INJECTION, SOLUTION INTRA-ARTICULAR; INTRALESIONAL; INTRAMUSCULAR; INTRAVENOUS; SOFT TISSUE AS NEEDED
Status: DISCONTINUED | OUTPATIENT
Start: 2024-02-06 | End: 2024-02-06 | Stop reason: SURG

## 2024-02-06 RX ORDER — SODIUM CHLORIDE 0.9 % (FLUSH) 0.9 %
10 SYRINGE (ML) INJECTION AS NEEDED
Status: DISCONTINUED | OUTPATIENT
Start: 2024-02-06 | End: 2024-02-06 | Stop reason: HOSPADM

## 2024-02-06 RX ORDER — FAMOTIDINE 10 MG/ML
20 INJECTION, SOLUTION INTRAVENOUS ONCE
Status: CANCELLED | OUTPATIENT
Start: 2024-02-06 | End: 2024-02-06

## 2024-02-06 RX ORDER — MIDAZOLAM HYDROCHLORIDE 1 MG/ML
1 INJECTION INTRAMUSCULAR; INTRAVENOUS
Status: DISCONTINUED | OUTPATIENT
Start: 2024-02-06 | End: 2024-02-06 | Stop reason: HOSPADM

## 2024-02-06 RX ORDER — VANCOMYCIN HYDROCHLORIDE 1 G/200ML
1000 INJECTION, SOLUTION INTRAVENOUS EVERY 8 HOURS
Qty: 4200 ML | Refills: 0 | Status: DISCONTINUED | OUTPATIENT
Start: 2024-02-06 | End: 2024-02-07

## 2024-02-06 RX ORDER — PHENYLEPHRINE HYDROCHLORIDE 10 MG/ML
INJECTION INTRAVENOUS AS NEEDED
Status: DISCONTINUED | OUTPATIENT
Start: 2024-02-06 | End: 2024-02-06 | Stop reason: SURG

## 2024-02-06 RX ORDER — SODIUM CHLORIDE 0.9 % (FLUSH) 0.9 %
10 SYRINGE (ML) INJECTION EVERY 12 HOURS SCHEDULED
Status: DISCONTINUED | OUTPATIENT
Start: 2024-02-06 | End: 2024-02-06 | Stop reason: HOSPADM

## 2024-02-06 RX ORDER — VANCOMYCIN HYDROCHLORIDE 1 G/20ML
INJECTION, POWDER, LYOPHILIZED, FOR SOLUTION INTRAVENOUS AS NEEDED
Status: DISCONTINUED | OUTPATIENT
Start: 2024-02-06 | End: 2024-02-06 | Stop reason: HOSPADM

## 2024-02-06 RX ORDER — FENTANYL CITRATE 50 UG/ML
INJECTION, SOLUTION INTRAMUSCULAR; INTRAVENOUS AS NEEDED
Status: DISCONTINUED | OUTPATIENT
Start: 2024-02-06 | End: 2024-02-06 | Stop reason: SURG

## 2024-02-06 RX ORDER — PROPOFOL 10 MG/ML
VIAL (ML) INTRAVENOUS AS NEEDED
Status: DISCONTINUED | OUTPATIENT
Start: 2024-02-06 | End: 2024-02-06 | Stop reason: SURG

## 2024-02-06 RX ORDER — ROCURONIUM BROMIDE 10 MG/ML
INJECTION, SOLUTION INTRAVENOUS AS NEEDED
Status: DISCONTINUED | OUTPATIENT
Start: 2024-02-06 | End: 2024-02-06 | Stop reason: SURG

## 2024-02-06 RX ORDER — ONDANSETRON 2 MG/ML
4 INJECTION INTRAMUSCULAR; INTRAVENOUS ONCE AS NEEDED
Status: DISCONTINUED | OUTPATIENT
Start: 2024-02-06 | End: 2024-02-06 | Stop reason: HOSPADM

## 2024-02-06 RX ORDER — SODIUM CHLORIDE 9 MG/ML
40 INJECTION, SOLUTION INTRAVENOUS AS NEEDED
Status: DISCONTINUED | OUTPATIENT
Start: 2024-02-06 | End: 2024-02-06 | Stop reason: HOSPADM

## 2024-02-06 RX ADMIN — Medication 10 ML: at 17:13

## 2024-02-06 RX ADMIN — PROPOFOL 200 MG: 10 INJECTION, EMULSION INTRAVENOUS at 18:31

## 2024-02-06 RX ADMIN — CLINDAMYCIN PHOSPHATE 900 MG: 900 INJECTION, SOLUTION INTRAVENOUS at 18:31

## 2024-02-06 RX ADMIN — FENTANYL CITRATE 100 MCG: 50 INJECTION, SOLUTION INTRAMUSCULAR; INTRAVENOUS at 18:31

## 2024-02-06 RX ADMIN — PRAMIPEXOLE DIHYDROCHLORIDE 1 MG: 1 TABLET ORAL at 08:08

## 2024-02-06 RX ADMIN — OXYCODONE HYDROCHLORIDE 10 MG: 10 TABLET ORAL at 22:18

## 2024-02-06 RX ADMIN — PREGABALIN 200 MG: 100 CAPSULE ORAL at 15:24

## 2024-02-06 RX ADMIN — PHENYLEPHRINE HYDROCHLORIDE 80 MCG: 10 INJECTION INTRAVENOUS at 19:06

## 2024-02-06 RX ADMIN — ROCURONIUM BROMIDE 50 MG: 10 SOLUTION INTRAVENOUS at 18:31

## 2024-02-06 RX ADMIN — POTASSIUM CHLORIDE 10 MEQ: 750 CAPSULE, EXTENDED RELEASE ORAL at 08:06

## 2024-02-06 RX ADMIN — SUGAMMADEX 400 MG: 100 INJECTION, SOLUTION INTRAVENOUS at 19:33

## 2024-02-06 RX ADMIN — DEXAMETHASONE SODIUM PHOSPHATE 4 MG: 4 INJECTION, SOLUTION INTRAMUSCULAR; INTRAVENOUS at 18:31

## 2024-02-06 RX ADMIN — PHENYLEPHRINE HYDROCHLORIDE 80 MCG: 10 INJECTION INTRAVENOUS at 18:31

## 2024-02-06 RX ADMIN — VANCOMYCIN HYDROCHLORIDE 2500 MG: 10 INJECTION, POWDER, LYOPHILIZED, FOR SOLUTION INTRAVENOUS at 10:02

## 2024-02-06 RX ADMIN — Medication 10 ML: at 08:09

## 2024-02-06 RX ADMIN — PREGABALIN 200 MG: 100 CAPSULE ORAL at 22:14

## 2024-02-06 RX ADMIN — FAMOTIDINE 20 MG: 20 TABLET ORAL at 08:08

## 2024-02-06 RX ADMIN — LEVOFLOXACIN 750 MG: 750 TABLET, FILM COATED ORAL at 08:28

## 2024-02-06 RX ADMIN — HYDROMORPHONE HYDROCHLORIDE 0.5 MG: 1 INJECTION, SOLUTION INTRAMUSCULAR; INTRAVENOUS; SUBCUTANEOUS at 20:28

## 2024-02-06 RX ADMIN — SODIUM CHLORIDE, POTASSIUM CHLORIDE, SODIUM LACTATE AND CALCIUM CHLORIDE 9 ML/HR: 600; 310; 30; 20 INJECTION, SOLUTION INTRAVENOUS at 17:14

## 2024-02-06 RX ADMIN — MAGNESIUM OXIDE TAB 400 MG (241.3 MG ELEMENTAL MG) 400 MG: 400 (241.3 MG) TAB at 08:08

## 2024-02-06 RX ADMIN — LEVOTHYROXINE SODIUM 175 MCG: 0.17 TABLET ORAL at 08:08

## 2024-02-06 RX ADMIN — OXYCODONE HYDROCHLORIDE 10 MG: 10 TABLET ORAL at 08:06

## 2024-02-06 RX ADMIN — MIDAZOLAM HYDROCHLORIDE 2 MG: 1 INJECTION, SOLUTION INTRAMUSCULAR; INTRAVENOUS at 17:31

## 2024-02-06 RX ADMIN — HYDROXYZINE HYDROCHLORIDE 25 MG: 25 TABLET, FILM COATED ORAL at 10:02

## 2024-02-06 RX ADMIN — MEROPENEM 1000 MG: 1 INJECTION, POWDER, FOR SOLUTION INTRAVENOUS at 05:16

## 2024-02-06 RX ADMIN — PREGABALIN 200 MG: 100 CAPSULE ORAL at 08:06

## 2024-02-06 RX ADMIN — HYDROMORPHONE HYDROCHLORIDE 1 MG: 1 INJECTION, SOLUTION INTRAMUSCULAR; INTRAVENOUS; SUBCUTANEOUS at 11:30

## 2024-02-06 RX ADMIN — OXYCODONE HYDROCHLORIDE 10 MG: 10 TABLET ORAL at 00:10

## 2024-02-06 RX ADMIN — PHENYLEPHRINE HYDROCHLORIDE 80 MCG: 10 INJECTION INTRAVENOUS at 19:14

## 2024-02-06 RX ADMIN — FLUCONAZOLE 200 MG: 100 TABLET ORAL at 08:05

## 2024-02-06 RX ADMIN — ONDANSETRON 4 MG: 2 INJECTION INTRAMUSCULAR; INTRAVENOUS at 19:33

## 2024-02-06 RX ADMIN — MIDODRINE HYDROCHLORIDE 7.5 MG: 10 TABLET ORAL at 11:30

## 2024-02-06 RX ADMIN — OXYCODONE HYDROCHLORIDE 10 MG: 10 TABLET ORAL at 15:24

## 2024-02-06 RX ADMIN — MIDODRINE HYDROCHLORIDE 7.5 MG: 10 TABLET ORAL at 08:06

## 2024-02-06 NOTE — ANESTHESIA PROCEDURE NOTES
Airway  Urgency: elective    Date/Time: 2/6/2024 6:33 PM  Airway not difficult    General Information and Staff    Patient location during procedure: OR  Anesthesiologist: Dejuan Lobato MD    Indications and Patient Condition  Indications for airway management: airway protection    Preoxygenated: yes  MILS not maintained throughout  Mask difficulty assessment: 1 - vent by mask    Final Airway Details  Final airway type: endotracheal airway      Successful airway: ETT  Cuffed: yes   Successful intubation technique: video laryngoscopy  Endotracheal tube insertion site: oral  Blade: Mayur  Blade size: 3  ETT size (mm): 7.5  Cormack-Lehane Classification: grade IIb - view of arytenoids or posterior of glottis only  Placement verified by: chest auscultation and capnometry   Measured from: lips  ETT/EBT  to lips (cm): 20  Number of attempts at approach: 1  Assessment: lips, teeth, and gum same as pre-op and atraumatic intubation    Additional Comments  Negative epigastric sounds, Breath sound equal bilaterally with symmetric chest rise and fall

## 2024-02-06 NOTE — CASE MANAGEMENT/SOCIAL WORK
Discharge Planning Assessment  [facility]     Patient Name: Phoebe Carvajal  MRN: [id number]  Today's Date: 2/6/2024    Admit Date: 2/5/2024    Plan: Ongoing, but wants to go back to  if insurance approves   Discharge Needs Assessment       Row Name 02/06/24 1148       Living Environment    People in Home spouse    Name(s) of People in Home  and dtr Lyndsey    Current Living Arrangements home    Potentially Unsafe Housing Conditions none    Primary Care Provided by spouse/significant other;child(roxana)    Provides Primary Care For no one, unable/limited ability to care for self    Family Caregiver if Needed child(roxana), adult;spouse    Quality of Family Relationships helpful;involved;supportive    Able to Return to Prior Arrangements yes    Living Arrangement Comments Patient wants to go back to  for more rehab       Resource/Environmental Concerns    Resource/Environmental Concerns none    Transportation Concerns none       Transition Planning    Patient/Family Anticipates Transition to inpatient rehabilitation facility    Patient/Family Anticipated Services at Transition     Transportation Anticipated family or friend will provide;health plan transportation       Discharge Needs Assessment    Equipment Currently Used at Home commode;rollator;ramp;wheelchair;walker, rolling  elevated toliet seat    Concerns to be Addressed discharge planning    Equipment Needed After Discharge walker, rolling;wheelchair, manual;ramp    Discharge Coordination/Progress Plan to go back to  for rehab if insurance approves                   Discharge Plan       Row Name 02/06/24 1150       Plan    Plan Ongoing, but wants to go back to  if insurance approves    Patient/Family in Agreement with Plan yes    Plan Comments I spoke with dtr Lyndsey over the phone. Lyndsey is 22 and lives with patient and her dad in [place] [facility]. Patient has a ramp into the home and uses a wheelchair. Pateint has an elevated TS, RW and  rollator. No home  or . Patient transferred from  after being there about 2 weeks. I called Chari/RN for  and patient d/c from  and will need a new referral made once patient closer to d/c. PT and OT ordered. 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    Final Discharge Disposition Code 62 - inpatient rehab facility                  Continued Care and Services - Admitted Since 2/5/2024    Coordination has not been started for this encounter.       Selected Continued Care - Prior Encounters Includes continued care and service providers with selected services from prior encounters from 11/7/2023 to 2/6/2024      Discharged on 1/19/2024 Admission date: 12/28/2023 - Discharge disposition: Rehab Facility or Unit (DC - External)      Destination       Service Provider Selected Services Address Phone Fax Patient Preferred    [facility] [address] [facility] [address] [phone] --                             Demographic Summary       Row Name 02/06/24 1147       General Information    Admission Type inpatient    Referral Source admission list    Reason for Consult discharge planning    Preferred Language English       Contact Information    Permission Granted to Share Info With     Contact Information Obtained for                    Functional Status       Row Name 02/06/24 1147       Functional Status    Usual Activity Tolerance moderate    Current Activity Tolerance fair       Functional Status, IADL    Medications assistive person    Meal Preparation assistive person    Housekeeping assistive person    Laundry assistive person    Shopping assistive person    IADL Comments Has coverqage for meds       Mental Status Summary    Recent Changes in Mental Status/Cognitive Functioning unable to assess       Employment/    Employment  Status employed full-time    Current or Previous Occupation self-employed                   Psychosocial    No documentation.                  Abuse/Neglect    No documentation.                  Legal    No documentation.                  Substance Abuse    No documentation.                  Patient Forms    No documentation.                     Mary Gaming RN
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
## 2024-02-06 NOTE — PROGRESS NOTES
[facility] Medicine Services  PROGRESS NOTE    Patient Name: Phoebe Carvajal  : 1975  MRN: [id number]    Date of Admission: 2024  Primary Care Physician: Sofya Benjamin MD    Subjective   Subjective     CC:  Left stump wound pain and erythema    HPI:  Evaluated patient this morning. Patient was reporting ongoing pain at left stump wound site. Has been in rehab for last 2 weeks. Admits to feeling anxious over persistent infection.      Objective   Objective     Vital Signs:   Temp:  [97.5 °F (36.4 °C)-98 °F (36.7 °C)] 97.9 °F (36.6 °C)  Heart Rate:  [54-72] 72  Resp:  [16] 16  BP: ()/(52-66) 98/54  Flow (L/min):  [2] 2     Physical Exam:  Constitutional: Awake, alert, resting comfortably  HENT: NCAT, mucous membranes moist  Respiratory: Clear to auscultation bilaterally, respiratory effort normal   Cardiovascular: RRR, no murmurs, rubs, or gallops  Gastrointestinal: Positive bowel sounds, soft, nontender, nondistended  Musculoskeletal: S/p left AKA, left stump wound with dehiscence  Neurologic: Alert and oriented x 3, no focal deficits, speech clear  Skin: Left stump with erythema at wound site      Results Reviewed:  LAB RESULTS:      Lab 24  0250 24  1120   WBC 3.24* 3.75   HEMOGLOBIN 8.9* 9.8*   HEMATOCRIT 29.2* 31.7*   PLATELETS 193 242   NEUTROS ABS  --  1.97   IMMATURE GRANS (ABS)  --  0.01   LYMPHS ABS  --  1.14   MONOS ABS  --  0.34   EOS ABS  --  0.27   MCV 99.7* 98.8*   SED RATE 39*  --    CRP 3.71*  --    PROCALCITONIN  --  <0.02   LACTATE  --  1.3         Lab 24  0250 24  1120   SODIUM 137 139   POTASSIUM 4.3 4.3   CHLORIDE 103 101   CO2 27.0 31.0*   ANION GAP 7.0 7.0   BUN 4* 6   CREATININE 0.42* 0.58   EGFR 119.3 110.4   GLUCOSE 70 88   CALCIUM 8.2* 8.3*         Lab 02/05/24  1120   TOTAL PROTEIN 6.5   ALBUMIN 2.8*   GLOBULIN 3.7   ALT (SGPT) 6   AST (SGOT) 28   BILIRUBIN 0.5   ALK PHOS 215*                     Brief Urine Lab  Results  (Last result in the past 365 days)        Color   Clarity   Blood   Leuk Est   Nitrite   Protein   CREAT   Urine HCG        01/05/24 1351 Yellow   Cloudy   Negative   Small (1+)   Negative   Trace                   Microbiology Results Abnormal       Procedure Component Value - Date/Time    Blood Culture - Blood, Arm, Left [[id number]]  (Normal) Collected: 02/05/24 1120    Lab Status: Preliminary result Specimen: Blood from Arm, Left Updated: 02/06/24 1146     Blood Culture No growth at 24 hours    Blood Culture - Blood, Arm, Right [[id number]]  (Normal) Collected: 02/05/24 1045    Lab Status: Preliminary result Specimen: Blood from Arm, Right Updated: 02/06/24 1146     Blood Culture No growth at 24 hours    Wound Culture - Swab, Leg, Left [[id number]] Collected: 02/05/24 1052    Lab Status: Preliminary result Specimen: Swab from Leg, Left Updated: 02/06/24 0842     Wound Culture Growth present, too young to evaluate     Gram Stain Moderate (3+) WBCs seen      Few (2+) Gram positive cocci in pairs and chains            No radiology results from the last 24 hrs        Current medications:  Scheduled Meds:cyanocobalamin, 1,000 mcg, Intramuscular, Q7 Days  enoxaparin, 60 mg, Subcutaneous, Q12H  famotidine, 20 mg, Oral, BID AC  fluconazole, 200 mg, Oral, Daily  furosemide, 40 mg, Oral, Daily  levoFLOXacin, 750 mg, Oral, Daily  levothyroxine, 175 mcg, Oral, Daily  magnesium oxide, 400 mg, Oral, Daily  midodrine, 7.5 mg, Oral, TID AC  potassium chloride, 10 mEq, Oral, Daily  pramipexole, 1 mg, Oral, Daily  pregabalin, 200 mg, Oral, TID  senna-docusate sodium, 2 tablet, Oral, BID  sertraline, 100 mg, Oral, Nightly  sodium chloride, 10 mL, Intravenous, Q12H  spironolactone, 25 mg, Oral, Daily  vancomycin, 1,000 mg, Intravenous, Q8H      Continuous Infusions:Pharmacy to dose vancomycin,       PRN Meds:.  acetaminophen    albuterol    senna-docusate sodium **AND** polyethylene glycol **AND** bisacodyl **AND**  bisacodyl    cloNIDine    HYDROmorphone    hydrOXYzine    LORazepam    melatonin    nitroglycerin    oxyCODONE    Pharmacy to dose vancomycin    sodium chloride    sodium chloride    traZODone    Assessment & Plan   Assessment & Plan     Active Hospital Problems    Diagnosis  POA    **Osteomyelitis [M86.9]  Yes    Amputation stump infection [T87.40]  Yes      Resolved Hospital Problems   No resolved problems to display.        Brief Hospital Course to date:  Phoebe Carvajal is a 48 y.o. female with PMHx of CKD, Stills disease, restless leg syndrome, hypothyroidism, PAD, morbid obesity. She has had chronic/recurrent osteomyelitis of left foot resulting in L AKA on 12/29/2023. She then had a monthlong admission requiring debridements x2. She was discharged to [facility] with a wound vac on 1/19/24. ID followed throughout and discontinued antibiotics at time of discharge because path report showed margins without infection. She was continued on fluconazole for + yeast at the AKA wound (C albicans). Re-presented due to concern for left stump wound dehiscence.    This patient's problems and plans were partially entered by my partner and updated as appropriate by me 02/06/24.    L AKA site with dehiscence and soft tissue infection  -Edema likely contributing to poor wound healing and serous drainage  -Previous wound culture with MDR Enterobacter, repeat culture with GPC on Gram stain  -s/p meropenem, levaquin on admission  -Orthopedic surgery evaluated, planning for I&D by Dr. Witt.  -Infectious disease evaluated, recommended to continue Levaquin, added IV vancomycin. Blood cultures x 2 pending.     Growth of candida albicans from left AKA site   -Continue fluconazole, plan for 4 weeks of antifungal through 2/9.     Hypotension  -patient reports SBP 90s-100s at home  -Will monitor in setting of infection.    Complex pain, chronic opioid use  Peripheral neuropathy  -Pain control with home Lyrica, oxycodone 10mg q4h PRN and  dilaudid daily PRN dressing changes. Wean as tolerated. Bowel regimen.    Chronic bilateral lower extremity edema  -Unclear if true hx of heart failure   -Echo 5/2023 with EF 61%, no comment regarding diastolic function  -Continue home Lasix.      L humerus nonunion  -reported by patient on admission  -XR left humerus 9/2023 showed fairly comminuted proximal humeral fracture  -Consider asking for Orthopedic surgery for opinion.     Macrocytic anemia  B12 deficiency  -MCV improved on admission  -Continue weekly replacement. Monitor daily CBC.    RLS  -Continue pramipexole.    Hypothyroidism  -Continue levothyroxine.    Anxiety  -Continue Zoloft, PRN Atarax.       Expected Discharge Location and Transportation: Home vs rehab  Expected Discharge   Expected Discharge Date: 2/9/2024; Expected Discharge Time:      DVT prophylaxis:  Medical DVT prophylaxis orders are present.         AM-PAC 6 Clicks Score (PT): 12 (02/06/24 1150)    CODE STATUS:   Code Status and Medical Interventions:   Ordered at: 02/05/24 1500     Level Of Support Discussed With:    Patient     Code Status (Patient has no pulse and is not breathing):    CPR (Attempt to Resuscitate)     Medical Interventions (Patient has pulse or is breathing):    Full Support       Natalie Oliver, DO  02/06/24

## 2024-02-06 NOTE — PLAN OF CARE
Problem: Adult Inpatient Plan of Care  Goal: Absence of Hospital-Acquired Illness or Injury  Intervention: Prevent Skin Injury  Recent Flowsheet Documentation  Taken 2/6/2024 0400 by Mya Garvey RN  Body Position: position changed independently  Skin Protection:   adhesive use limited   incontinence pads utilized   transparent dressing maintained   tubing/devices free from skin contact  Taken 2/6/2024 0200 by Mya Garvey RN  Body Position: position changed independently  Skin Protection:   adhesive use limited   incontinence pads utilized   transparent dressing maintained   tubing/devices free from skin contact  Taken 2/6/2024 0000 by Mya Garvey RN  Body Position: position changed independently  Skin Protection:   adhesive use limited   incontinence pads utilized   transparent dressing maintained   tubing/devices free from skin contact  Taken 2/5/2024 2000 by Mya Garvey RN  Body Position: position changed independently  Skin Protection:   adhesive use limited   incontinence pads utilized   transparent dressing maintained   tubing/devices free from skin contact   Goal Outcome Evaluation:  Plan of Care Reviewed With: patient        Progress: no change

## 2024-02-06 NOTE — PROGRESS NOTES
Pharmacy Consult-Vancomycin Dosing  Phoebe Carvajal is a  48 y.o. female receiving vancomycin therapy.     Indication:  SSTI/ osteomyelitis  Consulting Provider: Case Watson MD  ID Consult: Y    Goal AUC: 400 - 600 mg/L*hr    Current Antimicrobial Therapy  Anti-Infectives (From admission, onward)      Ordered     Dose/Rate Route Frequency Start Stop    02/06/24 1404  vancomycin (VANCOCIN) 1000 mg/200 mL dextrose 5% IVPB        Ordering Provider: Case Watson MD    1,000 mg  over 60 Minutes Intravenous Every 8 Hours 02/06/24 1800 02/13/24 1759    02/06/24 0848  vancomycin 2500 mg/500 mL 0.9% NS IVPB ([facility])        Ordering Provider: Case Watson MD    20 mg/kg × 125 kg  over 150 Minutes Intravenous Once 02/06/24 0945 02/06/24 1232    02/06/24 0839  Pharmacy to dose vancomycin        Ordering Provider: Case Watson MD     Does not apply Continuous PRN 02/06/24 0838 02/20/24 0837    02/05/24 1540  fluconazole (DIFLUCAN) tablet 200 mg        Ordering Provider: Sri Youngblood MD    200 mg Oral Daily 02/05/24 1630 02/19/24 0859    02/05/24 1540  levoFLOXacin (LEVAQUIN) tablet 750 mg        Ordering Provider: Sri Youngblood MD    750 mg Oral Daily 02/05/24 1630 02/15/24 0859    02/05/24 1506  meropenem (MERREM) 1,000 mg in sodium chloride 0.9 % 100 mL IVPB        Ordering Provider: Sri Youngblood MD    1,000 mg  over 30 Minutes Intravenous Once 02/05/24 1600 02/05/24 1636            Allergies  Allergies as of 02/05/2024 - Reviewed 02/05/2024   Allergen Reaction Noted    Vicodin [hydrocodone-acetaminophen] Itching 11/10/2017       Labs    Results from last 7 days   Lab Units 02/06/24  0250 02/05/24  1120   BUN mg/dL 4* 6   CREATININE mg/dL 0.42* 0.58       Results from last 7 days   Lab Units 02/06/24  0250 02/05/24  1120   WBC 10*3/mm3 3.24* 3.75       Evaluation of Dosing     Last Dose Received in the ED/Outside Facility:   2/6 1000 loading dose [facility]  Is Patient on Dialysis or Renal  "Replacement:  N    Ht - 157.5 cm (62\")  Wt - 125 kg (275 lb)    Estimated Creatinine Clearance: 202.6 mL/min (A) (by C-G formula based on SCr of 0.42 mg/dL (L)).    I/O last 3 completed shifts:  In: 2000 [IV Piggyback:2000]  Out: -     Microbiology and Radiology  Microbiology Results (last 10 days)       Procedure Component Value - Date/Time    Blood Culture - Blood, Arm, Left [[id number]]  (Normal) Collected: 02/05/24 1120    Lab Status: Preliminary result Specimen: Blood from Arm, Left Updated: 02/06/24 1146     Blood Culture No growth at 24 hours    Wound Culture - Swab, Leg, Left [[id number]] Collected: 02/05/24 1052    Lab Status: Preliminary result Specimen: Swab from Leg, Left Updated: 02/06/24 0842     Wound Culture Growth present, too young to evaluate     Gram Stain Moderate (3+) WBCs seen      Few (2+) Gram positive cocci in pairs and chains    Blood Culture - Blood, Arm, Right [[id number]]  (Normal) Collected: 02/05/24 1045    Lab Status: Preliminary result Specimen: Blood from Arm, Right Updated: 02/06/24 1146     Blood Culture No growth at 24 hours            Reported Vancomycin Levels                   [facility] AUC Calculation:    Current AUC:     mg/L*hr    Predicted Steady State AUC on Current Dose:   mg/L*hr  _________________________________    Predicted Steady State AUC on New Dose:    538 mg/L*hr    Assessment/Plan:    Rx to dose vancomycin for SSTI for targeting -600 mg/L*hr.  Given vancomycin loading dose of 2500 mg (20 mg/kg) x 1 dose this AM.  Recommend to continue vancomycin 1000 mg (~12 mg/kg based on adjusted IBw) IV q8h.   Check a vancomycin random level on 2/7.  Pharmacy will continue to monitor and adjust vancomycin dose as necessary based on renal function, cultures, labs, and clinical status.     Thanks,   Polo Mclean, PharmD  2/6/2024  14:10 EST            "

## 2024-02-06 NOTE — ANESTHESIA PREPROCEDURE EVALUATION
Anesthesia Evaluation     Patient summary reviewed and Nursing notes reviewed   NPO Solid Status: > 8 hours  NPO Liquid Status: > 8 hours           Airway   Mallampati: III  TM distance: >3 FB  Neck ROM: full  Possible difficult intubation and Large neck circumference  Comment: Previous grade 1 view with Tomlinson 3 blade  Dental - normal exam     Pulmonary - negative pulmonary ROS and normal exam   Cardiovascular - normal exam    ECG reviewed    (+) PVD (s/p left AKA)    ROS comment:   Echo 5/23: normal EF, no significant valve disease     Neuro/Psych  (+) psychiatric history Depression and Anxiety  GI/Hepatic/Renal/Endo    (+) morbid obesity, liver disease, renal disease- CRI, thyroid problem hypothyroidism    Musculoskeletal         ROS comment: Chronic osteomyelitis   Abdominal  - normal exam    Bowel sounds: normal.   Substance History - negative use     OB/GYN negative ob/gyn ROS         Other   arthritis,     ROS/Med Hx Other: HCT 29                Anesthesia Plan    ASA 3     general     (Tomlinson)  intravenous induction     Anesthetic plan, risks, benefits, and alternatives have been provided, discussed and informed consent has been obtained with: patient.    Plan discussed with CRNA.    CODE STATUS:    Level Of Support Discussed With: Patient  Code Status (Patient has no pulse and is not breathing): CPR (Attempt to Resuscitate)  Medical Interventions (Patient has pulse or is breathing): Full Support

## 2024-02-06 NOTE — PLAN OF CARE
Problem: Adult Inpatient Plan of Care  Goal: Plan of Care Review  Outcome: Ongoing, Progressing  Goal: Patient-Specific Goal (Individualized)  Outcome: Ongoing, Progressing  Goal: Absence of Hospital-Acquired Illness or Injury  Outcome: Ongoing, Progressing  Intervention: Identify and Manage Fall Risk  Recent Flowsheet Documentation  Taken 2/6/2024 1200 by Malka Esquivel RN  Safety Promotion/Fall Prevention:   activity supervised   assistive device/personal items within reach   clutter free environment maintained   toileting scheduled   safety round/check completed   room organization consistent  Taken 2/6/2024 1000 by Malka Esquivel RN  Safety Promotion/Fall Prevention:   activity supervised   assistive device/personal items within reach   clutter free environment maintained   toileting scheduled   safety round/check completed   room organization consistent  Taken 2/6/2024 0800 by Malka Esquivel RN  Safety Promotion/Fall Prevention:   activity supervised   assistive device/personal items within reach   clutter free environment maintained   toileting scheduled   safety round/check completed   room organization consistent  Intervention: Prevent Skin Injury  Recent Flowsheet Documentation  Taken 2/6/2024 1200 by Malka Esquivel RN  Body Position:   position changed independently   weight shifting  Skin Protection:   adhesive use limited   tubing/devices free from skin contact   transparent dressing maintained   skin-to-skin areas padded   skin-to-device areas padded  Taken 2/6/2024 1000 by Malka Esquivel RN  Body Position:   position changed independently   weight shifting  Skin Protection:   adhesive use limited   tubing/devices free from skin contact   transparent dressing maintained   skin-to-skin areas padded   skin-to-device areas padded  Taken 2/6/2024 0800 by Malka Esquivel RN  Body Position:   position changed independently   weight shifting  Skin Protection:   adhesive use limited    tubing/devices free from skin contact   transparent dressing maintained   skin-to-skin areas padded   skin-to-device areas padded  Intervention: Prevent and Manage VTE (Venous Thromboembolism) Risk  Recent Flowsheet Documentation  Taken 2/6/2024 1200 by Malka Esquivel RN  Activity Management: activity encouraged  Taken 2/6/2024 1000 by Malka Esquivel RN  Activity Management: activity encouraged  Taken 2/6/2024 0800 by Malka Esquivel RN  Activity Management: activity encouraged  Goal: Optimal Comfort and Wellbeing  Outcome: Ongoing, Progressing  Intervention: Monitor Pain and Promote Comfort  Recent Flowsheet Documentation  Taken 2/6/2024 0800 by Malka Esquivel RN  Pain Management Interventions: see MAR  Intervention: Provide Person-Centered Care  Recent Flowsheet Documentation  Taken 2/6/2024 0800 by Malka Esquivel RN  Trust Relationship/Rapport: care explained  Goal: Readiness for Transition of Care  Outcome: Ongoing, Progressing     Problem: Pain Chronic (Persistent) (Comorbidity Management)  Goal: Acceptable Pain Control and Functional Ability  Outcome: Ongoing, Progressing  Intervention: Develop Pain Management Plan  Recent Flowsheet Documentation  Taken 2/6/2024 0800 by Malka Esquivel RN  Pain Management Interventions: see MAR     Problem: Skin Injury Risk Increased  Goal: Skin Health and Integrity  Outcome: Ongoing, Progressing  Intervention: Optimize Skin Protection  Recent Flowsheet Documentation  Taken 2/6/2024 1200 by Malka Esquivel RN  Pressure Reduction Techniques:   frequent weight shift encouraged   weight shift assistance provided  Head of Bed (HOB) Positioning: HOB elevated  Pressure Reduction Devices: positioning supports utilized  Skin Protection:   adhesive use limited   tubing/devices free from skin contact   transparent dressing maintained   skin-to-skin areas padded   skin-to-device areas padded  Taken 2/6/2024 1000 by Malka Esquivel RN  Pressure Reduction Techniques:    frequent weight shift encouraged   weight shift assistance provided  Head of Bed (HOB) Positioning: HOB elevated  Pressure Reduction Devices: positioning supports utilized  Skin Protection:   adhesive use limited   tubing/devices free from skin contact   transparent dressing maintained   skin-to-skin areas padded   skin-to-device areas padded  Taken 2/6/2024 0800 by Malka Esquivel RN  Pressure Reduction Techniques:   frequent weight shift encouraged   weight shift assistance provided  Head of Bed (HOB) Positioning: HOB elevated  Pressure Reduction Devices:   positioning supports utilized   heel offloading device utilized  Skin Protection:   adhesive use limited   tubing/devices free from skin contact   transparent dressing maintained   skin-to-skin areas padded   skin-to-device areas padded     Problem: Fall Injury Risk  Goal: Absence of Fall and Fall-Related Injury  Outcome: Ongoing, Progressing  Intervention: Promote Injury-Free Environment  Recent Flowsheet Documentation  Taken 2/6/2024 1200 by Malka Esquivel RN  Safety Promotion/Fall Prevention:   activity supervised   assistive device/personal items within reach   clutter free environment maintained   toileting scheduled   safety round/check completed   room organization consistent  Taken 2/6/2024 1000 by Malka Esquivel RN  Safety Promotion/Fall Prevention:   activity supervised   assistive device/personal items within reach   clutter free environment maintained   toileting scheduled   safety round/check completed   room organization consistent  Taken 2/6/2024 0800 by Malka Esquivel RN  Safety Promotion/Fall Prevention:   activity supervised   assistive device/personal items within reach   clutter free environment maintained   toileting scheduled   safety round/check completed   room organization consistent   Goal Outcome Evaluation:
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
## 2024-02-06 NOTE — PLAN OF CARE
Goal Outcome Evaluation:  Plan of Care Reviewed With: patient           Outcome Evaluation: PT eval complete. Pt presents with weakness, balance deficits, and decreased activity tolerance leading to impairmens in functional mobility below baseline. Pt req encouragement and is significantly limited by pain. Completed stand pivot t/f to w/c from bed w/ MaxAx2. Pt will benefit from skilled IP PT to address impairments. PT rec IRF at d/c, will continue to monitor status.      Anticipated Discharge Disposition (PT): inpatient rehabilitation facility

## 2024-02-06 NOTE — NURSING NOTE
Wound is being handled by Ortho.    Woc also consulted for specialty mattress.    We are now out of our Joe beds will order PRITESH from [facility].    Reconsult Woc if skin integrity needs arise.
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
## 2024-02-07 LAB
ANION GAP SERPL CALCULATED.3IONS-SCNC: 7 MMOL/L (ref 5–15)
BUN SERPL-MCNC: 5 MG/DL (ref 6–20)
BUN/CREAT SERPL: 10.9 (ref 7–25)
CALCIUM SPEC-SCNC: 8.3 MG/DL (ref 8.6–10.5)
CHLORIDE SERPL-SCNC: 102 MMOL/L (ref 98–107)
CO2 SERPL-SCNC: 26 MMOL/L (ref 22–29)
CREAT SERPL-MCNC: 0.46 MG/DL (ref 0.57–1)
DEPRECATED RDW RBC AUTO: 62.4 FL (ref 37–54)
EGFRCR SERPLBLD CKD-EPI 2021: 116.7 ML/MIN/1.73
ERYTHROCYTE [DISTWIDTH] IN BLOOD BY AUTOMATED COUNT: 17.2 % (ref 12.3–15.4)
GLUCOSE SERPL-MCNC: 168 MG/DL (ref 65–99)
HCT VFR BLD AUTO: 28 % (ref 34–46.6)
HGB BLD-MCNC: 8.4 G/DL (ref 12–15.9)
MCH RBC QN AUTO: 29.6 PG (ref 26.6–33)
MCHC RBC AUTO-ENTMCNC: 30 G/DL (ref 31.5–35.7)
MCV RBC AUTO: 98.6 FL (ref 79–97)
PLATELET # BLD AUTO: 198 10*3/MM3 (ref 140–450)
PMV BLD AUTO: 11.1 FL (ref 6–12)
POTASSIUM SERPL-SCNC: 4.4 MMOL/L (ref 3.5–5.2)
QT INTERVAL: 414 MS
QTC INTERVAL: 416 MS
RBC # BLD AUTO: 2.84 10*6/MM3 (ref 3.77–5.28)
SODIUM SERPL-SCNC: 135 MMOL/L (ref 136–145)
VANCOMYCIN SERPL-MCNC: 39.2 MCG/ML (ref 5–40)
WBC NRBC COR # BLD AUTO: 2.99 10*3/MM3 (ref 3.4–10.8)

## 2024-02-07 PROCEDURE — 99232 SBSQ HOSP IP/OBS MODERATE 35: CPT | Performed by: STUDENT IN AN ORGANIZED HEALTH CARE EDUCATION/TRAINING PROGRAM

## 2024-02-07 PROCEDURE — 25010000002 VANCOMYCIN PER 500 MG: Performed by: ORTHOPAEDIC SURGERY

## 2024-02-07 PROCEDURE — 63710000001 ONDANSETRON ODT 4 MG TABLET DISPERSIBLE: Performed by: STUDENT IN AN ORGANIZED HEALTH CARE EDUCATION/TRAINING PROGRAM

## 2024-02-07 PROCEDURE — 85027 COMPLETE CBC AUTOMATED: CPT | Performed by: ORTHOPAEDIC SURGERY

## 2024-02-07 PROCEDURE — 97164 PT RE-EVAL EST PLAN CARE: CPT

## 2024-02-07 PROCEDURE — 80048 BASIC METABOLIC PNL TOTAL CA: CPT | Performed by: ORTHOPAEDIC SURGERY

## 2024-02-07 PROCEDURE — 97606 NEG PRS WND THER DME>50 SQCM: CPT

## 2024-02-07 PROCEDURE — 97530 THERAPEUTIC ACTIVITIES: CPT

## 2024-02-07 PROCEDURE — 25010000002 HYDROMORPHONE 1 MG/ML SOLUTION: Performed by: STUDENT IN AN ORGANIZED HEALTH CARE EDUCATION/TRAINING PROGRAM

## 2024-02-07 PROCEDURE — 80202 ASSAY OF VANCOMYCIN: CPT | Performed by: ORTHOPAEDIC SURGERY

## 2024-02-07 PROCEDURE — 97166 OT EVAL MOD COMPLEX 45 MIN: CPT

## 2024-02-07 PROCEDURE — 25010000002 ENOXAPARIN PER 10 MG: Performed by: ORTHOPAEDIC SURGERY

## 2024-02-07 PROCEDURE — 97535 SELF CARE MNGMENT TRAINING: CPT

## 2024-02-07 RX ORDER — ONDANSETRON 4 MG/1
4 TABLET, ORALLY DISINTEGRATING ORAL EVERY 6 HOURS PRN
Status: DISCONTINUED | OUTPATIENT
Start: 2024-02-07 | End: 2024-02-13 | Stop reason: HOSPADM

## 2024-02-07 RX ORDER — VANCOMYCIN HYDROCHLORIDE 1 G/200ML
1000 INJECTION, SOLUTION INTRAVENOUS EVERY 12 HOURS
Status: DISCONTINUED | OUTPATIENT
Start: 2024-02-07 | End: 2024-02-10

## 2024-02-07 RX ORDER — BISACODYL 5 MG/1
5 TABLET, DELAYED RELEASE ORAL DAILY PRN
Status: DISCONTINUED | OUTPATIENT
Start: 2024-02-07 | End: 2024-02-13 | Stop reason: HOSPADM

## 2024-02-07 RX ORDER — ONDANSETRON 2 MG/ML
4 INJECTION INTRAMUSCULAR; INTRAVENOUS EVERY 6 HOURS PRN
Status: DISCONTINUED | OUTPATIENT
Start: 2024-02-07 | End: 2024-02-13 | Stop reason: HOSPADM

## 2024-02-07 RX ORDER — BISACODYL 10 MG
10 SUPPOSITORY, RECTAL RECTAL DAILY PRN
Status: DISCONTINUED | OUTPATIENT
Start: 2024-02-07 | End: 2024-02-13 | Stop reason: HOSPADM

## 2024-02-07 RX ORDER — LEVOFLOXACIN 5 MG/ML
750 INJECTION, SOLUTION INTRAVENOUS EVERY 24 HOURS
Status: DISCONTINUED | OUTPATIENT
Start: 2024-02-08 | End: 2024-02-13 | Stop reason: HOSPADM

## 2024-02-07 RX ORDER — AMOXICILLIN 250 MG
2 CAPSULE ORAL NIGHTLY PRN
Status: DISCONTINUED | OUTPATIENT
Start: 2024-02-07 | End: 2024-02-13 | Stop reason: HOSPADM

## 2024-02-07 RX ORDER — POLYETHYLENE GLYCOL 3350 17 G/17G
17 POWDER, FOR SOLUTION ORAL DAILY PRN
Status: DISCONTINUED | OUTPATIENT
Start: 2024-02-07 | End: 2024-02-13 | Stop reason: HOSPADM

## 2024-02-07 RX ADMIN — LEVOFLOXACIN 750 MG: 750 TABLET, FILM COATED ORAL at 08:13

## 2024-02-07 RX ADMIN — FAMOTIDINE 20 MG: 20 TABLET ORAL at 08:13

## 2024-02-07 RX ADMIN — POTASSIUM CHLORIDE 10 MEQ: 750 CAPSULE, EXTENDED RELEASE ORAL at 08:14

## 2024-02-07 RX ADMIN — VANCOMYCIN HYDROCHLORIDE 1000 MG: 1 INJECTION, SOLUTION INTRAVENOUS at 02:23

## 2024-02-07 RX ADMIN — PREGABALIN 200 MG: 100 CAPSULE ORAL at 20:03

## 2024-02-07 RX ADMIN — VANCOMYCIN HYDROCHLORIDE 1000 MG: 1 INJECTION, SOLUTION INTRAVENOUS at 13:11

## 2024-02-07 RX ADMIN — Medication 10 ML: at 08:15

## 2024-02-07 RX ADMIN — OXYCODONE HYDROCHLORIDE 10 MG: 10 TABLET ORAL at 20:03

## 2024-02-07 RX ADMIN — MAGNESIUM OXIDE TAB 400 MG (241.3 MG ELEMENTAL MG) 400 MG: 400 (241.3 MG) TAB at 08:13

## 2024-02-07 RX ADMIN — OXYCODONE HYDROCHLORIDE 10 MG: 10 TABLET ORAL at 11:33

## 2024-02-07 RX ADMIN — HYDROMORPHONE HYDROCHLORIDE 1 MG: 1 INJECTION, SOLUTION INTRAMUSCULAR; INTRAVENOUS; SUBCUTANEOUS at 22:37

## 2024-02-07 RX ADMIN — PRAMIPEXOLE DIHYDROCHLORIDE 1 MG: 1 TABLET ORAL at 08:14

## 2024-02-07 RX ADMIN — OXYCODONE HYDROCHLORIDE 10 MG: 10 TABLET ORAL at 06:12

## 2024-02-07 RX ADMIN — HYDROMORPHONE HYDROCHLORIDE 1 MG: 1 INJECTION, SOLUTION INTRAMUSCULAR; INTRAVENOUS; SUBCUTANEOUS at 09:51

## 2024-02-07 RX ADMIN — ENOXAPARIN SODIUM 60 MG: 60 INJECTION SUBCUTANEOUS at 08:13

## 2024-02-07 RX ADMIN — HYDROMORPHONE HYDROCHLORIDE 1 MG: 1 INJECTION, SOLUTION INTRAMUSCULAR; INTRAVENOUS; SUBCUTANEOUS at 13:48

## 2024-02-07 RX ADMIN — OXYCODONE HYDROCHLORIDE 10 MG: 10 TABLET ORAL at 02:10

## 2024-02-07 RX ADMIN — ONDANSETRON 4 MG: 4 TABLET, ORALLY DISINTEGRATING ORAL at 09:51

## 2024-02-07 RX ADMIN — HYDROXYZINE HYDROCHLORIDE 25 MG: 25 TABLET, FILM COATED ORAL at 02:26

## 2024-02-07 RX ADMIN — FAMOTIDINE 20 MG: 20 TABLET ORAL at 17:14

## 2024-02-07 RX ADMIN — HYDROXYZINE HYDROCHLORIDE 25 MG: 25 TABLET, FILM COATED ORAL at 22:36

## 2024-02-07 RX ADMIN — SERTRALINE HYDROCHLORIDE 100 MG: 100 TABLET ORAL at 20:03

## 2024-02-07 RX ADMIN — ENOXAPARIN SODIUM 60 MG: 60 INJECTION SUBCUTANEOUS at 20:03

## 2024-02-07 RX ADMIN — LEVOTHYROXINE SODIUM 175 MCG: 0.17 TABLET ORAL at 06:12

## 2024-02-07 RX ADMIN — Medication 10 ML: at 20:04

## 2024-02-07 RX ADMIN — FLUCONAZOLE 200 MG: 100 TABLET ORAL at 08:14

## 2024-02-07 NOTE — PLAN OF CARE
Problem: Adult Inpatient Plan of Care  Goal: Plan of Care Review  Outcome: Ongoing, Progressing  Goal: Patient-Specific Goal (Individualized)  Outcome: Ongoing, Progressing  Goal: Absence of Hospital-Acquired Illness or Injury  Outcome: Ongoing, Progressing  Intervention: Identify and Manage Fall Risk  Recent Flowsheet Documentation  Taken 2/7/2024 1400 by Malka Esquivel RN  Safety Promotion/Fall Prevention:   activity supervised   assistive device/personal items within reach   clutter free environment maintained   toileting scheduled   safety round/check completed   room organization consistent  Taken 2/7/2024 1200 by Malka Esquivel RN  Safety Promotion/Fall Prevention:   activity supervised   assistive device/personal items within reach   clutter free environment maintained   toileting scheduled   safety round/check completed   room organization consistent  Taken 2/7/2024 1000 by Malka Esquivel RN  Safety Promotion/Fall Prevention:   activity supervised   assistive device/personal items within reach   clutter free environment maintained   toileting scheduled   safety round/check completed   room organization consistent  Taken 2/7/2024 0800 by Malka Esquivel RN  Safety Promotion/Fall Prevention:   activity supervised   assistive device/personal items within reach   clutter free environment maintained   toileting scheduled   safety round/check completed   room organization consistent  Intervention: Prevent Skin Injury  Recent Flowsheet Documentation  Taken 2/7/2024 1400 by Malka Esquivel RN  Body Position: position changed independently  Skin Protection:   adhesive use limited   tubing/devices free from skin contact   skin-to-skin areas padded   transparent dressing maintained   skin-to-device areas padded  Taken 2/7/2024 1200 by Malka Esquivel RN  Body Position:   position changed independently   weight shifting   tilted   right  Skin Protection:   adhesive use limited   tubing/devices free from  skin contact   transparent dressing maintained   skin-to-skin areas padded   skin-to-device areas padded  Taken 2/7/2024 1000 by Malka Esquivel RN  Body Position:   position changed independently   weight shifting   tilted   left  Skin Protection:   adhesive use limited   tubing/devices free from skin contact   transparent dressing maintained   skin-to-skin areas padded   skin-to-device areas padded  Taken 2/7/2024 0800 by Malka Esquivel RN  Body Position:   position changed independently   weight shifting   supine  Skin Protection:   adhesive use limited   tubing/devices free from skin contact   transparent dressing maintained   skin-to-skin areas padded   skin-to-device areas padded  Intervention: Prevent and Manage VTE (Venous Thromboembolism) Risk  Recent Flowsheet Documentation  Taken 2/7/2024 1400 by Malka Esquivel RN  Activity Management: activity encouraged  Taken 2/7/2024 1200 by Malka Esquivel RN  Activity Management: activity encouraged  Taken 2/7/2024 1000 by Malka Esquivel RN  Activity Management: activity encouraged  Taken 2/7/2024 0800 by Malka Esquivel RN  Activity Management: activity encouraged  Goal: Optimal Comfort and Wellbeing  Outcome: Ongoing, Progressing  Intervention: Monitor Pain and Promote Comfort  Recent Flowsheet Documentation  Taken 2/7/2024 1000 by Malka Esquivel RN  Pain Management Interventions: see MAR  Taken 2/7/2024 0800 by Malka Esquivel RN  Pain Management Interventions: see MAR  Goal: Readiness for Transition of Care  Outcome: Ongoing, Progressing     Problem: Pain Chronic (Persistent) (Comorbidity Management)  Goal: Acceptable Pain Control and Functional Ability  Outcome: Ongoing, Progressing  Intervention: Develop Pain Management Plan  Recent Flowsheet Documentation  Taken 2/7/2024 1000 by Malka Esquivel RN  Pain Management Interventions: see MAR  Taken 2/7/2024 0800 by Malka Esquivel RN  Pain Management Interventions: see MAR     Problem: Skin  Injury Risk Increased  Goal: Skin Health and Integrity  Outcome: Ongoing, Progressing  Intervention: Optimize Skin Protection  Recent Flowsheet Documentation  Taken 2/7/2024 1400 by Malka Esquivel RN  Pressure Reduction Techniques:   frequent weight shift encouraged   weight shift assistance provided  Head of Bed ([facility]) Positioning: [facility] elevated  Pressure Reduction Devices: specialty bed utilized  Skin Protection:   adhesive use limited   tubing/devices free from skin contact   skin-to-skin areas padded   transparent dressing maintained   skin-to-device areas padded  Taken 2/7/2024 1200 by Malka Esquivel RN  Pressure Reduction Techniques:   frequent weight shift encouraged   weight shift assistance provided  Head of Bed ([facility]) Positioning: [facility] elevated  Pressure Reduction Devices: specialty bed utilized  Skin Protection:   adhesive use limited   tubing/devices free from skin contact   transparent dressing maintained   skin-to-skin areas padded   skin-to-device areas padded  Taken 2/7/2024 1000 by Malka Esquivel RN  Pressure Reduction Techniques:   frequent weight shift encouraged   weight shift assistance provided  Head of Bed ([facility]) Positioning: [facility] elevated  Pressure Reduction Devices: specialty bed utilized  Skin Protection:   adhesive use limited   tubing/devices free from skin contact   transparent dressing maintained   skin-to-skin areas padded   skin-to-device areas padded  Taken 2/7/2024 0800 by Malka Esquivel RN  Pressure Reduction Techniques:   frequent weight shift encouraged   weight shift assistance provided   positioned off wounds  Head of Bed ([facility]) Positioning: [facility] elevated  Pressure Reduction Devices: specialty bed utilized  Skin Protection:   adhesive use limited   tubing/devices free from skin contact   transparent dressing maintained   skin-to-skin areas padded   skin-to-device areas padded     Problem: Fall Injury Risk  Goal: Absence of Fall and Fall-Related Injury  Outcome: Ongoing,  Progressing  Intervention: Promote Injury-Free Environment  Recent Flowsheet Documentation  Taken 2/7/2024 1400 by Malka Esquivel RN  Safety Promotion/Fall Prevention:   activity supervised   assistive device/personal items within reach   clutter free environment maintained   toileting scheduled   safety round/check completed   room organization consistent  Taken 2/7/2024 1200 by Malka Esquivel RN  Safety Promotion/Fall Prevention:   activity supervised   assistive device/personal items within reach   clutter free environment maintained   toileting scheduled   safety round/check completed   room organization consistent  Taken 2/7/2024 1000 by Malka Esquivel RN  Safety Promotion/Fall Prevention:   activity supervised   assistive device/personal items within reach   clutter free environment maintained   toileting scheduled   safety round/check completed   room organization consistent  Taken 2/7/2024 0800 by Malka Esquivel RN  Safety Promotion/Fall Prevention:   activity supervised   assistive device/personal items within reach   clutter free environment maintained   toileting scheduled   safety round/check completed   room organization consistent   Goal Outcome Evaluation:

## 2024-02-07 NOTE — PLAN OF CARE
Problem: Adult Inpatient Plan of Care  Goal: Plan of Care Review  Outcome: Ongoing, Progressing  Goal: Patient-Specific Goal (Individualized)  Outcome: Ongoing, Progressing  Goal: Absence of Hospital-Acquired Illness or Injury  Outcome: Ongoing, Progressing  Intervention: Identify and Manage Fall Risk  Recent Flowsheet Documentation  Taken 2/7/2024 0600 by Cori Coats RN  Safety Promotion/Fall Prevention:   assistive device/personal items within reach   clutter free environment maintained   fall prevention program maintained   nonskid shoes/slippers when out of bed   room organization consistent   safety round/check completed  Taken 2/7/2024 0400 by Cori Coats RN  Safety Promotion/Fall Prevention:   assistive device/personal items within reach   clutter free environment maintained   fall prevention program maintained   nonskid shoes/slippers when out of bed   room organization consistent   safety round/check completed  Taken 2/7/2024 0000 by Cori Coats RN  Safety Promotion/Fall Prevention:   assistive device/personal items within reach   clutter free environment maintained   fall prevention program maintained   nonskid shoes/slippers when out of bed   room organization consistent   safety round/check completed  Taken 2/6/2024 2100 by Cori Coats RN  Safety Promotion/Fall Prevention:   assistive device/personal items within reach   clutter free environment maintained   fall prevention program maintained   nonskid shoes/slippers when out of bed   room organization consistent   safety round/check completed  Intervention: Prevent Skin Injury  Recent Flowsheet Documentation  Taken 2/7/2024 0600 by Cori Coats RN  Body Position:   position changed independently   weight shifting  Skin Protection:   tubing/devices free from skin contact   transparent dressing maintained   skin-to-device areas padded  Taken 2/7/2024 0400 by Cori Coats RN  Body Position:   position changed  independently   weight shifting  Skin Protection:   tubing/devices free from skin contact   transparent dressing maintained   skin-to-device areas padded  Taken 2/7/2024 0000 by Cori Coats RN  Body Position:   position changed independently   weight shifting  Skin Protection:   tubing/devices free from skin contact   transparent dressing maintained   skin-to-device areas padded  Taken 2/6/2024 2100 by Cori Coats RN  Body Position:   position changed independently   weight shifting  Skin Protection:   tubing/devices free from skin contact   transparent dressing maintained   skin-to-device areas padded  Intervention: Prevent and Manage VTE (Venous Thromboembolism) Risk  Recent Flowsheet Documentation  Taken 2/7/2024 0600 by Cori Coats RN  Activity Management: activity encouraged  Taken 2/7/2024 0400 by Cori Coats RN  Activity Management: activity encouraged  Taken 2/7/2024 0000 by Cori Coats RN  Activity Management: activity encouraged  Taken 2/6/2024 2100 by Cori Coats RN  Activity Management: activity encouraged  Range of Motion: active ROM (range of motion) encouraged  Intervention: Prevent Infection  Recent Flowsheet Documentation  Taken 2/7/2024 0600 by Cori Coats RN  Infection Prevention:   environmental surveillance performed   hand hygiene promoted   personal protective equipment utilized   single patient room provided  Taken 2/7/2024 0400 by Cori Coats RN  Infection Prevention:   environmental surveillance performed   hand hygiene promoted   personal protective equipment utilized   single patient room provided  Taken 2/7/2024 0000 by Cori Coats RN  Infection Prevention:   environmental surveillance performed   hand hygiene promoted   personal protective equipment utilized   single patient room provided  Taken 2/6/2024 2100 by Cori Coats RN  Infection Prevention:   environmental surveillance performed   hand hygiene promoted   personal  protective equipment utilized   single patient room provided  Goal: Optimal Comfort and Wellbeing  Outcome: Ongoing, Progressing  Intervention: Monitor Pain and Promote Comfort  Recent Flowsheet Documentation  Taken 2/7/2024 0600 by Cori Coats RN  Pain Management Interventions:   pain management plan reviewed with patient/caregiver   see MAR  Taken 2/7/2024 0400 by Cori Coats RN  Pain Management Interventions:   pain management plan reviewed with patient/caregiver   see MAR  Taken 2/6/2024 2100 by Cori Coats RN  Pain Management Interventions: see MAR  Intervention: Provide Person-Centered Care  Recent Flowsheet Documentation  Taken 2/6/2024 2100 by Cori Coats RN  Trust Relationship/Rapport:   care explained   choices provided   emotional support provided   empathic listening provided   questions answered   questions encouraged   reassurance provided   thoughts/feelings acknowledged  Goal: Readiness for Transition of Care  Outcome: Ongoing, Progressing     Problem: Pain Chronic (Persistent) (Comorbidity Management)  Goal: Acceptable Pain Control and Functional Ability  Outcome: Ongoing, Progressing  Intervention: Manage Persistent Pain  Recent Flowsheet Documentation  Taken 2/7/2024 0600 by Cori Coats RN  Medication Review/Management: medications reviewed  Taken 2/7/2024 0400 by Cori Coats RN  Medication Review/Management: medications reviewed  Taken 2/6/2024 2100 by Cori Coats RN  Medication Review/Management: medications reviewed  Intervention: Develop Pain Management Plan  Recent Flowsheet Documentation  Taken 2/7/2024 0600 by Cori Coats RN  Pain Management Interventions:   pain management plan reviewed with patient/caregiver   see MAR  Taken 2/7/2024 0400 by Cori Coats RN  Pain Management Interventions:   pain management plan reviewed with patient/caregiver   see MAR  Taken 2/6/2024 2100 by Cori Coats RN  Pain Management Interventions: see  MAR  Intervention: Optimize Psychosocial Wellbeing  Recent Flowsheet Documentation  Taken 2/7/2024 0600 by Cori Coats RN  Diversional Activities:   television   smartphone  Family/Support System Care: support provided  Taken 2/7/2024 0400 by Cori Coats RN  Diversional Activities:   television   smartphone  Family/Support System Care: support provided  Taken 2/7/2024 0000 by Cori Coats RN  Diversional Activities:   television   smartphone  Family/Support System Care: support provided  Taken 2/6/2024 2100 by Cori Coats RN  Diversional Activities:   television   smartphone  Family/Support System Care: support provided     Problem: Skin Injury Risk Increased  Goal: Skin Health and Integrity  Outcome: Ongoing, Progressing  Intervention: Optimize Skin Protection  Recent Flowsheet Documentation  Taken 2/7/2024 0600 by Cori Coats RN  Pressure Reduction Techniques:   frequent weight shift encouraged   weight shift assistance provided  Head of Bed (HOB) Positioning: HOB lowered  Pressure Reduction Devices:   pressure-redistributing mattress utilized   positioning supports utilized  Skin Protection:   tubing/devices free from skin contact   transparent dressing maintained   skin-to-device areas padded  Taken 2/7/2024 0400 by Cori Coats RN  Pressure Reduction Techniques:   frequent weight shift encouraged   weight shift assistance provided  Head of Bed (HOB) Positioning: HOB lowered  Pressure Reduction Devices:   pressure-redistributing mattress utilized   positioning supports utilized  Skin Protection:   tubing/devices free from skin contact   transparent dressing maintained   skin-to-device areas padded  Taken 2/7/2024 0000 by Cori Coats RN  Pressure Reduction Techniques:   frequent weight shift encouraged   weight shift assistance provided  Head of Bed (HOB) Positioning: HOB lowered  Pressure Reduction Devices:   pressure-redistributing mattress utilized   positioning supports  utilized  Skin Protection:   tubing/devices free from skin contact   transparent dressing maintained   skin-to-device areas padded  Taken 2/6/2024 2100 by Cori Coats RN  Pressure Reduction Techniques:   frequent weight shift encouraged   weight shift assistance provided  Head of Bed (HOB) Positioning: HOB lowered  Pressure Reduction Devices:   pressure-redistributing mattress utilized   positioning supports utilized   heel offloading device utilized  Skin Protection:   tubing/devices free from skin contact   transparent dressing maintained   skin-to-device areas padded     Problem: Fall Injury Risk  Goal: Absence of Fall and Fall-Related Injury  Outcome: Ongoing, Progressing  Intervention: Identify and Manage Contributors  Recent Flowsheet Documentation  Taken 2/7/2024 0600 by Cori Coats RN  Medication Review/Management: medications reviewed  Taken 2/7/2024 0400 by Cori Coats RN  Medication Review/Management: medications reviewed  Taken 2/6/2024 2100 by Cori Coats RN  Medication Review/Management: medications reviewed  Intervention: Promote Injury-Free Environment  Recent Flowsheet Documentation  Taken 2/7/2024 0600 by Cori Coats RN  Safety Promotion/Fall Prevention:   assistive device/personal items within reach   clutter free environment maintained   fall prevention program maintained   nonskid shoes/slippers when out of bed   room organization consistent   safety round/check completed  Taken 2/7/2024 0400 by Cori Coats RN  Safety Promotion/Fall Prevention:   assistive device/personal items within reach   clutter free environment maintained   fall prevention program maintained   nonskid shoes/slippers when out of bed   room organization consistent   safety round/check completed  Taken 2/7/2024 0000 by Cori Coats RN  Safety Promotion/Fall Prevention:   assistive device/personal items within reach   clutter free environment maintained   fall prevention program  maintained   nonskid shoes/slippers when out of bed   room organization consistent   safety round/check completed  Taken 2/6/2024 2100 by Cori Coats RN  Safety Promotion/Fall Prevention:   assistive device/personal items within reach   clutter free environment maintained   fall prevention program maintained   nonskid shoes/slippers when out of bed   room organization consistent   safety round/check completed   Goal Outcome Evaluation:

## 2024-02-07 NOTE — PLAN OF CARE
Goal Outcome Evaluation:  Plan of Care Reviewed With: patient           Outcome Evaluation: Ot eval complete. Pt reports pain to residual LE limb, reports a fx to LUE, presents with weaknes, decreased act arias, and decline in adl fx. Pt min assist with environmental modification/extra time for supine to sit, dep for lbd, max assist x 2 for sts, setup for grooming, and mod assist for ubd. Recommend iPOT and IRF at d/c.      Anticipated Discharge Disposition (OT): inpatient rehabilitation facility

## 2024-02-07 NOTE — PROGRESS NOTES
[facility]  PROGRESS NOTE    Patient Name: Phoebe Carvajal  : 1975  MRN: [id number]    Date of Admission: 2024  Primary Care Physician: Sofya Benjamin MD    Subjective   Subjective     CC:  Left AKA site wound pain and erythema    HPI:  Evaluated patient this morning. Patient reporting she is nauseous, in a lot of pain, had multiple bowel movements overnight.      Objective   Objective     Vital Signs:   Temp:  [97.2 °F (36.2 °C)-98.5 °F (36.9 °C)] 98.5 °F (36.9 °C)  Heart Rate:  [58-70] 65  Resp:  [11-18] 16  BP: ()/(44-66) 94/47  Flow (L/min):  [2-3] 2     Physical Exam:  Constitutional: Awake, alert, appears uncomfortable due to pain  HENT: NCAT, mucous membranes moist  Respiratory: Clear to auscultation bilaterally, respiratory effort normal   Cardiovascular: RRR, no murmurs, rubs, or gallops  Gastrointestinal: Positive bowel sounds, soft, nontender, nondistended  Musculoskeletal: S/p left AKA, now with wound VAC and bandage in place  Neurologic: Alert and oriented x 3, no focal deficits, speech clear      Results Reviewed:  LAB RESULTS:      Lab 24  02524  1120   WBC 2.99* 3.24* 3.75   HEMOGLOBIN 8.4* 8.9* 9.8*   HEMATOCRIT 28.0* 29.2* 31.7*   PLATELETS 198 193 242   NEUTROS ABS  --   --  1.97   IMMATURE GRANS (ABS)  --   --  0.01   LYMPHS ABS  --   --  1.14   MONOS ABS  --   --  0.34   EOS ABS  --   --  0.27   MCV 98.6* 99.7* 98.8*   SED RATE  --  39*  --    CRP  --  3.71*  --    PROCALCITONIN  --   --  <0.02   LACTATE  --   --  1.3         Lab 24  0250 24  1120   SODIUM 135* 137 139   POTASSIUM 4.4 4.3 4.3   CHLORIDE 102 103 101   CO2 26.0 27.0 31.0*   ANION GAP 7.0 7.0 7.0   BUN 5* 4* 6   CREATININE 0.46* 0.42* 0.58   EGFR 116.7 119.3 110.4   GLUCOSE 168* 70 88   CALCIUM 8.3* 8.2* 8.3*         Lab 24  1120   TOTAL PROTEIN 6.5   ALBUMIN 2.8*   GLOBULIN 3.7   ALT (SGPT) 6   AST  (SGOT) 28   BILIRUBIN 0.5   ALK PHOS 215*                     Brief Urine Lab Results  (Last result in the past 365 days)        Color   Clarity   Blood   Leuk Est   Nitrite   Protein   CREAT   Urine HCG        01/05/24 1351 Yellow   Cloudy   Negative   Small (1+)   Negative   Trace                   Microbiology Results Abnormal       Procedure Component Value - Date/Time    AFB Culture - Swab, Leg, Left [[id number]] Collected: 02/06/24 1847    Lab Status: Preliminary result Specimen: Swab from Leg, Left Updated: 02/07/24 1307     AFB Stain No acid fast bacilli seen on concentrated smear    Blood Culture - Blood, Arm, Right [[id number]]  (Normal) Collected: 02/05/24 1045    Lab Status: Preliminary result Specimen: Blood from Arm, Right Updated: 02/07/24 1145     Blood Culture No growth at 2 days    Blood Culture - Blood, Arm, Left [[id number]]  (Normal) Collected: 02/05/24 1120    Lab Status: Preliminary result Specimen: Blood from Arm, Left Updated: 02/07/24 1145     Blood Culture No growth at 2 days    Wound Culture - Swab, Leg, Left [[id number]] Collected: 02/06/24 1847    Lab Status: Preliminary result Specimen: Swab from Leg, Left Updated: 02/07/24 0933     Wound Culture No growth     Gram Stain Moderate (3+) Red blood cells      Few (2+) WBCs seen      No organisms seen    Wound Culture - Swab, Leg, Left [[id number]] Collected: 02/05/24 1052    Lab Status: Preliminary result Specimen: Swab from Leg, Left Updated: 02/07/24 0908     Wound Culture Moderate growth (3+) Normal Skin Tracy     Gram Stain Moderate (3+) WBCs seen      Few (2+) Gram positive cocci in pairs and chains    MRSA Screen, PCR (Inpatient) - Swab, Nares [[id number]]  (Normal) Collected: 02/06/24 1247    Lab Status: Final result Specimen: Swab from Nares Updated: 02/06/24 2010     MRSA PCR No MRSA Detected    Narrative:      The negative predictive value of this diagnostic test is high and should only be used to consider de-escalating anti-MRSA  therapy. A positive result may indicate colonization with MRSA and must be correlated clinically.            No radiology results from the last 24 hrs        Current medications:  Scheduled Meds:cyanocobalamin, 1,000 mcg, Intramuscular, Q7 Days  enoxaparin, 60 mg, Subcutaneous, Q12H  famotidine, 20 mg, Oral, BID AC  fluconazole, 200 mg, Oral, Daily  furosemide, 40 mg, Oral, Daily  levoFLOXacin, 750 mg, Oral, Daily  levothyroxine, 175 mcg, Oral, Daily  magnesium oxide, 400 mg, Oral, Daily  potassium chloride, 10 mEq, Oral, Daily  pramipexole, 1 mg, Oral, Daily  pregabalin, 200 mg, Oral, TID  sertraline, 100 mg, Oral, Nightly  sodium chloride, 10 mL, Intravenous, Q12H  vancomycin, 1,000 mg, Intravenous, Q12H      Continuous Infusions:lactated ringers, 9 mL/hr, Last Rate: 50 mL/hr (02/06/24 1821)  Pharmacy to dose vancomycin,       PRN Meds:.  acetaminophen    albuterol    senna-docusate sodium **AND** polyethylene glycol **AND** bisacodyl **AND** bisacodyl    cloNIDine    HYDROmorphone    HYDROmorphone    hydrOXYzine    LORazepam    melatonin    nitroglycerin    ondansetron ODT **OR** ondansetron    oxyCODONE    Pharmacy to dose vancomycin    sodium chloride    sodium chloride    traZODone    Assessment & Plan   Assessment & Plan     Active Hospital Problems    Diagnosis  POA    **Osteomyelitis [M86.9]  Yes    Amputation stump infection [T87.40]  Yes      Resolved Hospital Problems   No resolved problems to display.        Brief Hospital Course to date:  Phoebe Carvajal is a 48 y.o. female with PMHx of CKD, Stills disease, restless leg syndrome, hypothyroidism, PAD, morbid obesity. She has had chronic/recurrent osteomyelitis of left foot resulting in L AKA on 12/29/2023. She then had a prolonged admission 12/28-1/19 requiring debridements x2. She was discharged to [facility] with a wound vac on 1/19/24. ID followed throughout and discontinued antibiotics at time of discharge because path report showed margins without  infection. She was continued on fluconazole for + yeast at the AKA wound site (C albicans). Re-presented due to concern for left AKA site wound dehiscence.    This patient's problems and plans were partially entered by my partner and updated as appropriate by me 02/07/24.    L AKA site with dehiscence and soft tissue infection  -Edema likely contributing to poor wound healing and serous drainage  -Previous wound culture with MDR Enterobacter, repeat culture with GPC on Gram stain  -s/p meropenem, levaquin on admission  -Orthopedic surgery evaluated, s/p I&D with wound VAC closure 2/6/24 by Dr. Witt.  -Infectious disease following, continue oral levofloxacin, IV vancomycin. Follow wound and blood cultures. Orthopedic surgery planning for wound VAC changes every 72 hours.    Growth of candida albicans from left AKA site   -Continue fluconazole, plan for 4 weeks of antifungal through 2/9.     Hypotension  -patient reports SBP 90s-100s at home  -Will monitor closely in setting of infection.    Complex pain, chronic opioid use  Peripheral neuropathy  -Pain control with home Lyrica, oxycodone 10mg q4h PRN and as needed Dilaudid for breakthrough pain. Wean as tolerated. Bowel regimen.    Chronic bilateral lower extremity edema  -Unclear if has diagnosis of heart failure, on Lasix at home for lower extremity edema  -Echo 5/2023 with EF 61%, no comment regarding diastolic function  -Continue home Lasix.      L humerus nonunion  -reported by patient on admission  -XR left humerus 9/2023 showed fairly comminuted proximal humeral fracture  -Consider asking for Orthopedic surgery for opinion.     Macrocytic anemia  B12 deficiency  -MCV improved on admission, Hgb 9.8, no evidence of overt bleeding  -Continue weekly B12 replacement. Monitor daily CBC. Transfuse for hemoglobin less than 7 or hemodynamic instability.    RLS  -Continue pramipexole.    Hypothyroidism  -Continue levothyroxine.    Anxiety  -Continue Zoloft, PRN  Atarax.       Expected Discharge Location and Transportation: Home vs rehab  Expected Discharge   Expected Discharge Date: 2/9/2024; Expected Discharge Time:      DVT prophylaxis:  Medical DVT prophylaxis orders are present.         AM-PAC 6 Clicks Score (PT): 10 (02/07/24 0800)    CODE STATUS:   Code Status and Medical Interventions:   Ordered at: 02/05/24 1500     Level Of Support Discussed With:    Patient     Code Status (Patient has no pulse and is not breathing):    CPR (Attempt to Resuscitate)     Medical Interventions (Patient has pulse or is breathing):    Full Support       Natalie Oliver,   02/07/24
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
## 2024-02-07 NOTE — PROGRESS NOTES
Pharmacy Consult-Vancomycin Dosing  Phoebe Carvajal is a  48 y.o. female receiving vancomycin therapy.     Indication:  SSTI/ osteomyelitis  Consulting Provider: Case Watson MD  ID Consult: Y    Goal AUC: 400 - 600 mg/L*hr    Current Antimicrobial Therapy  Anti-Infectives (From admission, onward)      Ordered     Dose/Rate Route Frequency Start Stop    02/07/24 0741  vancomycin (VANCOCIN) 1000 mg/200 mL dextrose 5% IVPB        Ordering Provider: Adama Witt Jr., MD    1,000 mg  over 60 Minutes Intravenous Every 12 Hours 02/07/24 1400 02/13/24 1359    02/06/24 1654  clindamycin (CLEOCIN) 900 mg in dextrose 5% 50 mL IVPB (premix)        Ordering Provider: Adama Witt Jr., MD    900 mg  50 mL/hr over 60 Minutes Intravenous Once 02/06/24 1656 02/06/24 1831    02/06/24 0848  vancomycin 2500 mg/500 mL 0.9% NS IVPB ([facility])        Ordering Provider: Case Watson MD    20 mg/kg × 125 kg  over 150 Minutes Intravenous Once 02/06/24 0945 02/06/24 1232    02/06/24 0839  Pharmacy to dose vancomycin        Ordering Provider: Adama Witt Jr., MD     Does not apply Continuous PRN 02/06/24 0838 02/20/24 0837    02/05/24 1540  fluconazole (DIFLUCAN) tablet 200 mg        Ordering Provider: Adama Witt Jr., MD    200 mg Oral Daily 02/05/24 1630 02/19/24 0859    02/05/24 1540  levoFLOXacin (LEVAQUIN) tablet 750 mg        Ordering Provider: Adama Witt Jr., MD    750 mg Oral Daily 02/05/24 1630 02/15/24 0859    02/05/24 1506  meropenem (MERREM) 1,000 mg in sodium chloride 0.9 % 100 mL IVPB        Ordering Provider: Sri Youngblood MD    1,000 mg  over 30 Minutes Intravenous Once 02/05/24 1600 02/05/24 1636            Allergies  Allergies as of 02/05/2024 - Reviewed 02/05/2024   Allergen Reaction Noted    Vicodin [hydrocodone-acetaminophen] Itching 11/10/2017       Labs    Results from last 7 days   Lab Units 02/07/24  0314 02/06/24  0250 02/05/24  1120   BUN mg/dL 5* 4* 6   CREATININE mg/dL 0.46*  "0.42* 0.58       Results from last 7 days   Lab Units 02/07/24  0314 02/06/24  0250 02/05/24  1120   WBC 10*3/mm3 2.99* 3.24* 3.75       Evaluation of Dosing     Last Dose Received in the ED/Outside Facility:   2/6 1000  Is Patient on Dialysis or Renal Replacement:  N    Ht - 157.5 cm (62\")  Wt - 125 kg (275 lb)    Estimated Creatinine Clearance: 185 mL/min (A) (by C-G formula based on SCr of 0.46 mg/dL (L)).    I/O last 3 completed shifts:  In: 50 [IV Piggyback:50]  Out: 350 [Urine:300]    Microbiology and Radiology  Microbiology Results (last 10 days)       Procedure Component Value - Date/Time    Wound Culture - Swab, Leg, Left [[id number]] Collected: 02/06/24 1847    Lab Status: Preliminary result Specimen: Swab from Leg, Left Updated: 02/07/24 0933     Wound Culture No growth     Gram Stain Moderate (3+) Red blood cells      Few (2+) WBCs seen      No organisms seen    AFB Culture - Swab, Leg, Left [[id number]] Collected: 02/06/24 1847    Lab Status: Preliminary result Specimen: Swab from Leg, Left Updated: 02/07/24 1307     AFB Stain No acid fast bacilli seen on concentrated smear    MRSA Screen, PCR (Inpatient) - Swab, Nares [[id number]]  (Normal) Collected: 02/06/24 1247    Lab Status: Final result Specimen: Swab from Nares Updated: 02/06/24 2010     MRSA PCR No MRSA Detected    Narrative:      The negative predictive value of this diagnostic test is high and should only be used to consider de-escalating anti-MRSA therapy. A positive result may indicate colonization with MRSA and must be correlated clinically.    Blood Culture - Blood, Arm, Left [[id number]]  (Normal) Collected: 02/05/24 1120    Lab Status: Preliminary result Specimen: Blood from Arm, Left Updated: 02/07/24 1145     Blood Culture No growth at 2 days    Wound Culture - Swab, Leg, Left [[id number]] Collected: 02/05/24 1052    Lab Status: Preliminary result Specimen: Swab from Leg, Left Updated: 02/07/24 0908     Wound Culture Moderate growth (3+) " Normal Skin Tracy     Gram Stain Moderate (3+) WBCs seen      Few (2+) Gram positive cocci in pairs and chains    Blood Culture - Blood, Arm, Right [[id number]]  (Normal) Collected: 02/05/24 1045    Lab Status: Preliminary result Specimen: Blood from Arm, Right Updated: 02/07/24 1145     Blood Culture No growth at 2 days            Reported Vancomycin Levels                   [employer] AUC Calculation:    Current AUC:     mg/L*hr    Predicted Steady State AUC on Current Dose:  > 700 mg/L*hr  _________________________________    Predicted Steady State AUC on New Dose:    495 mg/L*hr    Assessment/Plan:    Rx to dose vancomycin for SSTI for targeting -600 mg/L*hr.  Given vancomycin loading dose of 2500 mg (20 mg/kg) x 1 dose yesterday, then vancomycin 1 gm IV q8h.   Recommend to reduce her vancomycin to 1000 mg (~12 mg/kg) IV q12h. To better target AUC between 400-600.  Pharmacy will continue to monitor and adjust vancomycin dose as necessary based on renal function, cultures, labs, and clinical status.     Thanks,   Polo Mclean, PharmD  2/7/2024  13:39 EST

## 2024-02-07 NOTE — CASE MANAGEMENT/SOCIAL WORK
Continued Stay Note  [facility]     Patient Name: Phoebe Carvajal  MRN: [id number]  Today's Date: 2/7/2024    Admit Date: 2/5/2024    Plan: [facility] pending acceptance and insurance approval   Discharge Plan       Row Name 02/07/24 1315       Plan    Plan [facility] pending acceptance and insurance approval    Patient/Family in Agreement with Plan yes    Plan Comments CM spoke with the patient at the bedside. She was very tearful during conversation as she has recently had an amputation of her left leg. She stated that she does want to go back to [facility] at discharge. Patient received a wound vac today and notes that her amputation site is very sore. She expressed concern that when she went to [facility] last time they drastically reduced her pain medication causing detox symptoms for 4 days. She stated that she does not want that to happen this time as she is in considerable pain. CM comforted patient. CM will make the new referral to [facility] and discuss her medication concerns. CM to follow and assist .    Final Discharge Disposition Code 62 - inpatient rehab facility                   Discharge Codes    No documentation.                 Expected Discharge Date and Time       Expected Discharge Date Expected Discharge Time    Feb 9, 2024               Elizabeth Guzmán RN
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
## 2024-02-07 NOTE — PLAN OF CARE
Goal Outcome Evaluation:  Plan of Care Reviewed With: patient        Progress: improving  Outcome Evaluation: Pt. re-eval completed s/p I&D LLE. Pt. presents below baseline function w/generalized weakness, acute pain, and balance deficits affecting her ability to safely participate in functional mobility. She performed bed mobility and transfers w/max assist of 2. Activity limited by pain and fatigue. Pt. tolerated ther-ex well. Continue IPPT POC to progress as tolerated.      Anticipated Discharge Disposition (PT): inpatient rehabilitation facility

## 2024-02-07 NOTE — PLAN OF CARE
Goal Outcome Evaluation:  Plan of Care Reviewed With: patient           Outcome Evaluation: Pt presents with L AKA wound s/p surgical debridement and wound vac placement. PT will f/u with wound vac change in 2-3 days.

## 2024-02-07 NOTE — OP NOTE
[facility], [facility]  [address]  [phone]    OPERATIVE REPORT      PATIENT NAME:  Phoebe Carvajal                            YOB: 1975       PREOP DIAGNOSIS:   Left heel ulcer.    POSTOP DIAGNOSIS:   Same.    PROCEDURE:   Left     45153:  Irrigation and drainage of complex postoperative infection  72489:  Debridement of skin, subcutaneous tissue, muscle  77071:  Wound vacuum-assisted closure    SURGEON:     Adama Witt MD    OPERATIVE TEAM:   Circulator: Adonay Macias RN; Ya Locke RN  Scrub Person: Esteban Carrillo; Georgina Neal; Polo Nuñez    ANESTHETIST:  Anesthesiologist: Dejuan Lobato MD    ANESTHESIA:   General    ESTIM BLOOD LOSS:   30 mL    TOURNIQUET TIME:   Not utilized    FINDINGS:     I removed all deep suture.  There was serosanguineous seroma, no myke purulence.  Remaining tissue appeared healthy.  Wound measured approximately 30 cm x 20 cm depth by 5 cm from anterior to posterior dimension.    IMPLANTS:     None    DRAINS:   Wound vacuum-assisted closure.    COMPLICATIONS:    None.    DISPOSITION:    Stable to recovery.     INDICATIONS:    48-year-old female with chronic calcaneal osteomyelitis with nonhealing wound overlying left total knee stemmed prosthesis status post staged above-knee amputation with subsequent wound dehiscence and subsequent debridement irrigation with stage secondary closure.  She presented to the emergency department yesterday with constitutional symptoms, medial and lateral wound dehiscence.  I had a discussion with her regarding further management.  After discussion of risk, benefits, alternatives, she wished to proceed with left thigh debridement, irrigation, wound vacuum-assisted closure.    NARRATIVE:     Patient was identified in preoperative holding.  Operative site was marked in indelible ink.  History, physical, consent were reviewed and updated.  Patient was surrendered to the  anesthesia team and transported to the operative suite where anesthesia was induced.  Ipsilateral hip bump was placed.  Operative extremity was prepped and draped in the usual sterile fashion.  The operative team donned sterile gowns and gloves and a timeout was called.  All in attendance agreed regarding the patient's identity, proposed operative procedure, and operative site.    I opened her medial and lateral incision, bluntly dissected, opened the distal aspect of the incision.  I carefully removed all deep absorbable suture.  I took swab specimens of the wound bed which I sent for culture.  Using a 15 blade scalpel, mila Costello, I debrided devitalized skin, subcutaneous tissue, muscle.  This is an excisional debridement.  Deepest layer was muscle tissue.  I copiously irrigated with Pulsavac lavage.  I applied Floseal mixed with vancomycin powder to the wound.    The resulting wound size was roughly 30 cm x 20 cm x 5 cm.    Wound vacuum-assisted closure device was applied which was noted to have good seal.      Sterile dressing was applied.  Counts were correct x2.  There were no apparent complications.  I was present and scrubbed for the entire case.    Adama Witt Jr, MD  2/6/2024

## 2024-02-07 NOTE — PROGRESS NOTES
"  Patient Name: Phoebe Carvajal  : 1975  MRN: [id number]    Chief Complaint: leg pain    Reason for Consultation: wound infection    Subjective   Patient is a 48 y.o. female with history of obesity, prior adult stills disease, peripheral neuropathy, and chronic left knee arthroplasty infection treated at , and prior seizure.  Recently admitted to [facility] reviewed reevaluation of her chronic left leg infection and eventually underwent left leg above-the-knee amputation on 23, but then had 3 additional revision surgeries.  Fungal culture from most recent revision surgery on  grew scant Candida albicans.  She was discharged to [facility] on fluconazole    24: Patient reports improving drainage but still had fairly significant serous output overnight.  Wound VAC is to be removed today and switch to wet-to-dry dressing per Dr. Callaway.  No worsening pain, swelling.  Tolerated addition of Keflex    24: Patient concerned about right foot with ongoing swelling.  Left AKA site with ongoing serous drainage.  Still significant tenderness.  Patient refused direct exam.  Now has wet-to-dry dressing related wound VAC.    24: Ongoing drainage from the left amputation site which patient thinks is getting worse.  Wound VAC is now off.  Still has some discomfort.  Working with therapy.  Tolerating current antibiotics via peripheral IV and oral Bactrim.  No chest pain, palpitations.  She says she has tolerated levofloxacin before    24: Transferred back to [facility] with worsening wound dehiscence, drainage and pain.  I spoke with Dr. Colorado via phone.  Continue meropenem and levofloxacin.  Repeat culture obtained    2024: I&D down to level of muscle by Dr Witt \"removed all deep suture.  There was serosanguineous seroma, no myke purulence.  Remaining tissue appeared healthy\" vancomycin powder applied    24: Ongoing significant pain especially with movement.  No " clear side effects from antibiotics but she does note difficult IV access. Appetite not great.     Review of Systems  Afebrile and hemodynamically stable. No nausea, diarrhea. No rash. See above, otherwise negative.       Allergies   Allergen Reactions    Vicodin [Hydrocodone-Acetaminophen] Itching       Objective   Antibiotics:  Anti-Infectives (From admission, onward)      Ordered     Dose/Rate Route Frequency Start Stop    02/07/24 0741  vancomycin (VANCOCIN) 1000 mg/200 mL dextrose 5% IVPB        Ordering Provider: Adama Witt Jr., MD    1,000 mg  over 60 Minutes Intravenous Every 12 Hours 02/07/24 1400 02/13/24 1359    02/06/24 1654  clindamycin (CLEOCIN) 900 mg in dextrose 5% 50 mL IVPB (premix)        Ordering Provider: Adama Witt Jr., MD    900 mg  50 mL/hr over 60 Minutes Intravenous Once 02/06/24 1656 02/06/24 1831    02/06/24 0848  vancomycin 2500 mg/500 mL 0.9% NS IVPB ([facility])        Ordering Provider: Case Watson MD    20 mg/kg × 125 kg  over 150 Minutes Intravenous Once 02/06/24 0945 02/06/24 1232    02/06/24 0839  Pharmacy to dose vancomycin        Ordering Provider: Adama Witt Jr., MD     Does not apply Continuous PRN 02/06/24 0838 02/20/24 0837    02/05/24 1540  fluconazole (DIFLUCAN) tablet 200 mg        Ordering Provider: Adama Witt Jr., MD    200 mg Oral Daily 02/05/24 1630 02/19/24 0859    02/05/24 1540  levoFLOXacin (LEVAQUIN) tablet 750 mg        Ordering Provider: Adama Witt Jr., MD    750 mg Oral Daily 02/05/24 1630 02/15/24 0859    02/05/24 1506  meropenem (MERREM) 1,000 mg in sodium chloride 0.9 % 100 mL IVPB        Ordering Provider: Sri Youngblood MD    1,000 mg  over 30 Minutes Intravenous Once 02/05/24 1600 02/05/24 1636            Other Medications:  Current Facility-Administered Medications   Medication Dose Route Frequency Provider Last Rate Last Admin    acetaminophen (TYLENOL) tablet 650 mg  650 mg Oral Q4H PRN Adama Witt Jr., MD         albuterol (PROVENTIL) nebulizer solution 0.083% 2.5 mg/3mL  2.5 mg Nebulization Q6H PRN Adama Witt Jr., MD        sennosides-docusate (PERICOLACE) 8.6-50 MG per tablet 2 tablet  2 tablet Oral Nightly PRN Natalie Oliver DO        And    polyethylene glycol (MIRALAX) packet 17 g  17 g Oral Daily PRN Natalie Oliver DO        And    bisacodyl (DULCOLAX) EC tablet 5 mg  5 mg Oral Daily PRN Natalie Oliver DO        And    bisacodyl (DULCOLAX) suppository 10 mg  10 mg Rectal Daily PRN Natalie Oliver DO        cloNIDine (CATAPRES) tablet 0.1 mg  0.1 mg Oral Q6H PRN Adama Witt Jr., MD        cyanocobalamin injection 1,000 mcg  1,000 mcg Intramuscular Q7 Days Adama Witt Jr., MD   1,000 mcg at 02/05/24 1628    Enoxaparin Sodium (LOVENOX) syringe 60 mg  60 mg Subcutaneous Q12H Adama Witt Jr., MD   60 mg at 02/07/24 0813    famotidine (PEPCID) tablet 20 mg  20 mg Oral BID AC Adama Witt Jr., MD   20 mg at 02/07/24 0813    fluconazole (DIFLUCAN) tablet 200 mg  200 mg Oral Daily Adama Witt Jr., MD   200 mg at 02/07/24 0814    furosemide (LASIX) tablet 40 mg  40 mg Oral Daily Adama Witt Jr., MD        HYDROmorphone (DILAUDID) injection 1 mg  1 mg Intravenous Q2H PRN Natalie Oliver DO   1 mg at 02/07/24 1348    hydrOXYzine (ATARAX) tablet 25 mg  25 mg Oral TID PRN Adama Witt Jr., MD   25 mg at 02/07/24 0226    lactated ringers infusion  9 mL/hr Intravenous Continuous Adama Witt Jr., MD 50 mL/hr at 02/06/24 1821 Currently Infusing at 02/06/24 1821    levoFLOXacin (LEVAQUIN) tablet 750 mg  750 mg Oral Daily Adama Witt Jr., MD   750 mg at 02/07/24 0813    levothyroxine (SYNTHROID, LEVOTHROID) tablet 175 mcg  175 mcg Oral Daily Adama Witt Jr., MD   175 mcg at 02/07/24 0612    LORazepam (ATIVAN) tablet 0.5 mg  0.5 mg Oral BID PRN Adama Witt Jr., MD        magnesium oxide (MAG-OX) tablet 400 mg  400 mg Oral  "Daily Adama Witt Jr., MD   400 mg at 02/07/24 0813    melatonin tablet 2.5 mg  2.5 mg Oral Nightly PRN Adama Witt Jr., MD        nitroglycerin (NITROSTAT) SL tablet 0.4 mg  0.4 mg Sublingual Q5 Min PRN Adama Witt Jr., MD        ondansetron ODT (ZOFRAN-ODT) disintegrating tablet 4 mg  4 mg Oral Q6H PRN Natalie Oliver DO   4 mg at 02/07/24 0951    Or    ondansetron (ZOFRAN) injection 4 mg  4 mg Intravenous Q6H PRN Natalie Oliver DO        oxyCODONE (ROXICODONE) immediate release tablet 10 mg  10 mg Oral Q4H PRN Adama Witt Jr., MD   10 mg at 02/07/24 1133    Pharmacy to dose vancomycin   Does not apply Continuous PRN Adama Witt Jr., MD        potassium chloride (MICRO-K/KLOR-CON) CR capsule  10 mEq Oral Daily Adama Witt Jr., MD   10 mEq at 02/07/24 0814    pramipexole (MIRAPEX) tablet 1 mg  1 mg Oral Daily Adama Witt Jr., MD   1 mg at 02/07/24 0814    pregabalin (LYRICA) capsule 200 mg  200 mg Oral TID Adama Witt Jr., MD   200 mg at 02/06/24 2214    sertraline (ZOLOFT) tablet 100 mg  100 mg Oral Nightly Adama Witt Jr., MD   100 mg at 02/05/24 2052    sodium chloride 0.9 % flush 10 mL  10 mL Intravenous Q12H Adama Witt Jr., MD   10 mL at 02/07/24 0815    sodium chloride 0.9 % flush 10 mL  10 mL Intravenous PRN Adama Witt Jr., MD        sodium chloride 0.9 % infusion 40 mL  40 mL Intravenous PRN Adama Witt Jr., MD        traZODone (DESYREL) tablet 50 mg  50 mg Oral Nightly PRN Adama Witt Jr., MD        vancomycin (VANCOCIN) 1000 mg/200 mL dextrose 5% IVPB  1,000 mg Intravenous Q12H Adama Witt Jr., MD   1,000 mg at 02/07/24 1311       Physical Exam:   Vital Signs   BP 94/47   Pulse 66   Temp 98.5 °F (36.9 °C) (Oral)   Resp 16   Ht 157.5 cm (62\")   Wt 125 kg (275 lb)   LMP  (LMP Unknown) Comment: not regular  SpO2 91%   BMI 50.30 kg/m²     GENERAL: Awake and alert. Non-toxic  HEENT: Normocephalic, " atraumatic.  EOMI. No icterus.    HEART: RRR; No murmur.  LUNGS: Clear to auscultation bilaterally. Nonlabored.  ABDOMEN: Soft, obese, nontender,  : Without Mendoza catheter.  MSK: S/p AKA on left.  Dressed with wound VAC with significant serosanguineous output  SKIN: No diffuse rash  NEURO: AOx3  PSYCHIATRIC: Appropriate  PIV    Laboratory Data  Lab Results   Component Value Date    WBC 2.99 (L) 02/07/2024    HGB 8.4 (L) 02/07/2024    HCT 28.0 (L) 02/07/2024    MCV 98.6 (H) 02/07/2024     02/07/2024     Lab Results   Component Value Date    GLUCOSE 168 (H) 02/07/2024    CALCIUM 8.3 (L) 02/07/2024     (L) 02/07/2024    K 4.4 02/07/2024    CO2 26.0 02/07/2024     02/07/2024    BUN 5 (L) 02/07/2024    CREATININE 0.46 (L) 02/07/2024     Estimated Creatinine Clearance: 185 mL/min (A) (by C-G formula based on SCr of 0.46 mg/dL (L)).  Lab Results   Component Value Date    ALT 6 02/05/2024    AST 28 02/05/2024    ALKPHOS 215 (H) 02/05/2024    BILITOT 0.5 02/05/2024     Lab Results   Component Value Date    CRP 3.71 (H) 02/06/2024     Lab Results   Component Value Date    SEDRATE 39 (H) 02/06/2024       The above labs were reviewed.     Microbiology:  Microbiology Results (last 10 days)       Procedure Component Value - Date/Time    Wound Culture - Swab, Leg, Left [[id number]] Collected: 02/06/24 1847    Lab Status: Preliminary result Specimen: Swab from Leg, Left Updated: 02/07/24 0933     Wound Culture No growth     Gram Stain Moderate (3+) Red blood cells      Few (2+) WBCs seen      No organisms seen    AFB Culture - Swab, Leg, Left [[id number]] Collected: 02/06/24 1847    Lab Status: Preliminary result Specimen: Swab from Leg, Left Updated: 02/07/24 1307     AFB Stain No acid fast bacilli seen on concentrated smear    MRSA Screen, PCR (Inpatient) - Swab, Nares [[id number]]  (Normal) Collected: 02/06/24 1247    Lab Status: Final result Specimen: Swab from Nares Updated: 02/06/24 2010     MRSA PCR No MRSA  Detected    Narrative:      The negative predictive value of this diagnostic test is high and should only be used to consider de-escalating anti-MRSA therapy. A positive result may indicate colonization with MRSA and must be correlated clinically.    Blood Culture - Blood, Arm, Left [[id number]]  (Normal) Collected: 02/05/24 1120    Lab Status: Preliminary result Specimen: Blood from Arm, Left Updated: 02/07/24 1145     Blood Culture No growth at 2 days    Wound Culture - Swab, Leg, Left [[id number]] Collected: 02/05/24 1052    Lab Status: Preliminary result Specimen: Swab from Leg, Left Updated: 02/07/24 0908     Wound Culture Moderate growth (3+) Normal Skin Jon     Gram Stain Moderate (3+) WBCs seen      Few (2+) Gram positive cocci in pairs and chains    Blood Culture - Blood, Arm, Right [[id number]]  (Normal) Collected: 02/05/24 1045    Lab Status: Preliminary result Specimen: Blood from Arm, Right Updated: 02/07/24 1145     Blood Culture No growth at 2 days            Radiology:  No radiology results for the last 7 days    2/6 EKG with QTc 416 ms      Assessment   Poor wound healing from left AKA site with worsening dehiscence and soft tissue infection: Never had clearly purulent drainage. Likely edema contributing to swelling, poor wound healing and serous drainage. wound culture at [facility] with GPC in pairs on gram stain, but MDR Enterobacter on culture (resistant to ertapenem in vitro but sensitive to imipenem, quinolones and trimethoprim-sulfa) and mixed skin jon on culture.  High risk for complications due to numerous comorbidities.  Clearly worse on exam prior to readmission to [facility].  Repeat culture pending with GPC on Gram stain again, so far it is normal skin jon on culture.  Status post I&D down to level of muscle by Dr. Witt 2/6, no deep purulence noted.   Intraoperative cultures pending.  Vancomycin powder applied to wound    Growth of Candida albicans from residual from left AKA site of  unclear significance but treating as possible true infection in setting of numerous comorbidities complications from amputation surgery.  Plan was for 4 weeks of fluconazole through February 9  Recent severe left chronic left foot infection status post above-the-knee amputation on 12/29/2023 at [facility]  Chronic right lateral foot wound: To level of soft tissue without signs of soft tissue infection at this time  Leukopenia: ongoing, mild.   Obesity  peripheral neuropathy  hx of still disease  Recent possible rash with trimethoprim-sulfa: Noted while at [facility]      Plan:  - f/u pending repeat wound and operative cultures  - Follow CBC, CMP, CRP.  Intensive laboratory monitoring to mitigate risk for toxicity on numerous antimicrobials    - Continue IV vancomycin. Appreciate pharmacy assistance with vancomycin dosing and medication monitoring to limit risk of toxicity  - Continue levofloxacin. Switch to IV version until appetite improves  - continue fluconazole. Initial plan was through Feb 9 (4 weeks post-op) due to Candida albicans from one of her operative cultures  - Probiotic    Based on numerous incidence of poor wound healing and severity of current problem, I anticipate course of IV antibiotics for at least 10-14 days after most recent debridement.  IV access is becoming more difficult so will place PICC line tomorrow    Contact isolation for MDR Enterobacter from [facility]     Complex [facility]. I will follow at [facility], [place], operated by [facility] and transfer back to [facility] I will continue to follow there     Case Watson MD  2/7/2024

## 2024-02-07 NOTE — PROGRESS NOTES
Phoebe Carvajal       LOS: 2 days   Patient Care Team:  Sofya Benjamin MD as PCP - General (Internal Medicine)  Provider, No Known as PCP - Family Medicine    Chief Complaint:  left lower extremity surgical site infection    Subjective     Interval History:     Resting comfortably in bed this morning.  No acute events overnight, pain tolerable.    Review of Systems:      Gen- No fevers, chills  CV- No chest pain, palpitations  Resp- No cough, dyspnea  GI- No N/V/D, abd pain    Objective     Vital Signs  Vital Signs (last 24 hours)         02/06 0700 02/07 0659 02/07 0700 02/07 0731   Most Recent      Temp (°F) 97.2 -  98.3       97.2 (36.2) 02/06 2203    Heart Rate 58 -  72       60 02/06 2203    Resp 11 -  18       14 02/06 2203    BP 84/56 -  103/58       100/64 02/06 2203    SpO2 (%) 90 -  100       93 02/06 2203    Flow (L/min) 2 -  3       2 02/07 0000              Physical Exam:     No acute distress.  Nonlabored respirations.  Regular rate and rhythm.  Abdomen nondistended.  Left lower extremity: Operative dressing in place clean, dry, intact.  Wound vacuum-assisted closure in place with adequate seal, serosanguineous output in canister.     Results Review:     I reviewed the patient's new clinical results.    Medication Review:   Hospital Medications (active)         Dose Frequency Start End    acetaminophen (TYLENOL) tablet 650 mg 650 mg Every 4 Hours PRN 2/5/2024 --    Admin Instructions: If given for fever, use fever parameter: fever greater than 100.4 °F  Based on patient request - if ordered for moderate or severe pain, provider allows for administration of a medication prescribed for a lower pain scale.    Do not exceed 4 grams of acetaminophen in a 24 hr period. Max dose of 2gm for AST/ALT greater than 120 units/L.    If given for pain, use the following pain scale:   Mild Pain = Pain Score of 1-3, CPOT 1-2  Moderate Pain = Pain Score of 4-6, CPOT 3-4  Severe Pain = Pain Score of 7-10, CPOT 5-8     Route: Oral    albuterol (PROVENTIL) nebulizer solution 0.083% 2.5 mg/3mL 2.5 mg Every 6 Hours PRN 2/5/2024 --    Admin Instructions: Include Respiratory Treatment Education    Route: Nebulization    bisacodyl (DULCOLAX) EC tablet 5 mg 5 mg Daily PRN 2/5/2024 --    Admin Instructions: Use if no bowel movement after 12 hours.  Swallow whole. Do not crush, split, or chew tablet.    Route: Oral    Linked Group 1: See Hyperspace for full Linked Orders Report.        bisacodyl (DULCOLAX) suppository 10 mg 10 mg Daily PRN 2/5/2024 --    Admin Instructions: Use if no bowel movement after 12 hours.  Hold for diarrhea    Route: Rectal    Linked Group 1: See Hyperspace for full Linked Orders Report.        cloNIDine (CATAPRES) tablet 0.1 mg 0.1 mg Every 6 Hours PRN 2/5/2024 --    Admin Instructions: Hold for SBP less than 100, DBP less than 60, or heart rate less than 50. If a dose is held, please contact the provider.  Caution: Look alike/sound alike drug alert.    Route: Oral    cyanocobalamin injection 1,000 mcg 1,000 mcg Every 7 Days 2/5/2024 --    Route: Intramuscular    Enoxaparin Sodium (LOVENOX) syringe 60 mg 60 mg Every 12 Hours Scheduled 2/5/2024 --    Admin Instructions: Give subcutaneous in abdomen only. Do not massage site after injection.    Route: Subcutaneous    famotidine (PEPCID) tablet 20 mg 20 mg 2 Times Daily Before Meals 2/5/2024 --    Route: Oral    fluconazole (DIFLUCAN) tablet 200 mg 200 mg Daily 2/5/2024 2/19/2024    Admin Instructions: Group 2 (Pink) Hazardous Drug - Reproductive Risk Only - See Handling Guide    Route: Oral    furosemide (LASIX) tablet 40 mg 40 mg Daily 2/5/2024 --    Admin Instructions: Hold for SBP less than 100, DBP less than 60    Route: Oral    HYDROmorphone (DILAUDID) injection 1 mg 1 mg Daily PRN 2/5/2024 2/10/2024    Admin Instructions: Based on patient request - if ordered for moderate or severe pain, provider allows for administration of a medication prescribed for a  lower pain scale.      Caution: Look alike/sound alike drug alert    If given for pain, use the following pain scale:  Mild Pain = Pain Score of 1-3, CPOT 1-2  Moderate Pain = Pain Score of 4-6, CPOT 3-4  Severe Pain = Pain Score of 7-10, CPOT 5-8    Route: Intravenous    hydrOXYzine (ATARAX) tablet 25 mg 25 mg 3 Times Daily PRN 2/6/2024 --    Admin Instructions: Caution: Look alike/sound alike drug alert    Route: Oral    lactated ringers infusion 9 mL/hr Continuous 2/6/2024 --    Admin Instructions: May switch to NS IV at KVO if renal / if indicated    Route: Intravenous    levoFLOXacin (LEVAQUIN) tablet 750 mg 750 mg Daily 2/5/2024 2/15/2024    Admin Instructions: Caution: Look alike/sound alike drug alert.  Avoid antacids/vitamins/calcium/iron.    Route: Oral    levothyroxine (SYNTHROID, LEVOTHROID) tablet 175 mcg 175 mcg Daily 2/5/2024 --    Admin Instructions: Take on empty stomach.    Route: Oral    LORazepam (ATIVAN) tablet 0.5 mg 0.5 mg 2 Times Daily PRN 2/5/2024 --    Admin Instructions:  Caution: Look alike/sound alike drug alert    Route: Oral    magnesium oxide (MAG-OX) tablet 400 mg 400 mg Daily 2/5/2024 --    Admin Instructions: Each 400mg of magnesium oxide is equal to 241.3mg of elemental magnesium.    Route: Oral    melatonin tablet 2.5 mg 2.5 mg Nightly PRN 2/5/2024 --    Route: Oral    nitroglycerin (NITROSTAT) SL tablet 0.4 mg 0.4 mg Every 5 Minutes PRN 2/5/2024 --    Admin Instructions: If Pain Unrelieved After 3 Doses Notify MD  May administer up to 3 doses per episode.    Route: Sublingual    oxyCODONE (ROXICODONE) immediate release tablet 10 mg 10 mg Every 4 Hours PRN 2/5/2024 2/10/2024    Admin Instructions: Based on patient request - if ordered for moderate or severe pain, provider allows for administration of a medication prescribed for a lower pain scale.  If given for pain, use the following pain scale:  Mild Pain = Pain Score of 1-3, CPOT 1-2  Moderate Pain = Pain Score of 4-6, CPOT  3-4  Severe Pain = Pain Score of 7-10, CPOT 5-8    Route: Oral    Pharmacy to dose vancomycin  Continuous PRN 2/6/2024 2/20/2024    Route: Does not apply    polyethylene glycol (MIRALAX) packet 17 g 17 g Daily PRN 2/5/2024 --    Admin Instructions: Use if no bowel movement after 12 hours. Mix in 6-8 ounces of water.  Use 4-8 ounces of water, tea, or juice for each 17 gram dose.    Route: Oral    Linked Group 1: See Hyperspace for full Linked Orders Report.        potassium chloride (MICRO-K/KLOR-CON) CR capsule 10 mEq Daily 2/5/2024 --    Admin Instructions: Take with food.    Route: Oral    pramipexole (MIRAPEX) tablet 1 mg 1 mg Daily 2/5/2024 --    Route: Oral    pregabalin (LYRICA) capsule 200 mg 200 mg 3 Times Daily 2/5/2024 --    Admin Instructions:     Route: Oral    sennosides-docusate (PERICOLACE) 8.6-50 MG per tablet 2 tablet 2 tablet 2 Times Daily 2/5/2024 --    Admin Instructions: HOLD MEDICATION IF PATIENT HAS HAD BOWEL MOVEMENT. Start bowel management regimen if patient has not had a bowel movement after 12 hours.    Route: Oral    Linked Group 1: See Hyperspace for full Linked Orders Report.        sertraline (ZOLOFT) tablet 100 mg 100 mg Nightly 2/5/2024 --    Route: Oral    sodium chloride 0.9 % flush 10 mL 10 mL Every 12 Hours Scheduled 2/5/2024 --    Route: Intravenous    sodium chloride 0.9 % flush 10 mL 10 mL As Needed 2/5/2024 --    Route: Intravenous    sodium chloride 0.9 % infusion 40 mL 40 mL As Needed 2/5/2024 --    Admin Instructions: Following administration of an IV intermittent medication, flush line with 40mL NS at 100mL/hr.    Route: Intravenous    traZODone (DESYREL) tablet 50 mg 50 mg Nightly PRN 2/5/2024 --    Admin Instructions: Take with food.  Caution: Look alike/sound alike drug alert    Route: Oral    vancomycin (VANCOCIN) 1000 mg/200 mL dextrose 5% IVPB 1,000 mg Every 8 Hours 2/6/2024 2/13/2024    Route: Intravenous              Assessment & Plan     48-year-old female with  chronic calcaneal osteomyelitis nonhealing wound overlying left total knee stem prosthesis now status post staged above-knee amputation with subsequent wound dehiscence, subsequent debridement/irrigation with secondary staged closure, medial and lateral wound dehiscence now status post left lower extremity irrigation, debridement, wound vacuum-assisted closure 2/6/2024.      Osteomyelitis    Amputation stump infection      Nonweightbearing left lower extremity.  Every 72 hour wound vacuum-assisted closure changes per PT wound care; tentative plan for long-term wound vacuum-assisted closure  Follow intraoperative cultures      PANDA English  02/07/24  07:31 EST

## 2024-02-08 LAB
ANION GAP SERPL CALCULATED.3IONS-SCNC: 5 MMOL/L (ref 5–15)
BUN SERPL-MCNC: 7 MG/DL (ref 6–20)
BUN/CREAT SERPL: 14.3 (ref 7–25)
CALCIUM SPEC-SCNC: 8.6 MG/DL (ref 8.6–10.5)
CHLORIDE SERPL-SCNC: 103 MMOL/L (ref 98–107)
CO2 SERPL-SCNC: 28 MMOL/L (ref 22–29)
CREAT SERPL-MCNC: 0.49 MG/DL (ref 0.57–1)
DEPRECATED RDW RBC AUTO: 62.1 FL (ref 37–54)
EGFRCR SERPLBLD CKD-EPI 2021: 115 ML/MIN/1.73
ERYTHROCYTE [DISTWIDTH] IN BLOOD BY AUTOMATED COUNT: 17.3 % (ref 12.3–15.4)
GLUCOSE SERPL-MCNC: 112 MG/DL (ref 65–99)
HCT VFR BLD AUTO: 23.6 % (ref 34–46.6)
HGB BLD-MCNC: 7.2 G/DL (ref 12–15.9)
MCH RBC QN AUTO: 29.9 PG (ref 26.6–33)
MCHC RBC AUTO-ENTMCNC: 30.5 G/DL (ref 31.5–35.7)
MCV RBC AUTO: 97.9 FL (ref 79–97)
PLATELET # BLD AUTO: 178 10*3/MM3 (ref 140–450)
PMV BLD AUTO: 11.3 FL (ref 6–12)
POTASSIUM SERPL-SCNC: 4.4 MMOL/L (ref 3.5–5.2)
RBC # BLD AUTO: 2.41 10*6/MM3 (ref 3.77–5.28)
SODIUM SERPL-SCNC: 136 MMOL/L (ref 136–145)
WBC NRBC COR # BLD AUTO: 2.78 10*3/MM3 (ref 3.4–10.8)

## 2024-02-08 PROCEDURE — C1894 INTRO/SHEATH, NON-LASER: HCPCS

## 2024-02-08 PROCEDURE — 99232 SBSQ HOSP IP/OBS MODERATE 35: CPT | Performed by: STUDENT IN AN ORGANIZED HEALTH CARE EDUCATION/TRAINING PROGRAM

## 2024-02-08 PROCEDURE — 25010000002 HYDROMORPHONE 1 MG/ML SOLUTION: Performed by: STUDENT IN AN ORGANIZED HEALTH CARE EDUCATION/TRAINING PROGRAM

## 2024-02-08 PROCEDURE — 25010000002 VANCOMYCIN PER 500 MG: Performed by: ORTHOPAEDIC SURGERY

## 2024-02-08 PROCEDURE — 25010000002 LEVOFLOXACIN PER 250 MG: Performed by: INTERNAL MEDICINE

## 2024-02-08 PROCEDURE — 85027 COMPLETE CBC AUTOMATED: CPT | Performed by: ORTHOPAEDIC SURGERY

## 2024-02-08 PROCEDURE — 80048 BASIC METABOLIC PNL TOTAL CA: CPT | Performed by: ORTHOPAEDIC SURGERY

## 2024-02-08 PROCEDURE — C1751 CATH, INF, PER/CENT/MIDLINE: HCPCS

## 2024-02-08 PROCEDURE — 97605 NEG PRS WND THER DME<=50SQCM: CPT

## 2024-02-08 PROCEDURE — 25010000002 ENOXAPARIN PER 10 MG: Performed by: ORTHOPAEDIC SURGERY

## 2024-02-08 RX ORDER — SODIUM CHLORIDE 0.9 % (FLUSH) 0.9 %
10 SYRINGE (ML) INJECTION AS NEEDED
Status: DISCONTINUED | OUTPATIENT
Start: 2024-02-08 | End: 2024-02-13 | Stop reason: HOSPADM

## 2024-02-08 RX ORDER — SODIUM CHLORIDE 0.9 % (FLUSH) 0.9 %
10 SYRINGE (ML) INJECTION EVERY 12 HOURS SCHEDULED
Status: DISCONTINUED | OUTPATIENT
Start: 2024-02-08 | End: 2024-02-13 | Stop reason: HOSPADM

## 2024-02-08 RX ADMIN — HYDROXYZINE HYDROCHLORIDE 25 MG: 25 TABLET, FILM COATED ORAL at 06:40

## 2024-02-08 RX ADMIN — PREGABALIN 200 MG: 100 CAPSULE ORAL at 20:48

## 2024-02-08 RX ADMIN — ENOXAPARIN SODIUM 60 MG: 60 INJECTION SUBCUTANEOUS at 20:48

## 2024-02-08 RX ADMIN — PREGABALIN 200 MG: 100 CAPSULE ORAL at 08:01

## 2024-02-08 RX ADMIN — LEVOTHYROXINE SODIUM 175 MCG: 0.17 TABLET ORAL at 05:24

## 2024-02-08 RX ADMIN — OXYCODONE HYDROCHLORIDE 10 MG: 10 TABLET ORAL at 16:57

## 2024-02-08 RX ADMIN — POTASSIUM CHLORIDE 10 MEQ: 750 CAPSULE, EXTENDED RELEASE ORAL at 08:01

## 2024-02-08 RX ADMIN — FAMOTIDINE 20 MG: 20 TABLET ORAL at 16:57

## 2024-02-08 RX ADMIN — MAGNESIUM OXIDE TAB 400 MG (241.3 MG ELEMENTAL MG) 400 MG: 400 (241.3 MG) TAB at 08:01

## 2024-02-08 RX ADMIN — HYDROMORPHONE HYDROCHLORIDE 1 MG: 1 INJECTION, SOLUTION INTRAMUSCULAR; INTRAVENOUS; SUBCUTANEOUS at 14:48

## 2024-02-08 RX ADMIN — Medication 10 ML: at 20:57

## 2024-02-08 RX ADMIN — HYDROMORPHONE HYDROCHLORIDE 1 MG: 1 INJECTION, SOLUTION INTRAMUSCULAR; INTRAVENOUS; SUBCUTANEOUS at 20:47

## 2024-02-08 RX ADMIN — FAMOTIDINE 20 MG: 20 TABLET ORAL at 08:01

## 2024-02-08 RX ADMIN — FLUCONAZOLE 200 MG: 100 TABLET ORAL at 08:01

## 2024-02-08 RX ADMIN — PREGABALIN 200 MG: 100 CAPSULE ORAL at 16:57

## 2024-02-08 RX ADMIN — VANCOMYCIN HYDROCHLORIDE 1000 MG: 1 INJECTION, SOLUTION INTRAVENOUS at 02:59

## 2024-02-08 RX ADMIN — VANCOMYCIN HYDROCHLORIDE 1000 MG: 1 INJECTION, SOLUTION INTRAVENOUS at 14:36

## 2024-02-08 RX ADMIN — FUROSEMIDE 40 MG: 40 TABLET ORAL at 08:01

## 2024-02-08 RX ADMIN — Medication 2.5 MG: at 00:48

## 2024-02-08 RX ADMIN — OXYCODONE HYDROCHLORIDE 10 MG: 10 TABLET ORAL at 00:42

## 2024-02-08 RX ADMIN — PRAMIPEXOLE DIHYDROCHLORIDE 1 MG: 1 TABLET ORAL at 08:01

## 2024-02-08 RX ADMIN — SERTRALINE HYDROCHLORIDE 100 MG: 100 TABLET ORAL at 20:48

## 2024-02-08 RX ADMIN — TRAZODONE HYDROCHLORIDE 50 MG: 50 TABLET ORAL at 00:48

## 2024-02-08 RX ADMIN — Medication 10 ML: at 08:02

## 2024-02-08 RX ADMIN — ENOXAPARIN SODIUM 60 MG: 60 INJECTION SUBCUTANEOUS at 08:00

## 2024-02-08 RX ADMIN — OXYCODONE HYDROCHLORIDE 10 MG: 10 TABLET ORAL at 09:38

## 2024-02-08 RX ADMIN — OXYCODONE HYDROCHLORIDE 10 MG: 10 TABLET ORAL at 05:24

## 2024-02-08 RX ADMIN — LEVOFLOXACIN 750 MG: 750 INJECTION, SOLUTION INTRAVENOUS at 08:02

## 2024-02-08 NOTE — PROGRESS NOTES
"  Patient Name: Phoebe Carvajal  : 1975  MRN: [id number]    Chief Complaint: leg pain    Reason for Consultation: wound infection    Subjective   Patient is a 48 y.o. female with history of obesity, prior adult stills disease, peripheral neuropathy, and chronic left knee arthroplasty infection treated at , and prior seizure.  Recently admitted to [facility] reviewed reevaluation of her chronic left leg infection and eventually underwent left leg above-the-knee amputation on 23, but then had 3 additional revision surgeries.  Fungal culture from most recent revision surgery on  grew scant Candida albicans.  She was discharged to [facility] on fluconazole    24: Patient reports improving drainage but still had fairly significant serous output overnight.  Wound VAC is to be removed today and switch to wet-to-dry dressing per Dr. Callaway.  No worsening pain, swelling.  Tolerated addition of Keflex    24: Patient concerned about right foot with ongoing swelling.  Left AKA site with ongoing serous drainage.  Still significant tenderness.  Patient refused direct exam.  Now has wet-to-dry dressing related wound VAC.    24: Ongoing drainage from the left amputation site which patient thinks is getting worse.  Wound VAC is now off.  Still has some discomfort.  Working with therapy.  Tolerating current antibiotics via peripheral IV and oral Bactrim.  No chest pain, palpitations.  She says she has tolerated levofloxacin before    24: Transferred back to [facility] with worsening wound dehiscence, drainage and pain.  I spoke with Dr. Colorado via phone.  Continue meropenem and levofloxacin.  Repeat culture obtained    2024: I&D down to level of muscle by Dr Witt \"removed all deep suture.  There was serosanguineous seroma, no myke purulence.  Remaining tissue appeared healthy\" vancomycin powder applied    24: Ongoing significant pain especially with movement.  No " clear side effects from antibiotics but she does note difficult IV access. Appetite not great.     2/8/24: PICC line placed.  Pain improving.  Less output from wound VAC.  No side effect from antibiotics noted    Review of Systems  Afebrile and hemodynamically stable. No nausea, diarrhea. No rash. See above, otherwise negative.       Allergies   Allergen Reactions    Vicodin [Hydrocodone-Acetaminophen] Itching       Objective   Antibiotics:  Anti-Infectives (From admission, onward)      Ordered     Dose/Rate Route Frequency Start Stop    02/07/24 1547  levoFLOXacin (LEVAQUIN) 750 mg/150 mL D5W (premix) (LEVAQUIN) 750 mg        Ordering Provider: Case Watson MD    750 mg  100 mL/hr over 90 Minutes Intravenous Every 24 Hours 02/08/24 0900 02/15/24 0859    02/07/24 0741  vancomycin (VANCOCIN) 1000 mg/200 mL dextrose 5% IVPB        Ordering Provider: Adama Witt Jr., MD    1,000 mg  over 60 Minutes Intravenous Every 12 Hours 02/07/24 1400 02/13/24 1359    02/06/24 1654  clindamycin (CLEOCIN) 900 mg in dextrose 5% 50 mL IVPB (premix)        Ordering Provider: Adama Witt Jr., MD    900 mg  50 mL/hr over 60 Minutes Intravenous Once 02/06/24 1656 02/06/24 1831    02/06/24 0848  vancomycin 2500 mg/500 mL 0.9% NS IVPB ([facility])        Ordering Provider: Case Watson MD    20 mg/kg × 125 kg  over 150 Minutes Intravenous Once 02/06/24 0945 02/06/24 1232    02/06/24 0839  Pharmacy to dose vancomycin        Ordering Provider: Adama Witt Jr., MD     Does not apply Continuous PRN 02/06/24 0838 02/20/24 0837    02/05/24 1540  fluconazole (DIFLUCAN) tablet 200 mg        Ordering Provider: Adama Witt Jr., MD    200 mg Oral Daily 02/05/24 1630 02/19/24 0859    02/05/24 1506  meropenem (MERREM) 1,000 mg in sodium chloride 0.9 % 100 mL IVPB        Ordering Provider: Sri Youngblood MD    1,000 mg  over 30 Minutes Intravenous Once 02/05/24 1600 02/05/24 1636            Other Medications:  Current  Facility-Administered Medications   Medication Dose Route Frequency Provider Last Rate Last Admin    acetaminophen (TYLENOL) tablet 650 mg  650 mg Oral Q4H PRN Adama Witt Jr., MD        albuterol (PROVENTIL) nebulizer solution 0.083% 2.5 mg/3mL  2.5 mg Nebulization Q6H PRN Adama Witt Jr., MD        sennosides-docusate (PERICOLACE) 8.6-50 MG per tablet 2 tablet  2 tablet Oral Nightly PRN Natalie Oliver DO        And    polyethylene glycol (MIRALAX) packet 17 g  17 g Oral Daily PRN Natalie Oliver DO        And    bisacodyl (DULCOLAX) EC tablet 5 mg  5 mg Oral Daily PRN Natalie Oliver DO        And    bisacodyl (DULCOLAX) suppository 10 mg  10 mg Rectal Daily PRN Natalie Oliver DO        cloNIDine (CATAPRES) tablet 0.1 mg  0.1 mg Oral Q6H PRN Adama Witt Jr., MD        cyanocobalamin injection 1,000 mcg  1,000 mcg Intramuscular Q7 Days Adama Witt Jr., MD   1,000 mcg at 02/05/24 1628    Enoxaparin Sodium (LOVENOX) syringe 60 mg  60 mg Subcutaneous Q12H Adama Witt Jr., MD   60 mg at 02/08/24 0800    famotidine (PEPCID) tablet 20 mg  20 mg Oral BID AC Adama Witt Jr., MD   20 mg at 02/08/24 0801    fluconazole (DIFLUCAN) tablet 200 mg  200 mg Oral Daily Adama Witt Jr., MD   200 mg at 02/08/24 0801    furosemide (LASIX) tablet 40 mg  40 mg Oral Daily Adama Witt Jr., MD   40 mg at 02/08/24 0801    HYDROmorphone (DILAUDID) injection 1 mg  1 mg Intravenous Q2H PRN Natalie Oliver DO   1 mg at 02/08/24 1448    hydrOXYzine (ATARAX) tablet 25 mg  25 mg Oral TID PRN Adama Witt Jr., MD   25 mg at 02/08/24 0640    lactated ringers infusion  9 mL/hr Intravenous Continuous Adama Witt Jr., MD 50 mL/hr at 02/06/24 1821 Currently Infusing at 02/06/24 1821    levoFLOXacin (LEVAQUIN) 750 mg/150 mL D5W (premix) (LEVAQUIN) 750 mg  750 mg Intravenous Q24H Case Watson  mL/hr at 02/08/24 0802 750 mg at 02/08/24 0802     levothyroxine (SYNTHROID, LEVOTHROID) tablet 175 mcg  175 mcg Oral Daily Adama Witt Jr., MD   175 mcg at 02/08/24 0524    LORazepam (ATIVAN) tablet 0.5 mg  0.5 mg Oral BID PRN Adama Witt Jr., MD        magnesium oxide (MAG-OX) tablet 400 mg  400 mg Oral Daily Adama Witt Jr., MD   400 mg at 02/08/24 0801    melatonin tablet 2.5 mg  2.5 mg Oral Nightly PRN Adama Witt Jr., MD   2.5 mg at 02/08/24 0048    nitroglycerin (NITROSTAT) SL tablet 0.4 mg  0.4 mg Sublingual Q5 Min PRN Adama Witt Jr., MD        ondansetron ODT (ZOFRAN-ODT) disintegrating tablet 4 mg  4 mg Oral Q6H PRN Natalie Oliver DO   4 mg at 02/07/24 0951    Or    ondansetron (ZOFRAN) injection 4 mg  4 mg Intravenous Q6H PRN Natalie Oliver DO        oxyCODONE (ROXICODONE) immediate release tablet 10 mg  10 mg Oral Q4H PRN Adama Witt Jr., MD   10 mg at 02/08/24 0938    Pharmacy to dose vancomycin   Does not apply Continuous PRN Adama Witt Jr., MD        potassium chloride (MICRO-K/KLOR-CON) CR capsule  10 mEq Oral Daily Adama Witt Jr., MD   10 mEq at 02/08/24 0801    pramipexole (MIRAPEX) tablet 1 mg  1 mg Oral Daily Adama Witt Jr., MD   1 mg at 02/08/24 0801    pregabalin (LYRICA) capsule 200 mg  200 mg Oral TID Adama Witt Jr., MD   200 mg at 02/08/24 0801    sertraline (ZOLOFT) tablet 100 mg  100 mg Oral Nightly Adama Witt Jr., MD   100 mg at 02/07/24 2003    sodium chloride 0.9 % flush 10 mL  10 mL Intravenous Q12H Adama Witt Jr., MD   10 mL at 02/08/24 0802    sodium chloride 0.9 % flush 10 mL  10 mL Intravenous PRN Adama Witt Jr., MD        sodium chloride 0.9 % flush 10 mL  10 mL Intravenous Q12H Case Watson MD        sodium chloride 0.9 % flush 10 mL  10 mL Intravenous PRN Case Watson MD        sodium chloride 0.9 % infusion 40 mL  40 mL Intravenous PRN Adama Witt Jr., MD        traZODone (DESYREL) tablet 50 mg  50 mg  "Oral Nightly PRN Adama Witt Jr., MD   50 mg at 02/08/24 0048    vancomycin (VANCOCIN) 1000 mg/200 mL dextrose 5% IVPB  1,000 mg Intravenous Q12H Adama Witt Jr., MD   1,000 mg at 02/08/24 1436       Physical Exam:   Vital Signs   /71 (BP Location: Right arm, Patient Position: Lying)   Pulse 54   Temp 97.4 °F (36.3 °C) (Oral)   Resp 18   Ht 157.5 cm (62\")   Wt 125 kg (275 lb)   LMP  (LMP Unknown) Comment: not regular  SpO2 95%   BMI 50.30 kg/m²     GENERAL: Awake and alert. Non-toxic  HEENT: Normocephalic, atraumatic.  EOMI. No icterus.    HEART: RRR; No murmur.  LUNGS: Clear to auscultation bilaterally. Nonlabored.  ABDOMEN: Soft, obese, nontender,  : Without Mendoza catheter.  MSK: S/p AKA on left.  Dressed with wound VAC with scant output  SKIN: No diffuse rash  NEURO: AOx3  PSYCHIATRIC: Appropriate  PICC in [facility]    Laboratory Data  Lab Results   Component Value Date    WBC 2.78 (L) 02/08/2024    HGB 7.2 (L) 02/08/2024    HCT 23.6 (L) 02/08/2024    MCV 97.9 (H) 02/08/2024     02/08/2024     Lab Results   Component Value Date    GLUCOSE 112 (H) 02/08/2024    CALCIUM 8.6 02/08/2024     02/08/2024    K 4.4 02/08/2024    CO2 28.0 02/08/2024     02/08/2024    BUN 7 02/08/2024    CREATININE 0.49 (L) 02/08/2024     Estimated Creatinine Clearance: 173.7 mL/min (A) (by C-G formula based on SCr of 0.49 mg/dL (L)).  Lab Results   Component Value Date    ALT 6 02/05/2024    AST 28 02/05/2024    ALKPHOS 215 (H) 02/05/2024    BILITOT 0.5 02/05/2024     Lab Results   Component Value Date    CRP 3.71 (H) 02/06/2024     Lab Results   Component Value Date    SEDRATE 39 (H) 02/06/2024       The above labs were reviewed.     Microbiology:  Microbiology Results (last 10 days)       Procedure Component Value - Date/Time    Wound Culture - Swab, Leg, Left [[id number]] Collected: 02/06/24 1847    Lab Status: Preliminary result Specimen: Swab from Leg, Left Updated: 02/08/24 0834     Wound " Culture No growth at 2 days     Gram Stain Moderate (3+) Red blood cells      Few (2+) WBCs seen      No organisms seen    AFB Culture - Swab, Leg, Left [[id number]] Collected: 02/06/24 1847    Lab Status: Preliminary result Specimen: Swab from Leg, Left Updated: 02/07/24 1307     AFB Stain No acid fast bacilli seen on concentrated smear    MRSA Screen, PCR (Inpatient) - Swab, Nares [[id number]]  (Normal) Collected: 02/06/24 1247    Lab Status: Final result Specimen: Swab from Nares Updated: 02/06/24 2010     MRSA PCR No MRSA Detected    Narrative:      The negative predictive value of this diagnostic test is high and should only be used to consider de-escalating anti-MRSA therapy. A positive result may indicate colonization with MRSA and must be correlated clinically.    Blood Culture - Blood, Arm, Left [[id number]]  (Normal) Collected: 02/05/24 1120    Lab Status: Preliminary result Specimen: Blood from Arm, Left Updated: 02/08/24 1145     Blood Culture No growth at 3 days    Wound Culture - Swab, Leg, Left [[id number]]  (Abnormal) Collected: 02/05/24 1052    Lab Status: Preliminary result Specimen: Swab from Leg, Left Updated: 02/08/24 0941     Wound Culture Light growth (2+) Gram Positive Cocci      Moderate growth (3+) Normal Skin Tracy     Gram Stain Moderate (3+) WBCs seen      Few (2+) Gram positive cocci in pairs and chains    Blood Culture - Blood, Arm, Right [[id number]]  (Normal) Collected: 02/05/24 1045    Lab Status: Preliminary result Specimen: Blood from Arm, Right Updated: 02/08/24 1145     Blood Culture No growth at 3 days            Radiology:  No radiology results for the last 7 days    2/6 EKG with QTc 416 ms      Assessment   Poor wound healing from left AKA site with worsening dehiscence and soft tissue infection: Never had clearly purulent drainage. Likely edema contributing to swelling, poor wound healing and serous drainage. wound culture at [facility] with GPC in pairs on gram stain, but MDR  Enterobacter on culture (resistant to ertapenem in vitro but sensitive to imipenem, quinolones and trimethoprim-sulfa) and mixed skin jon on culture.  High risk for complications due to numerous comorbidities.  Clearly worse on exam prior to readmission to [facility].  Repeat culture pending with GPC on Gram stain again, so far it is normal skin jon on culture.  Status post I&D down to level of muscle by Dr. Witt 2/6, no deep purulence noted.   Intraoperative cultures negative to date.  Vancomycin powder applied to wound    Growth of Candida albicans from residual from left AKA site of unclear significance but treating as possible true infection in setting of numerous comorbidities complications from amputation surgery.  Plan was for 4 weeks of fluconazole through February 9  Recent severe left chronic left foot infection status post above-the-knee amputation on 12/29/2023 at [facility]  Chronic right lateral foot wound: To level of soft tissue without signs of soft tissue infection at this time  Leukopenia: worse   Obesity  peripheral neuropathy  hx of still disease  Recent possible rash with trimethoprim-sulfa: Noted while at [facility]      Plan:  - f/u pending repeat wound and operative cultures  - Follow CBC, CMP, CRP.  Intensive laboratory monitoring to mitigate risk for toxicity on numerous antimicrobials    - Continue IV vancomycin. Appreciate pharmacy assistance with vancomycin dosing and medication monitoring to limit risk of toxicity  - Continue levofloxacin.  IV version until appetite improves  - continue fluconazole. Initial plan was through Feb 9 (4 weeks post-op) due to Candida albicans from one of her operative cultures  - Probiotic    Based on numerous incidence of poor wound healing and severity of current problem, I anticipate course of IV antibiotics for at least 10-14 days after most recent debridement. PICC placed. Discussed with Dr Oliver.  Jyoti to transfer back to  [facility] once cleared by all other teams and I will continue to follow over there to help determine final antibiotic duration.    Contact isolation for MDR Enterobacter from [facility]     Complex MDM.       Case Watson MD  2/8/2024

## 2024-02-08 NOTE — PLAN OF CARE
Problem: Adult Inpatient Plan of Care  Goal: Plan of Care Review  Outcome: Ongoing, Progressing  Goal: Patient-Specific Goal (Individualized)  Outcome: Ongoing, Progressing  Goal: Absence of Hospital-Acquired Illness or Injury  Outcome: Ongoing, Progressing  Intervention: Identify and Manage Fall Risk  Recent Flowsheet Documentation  Taken 2/8/2024 0600 by Cori Coats RN  Safety Promotion/Fall Prevention:   assistive device/personal items within reach   clutter free environment maintained   fall prevention program maintained   nonskid shoes/slippers when out of bed   room organization consistent   safety round/check completed  Taken 2/8/2024 0400 by Cori Coats RN  Safety Promotion/Fall Prevention:   assistive device/personal items within reach   clutter free environment maintained   fall prevention program maintained   nonskid shoes/slippers when out of bed   room organization consistent   safety round/check completed  Taken 2/8/2024 0000 by Cori Coats RN  Safety Promotion/Fall Prevention:   assistive device/personal items within reach   clutter free environment maintained   fall prevention program maintained   nonskid shoes/slippers when out of bed   room organization consistent   safety round/check completed  Taken 2/7/2024 2200 by Cori Coats RN  Safety Promotion/Fall Prevention:   assistive device/personal items within reach   clutter free environment maintained   fall prevention program maintained   nonskid shoes/slippers when out of bed   room organization consistent   safety round/check completed  Taken 2/7/2024 2000 by Cori Coats RN  Safety Promotion/Fall Prevention:   assistive device/personal items within reach   clutter free environment maintained   fall prevention program maintained   nonskid shoes/slippers when out of bed   room organization consistent   safety round/check completed  Intervention: Prevent Skin Injury  Recent Flowsheet Documentation  Taken 2/8/2024 0600  by Cori Coats RN  Body Position:   position changed independently   weight shifting  Skin Protection:   tubing/devices free from skin contact   transparent dressing maintained   skin-to-device areas padded  Taken 2/8/2024 0400 by Cori Coats RN  Body Position:   position changed independently   weight shifting  Skin Protection:   tubing/devices free from skin contact   transparent dressing maintained   skin-to-device areas padded  Taken 2/8/2024 0000 by Cori Coats RN  Body Position:   position changed independently   weight shifting  Skin Protection:   tubing/devices free from skin contact   transparent dressing maintained   skin-to-device areas padded  Taken 2/7/2024 2200 by Cori Coats RN  Body Position:   position changed independently   weight shifting  Skin Protection:   tubing/devices free from skin contact   transparent dressing maintained   skin-to-device areas padded  Taken 2/7/2024 2000 by Cori Coats RN  Body Position:   position changed independently   weight shifting  Skin Protection:   tubing/devices free from skin contact   transparent dressing maintained   skin-to-device areas padded  Intervention: Prevent and Manage VTE (Venous Thromboembolism) Risk  Recent Flowsheet Documentation  Taken 2/8/2024 0600 by Cori Coats RN  Activity Management: activity encouraged  Taken 2/8/2024 0400 by Cori Coats RN  Activity Management: activity encouraged  Taken 2/8/2024 0000 by Cori Coats RN  Activity Management: activity encouraged  Taken 2/7/2024 2200 by Cori Coats RN  Activity Management: activity encouraged  Taken 2/7/2024 2000 by Cori Coats RN  Activity Management: activity encouraged  Intervention: Prevent Infection  Recent Flowsheet Documentation  Taken 2/8/2024 0600 by Cori Coats RN  Infection Prevention:   environmental surveillance performed   hand hygiene promoted   personal protective equipment utilized   single patient room  provided  Taken 2/8/2024 0400 by Cori Coats RN  Infection Prevention:   environmental surveillance performed   hand hygiene promoted   personal protective equipment utilized   single patient room provided  Taken 2/8/2024 0000 by Cori Coats RN  Infection Prevention:   environmental surveillance performed   hand hygiene promoted   personal protective equipment utilized   single patient room provided  Taken 2/7/2024 2200 by Cori Coats RN  Infection Prevention:   environmental surveillance performed   hand hygiene promoted   personal protective equipment utilized   single patient room provided  Taken 2/7/2024 2000 by Cori Coats RN  Infection Prevention:   environmental surveillance performed   hand hygiene promoted   personal protective equipment utilized   single patient room provided  Goal: Optimal Comfort and Wellbeing  Outcome: Ongoing, Progressing  Intervention: Monitor Pain and Promote Comfort  Recent Flowsheet Documentation  Taken 2/8/2024 0600 by Cori Coats RN  Pain Management Interventions: see MAR  Taken 2/7/2024 2200 by Cori Coats RN  Pain Management Interventions:   pain management plan reviewed with patient/caregiver   see MAR  Taken 2/7/2024 2000 by Cori Coats RN  Pain Management Interventions:   pain management plan reviewed with patient/caregiver   see MAR  Intervention: Provide Person-Centered Care  Recent Flowsheet Documentation  Taken 2/7/2024 2000 by Cori Coats RN  Trust Relationship/Rapport:   care explained   choices provided   emotional support provided   empathic listening provided   questions answered   questions encouraged   reassurance provided   thoughts/feelings acknowledged  Goal: Readiness for Transition of Care  Outcome: Ongoing, Progressing     Problem: Pain Chronic (Persistent) (Comorbidity Management)  Goal: Acceptable Pain Control and Functional Ability  Outcome: Ongoing, Progressing  Intervention: Manage Persistent Pain  Recent  Flowsheet Documentation  Taken 2/8/2024 0600 by Cori Coats RN  Medication Review/Management: medications reviewed  Taken 2/8/2024 0400 by Cori Coats RN  Medication Review/Management: medications reviewed  Taken 2/8/2024 0000 by Cori Coats RN  Medication Review/Management: medications reviewed  Taken 2/7/2024 2200 by Cori Coats RN  Medication Review/Management: medications reviewed  Taken 2/7/2024 2000 by Cori Coats RN  Medication Review/Management: medications reviewed  Intervention: Develop Pain Management Plan  Recent Flowsheet Documentation  Taken 2/8/2024 0600 by Cori Coats RN  Pain Management Interventions: see MAR  Taken 2/7/2024 2200 by Cori Coats RN  Pain Management Interventions:   pain management plan reviewed with patient/caregiver   see MAR  Taken 2/7/2024 2000 by Cori Coats RN  Pain Management Interventions:   pain management plan reviewed with patient/caregiver   see MAR  Intervention: Optimize Psychosocial Wellbeing  Recent Flowsheet Documentation  Taken 2/8/2024 0600 by Cori Coats RN  Diversional Activities:   television   smartphone  Family/Support System Care: support provided  Taken 2/8/2024 0400 by Cori Coats RN  Diversional Activities:   television   smartphone  Family/Support System Care: support provided  Taken 2/8/2024 0000 by Cori Coats RN  Diversional Activities:   television   smartphone  Family/Support System Care: support provided  Taken 2/7/2024 2200 by Cori Coats RN  Diversional Activities:   television   smartphone  Family/Support System Care: support provided  Taken 2/7/2024 2000 by Cori Coats RN  Diversional Activities:   television   smartphone  Family/Support System Care: support provided     Problem: Skin Injury Risk Increased  Goal: Skin Health and Integrity  Outcome: Ongoing, Progressing  Intervention: Optimize Skin Protection  Recent Flowsheet Documentation  Taken 2/8/2024 0600 by  Cori Coats RN  Pressure Reduction Techniques:   frequent weight shift encouraged   weight shift assistance provided  Head of Bed (HOB) Positioning: HOB lowered  Pressure Reduction Devices:   specialty bed utilized   positioning supports utilized  Skin Protection:   tubing/devices free from skin contact   transparent dressing maintained   skin-to-device areas padded  Taken 2/8/2024 0400 by Cori Coats RN  Pressure Reduction Techniques:   frequent weight shift encouraged   weight shift assistance provided  Head of Bed (HOB) Positioning: HOB lowered  Pressure Reduction Devices:   specialty bed utilized   positioning supports utilized  Skin Protection:   tubing/devices free from skin contact   transparent dressing maintained   skin-to-device areas padded  Taken 2/8/2024 0000 by Cori Coats RN  Pressure Reduction Techniques:   weight shift assistance provided   frequent weight shift encouraged  Head of Bed (HOB) Positioning: HOB lowered  Pressure Reduction Devices:   specialty bed utilized   positioning supports utilized  Skin Protection:   tubing/devices free from skin contact   transparent dressing maintained   skin-to-device areas padded  Taken 2/7/2024 2200 by Cori Coats RN  Pressure Reduction Techniques:   frequent weight shift encouraged   weight shift assistance provided  Head of Bed (HOB) Positioning: HOB lowered  Pressure Reduction Devices:   specialty bed utilized   pressure-redistributing mattress utilized   positioning supports utilized  Skin Protection:   tubing/devices free from skin contact   transparent dressing maintained   skin-to-device areas padded  Taken 2/7/2024 2000 by Cori Coats RN  Pressure Reduction Techniques:   frequent weight shift encouraged   weight shift assistance provided  Head of Bed (HOB) Positioning: HOB lowered  Pressure Reduction Devices:   specialty bed utilized   pressure-redistributing mattress utilized   positioning supports utilized  Skin  Protection:   tubing/devices free from skin contact   transparent dressing maintained   skin-to-device areas padded     Problem: Fall Injury Risk  Goal: Absence of Fall and Fall-Related Injury  Outcome: Ongoing, Progressing  Intervention: Identify and Manage Contributors  Recent Flowsheet Documentation  Taken 2/8/2024 0600 by Cori Coats RN  Medication Review/Management: medications reviewed  Taken 2/8/2024 0400 by Cori Coats RN  Medication Review/Management: medications reviewed  Taken 2/8/2024 0000 by Cori Coats RN  Medication Review/Management: medications reviewed  Taken 2/7/2024 2200 by Cori Coats RN  Medication Review/Management: medications reviewed  Taken 2/7/2024 2000 by Cori Coats RN  Medication Review/Management: medications reviewed  Intervention: Promote Injury-Free Environment  Recent Flowsheet Documentation  Taken 2/8/2024 0600 by Cori Coats RN  Safety Promotion/Fall Prevention:   assistive device/personal items within reach   clutter free environment maintained   fall prevention program maintained   nonskid shoes/slippers when out of bed   room organization consistent   safety round/check completed  Taken 2/8/2024 0400 by Cori Coats RN  Safety Promotion/Fall Prevention:   assistive device/personal items within reach   clutter free environment maintained   fall prevention program maintained   nonskid shoes/slippers when out of bed   room organization consistent   safety round/check completed  Taken 2/8/2024 0000 by Cori Coats RN  Safety Promotion/Fall Prevention:   assistive device/personal items within reach   clutter free environment maintained   fall prevention program maintained   nonskid shoes/slippers when out of bed   room organization consistent   safety round/check completed  Taken 2/7/2024 2200 by Cori Coats RN  Safety Promotion/Fall Prevention:   assistive device/personal items within reach   clutter free environment maintained    fall prevention program maintained   nonskid shoes/slippers when out of bed   room organization consistent   safety round/check completed  Taken 2/7/2024 2000 by Cori Coats, RN  Safety Promotion/Fall Prevention:   assistive device/personal items within reach   clutter free environment maintained   fall prevention program maintained   nonskid shoes/slippers when out of bed   room organization consistent   safety round/check completed   Goal Outcome Evaluation:

## 2024-02-08 NOTE — PROGRESS NOTES
[facility] Medicine Services  PROGRESS NOTE    Patient Name: Phoebe Carvajal  : 1975  MRN: [id number]    Date of Admission: 2024  Primary Care Physician: Sofya Benjamin MD    Subjective   Subjective     CC:  Left AKA site wound pain and erythema    HPI:  Patient reports aching pain with intermittent needlelike shooting pains in her left AKA site. Reports pain is somewhat better today. No nausea or vomiting.      Objective   Objective     Vital Signs:   Temp:  [97.1 °F (36.2 °C)-98.4 °F (36.9 °C)] 97.4 °F (36.3 °C)  Heart Rate:  [54-70] 54  Resp:  [16-18] 18  BP: ()/(60-73) 105/71     Physical Exam:  Constitutional: Awake, alert, appears more comfortable today and in better spirits   HENT: NCAT, mucous membranes moist  Respiratory: Clear to auscultation bilaterally, respiratory effort normal   Cardiovascular: RRR, no murmurs, rubs, or gallops  Gastrointestinal: Positive bowel sounds, soft, nontender, nondistended  Musculoskeletal: S/p left AKA, with wound VAC and bandage in place  Neurologic: Alert and oriented x 3, no focal deficits, speech clear      Results Reviewed:  LAB RESULTS:      Lab 24  0250 24  1120   WBC 2.78* 2.99* 3.24* 3.75   HEMOGLOBIN 7.2* 8.4* 8.9* 9.8*   HEMATOCRIT 23.6* 28.0* 29.2* 31.7*   PLATELETS 178 198 193 242   NEUTROS ABS  --   --   --  1.97   IMMATURE GRANS (ABS)  --   --   --  0.01   LYMPHS ABS  --   --   --  1.14   MONOS ABS  --   --   --  0.34   EOS ABS  --   --   --  0.27   MCV 97.9* 98.6* 99.7* 98.8*   SED RATE  --   --  39*  --    CRP  --   --  3.71*  --    PROCALCITONIN  --   --   --  <0.02   LACTATE  --   --   --  1.3         Lab 244 24  0250 24  1120   SODIUM 136 135* 137 139   POTASSIUM 4.4 4.4 4.3 4.3   CHLORIDE 103 102 103 101   CO2 28.0 26.0 27.0 31.0*   ANION GAP 5.0 7.0 7.0 7.0   BUN 7 5* 4* 6   CREATININE 0.49* 0.46* 0.42* 0.58   EGFR 115.0 116.7  119.3 110.4   GLUCOSE 112* 168* 70 88   CALCIUM 8.6 8.3* 8.2* 8.3*         Lab 02/05/24  1120   TOTAL PROTEIN 6.5   ALBUMIN 2.8*   GLOBULIN 3.7   ALT (SGPT) 6   AST (SGOT) 28   BILIRUBIN 0.5   ALK PHOS 215*                     Brief Urine Lab Results  (Last result in the past 365 days)        Color   Clarity   Blood   Leuk Est   Nitrite   Protein   CREAT   Urine HCG        01/05/24 1351 Yellow   Cloudy   Negative   Small (1+)   Negative   Trace                   Microbiology Results Abnormal       Procedure Component Value - Date/Time    Blood Culture - Blood, Arm, Left [[id number]]  (Normal) Collected: 02/05/24 1120    Lab Status: Preliminary result Specimen: Blood from Arm, Left Updated: 02/08/24 1145     Blood Culture No growth at 3 days    Blood Culture - Blood, Arm, Right [[id number]]  (Normal) Collected: 02/05/24 1045    Lab Status: Preliminary result Specimen: Blood from Arm, Right Updated: 02/08/24 1145     Blood Culture No growth at 3 days    Wound Culture - Swab, Leg, Left [[id number]] Collected: 02/06/24 1847    Lab Status: Preliminary result Specimen: Swab from Leg, Left Updated: 02/08/24 0834     Wound Culture No growth at 2 days     Gram Stain Moderate (3+) Red blood cells      Few (2+) WBCs seen      No organisms seen    AFB Culture - Swab, Leg, Left [[id number]] Collected: 02/06/24 1847    Lab Status: Preliminary result Specimen: Swab from Leg, Left Updated: 02/07/24 1307     AFB Stain No acid fast bacilli seen on concentrated smear    MRSA Screen, PCR (Inpatient) - Swab, Nares [[id number]]  (Normal) Collected: 02/06/24 1247    Lab Status: Final result Specimen: Swab from Nares Updated: 02/06/24 2010     MRSA PCR No MRSA Detected    Narrative:      The negative predictive value of this diagnostic test is high and should only be used to consider de-escalating anti-MRSA therapy. A positive result may indicate colonization with MRSA and must be correlated clinically.            No radiology results from the  last 24 hrs        Current medications:  Scheduled Meds:cyanocobalamin, 1,000 mcg, Intramuscular, Q7 Days  enoxaparin, 60 mg, Subcutaneous, Q12H  famotidine, 20 mg, Oral, BID AC  fluconazole, 200 mg, Oral, Daily  furosemide, 40 mg, Oral, Daily  levoFLOXacin, 750 mg, Intravenous, Q24H  levothyroxine, 175 mcg, Oral, Daily  magnesium oxide, 400 mg, Oral, Daily  potassium chloride, 10 mEq, Oral, Daily  pramipexole, 1 mg, Oral, Daily  pregabalin, 200 mg, Oral, TID  sertraline, 100 mg, Oral, Nightly  sodium chloride, 10 mL, Intravenous, Q12H  vancomycin, 1,000 mg, Intravenous, Q12H      Continuous Infusions:lactated ringers, 9 mL/hr, Last Rate: 50 mL/hr (02/06/24 4371)  Pharmacy to dose vancomycin,       PRN Meds:.  acetaminophen    albuterol    senna-docusate sodium **AND** polyethylene glycol **AND** bisacodyl **AND** bisacodyl    cloNIDine    HYDROmorphone    hydrOXYzine    LORazepam    melatonin    nitroglycerin    ondansetron ODT **OR** ondansetron    oxyCODONE    Pharmacy to dose vancomycin    sodium chloride    sodium chloride    traZODone    Assessment & Plan   Assessment & Plan     Active Hospital Problems    Diagnosis  POA    **Osteomyelitis [M86.9]  Yes    Amputation stump infection [T87.40]  Yes      Resolved Hospital Problems   No resolved problems to display.        Brief Hospital Course to date:  Phoebe Carvajal is a 48 y.o. female with PMHx of CKD, Stills disease, restless leg syndrome, hypothyroidism, PAD, morbid obesity. She has had chronic/recurrent osteomyelitis of left foot resulting in L AKA on 12/29/2023. She then had a prolonged admission 12/28-1/19 requiring debridements x2. She was discharged to [facility] with a wound vac on 1/19/24. ID followed throughout and discontinued antibiotics at time of discharge because path report showed margins without infection. She was continued on fluconazole for + yeast at the AKA wound site (C albicans). Re-presented due to concern for left AKA site wound  dehiscence.    This patient's problems and plans were partially entered by my partner and updated as appropriate by me 02/08/24.    L AKA site with dehiscence and soft tissue infection  -Edema likely contributing to poor wound healing and serous drainage  -Previous wound culture with MDR Enterobacter, repeat culture with GPC on Gram stain  -s/p meropenem, levaquin on admission  -Orthopedic surgery evaluated, s/p I&D with wound VAC closure 2/6/24 by Dr. Witt.  -Infectious disease following, continue oral levofloxacin, IV vancomycin. Follow wound and blood cultures. Orthopedic surgery planning for wound VAC changes q72 hours. Will discuss with Ortho/ID when patient will be medically ready for DC to rehab facility.     Growth of candida albicans from left AKA site   -Continue fluconazole, plan for 4 weeks of antifungal through 2/9/24.     Hypotension  -patient reports SBP 90s-100s at home  -Will monitor closely in setting of infection.    Complex pain, chronic opioid use  Peripheral neuropathy  -Pain control with home Lyrica, oxycodone 10mg q4h PRN and PRN Dilaudid for breakthrough pain. Wean as tolerated. Bowel regimen.    Chronic bilateral lower extremity edema  -Unclear if has diagnosis of heart failure, on Lasix at home for lower extremity edema  -Echo 5/2023 with EF 61%, no comment regarding diastolic function  -Continue home Lasix daily.      L humerus nonunion  -reported by patient on admission   -XR left humerus 9/2023 showed fairly comminuted proximal humeral fracture  -Consider asking for Orthopedic surgery for opinion.     Macrocytic anemia  B12 deficiency  -Hgb 9.8 on admission, further drop likely secondary to intraop blood loss, no evidence of overt bleeding  -Continue weekly B12 replacement. Monitor daily CBC. Transfuse for hemoglobin less than 7 or hemodynamic instability.    RLS  -Continue pramipexole.    Hypothyroidism  -Continue levothyroxine.    Anxiety  -Continue Zoloft, PRN Atarax.        Expected Discharge Location and Transportation: Home vs rehab  Expected Discharge   Expected Discharge Date: 2/10/2024; Expected Discharge Time:      DVT prophylaxis:  Medical DVT prophylaxis orders are present.         AM-PAC 6 Clicks Score (PT): 10 (02/08/24 0800)    CODE STATUS:   Code Status and Medical Interventions:   Ordered at: 02/05/24 1500     Level Of Support Discussed With:    Patient     Code Status (Patient has no pulse and is not breathing):    CPR (Attempt to Resuscitate)     Medical Interventions (Patient has pulse or is breathing):    Full Support       Natalie Oliver,   02/08/24

## 2024-02-08 NOTE — ANESTHESIA POSTPROCEDURE EVALUATION
Patient: Phoebe Carvajal    Procedure Summary       Date: 02/06/24 [facility]  [facility]    Anesthesia Start: 1821 Anesthesia Stop: 1949    Procedure: LEG DEBRIDEMENT, IRRIGATION, WOUND VACUUM ASSISTED CLOSURE LEFT (Left) Diagnosis:       Amputation stump infection      (Amputation stump infection [T87.40])    Surgeons: Adama Witt Jr., MD Provider: Dejuan Lobato MD    Anesthesia Type: general ASA Status: 3            Anesthesia Type: general    Vitals  Vitals Value Taken Time   /64 02/06/24 2045   Temp 98 °F (36.7 °C) 02/06/24 2045   Pulse 61 02/06/24 2049   Resp 11 02/06/24 2045   SpO2 97 % 02/06/24 2049   Vitals shown include unfiled device data.        Post Anesthesia Care and Evaluation    Patient location during evaluation: PACU  Patient participation: complete - patient participated  Level of consciousness: awake and alert  Pain management: adequate    Airway patency: patent  Anesthetic complications: No anesthetic complications  PONV Status: none  Cardiovascular status: hemodynamically stable and acceptable  Respiratory status: nonlabored ventilation, acceptable and nasal cannula  Hydration status: acceptable

## 2024-02-08 NOTE — PROGRESS NOTES
Clinical Nutrition   Nutrition Assessment  Reason for Visit: Identified at risk by screening criteria, Nonhealing wound or pressure ulcer    Patient Name: Phoebe Carvajal  YOB: 1975  MRN: [id number]  Date of Encounter: 24 12:06 EST  Admission date: 2024    Comments:    Pt does not meet criteria for malnutrition at this time.     Ordered Premier Protein 2x daily  Encouraged to sip on with ice due to increased protein needs  Placed meal orders x3 to promote increased PO intake at meals  Consider MVI+minerals to support tissue growth  Obtain bedscale weight as able    Nutrition Assessment   Admission Diagnosis:  Osteomyelitis [M86.9]  Amputation stump infection [T87.40]    Problem List:    Osteomyelitis    Amputation stump infection      PMH:   She  has a past medical history of Arthritis, Disease of thyroid gland, Head injury due to trauma (), Kidney disease, Liver disease, and Still's disease of adult.    PSH:  She  has a past surgical history that includes Knee surgery;  section; Tonsillectomy; Hand surgery; Laparoscopic gastric banding; Gastric Banding Removal; Foot surgery; Leg amputation, lower tibia/fibula (Left, 2023); Leg amputation through knee (Left, 2023); Leg Debridement (Left, 2024); incision and drainage leg (Left, 2024); and Leg Debridement (Left, 2024).    Applicable Nutrition Concerns:   Skin: L AKA stump wound - wound vac  Oral:  GI:    Applicable Interval History:   ) I&D with ortho     Reported/Observed/Food/Nutrition Related History:     Pt up in bed at time of visit, endorsed pain related nausea preventing some PO intake. Additionally states would eat more if able to select own meals - phone [facility]. Reviewed menu options and placed meal selections for next 3 meals, pt voiced thanks. Reports distaste for some protein modulars - agreeable to receive protein shakes. Denies dysphagia. Unable to determine CBW due to  "mobility limitations    Anthropometrics     Flowsheet Rows      Flowsheet Row First Filed Value   Admission Height 157.5 cm (62\") Documented at 02/05/2024 1032   Admission Weight 125 kg (275 lb) Documented at 02/05/2024 1032     Height: Height: 157.5 cm (62\")  Last Filed Weight: Weight: 125 kg (275 lb) (02/05/24 1032)  Adj BW for L AKA: 138.8kg/305.5#  Method: Weight Method: Stated  Adj BMI for L AKA: 56.3kg/m2  BMI classification: Obese Class III extreme obesity: > or equal to 40kg/m2  IBW:       UBW:     Weight      Weight (kg) Weight (lbs) Weight Method   12/28/2023 124.739 kg  275 lb     12/29/2023 132.45 kg  292 lb  Standing scale     132.45 kg  292 lb     12/30/2023 140.025 kg (H)  308 lb 11.2 oz (H)  Bed scale    12/31/2023 143 kg (H)  315 lb 4.1 oz (H)  Bed scale     143.155 kg (H)  315 lb 9.6 oz (H)     1/1/2024 138.347 kg (H)  305 lb (H)  Bed scale    1/2/2024 137.621 kg (H)  303 lb 6.4 oz (H)  Bed scale    1/7/2024 137.44 kg (H)  303 lb (H)  Bed scale    1/8/2024 139.708 kg (H)  308 lb (H)     1/9/2024 139.708 kg (H)  308 lb (H)  Stated    1/12/2024 139.708 kg (H)  308 lb (H)  Stated    1/13/2024 139.708 kg (H)  308 lb (H)     1/16/2024   Stated    1/17/2024 220.448 kg (H)  486 lb (H)  Bed scale    1/18/2024 219.042 kg (H)  482 lb 14.4 oz (H)  Bed scale    2/5/2024 124.739 kg  275 lb  Stated       Weight change:  difficult to determine due to inconsistent measured wt s/p L AKA    Nutrition Focused Physical Exam     Date:  2/8       Pt does not meet criteria for malnutrition diagnosis, at this time.      Current Nutrition Prescription   PO: Diet: Cardiac Diets; Healthy Heart (2-3 Na+); Texture: Regular Texture (IDDSI 7); Fluid Consistency: Thin (IDDSI 0)  Oral Nutrition Supplement: N/A  Intake:  75% x2 meals documented    Nutrition Diagnosis   Date:  2/8            Updated:    Problem Increased nutrient needs (protein)   Etiology L AKA stump infection   Signs/Symptoms Current wound vac use and s/p I&D "   Status: New    Goal:   General: Nutrition to support treatment  PO: Increase intake  EN/PN: N/A    Nutrition Intervention      Follow treatment progress, Care plan reviewed, Advise alternate selection, Advised available snacks, Interview for preferences, Menu provided, Encourage intake, Supplement provided    Ordered Premier Protein 2x daily  Encouraged to sip on with ice due to increased protein needs  Placed meal orders x3 to promote increased PO intake at meals  Consider MVI+minerals to support tissue growth  Obtain bedscale weight as able    Monitoring/Evaluation:   Per protocol, I&O, PO intake, Pertinent labs, Weight, Skin status, POC/GOC      Gertrude Quinteros, MS,RD,LD  Time Spent: 30min

## 2024-02-08 NOTE — CASE MANAGEMENT/SOCIAL WORK
Continued Stay Note  [facility]     Patient Name: Phoebe Carvajal  MRN: [id number]  Today's Date: 2/8/2024    Admit Date: 2/5/2024    Plan: [facility] pending acceptance and insurance approval   Discharge Plan       Row Name 02/08/24 1224       Plan    Plan [facility] pending acceptance and insurance approval    Plan Comments Patient was discussed in MDR today. Wound vac placed 24 hours ago. Per RN, wound care stated 72 hours will be the next evaluation. MD not expecting for patient to be medically ready for discharge until Monday or Tuesday next week. CM spoke with Chari and updated. CM to follow and assist as needed.    1530:CM discussed this further with Dr. Oliver. [facility] to follow patient to . Wound vac to be changed tomorrow.  has requested that Chari submit to insurance tomorrow. CM following.     Final Discharge Disposition Code 62 - inpatient rehab facility                   Discharge Codes    No documentation.                 Expected Discharge Date and Time       Expected Discharge Date Expected Discharge Time    Feb 9, 2024               Elizabeth Guzmán RN

## 2024-02-08 NOTE — PLAN OF CARE
Problem: Adult Inpatient Plan of Care  Goal: Plan of Care Review  Outcome: Ongoing, Progressing  Goal: Patient-Specific Goal (Individualized)  Outcome: Ongoing, Progressing  Goal: Absence of Hospital-Acquired Illness or Injury  Outcome: Ongoing, Progressing  Intervention: Identify and Manage Fall Risk  Recent Flowsheet Documentation  Taken 2/8/2024 1400 by Malka Esquivel RN  Safety Promotion/Fall Prevention:   activity supervised   assistive device/personal items within reach   clutter free environment maintained   toileting scheduled   room organization consistent   safety round/check completed  Taken 2/8/2024 1200 by Malka Esquivel RN  Safety Promotion/Fall Prevention:   activity supervised   assistive device/personal items within reach   clutter free environment maintained   toileting scheduled   safety round/check completed   room organization consistent  Taken 2/8/2024 1000 by Malka Esquivel RN  Safety Promotion/Fall Prevention:   activity supervised   assistive device/personal items within reach   clutter free environment maintained   toileting scheduled   safety round/check completed   room organization consistent  Taken 2/8/2024 0800 by Malka Esquivel RN  Safety Promotion/Fall Prevention:   activity supervised   assistive device/personal items within reach   clutter free environment maintained   toileting scheduled   safety round/check completed   room organization consistent  Intervention: Prevent Skin Injury  Recent Flowsheet Documentation  Taken 2/8/2024 1400 by Malka Esquivel RN  Body Position:   position changed independently   weight shifting  Skin Protection:   adhesive use limited   tubing/devices free from skin contact   transparent dressing maintained   skin-to-skin areas padded   skin-to-device areas padded  Taken 2/8/2024 1200 by Malka Esquivel RN  Body Position:   position changed independently   weight shifting   supine  Skin Protection:   adhesive use limited    tubing/devices free from skin contact   transparent dressing maintained   skin-to-skin areas padded   skin-to-device areas padded  Taken 2/8/2024 1000 by Malka Esquivel RN  Body Position:   position changed independently   weight shifting  Skin Protection:   adhesive use limited   tubing/devices free from skin contact   transparent dressing maintained   skin-to-skin areas padded   skin-to-device areas padded  Taken 2/8/2024 0800 by Malka Esquivel RN  Body Position:   position changed independently   weight shifting   supine  Skin Protection:   adhesive use limited   tubing/devices free from skin contact   transparent dressing maintained   skin-to-skin areas padded   skin-to-device areas padded  Intervention: Prevent and Manage VTE (Venous Thromboembolism) Risk  Recent Flowsheet Documentation  Taken 2/8/2024 1400 by Malka Esquivel RN  Activity Management: activity encouraged  Taken 2/8/2024 1200 by Malka Esquivel RN  Activity Management: activity encouraged  Taken 2/8/2024 1000 by Malka Esquivel RN  Activity Management: activity encouraged  Taken 2/8/2024 0800 by Malka Esquivel RN  Activity Management: activity encouraged  Goal: Optimal Comfort and Wellbeing  Outcome: Ongoing, Progressing  Goal: Readiness for Transition of Care  Outcome: Ongoing, Progressing     Problem: Pain Chronic (Persistent) (Comorbidity Management)  Goal: Acceptable Pain Control and Functional Ability  Outcome: Ongoing, Progressing     Problem: Skin Injury Risk Increased  Goal: Skin Health and Integrity  Outcome: Ongoing, Progressing  Intervention: Optimize Skin Protection  Recent Flowsheet Documentation  Taken 2/8/2024 1400 by Malka Esquivel RN  Pressure Reduction Techniques:   frequent weight shift encouraged   weight shift assistance provided  Head of Bed (HOB) Positioning: HOB elevated  Pressure Reduction Devices: specialty bed utilized  Skin Protection:   adhesive use limited   tubing/devices free from skin contact    transparent dressing maintained   skin-to-skin areas padded   skin-to-device areas padded  Taken 2/8/2024 1200 by Malka Esquivel RN  Pressure Reduction Techniques:   frequent weight shift encouraged   weight shift assistance provided  Head of Bed ([facility]) Positioning: [facility] elevated  Pressure Reduction Devices: specialty bed utilized  Skin Protection:   adhesive use limited   tubing/devices free from skin contact   transparent dressing maintained   skin-to-skin areas padded   skin-to-device areas padded  Taken 2/8/2024 1000 by Malka Esquivel RN  Pressure Reduction Techniques:   frequent weight shift encouraged   weight shift assistance provided  Head of Bed ([facility]) Positioning: [facility] elevated  Pressure Reduction Devices: specialty bed utilized  Skin Protection:   adhesive use limited   tubing/devices free from skin contact   transparent dressing maintained   skin-to-skin areas padded   skin-to-device areas padded  Taken 2/8/2024 0800 by Malka Esquivel RN  Pressure Reduction Techniques:   frequent weight shift encouraged   weight shift assistance provided  Head of Bed ([facility]) Positioning: [facility] elevated  Pressure Reduction Devices: specialty bed utilized  Skin Protection:   adhesive use limited   tubing/devices free from skin contact   transparent dressing maintained   skin-to-skin areas padded   skin-to-device areas padded     Problem: Fall Injury Risk  Goal: Absence of Fall and Fall-Related Injury  Outcome: Ongoing, Progressing  Intervention: Promote Injury-Free Environment  Recent Flowsheet Documentation  Taken 2/8/2024 1400 by Malka Esquivel RN  Safety Promotion/Fall Prevention:   activity supervised   assistive device/personal items within reach   clutter free environment maintained   toileting scheduled   room organization consistent   safety round/check completed  Taken 2/8/2024 1200 by Malka Esquivel RN  Safety Promotion/Fall Prevention:   activity supervised   assistive device/personal items within reach    clutter free environment maintained   toileting scheduled   safety round/check completed   room organization consistent  Taken 2/8/2024 1000 by Malka Esquivel RN  Safety Promotion/Fall Prevention:   activity supervised   assistive device/personal items within reach   clutter free environment maintained   toileting scheduled   safety round/check completed   room organization consistent  Taken 2/8/2024 0800 by Malka Esquivel RN  Safety Promotion/Fall Prevention:   activity supervised   assistive device/personal items within reach   clutter free environment maintained   toileting scheduled   safety round/check completed   room organization consistent     Problem: Adjustment to Illness (Sepsis/Septic Shock)  Goal: Optimal Coping  Outcome: Ongoing, Progressing     Problem: Bleeding (Sepsis/Septic Shock)  Goal: Absence of Bleeding  Outcome: Ongoing, Progressing     Problem: Glycemic Control Impaired (Sepsis/Septic Shock)  Goal: Blood Glucose Level Within Desired Range  Outcome: Ongoing, Progressing     Problem: Infection Progression (Sepsis/Septic Shock)  Goal: Absence of Infection Signs and Symptoms  Outcome: Ongoing, Progressing  Intervention: Promote Recovery  Recent Flowsheet Documentation  Taken 2/8/2024 1400 by Malka Esquivel RN  Activity Management: activity encouraged  Taken 2/8/2024 1200 by Malka Esquivel RN  Activity Management: activity encouraged  Taken 2/8/2024 1000 by Malka Esquivel RN  Activity Management: activity encouraged  Taken 2/8/2024 0800 by Malka Esquivel RN  Activity Management: activity encouraged     Problem: Nutrition Impaired (Sepsis/Septic Shock)  Goal: Optimal Nutrition Intake  Outcome: Ongoing, Progressing   Goal Outcome Evaluation:

## 2024-02-08 NOTE — PLAN OF CARE
Goal Outcome Evaluation:  Plan of Care Reviewed With: patient        Progress: no change  Outcome Evaluation: Wound vac check complete. Vac remains intact with no issues noted. Approximately 400mL serosanguineous/sanguineous drainage noted in joanna marshall changed this date. PT tentatively planning for wound vac dressing change tomorrow.

## 2024-02-08 NOTE — PROGRESS NOTES
Phoebe Carvajal       LOS: 3 days   Patient Care Team:  Sofya Benjamin MD as PCP - General (Internal Medicine)  Provider, No Known as PCP - Family Medicine    Chief Complaint:  left lower extremity surgical site infection    Subjective     Interval History:     Resting comfortably in bed this morning.  No acute events overnight, pain tolerable.    Review of Systems:      Gen- No fevers, chills  CV- No chest pain, palpitations  Resp- No cough, dyspnea  GI- No N/V/D, abd pain    Objective     Vital Signs  Vital Signs (last 24 hours)         02/06 0700 02/07 0659 02/07 0700 02/07 0731   Most Recent      Temp (°F) 97.2 -  98.3       97.2 (36.2) 02/06 2203    Heart Rate 58 -  72       60 02/06 2203    Resp 11 -  18       14 02/06 2203    BP 84/56 -  103/58       100/64 02/06 2203    SpO2 (%) 90 -  100       93 02/06 2203    Flow (L/min) 2 -  3       2 02/07 0000              Physical Exam:     No acute distress.  Nonlabored respirations.  Regular rate and rhythm.  Abdomen nondistended.  Left lower extremity: Operative dressing in place clean, dry, intact.  Wound vacuum-assisted closure in place with adequate seal, serosanguineous output in canister.     Results Review:     I reviewed the patient's new clinical results.    Medication Review:   Hospital Medications (active)         Dose Frequency Start End    acetaminophen (TYLENOL) tablet 650 mg 650 mg Every 4 Hours PRN 2/5/2024 --    Admin Instructions: If given for fever, use fever parameter: fever greater than 100.4 °F  Based on patient request - if ordered for moderate or severe pain, provider allows for administration of a medication prescribed for a lower pain scale.    Do not exceed 4 grams of acetaminophen in a 24 hr period. Max dose of 2gm for AST/ALT greater than 120 units/L.    If given for pain, use the following pain scale:   Mild Pain = Pain Score of 1-3, CPOT 1-2  Moderate Pain = Pain Score of 4-6, CPOT 3-4  Severe Pain = Pain Score of 7-10, CPOT 5-8     Route: Oral    albuterol (PROVENTIL) nebulizer solution 0.083% 2.5 mg/3mL 2.5 mg Every 6 Hours PRN 2/5/2024 --    Admin Instructions: Include Respiratory Treatment Education    Route: Nebulization    bisacodyl (DULCOLAX) EC tablet 5 mg 5 mg Daily PRN 2/5/2024 --    Admin Instructions: Use if no bowel movement after 12 hours.  Swallow whole. Do not crush, split, or chew tablet.    Route: Oral    Linked Group 1: See Hyperspace for full Linked Orders Report.        bisacodyl (DULCOLAX) suppository 10 mg 10 mg Daily PRN 2/5/2024 --    Admin Instructions: Use if no bowel movement after 12 hours.  Hold for diarrhea    Route: Rectal    Linked Group 1: See Hyperspace for full Linked Orders Report.        cloNIDine (CATAPRES) tablet 0.1 mg 0.1 mg Every 6 Hours PRN 2/5/2024 --    Admin Instructions: Hold for SBP less than 100, DBP less than 60, or heart rate less than 50. If a dose is held, please contact the provider.  Caution: Look alike/sound alike drug alert.    Route: Oral    cyanocobalamin injection 1,000 mcg 1,000 mcg Every 7 Days 2/5/2024 --    Route: Intramuscular    Enoxaparin Sodium (LOVENOX) syringe 60 mg 60 mg Every 12 Hours Scheduled 2/5/2024 --    Admin Instructions: Give subcutaneous in abdomen only. Do not massage site after injection.    Route: Subcutaneous    famotidine (PEPCID) tablet 20 mg 20 mg 2 Times Daily Before Meals 2/5/2024 --    Route: Oral    fluconazole (DIFLUCAN) tablet 200 mg 200 mg Daily 2/5/2024 2/19/2024    Admin Instructions: Group 2 (Pink) Hazardous Drug - Reproductive Risk Only - See Handling Guide    Route: Oral    furosemide (LASIX) tablet 40 mg 40 mg Daily 2/5/2024 --    Admin Instructions: Hold for SBP less than 100, DBP less than 60    Route: Oral    HYDROmorphone (DILAUDID) injection 1 mg 1 mg Daily PRN 2/5/2024 2/10/2024    Admin Instructions: Based on patient request - if ordered for moderate or severe pain, provider allows for administration of a medication prescribed for a  lower pain scale.      Caution: Look alike/sound alike drug alert    If given for pain, use the following pain scale:  Mild Pain = Pain Score of 1-3, CPOT 1-2  Moderate Pain = Pain Score of 4-6, CPOT 3-4  Severe Pain = Pain Score of 7-10, CPOT 5-8    Route: Intravenous    hydrOXYzine (ATARAX) tablet 25 mg 25 mg 3 Times Daily PRN 2/6/2024 --    Admin Instructions: Caution: Look alike/sound alike drug alert    Route: Oral    lactated ringers infusion 9 mL/hr Continuous 2/6/2024 --    Admin Instructions: May switch to NS IV at KVO if renal / if indicated    Route: Intravenous    levoFLOXacin (LEVAQUIN) tablet 750 mg 750 mg Daily 2/5/2024 2/15/2024    Admin Instructions: Caution: Look alike/sound alike drug alert.  Avoid antacids/vitamins/calcium/iron.    Route: Oral    levothyroxine (SYNTHROID, LEVOTHROID) tablet 175 mcg 175 mcg Daily 2/5/2024 --    Admin Instructions: Take on empty stomach.    Route: Oral    LORazepam (ATIVAN) tablet 0.5 mg 0.5 mg 2 Times Daily PRN 2/5/2024 --    Admin Instructions:  Caution: Look alike/sound alike drug alert    Route: Oral    magnesium oxide (MAG-OX) tablet 400 mg 400 mg Daily 2/5/2024 --    Admin Instructions: Each 400mg of magnesium oxide is equal to 241.3mg of elemental magnesium.    Route: Oral    melatonin tablet 2.5 mg 2.5 mg Nightly PRN 2/5/2024 --    Route: Oral    nitroglycerin (NITROSTAT) SL tablet 0.4 mg 0.4 mg Every 5 Minutes PRN 2/5/2024 --    Admin Instructions: If Pain Unrelieved After 3 Doses Notify MD  May administer up to 3 doses per episode.    Route: Sublingual    oxyCODONE (ROXICODONE) immediate release tablet 10 mg 10 mg Every 4 Hours PRN 2/5/2024 2/10/2024    Admin Instructions: Based on patient request - if ordered for moderate or severe pain, provider allows for administration of a medication prescribed for a lower pain scale.  If given for pain, use the following pain scale:  Mild Pain = Pain Score of 1-3, CPOT 1-2  Moderate Pain = Pain Score of 4-6, CPOT  3-4  Severe Pain = Pain Score of 7-10, CPOT 5-8    Route: Oral    Pharmacy to dose vancomycin  Continuous PRN 2/6/2024 2/20/2024    Route: Does not apply    polyethylene glycol (MIRALAX) packet 17 g 17 g Daily PRN 2/5/2024 --    Admin Instructions: Use if no bowel movement after 12 hours. Mix in 6-8 ounces of water.  Use 4-8 ounces of water, tea, or juice for each 17 gram dose.    Route: Oral    Linked Group 1: See Hyperspace for full Linked Orders Report.        potassium chloride (MICRO-K/KLOR-CON) CR capsule 10 mEq Daily 2/5/2024 --    Admin Instructions: Take with food.    Route: Oral    pramipexole (MIRAPEX) tablet 1 mg 1 mg Daily 2/5/2024 --    Route: Oral    pregabalin (LYRICA) capsule 200 mg 200 mg 3 Times Daily 2/5/2024 --    Admin Instructions:     Route: Oral    sennosides-docusate (PERICOLACE) 8.6-50 MG per tablet 2 tablet 2 tablet 2 Times Daily 2/5/2024 --    Admin Instructions: HOLD MEDICATION IF PATIENT HAS HAD BOWEL MOVEMENT. Start bowel management regimen if patient has not had a bowel movement after 12 hours.    Route: Oral    Linked Group 1: See Hyperspace for full Linked Orders Report.        sertraline (ZOLOFT) tablet 100 mg 100 mg Nightly 2/5/2024 --    Route: Oral    sodium chloride 0.9 % flush 10 mL 10 mL Every 12 Hours Scheduled 2/5/2024 --    Route: Intravenous    sodium chloride 0.9 % flush 10 mL 10 mL As Needed 2/5/2024 --    Route: Intravenous    sodium chloride 0.9 % infusion 40 mL 40 mL As Needed 2/5/2024 --    Admin Instructions: Following administration of an IV intermittent medication, flush line with 40mL NS at 100mL/hr.    Route: Intravenous    traZODone (DESYREL) tablet 50 mg 50 mg Nightly PRN 2/5/2024 --    Admin Instructions: Take with food.  Caution: Look alike/sound alike drug alert    Route: Oral    vancomycin (VANCOCIN) 1000 mg/200 mL dextrose 5% IVPB 1,000 mg Every 8 Hours 2/6/2024 2/13/2024    Route: Intravenous              Assessment & Plan     48-year-old female with  chronic calcaneal osteomyelitis nonhealing wound overlying left total knee stem prosthesis now status post staged above-knee amputation with subsequent wound dehiscence, subsequent debridement/irrigation with secondary staged closure, medial and lateral wound dehiscence now status post left lower extremity irrigation, debridement, wound vacuum-assisted closure 2/6/2024.      Osteomyelitis    Amputation stump infection      Nonweightbearing left lower extremity.  Every 72 hour wound vacuum-assisted closure changes per PT wound care; tentative plan for long-term wound vacuum-assisted closure  Follow intraoperative cultures    Anticipate she will benefit from placement, patient prefers to return to [facility]      PANDA English  02/08/24  07:36 EST
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
## 2024-02-09 LAB
ANION GAP SERPL CALCULATED.3IONS-SCNC: 5 MMOL/L (ref 5–15)
BACTERIA SPEC AEROBE CULT: NORMAL
BASOPHILS # BLD AUTO: 0.01 10*3/MM3 (ref 0–0.2)
BASOPHILS NFR BLD AUTO: 0.3 % (ref 0–1.5)
BUN SERPL-MCNC: 7 MG/DL (ref 6–20)
BUN/CREAT SERPL: 12.7 (ref 7–25)
CALCIUM SPEC-SCNC: 8.5 MG/DL (ref 8.6–10.5)
CHLORIDE SERPL-SCNC: 104 MMOL/L (ref 98–107)
CLUMPED PLATELETS: PRESENT
CO2 SERPL-SCNC: 31 MMOL/L (ref 22–29)
CREAT SERPL-MCNC: 0.55 MG/DL (ref 0.57–1)
D-LACTATE SERPL-SCNC: 1.2 MMOL/L (ref 0.5–2)
DEPRECATED RDW RBC AUTO: 63.4 FL (ref 37–54)
EGFRCR SERPLBLD CKD-EPI 2021: 111.8 ML/MIN/1.73
EOSINOPHIL # BLD AUTO: 0.05 10*3/MM3 (ref 0–0.4)
EOSINOPHIL NFR BLD AUTO: 1.7 % (ref 0.3–6.2)
ERYTHROCYTE [DISTWIDTH] IN BLOOD BY AUTOMATED COUNT: 17.4 % (ref 12.3–15.4)
FUNGUS WND CULT: NORMAL
GLUCOSE SERPL-MCNC: 104 MG/DL (ref 65–99)
GRAM STN SPEC: NORMAL
HCT VFR BLD AUTO: 24.1 % (ref 34–46.6)
HGB BLD-MCNC: 7.3 G/DL (ref 12–15.9)
HYPOCHROMIA BLD QL: NORMAL
IMM GRANULOCYTES # BLD AUTO: 0 10*3/MM3 (ref 0–0.05)
IMM GRANULOCYTES NFR BLD AUTO: 0 % (ref 0–0.5)
LYMPHOCYTES # BLD AUTO: 1.26 10*3/MM3 (ref 0.7–3.1)
LYMPHOCYTES NFR BLD AUTO: 43.6 % (ref 19.6–45.3)
MACROCYTES BLD QL SMEAR: NORMAL
MCH RBC QN AUTO: 29.9 PG (ref 26.6–33)
MCHC RBC AUTO-ENTMCNC: 30.3 G/DL (ref 31.5–35.7)
MCV RBC AUTO: 98.8 FL (ref 79–97)
MONOCYTES # BLD AUTO: 0.25 10*3/MM3 (ref 0.1–0.9)
MONOCYTES NFR BLD AUTO: 8.7 % (ref 5–12)
MYCOBACTERIUM SPEC CULT: NORMAL
MYCOBACTERIUM SPEC CULT: NORMAL
NEUTROPHILS NFR BLD AUTO: 1.32 10*3/MM3 (ref 1.7–7)
NEUTROPHILS NFR BLD AUTO: 45.7 % (ref 42.7–76)
NIGHT BLUE STAIN TISS: NORMAL
NIGHT BLUE STAIN TISS: NORMAL
NRBC BLD AUTO-RTO: 0 /100 WBC (ref 0–0.2)
PLATELET # BLD AUTO: 191 10*3/MM3 (ref 140–450)
PMV BLD AUTO: 10.8 FL (ref 6–12)
POTASSIUM SERPL-SCNC: 4.3 MMOL/L (ref 3.5–5.2)
RBC # BLD AUTO: 2.44 10*6/MM3 (ref 3.77–5.28)
SMALL PLATELETS BLD QL SMEAR: ADEQUATE
SODIUM SERPL-SCNC: 140 MMOL/L (ref 136–145)
VANCOMYCIN SERPL-MCNC: 25.6 MCG/ML (ref 5–40)
WBC MORPH BLD: NORMAL
WBC NRBC COR # BLD AUTO: 2.89 10*3/MM3 (ref 3.4–10.8)

## 2024-02-09 PROCEDURE — 83605 ASSAY OF LACTIC ACID: CPT | Performed by: STUDENT IN AN ORGANIZED HEALTH CARE EDUCATION/TRAINING PROGRAM

## 2024-02-09 PROCEDURE — 85025 COMPLETE CBC W/AUTO DIFF WBC: CPT | Performed by: INTERNAL MEDICINE

## 2024-02-09 PROCEDURE — 25010000002 VANCOMYCIN PER 500 MG: Performed by: ORTHOPAEDIC SURGERY

## 2024-02-09 PROCEDURE — 80048 BASIC METABOLIC PNL TOTAL CA: CPT | Performed by: ORTHOPAEDIC SURGERY

## 2024-02-09 PROCEDURE — 25010000002 HYDROMORPHONE 1 MG/ML SOLUTION: Performed by: STUDENT IN AN ORGANIZED HEALTH CARE EDUCATION/TRAINING PROGRAM

## 2024-02-09 PROCEDURE — 99232 SBSQ HOSP IP/OBS MODERATE 35: CPT | Performed by: STUDENT IN AN ORGANIZED HEALTH CARE EDUCATION/TRAINING PROGRAM

## 2024-02-09 PROCEDURE — 97608 NEG PRS WND THER NDME>50SQCM: CPT

## 2024-02-09 PROCEDURE — 80202 ASSAY OF VANCOMYCIN: CPT | Performed by: STUDENT IN AN ORGANIZED HEALTH CARE EDUCATION/TRAINING PROGRAM

## 2024-02-09 PROCEDURE — 25810000003 LACTATED RINGERS SOLUTION: Performed by: STUDENT IN AN ORGANIZED HEALTH CARE EDUCATION/TRAINING PROGRAM

## 2024-02-09 PROCEDURE — 25010000002 LEVOFLOXACIN PER 250 MG: Performed by: INTERNAL MEDICINE

## 2024-02-09 PROCEDURE — 85007 BL SMEAR W/DIFF WBC COUNT: CPT | Performed by: INTERNAL MEDICINE

## 2024-02-09 PROCEDURE — 63710000001 ONDANSETRON ODT 4 MG TABLET DISPERSIBLE: Performed by: STUDENT IN AN ORGANIZED HEALTH CARE EDUCATION/TRAINING PROGRAM

## 2024-02-09 PROCEDURE — 25010000002 ENOXAPARIN PER 10 MG: Performed by: ORTHOPAEDIC SURGERY

## 2024-02-09 RX ORDER — MIDODRINE HYDROCHLORIDE 5 MG/1
5 TABLET ORAL
Status: DISCONTINUED | OUTPATIENT
Start: 2024-02-09 | End: 2024-02-13 | Stop reason: HOSPADM

## 2024-02-09 RX ADMIN — SODIUM CHLORIDE, POTASSIUM CHLORIDE, SODIUM LACTATE AND CALCIUM CHLORIDE 500 ML: 600; 310; 30; 20 INJECTION, SOLUTION INTRAVENOUS at 13:02

## 2024-02-09 RX ADMIN — MAGNESIUM OXIDE TAB 400 MG (241.3 MG ELEMENTAL MG) 400 MG: 400 (241.3 MG) TAB at 08:04

## 2024-02-09 RX ADMIN — OXYCODONE HYDROCHLORIDE 10 MG: 10 TABLET ORAL at 18:09

## 2024-02-09 RX ADMIN — HYDROMORPHONE HYDROCHLORIDE 1 MG: 1 INJECTION, SOLUTION INTRAMUSCULAR; INTRAVENOUS; SUBCUTANEOUS at 09:30

## 2024-02-09 RX ADMIN — MIDODRINE HYDROCHLORIDE 5 MG: 5 TABLET ORAL at 18:05

## 2024-02-09 RX ADMIN — PREGABALIN 200 MG: 100 CAPSULE ORAL at 08:04

## 2024-02-09 RX ADMIN — OXYCODONE HYDROCHLORIDE 10 MG: 10 TABLET ORAL at 05:28

## 2024-02-09 RX ADMIN — VANCOMYCIN HYDROCHLORIDE 1000 MG: 1 INJECTION, SOLUTION INTRAVENOUS at 15:15

## 2024-02-09 RX ADMIN — ACETAMINOPHEN 650 MG: 325 TABLET ORAL at 15:28

## 2024-02-09 RX ADMIN — Medication 10 ML: at 08:05

## 2024-02-09 RX ADMIN — LEVOFLOXACIN 750 MG: 750 INJECTION, SOLUTION INTRAVENOUS at 09:30

## 2024-02-09 RX ADMIN — FAMOTIDINE 20 MG: 20 TABLET ORAL at 07:48

## 2024-02-09 RX ADMIN — SODIUM CHLORIDE, POTASSIUM CHLORIDE, SODIUM LACTATE AND CALCIUM CHLORIDE 500 ML: 600; 310; 30; 20 INJECTION, SOLUTION INTRAVENOUS at 11:22

## 2024-02-09 RX ADMIN — OXYCODONE HYDROCHLORIDE 10 MG: 10 TABLET ORAL at 14:09

## 2024-02-09 RX ADMIN — LEVOTHYROXINE SODIUM 175 MCG: 0.17 TABLET ORAL at 05:24

## 2024-02-09 RX ADMIN — OXYCODONE HYDROCHLORIDE 10 MG: 10 TABLET ORAL at 23:28

## 2024-02-09 RX ADMIN — OXYCODONE HYDROCHLORIDE 10 MG: 10 TABLET ORAL at 00:32

## 2024-02-09 RX ADMIN — PRAMIPEXOLE DIHYDROCHLORIDE 1 MG: 1 TABLET ORAL at 08:03

## 2024-02-09 RX ADMIN — FAMOTIDINE 20 MG: 20 TABLET ORAL at 17:01

## 2024-02-09 RX ADMIN — VANCOMYCIN HYDROCHLORIDE 1000 MG: 1 INJECTION, SOLUTION INTRAVENOUS at 03:30

## 2024-02-09 RX ADMIN — ENOXAPARIN SODIUM 60 MG: 60 INJECTION SUBCUTANEOUS at 20:35

## 2024-02-09 RX ADMIN — Medication 10 ML: at 09:30

## 2024-02-09 RX ADMIN — FLUCONAZOLE 200 MG: 100 TABLET ORAL at 08:03

## 2024-02-09 RX ADMIN — SERTRALINE HYDROCHLORIDE 100 MG: 100 TABLET ORAL at 20:35

## 2024-02-09 RX ADMIN — HYDROXYZINE HYDROCHLORIDE 25 MG: 25 TABLET, FILM COATED ORAL at 15:29

## 2024-02-09 RX ADMIN — ENOXAPARIN SODIUM 60 MG: 60 INJECTION SUBCUTANEOUS at 08:04

## 2024-02-09 RX ADMIN — HYDROMORPHONE HYDROCHLORIDE 1 MG: 1 INJECTION, SOLUTION INTRAMUSCULAR; INTRAVENOUS; SUBCUTANEOUS at 14:14

## 2024-02-09 RX ADMIN — PREGABALIN 200 MG: 100 CAPSULE ORAL at 15:29

## 2024-02-09 RX ADMIN — PREGABALIN 200 MG: 100 CAPSULE ORAL at 20:35

## 2024-02-09 RX ADMIN — ONDANSETRON 4 MG: 4 TABLET, ORALLY DISINTEGRATING ORAL at 07:54

## 2024-02-09 NOTE — PROGRESS NOTES
Pharmacy Consult-Vancomycin Dosing  Phoebe Carvajal is a  48 y.o. female receiving vancomycin therapy.     Indication:  SSTI/ osteomyelitis  Consulting Provider: Case Watson MD  ID Consult: Y    Goal AUC: 400 - 600 mg/L*hr    Current Antimicrobial Therapy  Anti-Infectives (From admission, onward)      Ordered     Dose/Rate Route Frequency Start Stop    02/07/24 1547  levoFLOXacin (LEVAQUIN) 750 mg/150 mL D5W (premix) (LEVAQUIN) 750 mg        Ordering Provider: Case Watson MD    750 mg  100 mL/hr over 90 Minutes Intravenous Every 24 Hours 02/08/24 0900 02/16/24 2359    02/07/24 0741  vancomycin (VANCOCIN) 1000 mg/200 mL dextrose 5% IVPB        Ordering Provider: Adama Witt Jr., MD    1,000 mg  over 60 Minutes Intravenous Every 12 Hours 02/07/24 1400 02/13/24 1359    02/06/24 1654  clindamycin (CLEOCIN) 900 mg in dextrose 5% 50 mL IVPB (premix)        Ordering Provider: Adama Witt Jr., MD    900 mg  50 mL/hr over 60 Minutes Intravenous Once 02/06/24 1656 02/06/24 1831    02/06/24 0848  vancomycin 2500 mg/500 mL 0.9% NS IVPB ([facility])        Ordering Provider: Case Watson MD    20 mg/kg × 125 kg  over 150 Minutes Intravenous Once 02/06/24 0945 02/06/24 1232    02/06/24 0839  Pharmacy to dose vancomycin        Ordering Provider: Adama Witt Jr., MD     Does not apply Continuous PRN 02/06/24 0838 02/20/24 0837    02/05/24 1540  fluconazole (DIFLUCAN) tablet 200 mg        Ordering Provider: Case Watson MD    200 mg Oral Daily 02/05/24 1630 02/16/24 0946    02/05/24 1506  meropenem (MERREM) 1,000 mg in sodium chloride 0.9 % 100 mL IVPB        Ordering Provider: Sri Youngblood MD    1,000 mg  over 30 Minutes Intravenous Once 02/05/24 1600 02/05/24 1636            Allergies  Allergies as of 02/05/2024 - Reviewed 02/05/2024   Allergen Reaction Noted    Vicodin [hydrocodone-acetaminophen] Itching 11/10/2017       Labs    Results from last 7 days   Lab Units 02/09/24  0118  "02/08/24 0315 02/07/24 0314   BUN mg/dL 7 7 5*   CREATININE mg/dL 0.55* 0.49* 0.46*       Results from last 7 days   Lab Units 02/09/24  0118 02/08/24 0315 02/07/24 0314   WBC 10*3/mm3 2.89* 2.78* 2.99*       Evaluation of Dosing     Last Dose Received in the ED/Outside Facility:   2/6 1000  Is Patient on Dialysis or Renal Replacement:  N    Ht - 157.5 cm (62\")  Wt - 125 kg (275 lb)    Estimated Creatinine Clearance: 154.7 mL/min (A) (by C-G formula based on SCr of 0.55 mg/dL (L)).    I/O last 3 completed shifts:  In: 840 [P.O.:840]  Out: 1400 [Urine:1000]    Microbiology and Radiology  Microbiology Results (last 10 days)       Procedure Component Value - Date/Time    Anaerobic Culture - Swab, Leg, Left [[id number]]  (Normal) Collected: 02/06/24 1847    Lab Status: Preliminary result Specimen: Swab from Leg, Left Updated: 02/09/24 0703     Anaerobic Culture No anaerobes isolated at 3 days    Wound Culture - Swab, Leg, Left [[id number]] Collected: 02/06/24 1847    Lab Status: Final result Specimen: Swab from Leg, Left Updated: 02/09/24 0734     Wound Culture No growth at 3 days     Gram Stain Moderate (3+) Red blood cells      Few (2+) WBCs seen      No organisms seen    AFB Culture - Swab, Leg, Left [[id number]] Collected: 02/06/24 1847    Lab Status: Preliminary result Specimen: Swab from Leg, Left Updated: 02/07/24 1307     AFB Stain No acid fast bacilli seen on concentrated smear    MRSA Screen, PCR (Inpatient) - Swab, Nares [[id number]]  (Normal) Collected: 02/06/24 1247    Lab Status: Final result Specimen: Swab from Nares Updated: 02/06/24 2010     MRSA PCR No MRSA Detected    Narrative:      The negative predictive value of this diagnostic test is high and should only be used to consider de-escalating anti-MRSA therapy. A positive result may indicate colonization with MRSA and must be correlated clinically.    Blood Culture - Blood, Arm, Left [[id number]]  (Normal) Collected: 02/05/24 1120    Lab Status: " Preliminary result Specimen: Blood from Arm, Left Updated: 02/09/24 1145     Blood Culture No growth at 4 days    Wound Culture - Swab, Leg, Left [[id number]]  (Abnormal) Collected: 02/05/24 1052    Lab Status: Preliminary result Specimen: Swab from Leg, Left Updated: 02/09/24 0652     Wound Culture Light growth (2+) Enterococcus species      Moderate growth (3+) Normal Skin Tracy     Gram Stain Moderate (3+) WBCs seen      Few (2+) Gram positive cocci in pairs and chains    Blood Culture - Blood, Arm, Right [[id number]]  (Normal) Collected: 02/05/24 1045    Lab Status: Preliminary result Specimen: Blood from Arm, Right Updated: 02/09/24 1145     Blood Culture No growth at 4 days            Reported Vancomycin Levels                   [facility] AUC Calculation:    Current AUC:     mg/L*hr    Predicted Steady State AUC on Current Dose:  540 mg/L*hr  _________________________________    Predicted Steady State AUC on New Dose:     mg/L*hr    Assessment/Plan:    Rx to dose vancomycin for SSTI for targeting -600 mg/L*hr.  Vancomycin random level 2/9/24 0730 = 25.6 mcg/mL. In coordination with [facility] recommend to continue vancomycin 1 gm IV q12h to target AUCss of 400-600 mg/L*hr.  Pharmacy will continue to monitor and adjust vancomycin dose as necessary based on renal function, cultures, labs, and clinical status.     Thanks,   Polo Mclean, PharmD  2/9/2024  13:23 EST

## 2024-02-09 NOTE — PLAN OF CARE
Goal Outcome Evaluation:  Plan of Care Reviewed With: patient           Outcome Evaluation: L AKA wound vac dressing changed. Pt with moderate to high pain with dressing change. PT added white foam to packing to help decrease pain with dressing changes. PT to f/u with wound vac change in 2-3 days.

## 2024-02-09 NOTE — PROGRESS NOTES
[facility] Medicine Services  PROGRESS NOTE    Patient Name: Phoebe Carvajal  : 1975  MRN: [id number]    Date of Admission: 2024  Primary Care Physician: Sofya Benjamin MD    Subjective   Subjective     CC:  Left AKA site wound pain and erythema    HPI:  Patient in better spirits today. Reported mild nausea this morning. Otherwise feels generally well. Plan for wound VAC change either today or tomorrow.      Objective   Objective     Vital Signs:   Temp:  [96.7 °F (35.9 °C)-98.1 °F (36.7 °C)] 97.2 °F (36.2 °C)  Heart Rate:  [52-74] 57  Resp:  [14-18] 16  BP: (79-94)/(45-58) 79/45     Physical Exam:  Constitutional: Awake, alert, appears more comfortable today   HENT: NCAT, mucous membranes moist  Respiratory: Clear to auscultation bilaterally, respiratory effort normal   Cardiovascular: RRR, no murmurs, rubs, or gallops  Gastrointestinal: Positive bowel sounds, soft, nontender, nondistended  Musculoskeletal: S/p left AKA, with wound VAC and bandage in place  Neurologic: Alert and oriented x 3, no focal deficits, speech clear    Exam unchanged from .    Results Reviewed:  LAB RESULTS:      Lab 24  0118 24  0315 24  0314 24  0250 24  1120   WBC 2.89* 2.78* 2.99* 3.24* 3.75   HEMOGLOBIN 7.3* 7.2* 8.4* 8.9* 9.8*   HEMATOCRIT 24.1* 23.6* 28.0* 29.2* 31.7*   PLATELETS 191 178 198 193 242   NEUTROS ABS 1.32*  --   --   --  1.97   IMMATURE GRANS (ABS) 0.00  --   --   --  0.01   LYMPHS ABS 1.26  --   --   --  1.14   MONOS ABS 0.25  --   --   --  0.34   EOS ABS 0.05  --   --   --  0.27   MCV 98.8* 97.9* 98.6* 99.7* 98.8*   SED RATE  --   --   --  39*  --    CRP  --   --   --  3.71*  --    PROCALCITONIN  --   --   --   --  <0.02   LACTATE  --   --   --   --  1.3         Lab 24  0118 24  0315 24  03124  0250 24  1120   SODIUM 140 136 135* 137 139   POTASSIUM 4.3 4.4 4.4 4.3 4.3   CHLORIDE 104 103 102 103 101   CO2 31.0*  28.0 26.0 27.0 31.0*   ANION GAP 5.0 5.0 7.0 7.0 7.0   BUN 7 7 5* 4* 6   CREATININE 0.55* 0.49* 0.46* 0.42* 0.58   EGFR 111.8 115.0 116.7 119.3 110.4   GLUCOSE 104* 112* 168* 70 88   CALCIUM 8.5* 8.6 8.3* 8.2* 8.3*         Lab 02/05/24  1120   TOTAL PROTEIN 6.5   ALBUMIN 2.8*   GLOBULIN 3.7   ALT (SGPT) 6   AST (SGOT) 28   BILIRUBIN 0.5   ALK PHOS 215*                     Brief Urine Lab Results  (Last result in the past 365 days)        Color   Clarity   Blood   Leuk Est   Nitrite   Protein   CREAT   Urine HCG        01/05/24 1351 Yellow   Cloudy   Negative   Small (1+)   Negative   Trace                   Microbiology Results Abnormal       Procedure Component Value - Date/Time    Blood Culture - Blood, Arm, Left [[id number]]  (Normal) Collected: 02/05/24 1120    Lab Status: Preliminary result Specimen: Blood from Arm, Left Updated: 02/09/24 1145     Blood Culture No growth at 4 days    Blood Culture - Blood, Arm, Right [[id number]]  (Normal) Collected: 02/05/24 1045    Lab Status: Preliminary result Specimen: Blood from Arm, Right Updated: 02/09/24 1145     Blood Culture No growth at 4 days    Wound Culture - Swab, Leg, Left [[id number]] Collected: 02/06/24 1847    Lab Status: Final result Specimen: Swab from Leg, Left Updated: 02/09/24 0734     Wound Culture No growth at 3 days     Gram Stain Moderate (3+) Red blood cells      Few (2+) WBCs seen      No organisms seen    Anaerobic Culture - Swab, Leg, Left [[id number]]  (Normal) Collected: 02/06/24 1847    Lab Status: Preliminary result Specimen: Swab from Leg, Left Updated: 02/09/24 0703     Anaerobic Culture No anaerobes isolated at 3 days    AFB Culture - Swab, Leg, Left [[id number]] Collected: 02/06/24 1847    Lab Status: Preliminary result Specimen: Swab from Leg, Left Updated: 02/07/24 1307     AFB Stain No acid fast bacilli seen on concentrated smear    MRSA Screen, PCR (Inpatient) - Swab, Nares [[id number]]  (Normal) Collected: 02/06/24 1247    Lab Status:  Final result Specimen: Swab from Nares Updated: 02/06/24 2010     MRSA PCR No MRSA Detected    Narrative:      The negative predictive value of this diagnostic test is high and should only be used to consider de-escalating anti-MRSA therapy. A positive result may indicate colonization with MRSA and must be correlated clinically.            No radiology results from the last 24 hrs        Current medications:  Scheduled Meds:cyanocobalamin, 1,000 mcg, Intramuscular, Q7 Days  enoxaparin, 60 mg, Subcutaneous, Q12H  famotidine, 20 mg, Oral, BID AC  fluconazole, 200 mg, Oral, Daily  levoFLOXacin, 750 mg, Intravenous, Q24H  levothyroxine, 175 mcg, Oral, Daily  magnesium oxide, 400 mg, Oral, Daily  potassium chloride, 10 mEq, Oral, Daily  pramipexole, 1 mg, Oral, Daily  pregabalin, 200 mg, Oral, TID  sertraline, 100 mg, Oral, Nightly  sodium chloride, 10 mL, Intravenous, Q12H  sodium chloride, 10 mL, Intravenous, Q12H  vancomycin, 1,000 mg, Intravenous, Q12H      Continuous Infusions:lactated ringers, 9 mL/hr, Last Rate: 50 mL/hr (02/06/24 1821)  Pharmacy to dose vancomycin,       PRN Meds:.  acetaminophen    albuterol    senna-docusate sodium **AND** polyethylene glycol **AND** bisacodyl **AND** bisacodyl    cloNIDine    HYDROmorphone    hydrOXYzine    LORazepam    melatonin    nitroglycerin    ondansetron ODT **OR** ondansetron    oxyCODONE    Pharmacy to dose vancomycin    sodium chloride    sodium chloride    sodium chloride    traZODone    Assessment & Plan   Assessment & Plan     Active Hospital Problems    Diagnosis  POA    **Osteomyelitis [M86.9]  Yes    Amputation stump infection [T87.40]  Yes      Resolved Hospital Problems   No resolved problems to display.        Brief Hospital Course to date:  Phoebe Carvajal is a 48 y.o. female with PMHx of CKD, Stills disease, restless leg syndrome, hypothyroidism, PAD, morbid obesity. She has had chronic/recurrent osteomyelitis of left foot resulting in L AKA on  12/29/2023. She then had a prolonged admission 12/28-1/19 requiring debridements x2. She was discharged to [facility] with a wound vac on 1/19/24. ID followed throughout and discontinued antibiotics at time of discharge because path report showed margins without infection. She was continued on fluconazole for + yeast at the AKA wound site (C albicans). Re-presented due to concern for left AKA site wound dehiscence.    This patient's problems and plans were partially entered by my partner and updated as appropriate by me 02/09/24.    L AKA site with dehiscence and soft tissue infection  Chronic calcaneal osteomyelitis s/p left AKA  -Edema likely contributing to poor wound healing and serous drainage  -Previous wound culture with MDR Enterobacter, repeat culture with GPC on gram stain  -Wound culture 2/5 growing Enterococcus  -Orthopedic surgery evaluated, s/p I&D with wound VAC closure 2/6/24 by Dr. Witt. Will need long-term wound VAC until fully healed.  -Infectious disease following, continue oral levofloxacin, IV vancomycin. Follow final wound and blood cultures. Orthopedic surgery planning for wound VAC changes q72 hours.     Growth of candida albicans from left AKA site   -Continue fluconazole, plan for 4 weeks of antifungal through 2/9/24. Plan to continue until at least 2/16/2024.     Hypotension  -patient reports SBP 90s-100s at home, worsened on 2/9 likely in setting of opioid medication use, lactate normal  -Will give 500 LR bolus today. Check lactate. Hold oral Lasix. Monitor closely.    Complex pain, chronic opioid use  Peripheral neuropathy  -Pain control with home Lyrica, oxycodone 10mg q4h PRN and PRN Dilaudid for breakthrough pain. Wean as tolerated. Bowel regimen.    Chronic bilateral lower extremity edema  -Unclear if has diagnosis of heart failure, on Lasix at home for lower extremity edema  -Echo 5/2023 with EF 61%, no comment regarding diastolic function  -Holding home Lasix due to hypotension.      L humerus nonunion  -reported by patient on admission   -XR left humerus 9/2023 showed fairly comminuted proximal humeral fracture  -Consider asking for Orthopedic surgery for opinion.     Macrocytic anemia  B12 deficiency  -Hgb 9.8 on admission, further drop likely secondary to intraop blood loss, no evidence of overt bleeding  -Continue weekly B12 replacement. Monitor daily CBC. Transfuse for hemoglobin less than 7 or hemodynamic instability.    RLS  -Continue pramipexole.    Hypothyroidism  -Continue levothyroxine.    Anxiety  -Continue Zoloft, PRN Atarax.       Expected Discharge Location and Transportation: Home vs rehab  Expected Discharge   Expected Discharge Date: 2/12/2024; Expected Discharge Time:      DVT prophylaxis:  Medical DVT prophylaxis orders are present.         AM-PAC 6 Clicks Score (PT): 10 (02/08/24 0800)    CODE STATUS:   Code Status and Medical Interventions:   Ordered at: 02/05/24 1500     Level Of Support Discussed With:    Patient     Code Status (Patient has no pulse and is not breathing):    CPR (Attempt to Resuscitate)     Medical Interventions (Patient has pulse or is breathing):    Full Support       Natalie Oliver, DO  02/09/24

## 2024-02-09 NOTE — PROGRESS NOTES
Phoebe Carvajal       LOS: 4 days   Patient Care Team:  Sofya Benjamin MD as PCP - General (Internal Medicine)  Provider, No Known as PCP - Family Medicine    Chief Complaint:  left lower extremity surgical site infection    Subjective     Interval History:     Resting comfortably in bed this morning.    Review of Systems:      Gen- No fevers, chills  CV- No chest pain, palpitations  Resp- No cough, dyspnea  GI- No N/V/D, abd pain    Objective     Vital Signs  Vital Signs (last 24 hours)         02/06 0700  02/07 0659 02/07 0700 02/07 0731   Most Recent      Temp (°F) 97.2 -  98.3       97.2 (36.2) 02/06 2203    Heart Rate 58 -  72       60 02/06 2203    Resp 11 -  18       14 02/06 2203    BP 84/56 -  103/58       100/64 02/06 2203    SpO2 (%) 90 -  100       93 02/06 2203    Flow (L/min) 2 -  3       2 02/07 0000              Physical Exam:     No acute distress.  Nonlabored respirations.  Regular rate and rhythm.  Abdomen nondistended.  Left lower extremity: Operative dressing in place clean, dry, intact.  Wound vacuum-assisted closure in place with adequate seal, serosanguineous output in canister.     Results Review:     I reviewed the patient's new clinical results.    Medication Review:   Hospital Medications (active)         Dose Frequency Start End    acetaminophen (TYLENOL) tablet 650 mg 650 mg Every 4 Hours PRN 2/5/2024 --    Admin Instructions: If given for fever, use fever parameter: fever greater than 100.4 °F  Based on patient request - if ordered for moderate or severe pain, provider allows for administration of a medication prescribed for a lower pain scale.    Do not exceed 4 grams of acetaminophen in a 24 hr period. Max dose of 2gm for AST/ALT greater than 120 units/L.    If given for pain, use the following pain scale:   Mild Pain = Pain Score of 1-3, CPOT 1-2  Moderate Pain = Pain Score of 4-6, CPOT 3-4  Severe Pain = Pain Score of 7-10, CPOT 5-8    Route: Oral    albuterol (PROVENTIL)  nebulizer solution 0.083% 2.5 mg/3mL 2.5 mg Every 6 Hours PRN 2/5/2024 --    Admin Instructions: Include Respiratory Treatment Education    Route: Nebulization    bisacodyl (DULCOLAX) EC tablet 5 mg 5 mg Daily PRN 2/5/2024 --    Admin Instructions: Use if no bowel movement after 12 hours.  Swallow whole. Do not crush, split, or chew tablet.    Route: Oral    Linked Group 1: See Hyperspace for full Linked Orders Report.        bisacodyl (DULCOLAX) suppository 10 mg 10 mg Daily PRN 2/5/2024 --    Admin Instructions: Use if no bowel movement after 12 hours.  Hold for diarrhea    Route: Rectal    Linked Group 1: See Hyperspace for full Linked Orders Report.        cloNIDine (CATAPRES) tablet 0.1 mg 0.1 mg Every 6 Hours PRN 2/5/2024 --    Admin Instructions: Hold for SBP less than 100, DBP less than 60, or heart rate less than 50. If a dose is held, please contact the provider.  Caution: Look alike/sound alike drug alert.    Route: Oral    cyanocobalamin injection 1,000 mcg 1,000 mcg Every 7 Days 2/5/2024 --    Route: Intramuscular    Enoxaparin Sodium (LOVENOX) syringe 60 mg 60 mg Every 12 Hours Scheduled 2/5/2024 --    Admin Instructions: Give subcutaneous in abdomen only. Do not massage site after injection.    Route: Subcutaneous    famotidine (PEPCID) tablet 20 mg 20 mg 2 Times Daily Before Meals 2/5/2024 --    Route: Oral    fluconazole (DIFLUCAN) tablet 200 mg 200 mg Daily 2/5/2024 2/19/2024    Admin Instructions: Group 2 (Pink) Hazardous Drug - Reproductive Risk Only - See Handling Guide    Route: Oral    furosemide (LASIX) tablet 40 mg 40 mg Daily 2/5/2024 --    Admin Instructions: Hold for SBP less than 100, DBP less than 60    Route: Oral    HYDROmorphone (DILAUDID) injection 1 mg 1 mg Daily PRN 2/5/2024 2/10/2024    Admin Instructions: Based on patient request - if ordered for moderate or severe pain, provider allows for administration of a medication prescribed for a lower pain scale.      Caution: Look  alike/sound alike drug alert    If given for pain, use the following pain scale:  Mild Pain = Pain Score of 1-3, CPOT 1-2  Moderate Pain = Pain Score of 4-6, CPOT 3-4  Severe Pain = Pain Score of 7-10, CPOT 5-8    Route: Intravenous    hydrOXYzine (ATARAX) tablet 25 mg 25 mg 3 Times Daily PRN 2/6/2024 --    Admin Instructions: Caution: Look alike/sound alike drug alert    Route: Oral    lactated ringers infusion 9 mL/hr Continuous 2/6/2024 --    Admin Instructions: May switch to NS IV at KVO if renal / if indicated    Route: Intravenous    levoFLOXacin (LEVAQUIN) tablet 750 mg 750 mg Daily 2/5/2024 2/15/2024    Admin Instructions: Caution: Look alike/sound alike drug alert.  Avoid antacids/vitamins/calcium/iron.    Route: Oral    levothyroxine (SYNTHROID, LEVOTHROID) tablet 175 mcg 175 mcg Daily 2/5/2024 --    Admin Instructions: Take on empty stomach.    Route: Oral    LORazepam (ATIVAN) tablet 0.5 mg 0.5 mg 2 Times Daily PRN 2/5/2024 --    Admin Instructions:  Caution: Look alike/sound alike drug alert    Route: Oral    magnesium oxide (MAG-OX) tablet 400 mg 400 mg Daily 2/5/2024 --    Admin Instructions: Each 400mg of magnesium oxide is equal to 241.3mg of elemental magnesium.    Route: Oral    melatonin tablet 2.5 mg 2.5 mg Nightly PRN 2/5/2024 --    Route: Oral    nitroglycerin (NITROSTAT) SL tablet 0.4 mg 0.4 mg Every 5 Minutes PRN 2/5/2024 --    Admin Instructions: If Pain Unrelieved After 3 Doses Notify MD  May administer up to 3 doses per episode.    Route: Sublingual    oxyCODONE (ROXICODONE) immediate release tablet 10 mg 10 mg Every 4 Hours PRN 2/5/2024 2/10/2024    Admin Instructions: Based on patient request - if ordered for moderate or severe pain, provider allows for administration of a medication prescribed for a lower pain scale.  If given for pain, use the following pain scale:  Mild Pain = Pain Score of 1-3, CPOT 1-2  Moderate Pain = Pain Score of 4-6, CPOT 3-4  Severe Pain = Pain Score of 7-10,  CPOT 5-8    Route: Oral    Pharmacy to dose vancomycin  Continuous PRN 2/6/2024 2/20/2024    Route: Does not apply    polyethylene glycol (MIRALAX) packet 17 g 17 g Daily PRN 2/5/2024 --    Admin Instructions: Use if no bowel movement after 12 hours. Mix in 6-8 ounces of water.  Use 4-8 ounces of water, tea, or juice for each 17 gram dose.    Route: Oral    Linked Group 1: See Hyperspace for full Linked Orders Report.        potassium chloride (MICRO-K/KLOR-CON) CR capsule 10 mEq Daily 2/5/2024 --    Admin Instructions: Take with food.    Route: Oral    pramipexole (MIRAPEX) tablet 1 mg 1 mg Daily 2/5/2024 --    Route: Oral    pregabalin (LYRICA) capsule 200 mg 200 mg 3 Times Daily 2/5/2024 --    Admin Instructions:     Route: Oral    sennosides-docusate (PERICOLACE) 8.6-50 MG per tablet 2 tablet 2 tablet 2 Times Daily 2/5/2024 --    Admin Instructions: HOLD MEDICATION IF PATIENT HAS HAD BOWEL MOVEMENT. Start bowel management regimen if patient has not had a bowel movement after 12 hours.    Route: Oral    Linked Group 1: See Hyperspace for full Linked Orders Report.        sertraline (ZOLOFT) tablet 100 mg 100 mg Nightly 2/5/2024 --    Route: Oral    sodium chloride 0.9 % flush 10 mL 10 mL Every 12 Hours Scheduled 2/5/2024 --    Route: Intravenous    sodium chloride 0.9 % flush 10 mL 10 mL As Needed 2/5/2024 --    Route: Intravenous    sodium chloride 0.9 % infusion 40 mL 40 mL As Needed 2/5/2024 --    Admin Instructions: Following administration of an IV intermittent medication, flush line with 40mL NS at 100mL/hr.    Route: Intravenous    traZODone (DESYREL) tablet 50 mg 50 mg Nightly PRN 2/5/2024 --    Admin Instructions: Take with food.  Caution: Look alike/sound alike drug alert    Route: Oral    vancomycin (VANCOCIN) 1000 mg/200 mL dextrose 5% IVPB 1,000 mg Every 8 Hours 2/6/2024 2/13/2024    Route: Intravenous              Assessment & Plan     48-year-old female with chronic calcaneal osteomyelitis  nonhealing wound overlying left total knee stem prosthesis now status post staged above-knee amputation with subsequent wound dehiscence, subsequent debridement/irrigation with secondary staged closure, medial and lateral wound dehiscence now status post left lower extremity irrigation, debridement, wound vacuum-assisted closure 2/6/2024.      Osteomyelitis    Amputation stump infection      Nonweightbearing left lower extremity.  Every 72 hour wound vacuum-assisted closure changes per PT wound care; tentative plan for long-term wound vacuum-assisted closure as she has failed 2 attempts at primary wound closure.    Follow intraoperative cultures: enterococcus    Anticipate she will benefit from placement, patient prefers to return to [facility]      Adama Witt Jr, MD  02/09/24  10:28 EST

## 2024-02-09 NOTE — PROGRESS NOTES
"  Patient Name: Phoebe Carvajal  : 1975  MRN: [id number]    Chief Complaint: leg pain    Reason for Consultation: wound infection    Subjective   Patient is a 48 y.o. female with history of obesity, prior adult stills disease, peripheral neuropathy, and chronic left knee arthroplasty infection treated at , and prior seizure.  Recently admitted to [facility] reviewed reevaluation of her chronic left leg infection and eventually underwent left leg above-the-knee amputation on 23, but then had 3 additional revision surgeries.  Fungal culture from most recent revision surgery on  grew scant Candida albicans.  She was discharged to [facility] on fluconazole    24: Patient reports improving drainage but still had fairly significant serous output overnight.  Wound VAC is to be removed today and switch to wet-to-dry dressing per Dr. Callaway.  No worsening pain, swelling.  Tolerated addition of Keflex    24: Patient concerned about right foot with ongoing swelling.  Left AKA site with ongoing serous drainage.  Still significant tenderness.  Patient refused direct exam.  Now has wet-to-dry dressing related wound VAC.    24: Ongoing drainage from the left amputation site which patient thinks is getting worse.  Wound VAC is now off.  Still has some discomfort.  Working with therapy.  Tolerating current antibiotics via peripheral IV and oral Bactrim.  No chest pain, palpitations.  She says she has tolerated levofloxacin before    24: Transferred back to [facility] with worsening wound dehiscence, drainage and pain.  I spoke with Dr. Colorado via phone.  Continue meropenem and levofloxacin.  Repeat culture obtained    2024: I&D down to level of muscle by Dr Witt \"removed all deep suture.  There was serosanguineous seroma, no myke purulence.  Remaining tissue appeared healthy\" vancomycin powder applied    24: Ongoing significant pain especially with movement.  No " clear side effects from antibiotics but she does note difficult IV access. Appetite not great.     2/8/24: PICC line placed.  Pain improving.  Less output from wound VAC.  No side effect from antibiotics noted    2/9/24: Significant bleeding around PICC line but no pain and line is working.  No ongoing pain at amputation site and had difficulty sleeping last night.  Appetite is okay.  No other specific new concerns noted.    Review of Systems  Afebrile and hemodynamically stable. No nausea, diarrhea. No rash. See above, otherwise negative.       Allergies   Allergen Reactions    Vicodin [Hydrocodone-Acetaminophen] Itching       Objective   Antibiotics:  Anti-Infectives (From admission, onward)      Ordered     Dose/Rate Route Frequency Start Stop    02/07/24 1547  levoFLOXacin (LEVAQUIN) 750 mg/150 mL D5W (premix) (LEVAQUIN) 750 mg        Ordering Provider: Case Watson MD    750 mg  100 mL/hr over 90 Minutes Intravenous Every 24 Hours 02/08/24 0900 02/15/24 0859    02/07/24 0741  vancomycin (VANCOCIN) 1000 mg/200 mL dextrose 5% IVPB        Ordering Provider: Adama Witt Jr., MD    1,000 mg  over 60 Minutes Intravenous Every 12 Hours 02/07/24 1400 02/13/24 1359    02/06/24 1654  clindamycin (CLEOCIN) 900 mg in dextrose 5% 50 mL IVPB (premix)        Ordering Provider: Adama Witt Jr., MD    900 mg  50 mL/hr over 60 Minutes Intravenous Once 02/06/24 1656 02/06/24 1831    02/06/24 0848  vancomycin 2500 mg/500 mL 0.9% NS IVPB ([facility])        Ordering Provider: Case Watson MD    20 mg/kg × 125 kg  over 150 Minutes Intravenous Once 02/06/24 0945 02/06/24 1232    02/06/24 0839  Pharmacy to dose vancomycin        Ordering Provider: Adama Witt Jr., MD     Does not apply Continuous PRN 02/06/24 0838 02/20/24 0837    02/05/24 1540  fluconazole (DIFLUCAN) tablet 200 mg        Ordering Provider: Adama Witt Jr., MD    200 mg Oral Daily 02/05/24 1630 02/19/24 0859    02/05/24 1506  meropenem  (MERREM) 1,000 mg in sodium chloride 0.9 % 100 mL IVPB        Ordering Provider: Sri Youngblood MD    1,000 mg  over 30 Minutes Intravenous Once 02/05/24 1600 02/05/24 1636            Other Medications:  Current Facility-Administered Medications   Medication Dose Route Frequency Provider Last Rate Last Admin    acetaminophen (TYLENOL) tablet 650 mg  650 mg Oral Q4H PRN Adama Witt Jr., MD        albuterol (PROVENTIL) nebulizer solution 0.083% 2.5 mg/3mL  2.5 mg Nebulization Q6H PRN Adama Witt Jr., MD        sennosides-docusate (PERICOLACE) 8.6-50 MG per tablet 2 tablet  2 tablet Oral Nightly PRN Natalie Oliver DO        And    polyethylene glycol (MIRALAX) packet 17 g  17 g Oral Daily PRN Natalie Oliver DO        And    bisacodyl (DULCOLAX) EC tablet 5 mg  5 mg Oral Daily PRN Natalie Oliver DO        And    bisacodyl (DULCOLAX) suppository 10 mg  10 mg Rectal Daily PRN Natalie Oliver DO        cloNIDine (CATAPRES) tablet 0.1 mg  0.1 mg Oral Q6H PRN Adama Witt Jr., MD        cyanocobalamin injection 1,000 mcg  1,000 mcg Intramuscular Q7 Days Adama Witt Jr., MD   1,000 mcg at 02/05/24 1628    Enoxaparin Sodium (LOVENOX) syringe 60 mg  60 mg Subcutaneous Q12H Adama Witt Jr., MD   60 mg at 02/09/24 0804    famotidine (PEPCID) tablet 20 mg  20 mg Oral BID AC Adama Witt Jr., MD   20 mg at 02/09/24 0748    fluconazole (DIFLUCAN) tablet 200 mg  200 mg Oral Daily Adama Witt Jr., MD   200 mg at 02/09/24 0803    furosemide (LASIX) tablet 40 mg  40 mg Oral Daily Adama Witt Jr., MD   40 mg at 02/08/24 0801    HYDROmorphone (DILAUDID) injection 1 mg  1 mg Intravenous Q2H PRN Natalie Oliver DO   1 mg at 02/09/24 0930    hydrOXYzine (ATARAX) tablet 25 mg  25 mg Oral TID PRN Adama Witt Jr., MD   25 mg at 02/08/24 0640    lactated ringers infusion  9 mL/hr Intravenous Continuous Adama Witt Jr., MD 50 mL/hr at 02/06/24  1821 Currently Infusing at 02/06/24 1821    levoFLOXacin (LEVAQUIN) 750 mg/150 mL D5W (premix) (LEVAQUIN) 750 mg  750 mg Intravenous Q24H Case Watson  mL/hr at 02/09/24 0930 750 mg at 02/09/24 0930    levothyroxine (SYNTHROID, LEVOTHROID) tablet 175 mcg  175 mcg Oral Daily Adama Witt Jr., MD   175 mcg at 02/09/24 0524    LORazepam (ATIVAN) tablet 0.5 mg  0.5 mg Oral BID PRN Adama Witt Jr., MD        magnesium oxide (MAG-OX) tablet 400 mg  400 mg Oral Daily Adama Witt Jr., MD   400 mg at 02/09/24 0804    melatonin tablet 2.5 mg  2.5 mg Oral Nightly PRN Adama Witt Jr., MD   2.5 mg at 02/08/24 0048    nitroglycerin (NITROSTAT) SL tablet 0.4 mg  0.4 mg Sublingual Q5 Min PRN Adama Witt Jr., MD        ondansetron ODT (ZOFRAN-ODT) disintegrating tablet 4 mg  4 mg Oral Q6H PRN Natalie Oliver DO   4 mg at 02/09/24 0754    Or    ondansetron (ZOFRAN) injection 4 mg  4 mg Intravenous Q6H PRN Natalie Oliver,         oxyCODONE (ROXICODONE) immediate release tablet 10 mg  10 mg Oral Q4H PRN Adama Witt Jr., MD   10 mg at 02/09/24 0528    Pharmacy to dose vancomycin   Does not apply Continuous PRN Adama Witt Jr., MD        potassium chloride (MICRO-K/KLOR-CON) CR capsule  10 mEq Oral Daily Adama Witt Jr., MD   10 mEq at 02/08/24 0801    pramipexole (MIRAPEX) tablet 1 mg  1 mg Oral Daily Adama Witt Jr., MD   1 mg at 02/09/24 0803    pregabalin (LYRICA) capsule 200 mg  200 mg Oral TID Adama Witt Jr., MD   200 mg at 02/09/24 0804    sertraline (ZOLOFT) tablet 100 mg  100 mg Oral Nightly Aadma Witt Jr., MD   100 mg at 02/08/24 2048    sodium chloride 0.9 % flush 10 mL  10 mL Intravenous Q12H Adama Witt Jr., MD   10 mL at 02/09/24 0930    sodium chloride 0.9 % flush 10 mL  10 mL Intravenous PRN Adama Witt Jr., MD        sodium chloride 0.9 % flush 10 mL  10 mL Intravenous Q12H Case Watson MD   10 mL at  "02/09/24 0805    sodium chloride 0.9 % flush 10 mL  10 mL Intravenous PRN Case Watson MD        sodium chloride 0.9 % infusion 40 mL  40 mL Intravenous PRN Adama Witt Jr., MD        traZODone (DESYREL) tablet 50 mg  50 mg Oral Nightly PRN Adama Witt Jr., MD   50 mg at 02/08/24 0048    vancomycin (VANCOCIN) 1000 mg/200 mL dextrose 5% IVPB  1,000 mg Intravenous Q12H Adama Witt Jr., MD   1,000 mg at 02/09/24 0330       Physical Exam:   Vital Signs   BP 92/54   Pulse 74   Temp 98.1 °F (36.7 °C) (Oral)   Resp 14   Ht 157.5 cm (62\")   Wt 125 kg (275 lb)   LMP  (LMP Unknown) Comment: not regular  SpO2 98%   BMI 50.30 kg/m²     GENERAL: Awake and alert. Non-toxic  HEENT: Normocephalic, atraumatic.  EOMI. No icterus.    HEART: RRR; No murmur.  LUNGS: Clear to auscultation bilaterally. Nonlabored.  ABDOMEN: Soft, obese, nontender,  : Without Mendoza catheter.  MSK: S/p AKA on left.  Dressed with wound VAC with scant serosanguinous output  SKIN: No diffuse rash  NEURO: AOx3  PSYCHIATRIC: Appropriate  PICC in RUE with blood soaked dressing     Laboratory Data  Lab Results   Component Value Date    WBC 2.89 (L) 02/09/2024    HGB 7.3 (L) 02/09/2024    HCT 24.1 (L) 02/09/2024    MCV 98.8 (H) 02/09/2024     02/09/2024     Lab Results   Component Value Date    GLUCOSE 104 (H) 02/09/2024    CALCIUM 8.5 (L) 02/09/2024     02/09/2024    K 4.3 02/09/2024    CO2 31.0 (H) 02/09/2024     02/09/2024    BUN 7 02/09/2024    CREATININE 0.55 (L) 02/09/2024     Estimated Creatinine Clearance: 154.7 mL/min (A) (by C-G formula based on SCr of 0.55 mg/dL (L)).  Lab Results   Component Value Date    ALT 6 02/05/2024    AST 28 02/05/2024    ALKPHOS 215 (H) 02/05/2024    BILITOT 0.5 02/05/2024     Lab Results   Component Value Date    CRP 3.71 (H) 02/06/2024     Lab Results   Component Value Date    SEDRATE 39 (H) 02/06/2024       The above labs were reviewed.     Microbiology:  Microbiology " Results (last 10 days)       Procedure Component Value - Date/Time    Anaerobic Culture - Swab, Leg, Left [[id number]]  (Normal) Collected: 02/06/24 1847    Lab Status: Preliminary result Specimen: Swab from Leg, Left Updated: 02/09/24 0703     Anaerobic Culture No anaerobes isolated at 3 days    Wound Culture - Swab, Leg, Left [[id number]] Collected: 02/06/24 1847    Lab Status: Final result Specimen: Swab from Leg, Left Updated: 02/09/24 0734     Wound Culture No growth at 3 days     Gram Stain Moderate (3+) Red blood cells      Few (2+) WBCs seen      No organisms seen    AFB Culture - Swab, Leg, Left [[id number]] Collected: 02/06/24 1847    Lab Status: Preliminary result Specimen: Swab from Leg, Left Updated: 02/07/24 1307     AFB Stain No acid fast bacilli seen on concentrated smear    MRSA Screen, PCR (Inpatient) - Swab, Nares [[id number]]  (Normal) Collected: 02/06/24 1247    Lab Status: Final result Specimen: Swab from Nares Updated: 02/06/24 2010     MRSA PCR No MRSA Detected    Narrative:      The negative predictive value of this diagnostic test is high and should only be used to consider de-escalating anti-MRSA therapy. A positive result may indicate colonization with MRSA and must be correlated clinically.    Blood Culture - Blood, Arm, Left [[id number]]  (Normal) Collected: 02/05/24 1120    Lab Status: Preliminary result Specimen: Blood from Arm, Left Updated: 02/08/24 1145     Blood Culture No growth at 3 days    Wound Culture - Swab, Leg, Left [[id number]]  (Abnormal) Collected: 02/05/24 1052    Lab Status: Preliminary result Specimen: Swab from Leg, Left Updated: 02/09/24 0652     Wound Culture Light growth (2+) Enterococcus species      Moderate growth (3+) Normal Skin Tracy     Gram Stain Moderate (3+) WBCs seen      Few (2+) Gram positive cocci in pairs and chains    Blood Culture - Blood, Arm, Right [[id number]]  (Normal) Collected: 02/05/24 1045    Lab Status: Preliminary result Specimen: Blood from  Arm, Right Updated: 02/08/24 1145     Blood Culture No growth at 3 days            Radiology:  No radiology results for the last 7 days    2/6 EKG with QTc 416 ms      Assessment   Poor wound healing from left AKA site with worsening dehiscence and soft tissue infection: Never had clearly purulent drainage. Likely edema contributing to swelling, poor wound healing and serous drainage. wound culture at [facility] with GPC in pairs on gram stain, but MDR Enterobacter on culture (resistant to ertapenem in vitro but sensitive to imipenem, quinolones and trimethoprim-sulfa) and mixed skin jon on culture.  High risk for complications due to numerous comorbidities.  Clearly worse on exam prior to readmission to [facility].  Repeat superficial culture with Enterococcus spp and normal skin jon.  Status post I&D down to level of muscle by Dr. Witt 2/6, no deep purulence noted.   Intraoperative cultures negative to date.  Vancomycin powder applied to wound    Growth of Candida albicans from residual from left AKA site of unclear significance but treating as possible true infection in setting of numerous comorbidities complications from amputation surgery.  Plan was for 4 weeks of fluconazole through February 9  Recent severe left chronic left foot infection status post above-the-knee amputation on 12/29/2023 at [facility]  Chronic right lateral foot wound: To level of soft tissue without signs of soft tissue infection at this time  Leukopenia: Has been low throughout recent hospitalization but overall stable  Obesity  peripheral neuropathy  hx of still disease  Mood disorder on multiple serotonergic agents: Relative contraindication to linezolid  Recent possible rash with trimethoprim-sulfa: Noted while at [facility]      Plan:  - f/u pending repeat wound and operative cultures. Sensitivity data for Enterococcus pending  - Follow CBC, CMP, CRP.  Intensive laboratory monitoring to mitigate risk for toxicity on numerous  antimicrobials    - Continue IV vancomycin.  Noted elevated level recently but creatinine stable. Dose decreased Appreciate pharmacy assistance with vancomycin dosing and medication monitoring to limit risk of toxicity  - Continue levofloxacin for now. IV version for now until oral intake improves. Planning for 7-10 days post-op.    - continue fluconazole. Initial plan was through Feb 9 (4 weeks post-op) due to Candida albicans from one of her operative cultures. Plan to continue for at least 1 more week  - Probiotic    Based on numerous incidence of poor wound healing and severity of current problem, I anticipate course of IV antibiotics for at least 10-14 days after most recent debridement. PICC placed. Discussed with Dr Oliver.  Okay to transfer back to [facility] once cleared by all other teams and I will continue to follow over there to help determine final antibiotic duration.    Contact isolation for MDR Enterobacter from [facility]     Complex MDM.  I will re-evaluate either at [facility] [place] or [facility] next week.      Case Watson MD  2/9/2024     left

## 2024-02-09 NOTE — NURSING NOTE
Contacted House to order PRITESH from [facility]. Per Theodore QUAN, bed in room is not working properly.    Aron dolan

## 2024-02-09 NOTE — CASE MANAGEMENT/SOCIAL WORK
Continued Stay Note  [facility]     Patient Name: Phoebe Carvajal  MRN: [id number]  Today's Date: 2/9/2024    Admit Date: 2/5/2024    Plan: [facility] pending insurance approval   Discharge Plan       Row Name 02/09/24 1210       Plan    Plan [facility] pending insurance approval    Patient/Family in Agreement with Plan yes    Plan Comments CM spoke with the patient at the bedside. She is in agreement with going to rehab at [facility]. She stated that she knows wound care will be changing her wound vac today but does not know when they will be in the room to see her. We discussed that [facility] has submitted to insurance for bed approval. Patient acknowledged and verbalized understanding. CM discussed with patient that she will need to be off the IV pain/anxiety (if applicable) medication prior to going to [facility]. CM encouraged patient to work with MD and RN to establish a good oral pain medication regimen while she is waiting to hear from insurance. CM is anticipating that the patient will need transport to [facility] due to left leg amputation and wound vac. CM to follow and assist.    Final Discharge Disposition Code 62 - inpatient rehab facility                   Discharge Codes    No documentation.                 Expected Discharge Date and Time       Expected Discharge Date Expected Discharge Time    Feb 10, 2024               Elizabeth Guzmán, RN
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
## 2024-02-10 ENCOUNTER — APPOINTMENT (OUTPATIENT)
Dept: GENERAL RADIOLOGY | Facility: HOSPITAL | Age: 49
End: 2024-02-10
Payer: COMMERCIAL

## 2024-02-10 LAB
ANION GAP SERPL CALCULATED.3IONS-SCNC: 6 MMOL/L (ref 5–15)
BACTERIA SPEC AEROBE CULT: ABNORMAL
BACTERIA SPEC AEROBE CULT: ABNORMAL
BACTERIA SPEC AEROBE CULT: NORMAL
BACTERIA SPEC AEROBE CULT: NORMAL
BUN SERPL-MCNC: 8 MG/DL (ref 6–20)
BUN/CREAT SERPL: 11 (ref 7–25)
CALCIUM SPEC-SCNC: 8.5 MG/DL (ref 8.6–10.5)
CHLORIDE SERPL-SCNC: 105 MMOL/L (ref 98–107)
CO2 SERPL-SCNC: 29 MMOL/L (ref 22–29)
CREAT SERPL-MCNC: 0.73 MG/DL (ref 0.57–1)
DEPRECATED RDW RBC AUTO: 63 FL (ref 37–54)
EGFRCR SERPLBLD CKD-EPI 2021: 100.3 ML/MIN/1.73
ERYTHROCYTE [DISTWIDTH] IN BLOOD BY AUTOMATED COUNT: 17.6 % (ref 12.3–15.4)
GLUCOSE SERPL-MCNC: 86 MG/DL (ref 65–99)
GRAM STN SPEC: ABNORMAL
GRAM STN SPEC: ABNORMAL
HCT VFR BLD AUTO: 22.8 % (ref 34–46.6)
HGB BLD-MCNC: 7.1 G/DL (ref 12–15.9)
MCH RBC QN AUTO: 30.3 PG (ref 26.6–33)
MCHC RBC AUTO-ENTMCNC: 31.1 G/DL (ref 31.5–35.7)
MCV RBC AUTO: 97.4 FL (ref 79–97)
PLATELET # BLD AUTO: 199 10*3/MM3 (ref 140–450)
PMV BLD AUTO: 10.9 FL (ref 6–12)
POTASSIUM SERPL-SCNC: 4 MMOL/L (ref 3.5–5.2)
RBC # BLD AUTO: 2.34 10*6/MM3 (ref 3.77–5.28)
SODIUM SERPL-SCNC: 140 MMOL/L (ref 136–145)
WBC NRBC COR # BLD AUTO: 3.91 10*3/MM3 (ref 3.4–10.8)

## 2024-02-10 PROCEDURE — 25010000002 HYDROMORPHONE 1 MG/ML SOLUTION: Performed by: STUDENT IN AN ORGANIZED HEALTH CARE EDUCATION/TRAINING PROGRAM

## 2024-02-10 PROCEDURE — 25010000002 VANCOMYCIN PER 500 MG: Performed by: ORTHOPAEDIC SURGERY

## 2024-02-10 PROCEDURE — 25010000002 LEVOFLOXACIN PER 250 MG: Performed by: INTERNAL MEDICINE

## 2024-02-10 PROCEDURE — 99232 SBSQ HOSP IP/OBS MODERATE 35: CPT | Performed by: STUDENT IN AN ORGANIZED HEALTH CARE EDUCATION/TRAINING PROGRAM

## 2024-02-10 PROCEDURE — 80048 BASIC METABOLIC PNL TOTAL CA: CPT | Performed by: ORTHOPAEDIC SURGERY

## 2024-02-10 PROCEDURE — 73060 X-RAY EXAM OF HUMERUS: CPT

## 2024-02-10 PROCEDURE — 25010000002 ENOXAPARIN PER 10 MG: Performed by: ORTHOPAEDIC SURGERY

## 2024-02-10 PROCEDURE — 25010000002 VANCOMYCIN PER 500 MG

## 2024-02-10 PROCEDURE — 97606 NEG PRS WND THER DME>50 SQCM: CPT

## 2024-02-10 PROCEDURE — 85027 COMPLETE CBC AUTOMATED: CPT | Performed by: ORTHOPAEDIC SURGERY

## 2024-02-10 RX ORDER — VANCOMYCIN HYDROCHLORIDE 750 MG/150ML
750 INJECTION, SOLUTION INTRAVENOUS EVERY 12 HOURS
Status: DISCONTINUED | OUTPATIENT
Start: 2024-02-10 | End: 2024-02-13 | Stop reason: HOSPADM

## 2024-02-10 RX ADMIN — Medication 10 ML: at 09:32

## 2024-02-10 RX ADMIN — MIDODRINE HYDROCHLORIDE 5 MG: 5 TABLET ORAL at 07:36

## 2024-02-10 RX ADMIN — FAMOTIDINE 20 MG: 20 TABLET ORAL at 16:53

## 2024-02-10 RX ADMIN — VANCOMYCIN HYDROCHLORIDE 750 MG: 750 INJECTION, SOLUTION INTRAVENOUS at 16:02

## 2024-02-10 RX ADMIN — LORAZEPAM 0.5 MG: 0.5 TABLET ORAL at 01:39

## 2024-02-10 RX ADMIN — LORAZEPAM 0.5 MG: 0.5 TABLET ORAL at 20:04

## 2024-02-10 RX ADMIN — PREGABALIN 200 MG: 100 CAPSULE ORAL at 15:54

## 2024-02-10 RX ADMIN — SERTRALINE HYDROCHLORIDE 100 MG: 100 TABLET ORAL at 20:04

## 2024-02-10 RX ADMIN — TRAZODONE HYDROCHLORIDE 50 MG: 50 TABLET ORAL at 01:39

## 2024-02-10 RX ADMIN — LEVOTHYROXINE SODIUM 175 MCG: 0.17 TABLET ORAL at 05:15

## 2024-02-10 RX ADMIN — ENOXAPARIN SODIUM 60 MG: 60 INJECTION SUBCUTANEOUS at 20:04

## 2024-02-10 RX ADMIN — PREGABALIN 200 MG: 100 CAPSULE ORAL at 20:04

## 2024-02-10 RX ADMIN — PREGABALIN 200 MG: 100 CAPSULE ORAL at 09:31

## 2024-02-10 RX ADMIN — POTASSIUM CHLORIDE 10 MEQ: 750 CAPSULE, EXTENDED RELEASE ORAL at 09:33

## 2024-02-10 RX ADMIN — MAGNESIUM OXIDE TAB 400 MG (241.3 MG ELEMENTAL MG) 400 MG: 400 (241.3 MG) TAB at 09:31

## 2024-02-10 RX ADMIN — LEVOFLOXACIN 750 MG: 750 INJECTION, SOLUTION INTRAVENOUS at 09:33

## 2024-02-10 RX ADMIN — OXYCODONE HYDROCHLORIDE 10 MG: 10 TABLET ORAL at 13:45

## 2024-02-10 RX ADMIN — HYDROMORPHONE HYDROCHLORIDE 1 MG: 1 INJECTION, SOLUTION INTRAMUSCULAR; INTRAVENOUS; SUBCUTANEOUS at 11:30

## 2024-02-10 RX ADMIN — VANCOMYCIN HYDROCHLORIDE 1000 MG: 1 INJECTION, SOLUTION INTRAVENOUS at 01:32

## 2024-02-10 RX ADMIN — PRAMIPEXOLE DIHYDROCHLORIDE 1 MG: 1 TABLET ORAL at 09:31

## 2024-02-10 RX ADMIN — FAMOTIDINE 20 MG: 20 TABLET ORAL at 07:36

## 2024-02-10 RX ADMIN — MIDODRINE HYDROCHLORIDE 5 MG: 5 TABLET ORAL at 16:53

## 2024-02-10 RX ADMIN — ACETAMINOPHEN 650 MG: 325 TABLET ORAL at 16:03

## 2024-02-10 RX ADMIN — HYDROXYZINE HYDROCHLORIDE 25 MG: 25 TABLET, FILM COATED ORAL at 15:54

## 2024-02-10 RX ADMIN — ENOXAPARIN SODIUM 60 MG: 60 INJECTION SUBCUTANEOUS at 09:31

## 2024-02-10 RX ADMIN — FLUCONAZOLE 200 MG: 100 TABLET ORAL at 09:31

## 2024-02-10 RX ADMIN — OXYCODONE HYDROCHLORIDE 10 MG: 10 TABLET ORAL at 20:04

## 2024-02-10 NOTE — PROGRESS NOTES
Pharmacy Consult-Vancomycin Dosing  Phoebe Carvajal is a  48 y.o. female receiving vancomycin therapy.     Indication:  SSTI/ osteomyelitis  Consulting Provider: Case Watson MD  ID Consult: Y    Goal AUC: 400 - 600 mg/L*hr    Current Antimicrobial Therapy  Anti-Infectives (From admission, onward)      Ordered     Dose/Rate Route Frequency Start Stop    02/07/24 1547  levoFLOXacin (LEVAQUIN) 750 mg/150 mL D5W (premix) (LEVAQUIN) 750 mg        Ordering Provider: Case Watson MD    750 mg  100 mL/hr over 90 Minutes Intravenous Every 24 Hours 02/08/24 0900 02/16/24 2359    02/07/24 0741  vancomycin (VANCOCIN) 1000 mg/200 mL dextrose 5% IVPB        Ordering Provider: Adama Witt Jr., MD    1,000 mg  over 60 Minutes Intravenous Every 12 Hours 02/07/24 1400 02/13/24 1359    02/06/24 1654  clindamycin (CLEOCIN) 900 mg in dextrose 5% 50 mL IVPB (premix)        Ordering Provider: Adama Witt Jr., MD    900 mg  50 mL/hr over 60 Minutes Intravenous Once 02/06/24 1656 02/06/24 1831    02/06/24 0848  vancomycin 2500 mg/500 mL 0.9% NS IVPB ([facility])        Ordering Provider: Case Watson MD    20 mg/kg × 125 kg  over 150 Minutes Intravenous Once 02/06/24 0945 02/06/24 1232    02/06/24 0839  Pharmacy to dose vancomycin        Ordering Provider: Adama Witt Jr., MD     Does not apply Continuous PRN 02/06/24 0838 02/20/24 0837    02/05/24 1540  fluconazole (DIFLUCAN) tablet 200 mg        Ordering Provider: Case Watson MD    200 mg Oral Daily 02/05/24 1630 02/16/24 0946    02/05/24 1506  meropenem (MERREM) 1,000 mg in sodium chloride 0.9 % 100 mL IVPB        Ordering Provider: Sri Youngblood MD    1,000 mg  over 30 Minutes Intravenous Once 02/05/24 1600 02/05/24 1636            Allergies  Allergies as of 02/05/2024 - Reviewed 02/05/2024   Allergen Reaction Noted    Vicodin [hydrocodone-acetaminophen] Itching 11/10/2017       Labs    Results from last 7 days   Lab Units 02/10/24  0414  "02/09/24  0118 02/08/24  0315   BUN mg/dL 8 7 7   CREATININE mg/dL 0.73 0.55* 0.49*       Results from last 7 days   Lab Units 02/10/24  0418 02/09/24  0118 02/08/24  0315   WBC 10*3/mm3 3.91 2.89* 2.78*       Evaluation of Dosing     Last Dose Received in the ED/Outside Facility:   2/6 1000  Is Patient on Dialysis or Renal Replacement:  N    Ht - 157.5 cm (62\")  Wt - 125 kg (275 lb)    Estimated Creatinine Clearance: 116.6 mL/min (by C-G formula based on SCr of 0.73 mg/dL).    I/O last 3 completed shifts:  In: 600 [P.O.:600]  Out: -     Microbiology and Radiology  Microbiology Results (last 10 days)       Procedure Component Value - Date/Time    Anaerobic Culture - Swab, Leg, Left [[id number]]  (Normal) Collected: 02/06/24 1847    Lab Status: Preliminary result Specimen: Swab from Leg, Left Updated: 02/09/24 0703     Anaerobic Culture No anaerobes isolated at 3 days    Wound Culture - Swab, Leg, Left [[id number]] Collected: 02/06/24 1847    Lab Status: Final result Specimen: Swab from Leg, Left Updated: 02/09/24 0734     Wound Culture No growth at 3 days     Gram Stain Moderate (3+) Red blood cells      Few (2+) WBCs seen      No organisms seen    AFB Culture - Swab, Leg, Left [[id number]] Collected: 02/06/24 1847    Lab Status: Preliminary result Specimen: Swab from Leg, Left Updated: 02/07/24 1307     AFB Stain No acid fast bacilli seen on concentrated smear    MRSA Screen, PCR (Inpatient) - Swab, Nares [[id number]]  (Normal) Collected: 02/06/24 1247    Lab Status: Final result Specimen: Swab from Nares Updated: 02/06/24 2010     MRSA PCR No MRSA Detected    Narrative:      The negative predictive value of this diagnostic test is high and should only be used to consider de-escalating anti-MRSA therapy. A positive result may indicate colonization with MRSA and must be correlated clinically.    Blood Culture - Blood, Arm, Left [[id number]]  (Normal) Collected: 02/05/24 1120    Lab Status: Final result Specimen: Blood " from Arm, Left Updated: 02/10/24 1145     Blood Culture No growth at 5 days    Wound Culture - Swab, Leg, Left [[id number]]  (Abnormal)  (Susceptibility) Collected: 02/05/24 1052    Lab Status: Final result Specimen: Swab from Leg, Left Updated: 02/10/24 0826     Wound Culture Light growth (2+) Enterococcus faecalis      Moderate growth (3+) Normal Skin Tracy     Gram Stain Moderate (3+) WBCs seen      Few (2+) Gram positive cocci in pairs and chains    Susceptibility        Enterococcus faecalis      LAVELLE      Ampicillin <=2 ug/ml Susceptible      Vancomycin 1 ug/ml Susceptible                           Blood Culture - Blood, Arm, Right [[id number]]  (Normal) Collected: 02/05/24 1045    Lab Status: Final result Specimen: Blood from Arm, Right Updated: 02/10/24 1145     Blood Culture No growth at 5 days            Reported Vancomycin Levels                   [facility] AUC Calculation:    Current AUC:   657 mg/L*hr    Predicted Steady State AUC on Current Dose:  680 mg/L*hr  _________________________________    Predicted Steady State AUC on New Dose:    510 mg/L*hr    Assessment/Plan:    Pharmacy to dose vancomycin for E. faecalis SSTI for targeting -600 mg/L*hr.  Vancomycin random level 2/9/24 0730 = 25.6 mcg/mL. Renal function worsened slightly, with Scr increasing from 0.55 mg/dL to 0.73 mg/dL. Patient with unmeasured urine output 2/9. In coordination with [facility] recommend to reduce maintenance dose to vancomycin 750 mg IV q12h  Pharmacy will continue to monitor and adjust vancomycin dose as necessary based on renal function, cultures, labs, and clinical status.     Thanks,   Esteban Ferguson [facility]  2/10/2024  12:12 EST

## 2024-02-10 NOTE — PLAN OF CARE
Goal Outcome Evaluation:  Plan of Care Reviewed With: patient           Outcome Evaluation: wound vac intact with no issues noted. PT will f/u with wound vac change in 2-3 days.
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
## 2024-02-10 NOTE — PROGRESS NOTES
[facility] Medicine Services  PROGRESS NOTE    Patient Name: Phoebe Carvajal  : 1975  MRN: [id number]    Date of Admission: 2024  Primary Care Physician: Sofya Benjamin MD    Subjective   Subjective     CC:  Left AKA site wound pain and erythema    HPI:  Patient reports that pain is tolerable today. Had a lot of pain with wound VAC change yesterday. Patient planning to ask Dr. Witt if any adjustments can be made.      Objective   Objective     Vital Signs:   Temp:  [97.9 °F (36.6 °C)-98.2 °F (36.8 °C)] 98.1 °F (36.7 °C)  Heart Rate:  [54-57] 57  Resp:  [16-18] 18  BP: ()/(41-65) 100/59     Physical Exam:  Constitutional: Awake, alert, appears more anxious today  HENT: NCAT, mucous membranes moist  Respiratory: Clear to auscultation bilaterally, respiratory effort normal   Cardiovascular: RRR, no murmurs, rubs, or gallops  Gastrointestinal: Positive bowel sounds, soft, nontender, nondistended  Musculoskeletal: s/p left AKA, with wound VAC and bandage in place  Neurologic: Alert and oriented x 3, no focal deficits, speech clear      Results Reviewed:  LAB RESULTS:      Lab 02/10/24  0418 24  1322 24  0118 24  0315 24  0314 24  0250 24  1120   WBC 3.91  --  2.89* 2.78* 2.99* 3.24* 3.75   HEMOGLOBIN 7.1*  --  7.3* 7.2* 8.4* 8.9* 9.8*   HEMATOCRIT 22.8*  --  24.1* 23.6* 28.0* 29.2* 31.7*   PLATELETS 199  --  191 178 198 193 242   NEUTROS ABS  --   --  1.32*  --   --   --  1.97   IMMATURE GRANS (ABS)  --   --  0.00  --   --   --  0.01   LYMPHS ABS  --   --  1.26  --   --   --  1.14   MONOS ABS  --   --  0.25  --   --   --  0.34   EOS ABS  --   --  0.05  --   --   --  0.27   MCV 97.4*  --  98.8* 97.9* 98.6* 99.7* 98.8*   SED RATE  --   --   --   --   --  39*  --    CRP  --   --   --   --   --  3.71*  --    PROCALCITONIN  --   --   --   --   --   --  <0.02   LACTATE  --  1.2  --   --   --   --  1.3         [facility] 02/10/24  0418  02/09/24  0118 02/08/24  0315 02/07/24  0314 02/06/24  0250   SODIUM 140 140 136 135* 137   POTASSIUM 4.0 4.3 4.4 4.4 4.3   CHLORIDE 105 104 103 102 103   CO2 29.0 31.0* 28.0 26.0 27.0   ANION GAP 6.0 5.0 5.0 7.0 7.0   BUN 8 7 7 5* 4*   CREATININE 0.73 0.55* 0.49* 0.46* 0.42*   EGFR 100.3 111.8 115.0 116.7 119.3   GLUCOSE 86 104* 112* 168* 70   CALCIUM 8.5* 8.5* 8.6 8.3* 8.2*         Lab 02/05/24  1120   TOTAL PROTEIN 6.5   ALBUMIN 2.8*   GLOBULIN 3.7   ALT (SGPT) 6   AST (SGOT) 28   BILIRUBIN 0.5   ALK PHOS 215*                     Brief Urine Lab Results  (Last result in the past 365 days)        Color   Clarity   Blood   Leuk Est   Nitrite   Protein   CREAT   Urine HCG        01/05/24 1351 Yellow   Cloudy   Negative   Small (1+)   Negative   Trace                   Microbiology Results Abnormal       Procedure Component Value - Date/Time    Blood Culture - Blood, Arm, Right [[id number]]  (Normal) Collected: 02/05/24 1045    Lab Status: Final result Specimen: Blood from Arm, Right Updated: 02/10/24 1145     Blood Culture No growth at 5 days    Blood Culture - Blood, Arm, Left [[id number]]  (Normal) Collected: 02/05/24 1120    Lab Status: Final result Specimen: Blood from Arm, Left Updated: 02/10/24 1145     Blood Culture No growth at 5 days    Wound Culture - Swab, Leg, Left [[id number]] Collected: 02/06/24 1847    Lab Status: Final result Specimen: Swab from Leg, Left Updated: 02/09/24 0734     Wound Culture No growth at 3 days     Gram Stain Moderate (3+) Red blood cells      Few (2+) WBCs seen      No organisms seen    Anaerobic Culture - Swab, Leg, Left [[id number]]  (Normal) Collected: 02/06/24 1847    Lab Status: Preliminary result Specimen: Swab from Leg, Left Updated: 02/09/24 0703     Anaerobic Culture No anaerobes isolated at 3 days    AFB Culture - Swab, Leg, Left [[id number]] Collected: 02/06/24 1847    Lab Status: Preliminary result Specimen: Swab from Leg, Left Updated: 02/07/24 1307     AFB Stain No acid  fast bacilli seen on concentrated smear    MRSA Screen, PCR (Inpatient) - Swab, Nares [[id number]]  (Normal) Collected: 02/06/24 1247    Lab Status: Final result Specimen: Swab from Nares Updated: 02/06/24 2010     MRSA PCR No MRSA Detected    Narrative:      The negative predictive value of this diagnostic test is high and should only be used to consider de-escalating anti-MRSA therapy. A positive result may indicate colonization with MRSA and must be correlated clinically.            No radiology results from the last 24 hrs        Current medications:  Scheduled Meds:cyanocobalamin, 1,000 mcg, Intramuscular, Q7 Days  enoxaparin, 60 mg, Subcutaneous, Q12H  famotidine, 20 mg, Oral, BID AC  fluconazole, 200 mg, Oral, Daily  levoFLOXacin, 750 mg, Intravenous, Q24H  levothyroxine, 175 mcg, Oral, Daily  magnesium oxide, 400 mg, Oral, Daily  midodrine, 5 mg, Oral, BID AC  potassium chloride, 10 mEq, Oral, Daily  pramipexole, 1 mg, Oral, Daily  pregabalin, 200 mg, Oral, TID  sertraline, 100 mg, Oral, Nightly  sodium chloride, 10 mL, Intravenous, Q12H  sodium chloride, 10 mL, Intravenous, Q12H  vancomycin, 750 mg, Intravenous, Q12H      Continuous Infusions:lactated ringers, 9 mL/hr, Last Rate: 50 mL/hr (02/06/24 1821)  Pharmacy to dose vancomycin,       PRN Meds:.  acetaminophen    albuterol    senna-docusate sodium **AND** polyethylene glycol **AND** bisacodyl **AND** bisacodyl    cloNIDine    HYDROmorphone    hydrOXYzine    LORazepam    melatonin    nitroglycerin    ondansetron ODT **OR** ondansetron    oxyCODONE    Pharmacy to dose vancomycin    sodium chloride    sodium chloride    sodium chloride    traZODone    Assessment & Plan   Assessment & Plan     Active Hospital Problems    Diagnosis  POA    **Osteomyelitis [M86.9]  Yes    Amputation stump infection [T87.40]  Yes      Resolved Hospital Problems   No resolved problems to display.        Brief Hospital Course to date:  Phoebe Carvajal is a 48 y.o. female with  PMHx of CKD, Stills disease, restless leg syndrome, hypothyroidism, PAD, morbid obesity. She has had chronic/recurrent osteomyelitis of left foot resulting in L AKA on 12/29/2023. She then had a prolonged admission 12/28-1/19 requiring debridements x2. She was discharged to [facility] with a wound vac on 1/19/24. ID followed throughout and discontinued antibiotics at time of discharge because path report showed margins without infection. She was continued on fluconazole for + yeast at the AKA wound site (C albicans). Re-presented due to concern for left AKA site wound dehiscence.    This patient's problems and plans were partially entered by my partner and updated as appropriate by me 02/10/24.    L AKA site with dehiscence and soft tissue infection  Chronic calcaneal osteomyelitis s/p left AKA  -Edema likely contributing to poor wound healing and serous drainage  -Previous wound culture with MDR Enterobacter, repeat culture with GPC on gram stain  -Wound culture 2/5 growing Enterococcus faecalis  -Orthopedic surgery evaluated, s/p I&D with wound VAC closure 2/6/24 by Dr. Witt. Will need long-term wound VAC until fully healed.  -Infectious disease following, continue oral levofloxacin, IV vancomycin. Follow final wound and blood cultures. Orthopedic surgery planning for wound VAC changes q72 hours. Back to [facility] on discharge.    Growth of candida albicans from left AKA site   -Continue fluconazole, initial plan for 4 weeks of antifungal through 2/9/24. Now planning to continue until at least 2/16/2024.     Hypotension  -patient reports SBP 90s-100s at home, worsened on 2/9 to 80s/40s likely in setting of opioid medication use, lactate normal  -s/p 500ml LR bolus x2 on 2/9 without improvement   -Added on low dose midodrine short-term. Attempt to titrate down frequency of opioid medications, though this will be difficult. Holding oral Lasix. Monitor closely.    Complex pain, chronic opioid use  Peripheral  neuropathy  -Pain control with home Lyrica, oxycodone 10mg q4h PRN and PRN Dilaudid for breakthrough pain. Wean as tolerated. Bowel regimen.    Chronic bilateral lower extremity edema  -Unclear if has diagnosis of heart failure, on Lasix at home for lower extremity edema  -Echo 5/2023 with EF 61%, no comment regarding diastolic function  -Holding home Lasix due to hypotension.     L humerus nonunion  -initially occurred 8/2023, reported by patient on admission   -XR left humerus 9/2023 showed fairly comminuted proximal humeral fracture  -Will repeat XR humerus given persistent pain and patient's request. Consider asking for Orthopedic surgery for opinion.     Macrocytic anemia  B12 deficiency  -Hgb 9.8 on admission, further drop likely secondary to intraop blood loss, no evidence of overt bleeding  -Continue weekly B12 replacement. Monitor daily CBC. Transfuse for hemoglobin less than 7 or hemodynamic instability.    RLS  -Continue pramipexole.    Hypothyroidism  -Continue levothyroxine.    Anxiety  -Continue Zoloft, PRN Atarax.       Expected Discharge Location and Transportation: Home vs rehab  Expected Discharge   Expected Discharge Date: 2/13/2024; Expected Discharge Time:      DVT prophylaxis:  Medical DVT prophylaxis orders are present.         AM-PAC 6 Clicks Score (PT): 10 (02/10/24 7039)    CODE STATUS:   Code Status and Medical Interventions:   Ordered at: 02/05/24 1500     Level Of Support Discussed With:    Patient     Code Status (Patient has no pulse and is not breathing):    CPR (Attempt to Resuscitate)     Medical Interventions (Patient has pulse or is breathing):    Full Support       Natalie Oliver, DO  02/10/24

## 2024-02-11 LAB
ANION GAP SERPL CALCULATED.3IONS-SCNC: 6 MMOL/L (ref 5–15)
BACTERIA SPEC ANAEROBE CULT: NORMAL
BUN SERPL-MCNC: 7 MG/DL (ref 6–20)
BUN/CREAT SERPL: 11.7 (ref 7–25)
CALCIUM SPEC-SCNC: 8.2 MG/DL (ref 8.6–10.5)
CHLORIDE SERPL-SCNC: 105 MMOL/L (ref 98–107)
CO2 SERPL-SCNC: 27 MMOL/L (ref 22–29)
CREAT SERPL-MCNC: 0.6 MG/DL (ref 0.57–1)
DEPRECATED RDW RBC AUTO: 62.8 FL (ref 37–54)
EGFRCR SERPLBLD CKD-EPI 2021: 109.5 ML/MIN/1.73
ERYTHROCYTE [DISTWIDTH] IN BLOOD BY AUTOMATED COUNT: 17.3 % (ref 12.3–15.4)
FUNGUS WND CULT: NORMAL
GLUCOSE SERPL-MCNC: 90 MG/DL (ref 65–99)
HCT VFR BLD AUTO: 25.1 % (ref 34–46.6)
HGB BLD-MCNC: 7.7 G/DL (ref 12–15.9)
MCH RBC QN AUTO: 30.3 PG (ref 26.6–33)
MCHC RBC AUTO-ENTMCNC: 30.7 G/DL (ref 31.5–35.7)
MCV RBC AUTO: 98.8 FL (ref 79–97)
MYCOBACTERIUM SPEC CULT: NORMAL
NIGHT BLUE STAIN TISS: NORMAL
PLATELET # BLD AUTO: 192 10*3/MM3 (ref 140–450)
PMV BLD AUTO: 11 FL (ref 6–12)
POTASSIUM SERPL-SCNC: 4.2 MMOL/L (ref 3.5–5.2)
RBC # BLD AUTO: 2.54 10*6/MM3 (ref 3.77–5.28)
SODIUM SERPL-SCNC: 138 MMOL/L (ref 136–145)
VANCOMYCIN SERPL-MCNC: 38.6 MCG/ML (ref 5–40)
WBC NRBC COR # BLD AUTO: 3.31 10*3/MM3 (ref 3.4–10.8)

## 2024-02-11 PROCEDURE — 80202 ASSAY OF VANCOMYCIN: CPT

## 2024-02-11 PROCEDURE — 25010000002 VANCOMYCIN PER 500 MG

## 2024-02-11 PROCEDURE — 99232 SBSQ HOSP IP/OBS MODERATE 35: CPT | Performed by: HOSPITALIST

## 2024-02-11 PROCEDURE — 97606 NEG PRS WND THER DME>50 SQCM: CPT

## 2024-02-11 PROCEDURE — 85027 COMPLETE CBC AUTOMATED: CPT | Performed by: ORTHOPAEDIC SURGERY

## 2024-02-11 PROCEDURE — 25010000002 HYDROMORPHONE 1 MG/ML SOLUTION: Performed by: STUDENT IN AN ORGANIZED HEALTH CARE EDUCATION/TRAINING PROGRAM

## 2024-02-11 PROCEDURE — 25010000002 ONDANSETRON PER 1 MG: Performed by: STUDENT IN AN ORGANIZED HEALTH CARE EDUCATION/TRAINING PROGRAM

## 2024-02-11 PROCEDURE — 25010000002 LEVOFLOXACIN PER 250 MG: Performed by: INTERNAL MEDICINE

## 2024-02-11 PROCEDURE — 25010000002 ENOXAPARIN PER 10 MG: Performed by: ORTHOPAEDIC SURGERY

## 2024-02-11 PROCEDURE — 80048 BASIC METABOLIC PNL TOTAL CA: CPT | Performed by: ORTHOPAEDIC SURGERY

## 2024-02-11 RX ADMIN — FAMOTIDINE 20 MG: 20 TABLET ORAL at 16:55

## 2024-02-11 RX ADMIN — Medication 10 ML: at 08:26

## 2024-02-11 RX ADMIN — LORAZEPAM 0.5 MG: 0.5 TABLET ORAL at 23:47

## 2024-02-11 RX ADMIN — PRAMIPEXOLE DIHYDROCHLORIDE 1 MG: 1 TABLET ORAL at 08:25

## 2024-02-11 RX ADMIN — PREGABALIN 200 MG: 100 CAPSULE ORAL at 15:53

## 2024-02-11 RX ADMIN — SERTRALINE HYDROCHLORIDE 100 MG: 100 TABLET ORAL at 21:15

## 2024-02-11 RX ADMIN — OXYCODONE HYDROCHLORIDE 10 MG: 10 TABLET ORAL at 16:55

## 2024-02-11 RX ADMIN — VANCOMYCIN HYDROCHLORIDE 750 MG: 750 INJECTION, SOLUTION INTRAVENOUS at 14:19

## 2024-02-11 RX ADMIN — Medication 10 ML: at 21:16

## 2024-02-11 RX ADMIN — ENOXAPARIN SODIUM 60 MG: 60 INJECTION SUBCUTANEOUS at 21:15

## 2024-02-11 RX ADMIN — OXYCODONE HYDROCHLORIDE 10 MG: 10 TABLET ORAL at 12:52

## 2024-02-11 RX ADMIN — PREGABALIN 200 MG: 100 CAPSULE ORAL at 08:25

## 2024-02-11 RX ADMIN — MIDODRINE HYDROCHLORIDE 5 MG: 5 TABLET ORAL at 07:36

## 2024-02-11 RX ADMIN — VANCOMYCIN HYDROCHLORIDE 750 MG: 750 INJECTION, SOLUTION INTRAVENOUS at 03:12

## 2024-02-11 RX ADMIN — OXYCODONE HYDROCHLORIDE 10 MG: 10 TABLET ORAL at 05:16

## 2024-02-11 RX ADMIN — FLUCONAZOLE 200 MG: 100 TABLET ORAL at 08:25

## 2024-02-11 RX ADMIN — POTASSIUM CHLORIDE 10 MEQ: 750 CAPSULE, EXTENDED RELEASE ORAL at 08:25

## 2024-02-11 RX ADMIN — FAMOTIDINE 20 MG: 20 TABLET ORAL at 07:36

## 2024-02-11 RX ADMIN — LEVOTHYROXINE SODIUM 175 MCG: 0.17 TABLET ORAL at 05:16

## 2024-02-11 RX ADMIN — OXYCODONE HYDROCHLORIDE 10 MG: 10 TABLET ORAL at 23:43

## 2024-02-11 RX ADMIN — PREGABALIN 200 MG: 100 CAPSULE ORAL at 21:15

## 2024-02-11 RX ADMIN — TRAZODONE HYDROCHLORIDE 50 MG: 50 TABLET ORAL at 23:44

## 2024-02-11 RX ADMIN — HYDROMORPHONE HYDROCHLORIDE 1 MG: 1 INJECTION, SOLUTION INTRAMUSCULAR; INTRAVENOUS; SUBCUTANEOUS at 21:14

## 2024-02-11 RX ADMIN — HYDROXYZINE HYDROCHLORIDE 25 MG: 25 TABLET, FILM COATED ORAL at 16:55

## 2024-02-11 RX ADMIN — MAGNESIUM OXIDE TAB 400 MG (241.3 MG ELEMENTAL MG) 400 MG: 400 (241.3 MG) TAB at 08:25

## 2024-02-11 RX ADMIN — MIDODRINE HYDROCHLORIDE 5 MG: 5 TABLET ORAL at 16:55

## 2024-02-11 RX ADMIN — ENOXAPARIN SODIUM 60 MG: 60 INJECTION SUBCUTANEOUS at 08:25

## 2024-02-11 RX ADMIN — ONDANSETRON 4 MG: 2 INJECTION INTRAMUSCULAR; INTRAVENOUS at 15:53

## 2024-02-11 RX ADMIN — HYDROMORPHONE HYDROCHLORIDE 1 MG: 1 INJECTION, SOLUTION INTRAMUSCULAR; INTRAVENOUS; SUBCUTANEOUS at 07:36

## 2024-02-11 RX ADMIN — LEVOFLOXACIN 750 MG: 750 INJECTION, SOLUTION INTRAVENOUS at 08:25

## 2024-02-11 NOTE — PROGRESS NOTES
[facility] Medicine Services  PROGRESS NOTE    Patient Name: Phoebe Carvajal  : 1975  MRN: [id number]    Date of Admission: 2024  Primary Care Physician: Sofya Benjamin MD    Subjective   Subjective     CC: Left AKA site wound pain and erythema    HPI: Tearful and depressed. Notes LE pain. Notes shoulder pain.     Objective   Objective     Vital Signs:   Temp:  [97.5 °F (36.4 °C)-98.7 °F (37.1 °C)] 97.7 °F (36.5 °C)  Heart Rate:  [56-63] 63  Resp:  [18] 18  BP: ()/(52-66) 93/52     Physical Exam:  NAD, alert  OP clear, MMM  Neck supple  No LAD  RRR  L AKA wound vac  BRIONES  Tearful    Results Reviewed:  LAB RESULTS:      Lab 24  0347 02/10/24  0418 24  1322 24  0118 24  0315 24  0314 24  0250 24  1120   WBC 3.31* 3.91  --  2.89* 2.78* 2.99* 3.24* 3.75   HEMOGLOBIN 7.7* 7.1*  --  7.3* 7.2* 8.4* 8.9* 9.8*   HEMATOCRIT 25.1* 22.8*  --  24.1* 23.6* 28.0* 29.2* 31.7*   PLATELETS 192 199  --  191 178 198 193 242   NEUTROS ABS  --   --   --  1.32*  --   --   --  1.97   IMMATURE GRANS (ABS)  --   --   --  0.00  --   --   --  0.01   LYMPHS ABS  --   --   --  1.26  --   --   --  1.14   MONOS ABS  --   --   --  0.25  --   --   --  0.34   EOS ABS  --   --   --  0.05  --   --   --  0.27   MCV 98.8* 97.4*  --  98.8* 97.9* 98.6* 99.7* 98.8*   SED RATE  --   --   --   --   --   --  39*  --    CRP  --   --   --   --   --   --  3.71*  --    PROCALCITONIN  --   --   --   --   --   --   --  <0.02   LACTATE  --   --  1.2  --   --   --   --  1.3         Lab 24  0347 02/10/24  0418 24  0118 24  0315 24  0314   SODIUM 138 140 140 136 135*   POTASSIUM 4.2 4.0 4.3 4.4 4.4   CHLORIDE 105 105 104 103 102   CO2 27.0 29.0 31.0* 28.0 26.0   ANION GAP 6.0 6.0 5.0 5.0 7.0   BUN 7 8 7 7 5*   CREATININE 0.60 0.73 0.55* 0.49* 0.46*   EGFR 109.5 100.3 111.8 115.0 116.7   GLUCOSE 90 86 104* 112* 168*   CALCIUM 8.2* 8.5* 8.5* 8.6 8.3*          Lab 02/05/24  1120   TOTAL PROTEIN 6.5   ALBUMIN 2.8*   GLOBULIN 3.7   ALT (SGPT) 6   AST (SGOT) 28   BILIRUBIN 0.5   ALK PHOS 215*                     Brief Urine Lab Results  (Last result in the past 365 days)        Color   Clarity   Blood   Leuk Est   Nitrite   Protein   CREAT   Urine HCG        01/05/24 1351 Yellow   Cloudy   Negative   Small (1+)   Negative   Trace                   Microbiology Results Abnormal       Procedure Component Value - Date/Time    Blood Culture - Blood, Arm, Right [[id number]]  (Normal) Collected: 02/05/24 1045    Lab Status: Final result Specimen: Blood from Arm, Right Updated: 02/10/24 1145     Blood Culture No growth at 5 days    Blood Culture - Blood, Arm, Left [[id number]]  (Normal) Collected: 02/05/24 1120    Lab Status: Final result Specimen: Blood from Arm, Left Updated: 02/10/24 1145     Blood Culture No growth at 5 days    Wound Culture - Swab, Leg, Left [[id number]] Collected: 02/06/24 1847    Lab Status: Final result Specimen: Swab from Leg, Left Updated: 02/09/24 0734     Wound Culture No growth at 3 days     Gram Stain Moderate (3+) Red blood cells      Few (2+) WBCs seen      No organisms seen    Anaerobic Culture - Swab, Leg, Left [[id number]]  (Normal) Collected: 02/06/24 1847    Lab Status: Preliminary result Specimen: Swab from Leg, Left Updated: 02/09/24 0703     Anaerobic Culture No anaerobes isolated at 3 days    AFB Culture - Swab, Leg, Left [[id number]] Collected: 02/06/24 1847    Lab Status: Preliminary result Specimen: Swab from Leg, Left Updated: 02/07/24 1307     AFB Stain No acid fast bacilli seen on concentrated smear    MRSA Screen, PCR (Inpatient) - Swab, Nares [[id number]]  (Normal) Collected: 02/06/24 1247    Lab Status: Final result Specimen: Swab from Nares Updated: 02/06/24 2010     MRSA PCR No MRSA Detected    Narrative:      The negative predictive value of this diagnostic test is high and should only be used to consider de-escalating anti-MRSA  therapy. A positive result may indicate colonization with MRSA and must be correlated clinically.            XR Humerus Left    Result Date: 2/10/2024  XR HUMERUS LEFT Date of Exam: 2/10/2024 3:55 PM EST Indication: previous humerus fracture in 8/2023, patient still with pain Comparison: Portable chest 12/29/2023 Findings: Smooth deformity of the proximal left humerus on the prior chest radiograph is very similar in appearance to the humerus today on AP view. Lateral view gives the appearance of some anterior subluxation, although this may reflect oblique patient positioning, rather than actual displacement. This appearance could be explained by capsular laxity as well with change in relationship of the glenoid and humerus with differences in position. There is moderately advanced AC joint DJD. No clearly new bony abnormality is identified here or elsewhere.     Impression: Impression: Findings consistent with old healed left humerus fracture, not obviously changed from prior exam. Scapular Y view suggests possible anterior subluxation, versus artifact of positioning. No evidence of dislocation. Consider follow-up imaging if patient's symptoms persist or worsen. Electronically Signed: Carlito Zhou MD  2/10/2024 4:36 PM EST  Workstation ID: [id number]         Current medications:  Scheduled Meds:cyanocobalamin, 1,000 mcg, Intramuscular, Q7 Days  enoxaparin, 60 mg, Subcutaneous, Q12H  famotidine, 20 mg, Oral, BID AC  fluconazole, 200 mg, Oral, Daily  levoFLOXacin, 750 mg, Intravenous, Q24H  levothyroxine, 175 mcg, Oral, Daily  magnesium oxide, 400 mg, Oral, Daily  midodrine, 5 mg, Oral, BID AC  potassium chloride, 10 mEq, Oral, Daily  pramipexole, 1 mg, Oral, Daily  pregabalin, 200 mg, Oral, TID  sertraline, 100 mg, Oral, Nightly  sodium chloride, 10 mL, Intravenous, Q12H  sodium chloride, 10 mL, Intravenous, Q12H  vancomycin, 750 mg, Intravenous, Q12H      Continuous Infusions:lactated ringers, 9 mL/hr, Last Rate: 50  mL/hr (02/06/24 1581)  Pharmacy to dose vancomycin,       PRN Meds:.  acetaminophen    albuterol    senna-docusate sodium **AND** polyethylene glycol **AND** bisacodyl **AND** bisacodyl    cloNIDine    HYDROmorphone    hydrOXYzine    LORazepam    melatonin    nitroglycerin    ondansetron ODT **OR** ondansetron    oxyCODONE    Pharmacy to dose vancomycin    sodium chloride    sodium chloride    sodium chloride    traZODone    Assessment & Plan   Assessment & Plan     Active Hospital Problems    Diagnosis  POA    **Osteomyelitis [M86.9]  Yes    Amputation stump infection [T87.40]  Yes      Resolved Hospital Problems   No resolved problems to display.        Brief Hospital Course to date:  Phoebe Carvajal is a 48 y.o. female with PMHx of CKD, Stills disease, restless leg syndrome, hypothyroidism, PAD, morbid obesity. She has had chronic/recurrent osteomyelitis of left foot resulting in L AKA on 12/29/2023. She then had a prolonged admission 12/28-1/19 requiring debridements x2. She was discharged to [facility] with a wound vac on 1/19/24. ID followed throughout and discontinued antibiotics at time of discharge because path report showed margins without infection. She was continued on fluconazole for + yeast at the AKA wound site (C albicans). Re-presented due to concern for left AKA site wound dehiscence.    L AKA site with dehiscence and soft tissue infection  Chronic calcaneal osteomyelitis s/p left AKA  -Edema likely contributing to poor wound healing and serous drainage  -Wound culture 2/5 growing Enterococcus faecalis  -Orthopedic surgery evaluated, s/p I&D with wound VAC closure 2/6/24 by Dr. Witt. Will need long-term wound VAC until fully healed.  -Infectious disease following, continue oral levofloxacin, IV vancomycin. Follow final wound and blood cultures. Orthopedic surgery planning for wound VAC changes q72 hours. Back to [facility] on discharge.    Growth of candida albicans from left AKA site   -Continue  fluconazole, initial plan for 4 weeks of antifungal through 2/9/24. Now planning to continue until at least 2/16/2024.     Hypotension  -on midodrine, needs rehab    Complex pain, chronic opioid use  Peripheral neuropathy  -Pain control with home Lyrica, oxycodone 10 mg q4h PRN and PRN Dilaudid for breakthrough pain. Wean as tolerated. Bowel regimen.    Chronic bilateral lower extremity edema  -On Lasix at home for lower extremity edema  -ECHO 5/2023 with EF 61%, no comment regarding diastolic function  -Holding home Lasix due to hypotension     L humerus nonunion  -initially occurred 8/2023, reported by patient on admission   -XR left humerus 9/2023 showed fairly comminuted proximal humeral fracture  -Healing fracture on xray    Macrocytic anemia  B12 deficiency  -Hgb 9.8 on admission, further drop likely secondary to intraop blood loss, no evidence of overt bleeding  -Continue weekly B12 replacement. Monitor daily CBC. Transfuse for hemoglobin less than 7 or hemodynamic instability.    RLS  -Continue pramipexole.    Hypothyroidism  -Continue levothyroxine.    Anxiety  -Continue Zoloft, PRN Atarax.       Expected Discharge Location and Transportation: Home vs rehab  Expected Discharge   Expected Discharge Date: 2/13/2024; Expected Discharge Time:      DVT prophylaxis:  Medical DVT prophylaxis orders are present.         AM-PAC 6 Clicks Score (PT): 10 (02/10/24 0755)    CODE STATUS:   Code Status and Medical Interventions:   Ordered at: 02/05/24 1500     Level Of Support Discussed With:    Patient     Code Status (Patient has no pulse and is not breathing):    CPR (Attempt to Resuscitate)     Medical Interventions (Patient has pulse or is breathing):    Full Support       Carlito Olvera MD  02/11/24
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
## 2024-02-11 NOTE — PLAN OF CARE
Goal Outcome Evaluation:  Plan of Care Reviewed With: patient           Outcome Evaluation: wound vac intact with no new issues. PT will f/u with wound vac change in 1-2 days depending on d/c plan

## 2024-02-12 LAB
ALBUMIN SERPL-MCNC: 2.7 G/DL (ref 3.5–5.2)
ALBUMIN/GLOB SERPL: 1.1 G/DL
ALP SERPL-CCNC: 148 U/L (ref 39–117)
ALT SERPL W P-5'-P-CCNC: 5 U/L (ref 1–33)
ANION GAP SERPL CALCULATED.3IONS-SCNC: 11 MMOL/L (ref 5–15)
AST SERPL-CCNC: 18 U/L (ref 1–32)
BILIRUB SERPL-MCNC: 0.3 MG/DL (ref 0–1.2)
BUN SERPL-MCNC: 7 MG/DL (ref 6–20)
BUN/CREAT SERPL: 11.1 (ref 7–25)
CALCIUM SPEC-SCNC: 8.1 MG/DL (ref 8.6–10.5)
CHLORIDE SERPL-SCNC: 106 MMOL/L (ref 98–107)
CO2 SERPL-SCNC: 23 MMOL/L (ref 22–29)
CREAT SERPL-MCNC: 0.63 MG/DL (ref 0.57–1)
DEPRECATED RDW RBC AUTO: 61.9 FL (ref 37–54)
EGFRCR SERPLBLD CKD-EPI 2021: 108.2 ML/MIN/1.73
ERYTHROCYTE [DISTWIDTH] IN BLOOD BY AUTOMATED COUNT: 17.2 % (ref 12.3–15.4)
FUNGUS WND CULT: ABNORMAL
GLOBULIN UR ELPH-MCNC: 2.4 GM/DL
GLUCOSE SERPL-MCNC: 76 MG/DL (ref 65–99)
HCT VFR BLD AUTO: 23.5 % (ref 34–46.6)
HGB BLD-MCNC: 7.5 G/DL (ref 12–15.9)
MCH RBC QN AUTO: 31 PG (ref 26.6–33)
MCHC RBC AUTO-ENTMCNC: 31.9 G/DL (ref 31.5–35.7)
MCV RBC AUTO: 97.1 FL (ref 79–97)
PLATELET # BLD AUTO: 191 10*3/MM3 (ref 140–450)
PMV BLD AUTO: 11 FL (ref 6–12)
POTASSIUM SERPL-SCNC: 4.5 MMOL/L (ref 3.5–5.2)
PROT SERPL-MCNC: 5.1 G/DL (ref 6–8.5)
RBC # BLD AUTO: 2.42 10*6/MM3 (ref 3.77–5.28)
SODIUM SERPL-SCNC: 140 MMOL/L (ref 136–145)
WBC NRBC COR # BLD AUTO: 3.92 10*3/MM3 (ref 3.4–10.8)

## 2024-02-12 PROCEDURE — 25010000002 LEVOFLOXACIN PER 250 MG: Performed by: INTERNAL MEDICINE

## 2024-02-12 PROCEDURE — 97530 THERAPEUTIC ACTIVITIES: CPT

## 2024-02-12 PROCEDURE — 25010000002 ENOXAPARIN PER 10 MG: Performed by: ORTHOPAEDIC SURGERY

## 2024-02-12 PROCEDURE — 85027 COMPLETE CBC AUTOMATED: CPT | Performed by: ORTHOPAEDIC SURGERY

## 2024-02-12 PROCEDURE — 25010000002 HYDROMORPHONE 1 MG/ML SOLUTION: Performed by: STUDENT IN AN ORGANIZED HEALTH CARE EDUCATION/TRAINING PROGRAM

## 2024-02-12 PROCEDURE — 99232 SBSQ HOSP IP/OBS MODERATE 35: CPT | Performed by: HOSPITALIST

## 2024-02-12 PROCEDURE — 97605 NEG PRS WND THER DME<=50SQCM: CPT

## 2024-02-12 PROCEDURE — 80053 COMPREHEN METABOLIC PANEL: CPT | Performed by: HOSPITALIST

## 2024-02-12 PROCEDURE — 25010000002 CYANOCOBALAMIN PER 1000 MCG: Performed by: ORTHOPAEDIC SURGERY

## 2024-02-12 PROCEDURE — 25010000002 VANCOMYCIN PER 500 MG

## 2024-02-12 RX ADMIN — HYDROXYZINE HYDROCHLORIDE 25 MG: 25 TABLET, FILM COATED ORAL at 21:27

## 2024-02-12 RX ADMIN — Medication 10 ML: at 08:40

## 2024-02-12 RX ADMIN — Medication 10 ML: at 21:28

## 2024-02-12 RX ADMIN — POTASSIUM CHLORIDE 10 MEQ: 750 CAPSULE, EXTENDED RELEASE ORAL at 08:39

## 2024-02-12 RX ADMIN — PREGABALIN 200 MG: 100 CAPSULE ORAL at 16:22

## 2024-02-12 RX ADMIN — OXYCODONE HYDROCHLORIDE 10 MG: 10 TABLET ORAL at 11:05

## 2024-02-12 RX ADMIN — CYANOCOBALAMIN 1000 MCG: 1000 INJECTION, SOLUTION INTRAMUSCULAR; SUBCUTANEOUS at 16:32

## 2024-02-12 RX ADMIN — PRAMIPEXOLE DIHYDROCHLORIDE 1 MG: 1 TABLET ORAL at 08:39

## 2024-02-12 RX ADMIN — PREGABALIN 200 MG: 100 CAPSULE ORAL at 21:26

## 2024-02-12 RX ADMIN — MIDODRINE HYDROCHLORIDE 5 MG: 5 TABLET ORAL at 16:32

## 2024-02-12 RX ADMIN — OXYCODONE HYDROCHLORIDE 10 MG: 10 TABLET ORAL at 16:32

## 2024-02-12 RX ADMIN — FAMOTIDINE 20 MG: 20 TABLET ORAL at 08:39

## 2024-02-12 RX ADMIN — LORAZEPAM 0.5 MG: 0.5 TABLET ORAL at 14:32

## 2024-02-12 RX ADMIN — OXYCODONE HYDROCHLORIDE 10 MG: 10 TABLET ORAL at 06:35

## 2024-02-12 RX ADMIN — HYDROMORPHONE HYDROCHLORIDE 1 MG: 1 INJECTION, SOLUTION INTRAMUSCULAR; INTRAVENOUS; SUBCUTANEOUS at 14:32

## 2024-02-12 RX ADMIN — ENOXAPARIN SODIUM 60 MG: 60 INJECTION SUBCUTANEOUS at 21:27

## 2024-02-12 RX ADMIN — FLUCONAZOLE 200 MG: 100 TABLET ORAL at 08:39

## 2024-02-12 RX ADMIN — FAMOTIDINE 20 MG: 20 TABLET ORAL at 16:32

## 2024-02-12 RX ADMIN — LEVOFLOXACIN 750 MG: 750 INJECTION, SOLUTION INTRAVENOUS at 08:40

## 2024-02-12 RX ADMIN — VANCOMYCIN HYDROCHLORIDE 750 MG: 750 INJECTION, SOLUTION INTRAVENOUS at 13:20

## 2024-02-12 RX ADMIN — MAGNESIUM OXIDE TAB 400 MG (241.3 MG ELEMENTAL MG) 400 MG: 400 (241.3 MG) TAB at 08:39

## 2024-02-12 RX ADMIN — ENOXAPARIN SODIUM 60 MG: 60 INJECTION SUBCUTANEOUS at 08:39

## 2024-02-12 RX ADMIN — VANCOMYCIN HYDROCHLORIDE 750 MG: 750 INJECTION, SOLUTION INTRAVENOUS at 03:08

## 2024-02-12 RX ADMIN — SERTRALINE HYDROCHLORIDE 100 MG: 100 TABLET ORAL at 21:27

## 2024-02-12 RX ADMIN — OXYCODONE HYDROCHLORIDE 10 MG: 10 TABLET ORAL at 21:26

## 2024-02-12 RX ADMIN — MIDODRINE HYDROCHLORIDE 5 MG: 5 TABLET ORAL at 08:21

## 2024-02-12 RX ADMIN — LEVOTHYROXINE SODIUM 175 MCG: 0.17 TABLET ORAL at 06:35

## 2024-02-12 NOTE — PROGRESS NOTES
[facility] Medicine Services  PROGRESS NOTE    Patient Name: Phoebe Carvajal  : 1975  MRN: [id number]    Date of Admission: 2024  Primary Care Physician: Sofya Benjamin MD    Subjective   Subjective     CC: Left AKA site wound pain and erythema    HPI: Up in bed. Mood seems better today. No f/c. No n/v. Some loose stool.     Objective   Objective     Vital Signs:   Temp:  [97.6 °F (36.4 °C)-98.5 °F (36.9 °C)] 97.6 °F (36.4 °C)  Heart Rate:  [59-72] 72  Resp:  [18] 18  BP: ()/(42-92) 84/45     Physical Exam:  NAD, alert  OP clear, MMM  Neck supple  No LAD  RRR  L AKA wound vac  BRIONES  Mood improved today  No change from  otherwise    Results Reviewed:  LAB RESULTS:      Lab 24  0317 24  0347 02/10/24  0418 24  1322 24  0118 24  0315 24  0314 24  0250 24  1120   WBC 3.92 3.31* 3.91  --  2.89* 2.78*   < > 3.24* 3.75   HEMOGLOBIN 7.5* 7.7* 7.1*  --  7.3* 7.2*   < > 8.9* 9.8*   HEMATOCRIT 23.5* 25.1* 22.8*  --  24.1* 23.6*   < > 29.2* 31.7*   PLATELETS 191 192 199  --  191 178   < > 193 242   NEUTROS ABS  --   --   --   --  1.32*  --   --   --  1.97   IMMATURE GRANS (ABS)  --   --   --   --  0.00  --   --   --  0.01   LYMPHS ABS  --   --   --   --  1.26  --   --   --  1.14   MONOS ABS  --   --   --   --  0.25  --   --   --  0.34   EOS ABS  --   --   --   --  0.05  --   --   --  0.27   MCV 97.1* 98.8* 97.4*  --  98.8* 97.9*   < > 99.7* 98.8*   SED RATE  --   --   --   --   --   --   --  39*  --    CRP  --   --   --   --   --   --   --  3.71*  --    PROCALCITONIN  --   --   --   --   --   --   --   --  <0.02   LACTATE  --   --   --  1.2  --   --   --   --  1.3    < > = values in this interval not displayed.         Lab 24  0317 24  0347 02/10/24  0418 24  0118 24  0315   SODIUM 140 138 140 140 136   POTASSIUM 4.5 4.2 4.0 4.3 4.4   CHLORIDE 106 105 105 104 103   CO2 23.0 27.0 29.0 31.0* 28.0   ANION GAP  11.0 6.0 6.0 5.0 5.0   BUN 7 7 8 7 7   CREATININE 0.63 0.60 0.73 0.55* 0.49*   EGFR 108.2 109.5 100.3 111.8 115.0   GLUCOSE 76 90 86 104* 112*   CALCIUM 8.1* 8.2* 8.5* 8.5* 8.6         Lab 02/12/24  0317 02/05/24  1120   TOTAL PROTEIN 5.1* 6.5   ALBUMIN 2.7* 2.8*   GLOBULIN 2.4 3.7   ALT (SGPT) 5 6   AST (SGOT) 18 28   BILIRUBIN 0.3 0.5   ALK PHOS 148* 215*                     Brief Urine Lab Results  (Last result in the past 365 days)        Color   Clarity   Blood   Leuk Est   Nitrite   Protein   CREAT   Urine HCG        01/05/24 1351 Yellow   Cloudy   Negative   Small (1+)   Negative   Trace                   Microbiology Results Abnormal       Procedure Component Value - Date/Time    Fungus Culture - Swab, Leg, Left [[id number]] Collected: 02/06/24 1847    Lab Status: Preliminary result Specimen: Swab from Leg, Left Updated: 02/11/24 2031     Fungus Culture No fungus isolated at less than 1 week    AFB Culture - Swab, Leg, Left [[id number]] Collected: 02/06/24 1847    Lab Status: Preliminary result Specimen: Swab from Leg, Left Updated: 02/11/24 2031     AFB Culture No AFB isolated at less than 1 week     AFB Stain No acid fast bacilli seen on concentrated smear    Anaerobic Culture - Swab, Leg, Left [[id number]]  (Normal) Collected: 02/06/24 1847    Lab Status: Final result Specimen: Swab from Leg, Left Updated: 02/11/24 1133     Anaerobic Culture No anaerobes isolated at 5 days    Blood Culture - Blood, Arm, Right [[id number]]  (Normal) Collected: 02/05/24 1045    Lab Status: Final result Specimen: Blood from Arm, Right Updated: 02/10/24 1145     Blood Culture No growth at 5 days    Blood Culture - Blood, Arm, Left [[id number]]  (Normal) Collected: 02/05/24 1120    Lab Status: Final result Specimen: Blood from Arm, Left Updated: 02/10/24 1145     Blood Culture No growth at 5 days    Wound Culture - Swab, Leg, Left [[id number]] Collected: 02/06/24 1847    Lab Status: Final result Specimen: Swab from Leg, Left  Updated: 02/09/24 0734     Wound Culture No growth at 3 days     Gram Stain Moderate (3+) Red blood cells      Few (2+) WBCs seen      No organisms seen    MRSA Screen, PCR (Inpatient) - Swab, Nares [[id number]]  (Normal) Collected: 02/06/24 1247    Lab Status: Final result Specimen: Swab from Nares Updated: 02/06/24 2010     MRSA PCR No MRSA Detected    Narrative:      The negative predictive value of this diagnostic test is high and should only be used to consider de-escalating anti-MRSA therapy. A positive result may indicate colonization with MRSA and must be correlated clinically.            XR Humerus Left    Result Date: 2/10/2024  XR HUMERUS LEFT Date of Exam: 2/10/2024 3:55 PM EST Indication: previous humerus fracture in 8/2023, patient still with pain Comparison: Portable chest 12/29/2023 Findings: Smooth deformity of the proximal left humerus on the prior chest radiograph is very similar in appearance to the humerus today on AP view. Lateral view gives the appearance of some anterior subluxation, although this may reflect oblique patient positioning, rather than actual displacement. This appearance could be explained by capsular laxity as well with change in relationship of the glenoid and humerus with differences in position. There is moderately advanced AC joint DJD. No clearly new bony abnormality is identified here or elsewhere.     Impression: Impression: Findings consistent with old healed left humerus fracture, not obviously changed from prior exam. Scapular Y view suggests possible anterior subluxation, versus artifact of positioning. No evidence of dislocation. Consider follow-up imaging if patient's symptoms persist or worsen. Electronically Signed: Carlito Zhou MD  2/10/2024 4:36 PM EST  Workstation ID: [id number]         Current medications:  Scheduled Meds:cyanocobalamin, 1,000 mcg, Intramuscular, Q7 Days  enoxaparin, 60 mg, Subcutaneous, Q12H  famotidine, 20 mg, Oral, BID AC  fluconazole, 200 mg,  Oral, Daily  levoFLOXacin, 750 mg, Intravenous, Q24H  levothyroxine, 175 mcg, Oral, Daily  magnesium oxide, 400 mg, Oral, Daily  midodrine, 5 mg, Oral, BID AC  potassium chloride, 10 mEq, Oral, Daily  pramipexole, 1 mg, Oral, Daily  pregabalin, 200 mg, Oral, TID  sertraline, 100 mg, Oral, Nightly  sodium chloride, 10 mL, Intravenous, Q12H  sodium chloride, 10 mL, Intravenous, Q12H  vancomycin, 750 mg, Intravenous, Q12H      Continuous Infusions:lactated ringers, 9 mL/hr, Last Rate: 50 mL/hr (02/06/24 1821)  Pharmacy to dose vancomycin,       PRN Meds:.  acetaminophen    albuterol    senna-docusate sodium **AND** polyethylene glycol **AND** bisacodyl **AND** bisacodyl    cloNIDine    HYDROmorphone    hydrOXYzine    LORazepam    melatonin    nitroglycerin    ondansetron ODT **OR** ondansetron    oxyCODONE    Pharmacy to dose vancomycin    sodium chloride    sodium chloride    sodium chloride    traZODone    Assessment & Plan   Assessment & Plan     Active Hospital Problems    Diagnosis  POA    **Osteomyelitis [M86.9]  Yes    Amputation stump infection [T87.40]  Yes      Resolved Hospital Problems   No resolved problems to display.        Brief Hospital Course to date:  Phoebe Carvajal is a 48 y.o. female with PMHx of CKD, Stills disease, restless leg syndrome, hypothyroidism, PAD, morbid obesity. She has had chronic/recurrent osteomyelitis of left foot resulting in L AKA on 12/29/2023. She then had a prolonged admission 12/28-1/19 requiring debridements x2. She was discharged to [facility] with a wound vac on 1/19/24. ID followed throughout and discontinued antibiotics at time of discharge because path report showed margins without infection. She was continued on fluconazole for + yeast at the AKA wound site (C albicans). Re-presented due to concern for left AKA site wound dehiscence.    L AKA site with dehiscence and soft tissue infection  Chronic calcaneal osteomyelitis s/p left AKA  -Edema likely contributing to poor  wound healing and serous drainage  -Wound culture 2/5 growing Enterococcus faecalis  -Orthopedic surgery evaluated, s/p I&D with wound VAC closure 2/6/24 by Dr. Witt. Will need long-term wound VAC until fully healed.  -Infectious disease following, continue oral levofloxacin, IV vancomycin. Follow final wound and blood cultures. Orthopedic surgery planning for wound VAC changes q72 hours. Back to [facility] on discharge.    Growth of candida albicans from left AKA site   -Continue fluconazole, initial plan for 4 weeks of antifungal through 2/9/24. Now planning to continue until at least 2/16/2024.     Hypotension  -on midodrine, needs rehab    Complex pain, chronic opioid use  Peripheral neuropathy  -Pain control with home Lyrica, oxycodone 10 mg q4h PRN and PRN Dilaudid for breakthrough pain. Wean as tolerated. Bowel regimen.    Chronic bilateral lower extremity edema  -On Lasix at home for lower extremity edema  -ECHO 5/2023 with EF 61%, no comment regarding diastolic function  -Holding home Lasix due to hypotension     L humerus nonunion  -initially occurred 8/2023, reported by patient on admission   -XR left humerus 9/2023 showed fairly comminuted proximal humeral fracture  -Healing fracture on xray    Macrocytic anemia  B12 deficiency  -Hgb 9.8 on admission, further drop likely secondary to intraop blood loss, no evidence of overt bleeding  -Continue weekly B12 replacement. Monitor daily CBC. Transfuse for hemoglobin less than 7 or hemodynamic instability.    RLS  -Continue pramipexole.    Hypothyroidism  -Continue levothyroxine.    Anxiety  -Continue Zoloft, PRN Atarax.       Expected Discharge Location and Transportation: Home vs rehab  Expected Discharge   Expected Discharge Date: 2/13/2024; Expected Discharge Time:      DVT prophylaxis:  Medical DVT prophylaxis orders are present.         AM-PAC 6 Clicks Score (PT): 10 (02/11/24 0800)    CODE STATUS:   Code Status and Medical Interventions:   Ordered  at: 02/05/24 1500     Level Of Support Discussed With:    Patient     Code Status (Patient has no pulse and is not breathing):    CPR (Attempt to Resuscitate)     Medical Interventions (Patient has pulse or is breathing):    Full Support       Carlito Olvera MD  02/12/24
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
## 2024-02-12 NOTE — PLAN OF CARE
Goal Outcome Evaluation:  Plan of Care Reviewed With: patient, daughter        Progress: improving  Outcome Evaluation: PT changed pt's L residual limb wound vac this date. Pt's wound base demonstrating good granulation formation. Pt with small area of seeping bleeding from lateral incision line requiring jarad ag to manage. PT re-applied wound vac to further promote granulation formation, manage drainage, reduce bacterial load, and promote wound closure. Pt due for next wound vac dressing change in 2-3 days.

## 2024-02-12 NOTE — PLAN OF CARE
Goal Outcome Evaluation:  Plan of Care Reviewed With: patient        Progress: improving  Outcome Evaluation: Physical therapy treatment complete. The patient continues to require additional time to complete mobility and encouragement to complete activity. Patient with decreased safety awareness. The patient continues to present below baseline for functional mobility. The patient would continue to benefit from skilled PT to address strength, balance and activity tolerance deficits. Continue to current PT POC      Anticipated Discharge Disposition (PT): inpatient rehabilitation facility

## 2024-02-12 NOTE — CASE MANAGEMENT/SOCIAL WORK
Continued Stay Note  [facility]     Patient Name: Phoebe Carvajal  MRN: [id number]  Today's Date: 2/12/2024    Admit Date: 2/5/2024    Plan: [facility]   Discharge Plan       Row Name 02/12/24 1131       Plan    Plan [facility]    Plan Comments CM spoke with Chari with [facility] this morning. Insurance pre-cert is still pending. CM will continue to follow.    Addendum: Patient has insurance approval for a skilled bed tomorrow, 2/13. CM has requested transportation with [facility]. Awaiting a transport time. Will update patient, nurse and MD once received.     Final Discharge Disposition Code 62 - inpatient rehab facility                   Discharge Codes    No documentation.                 Expected Discharge Date and Time       Expected Discharge Date Expected Discharge Time    Feb 13, 2024               Kelsey Guillen RN

## 2024-02-12 NOTE — PROGRESS NOTES
Phoebe Carvajal       LOS: 7 days   Patient Care Team:  Sofya Benjamin MD as PCP - General (Internal Medicine)  Provider, No Known as PCP - Family Medicine    Chief Complaint:  left lower extremity surgical site infection    Subjective     Interval History:     Resting comfortably in bed this morning.    Review of Systems:      Gen- No fevers, chills  CV- No chest pain, palpitations  Resp- No cough, dyspnea  GI- No N/V/D, abd pain    Objective     Vital Signs  Vital Signs (last 24 hours)         02/06 0700  02/07 0659 02/07 0700 02/07 0731   Most Recent      Temp (°F) 97.2 -  98.3       97.2 (36.2) 02/06 2203    Heart Rate 58 -  72       60 02/06 2203    Resp 11 -  18       14 02/06 2203    BP 84/56 -  103/58       100/64 02/06 2203    SpO2 (%) 90 -  100       93 02/06 2203    Flow (L/min) 2 -  3       2 02/07 0000              Physical Exam:     No acute distress.  Nonlabored respirations.  Regular rate and rhythm.  Abdomen nondistended.  Left lower extremity: Operative dressing in place clean, dry, intact.  Wound vacuum-assisted closure in place with adequate seal, serosanguineous output in canister.     Results Review:     I reviewed the patient's new clinical results.    Medication Review:   Hospital Medications (active)         Dose Frequency Start End    acetaminophen (TYLENOL) tablet 650 mg 650 mg Every 4 Hours PRN 2/5/2024 --    Admin Instructions: If given for fever, use fever parameter: fever greater than 100.4 °F  Based on patient request - if ordered for moderate or severe pain, provider allows for administration of a medication prescribed for a lower pain scale.    Do not exceed 4 grams of acetaminophen in a 24 hr period. Max dose of 2gm for AST/ALT greater than 120 units/L.    If given for pain, use the following pain scale:   Mild Pain = Pain Score of 1-3, CPOT 1-2  Moderate Pain = Pain Score of 4-6, CPOT 3-4  Severe Pain = Pain Score of 7-10, CPOT 5-8    Route: Oral    albuterol (PROVENTIL)  nebulizer solution 0.083% 2.5 mg/3mL 2.5 mg Every 6 Hours PRN 2/5/2024 --    Admin Instructions: Include Respiratory Treatment Education    Route: Nebulization    bisacodyl (DULCOLAX) EC tablet 5 mg 5 mg Daily PRN 2/5/2024 --    Admin Instructions: Use if no bowel movement after 12 hours.  Swallow whole. Do not crush, split, or chew tablet.    Route: Oral    Linked Group 1: See Hyperspace for full Linked Orders Report.        bisacodyl (DULCOLAX) suppository 10 mg 10 mg Daily PRN 2/5/2024 --    Admin Instructions: Use if no bowel movement after 12 hours.  Hold for diarrhea    Route: Rectal    Linked Group 1: See Hyperspace for full Linked Orders Report.        cloNIDine (CATAPRES) tablet 0.1 mg 0.1 mg Every 6 Hours PRN 2/5/2024 --    Admin Instructions: Hold for SBP less than 100, DBP less than 60, or heart rate less than 50. If a dose is held, please contact the provider.  Caution: Look alike/sound alike drug alert.    Route: Oral    cyanocobalamin injection 1,000 mcg 1,000 mcg Every 7 Days 2/5/2024 --    Route: Intramuscular    Enoxaparin Sodium (LOVENOX) syringe 60 mg 60 mg Every 12 Hours Scheduled 2/5/2024 --    Admin Instructions: Give subcutaneous in abdomen only. Do not massage site after injection.    Route: Subcutaneous    famotidine (PEPCID) tablet 20 mg 20 mg 2 Times Daily Before Meals 2/5/2024 --    Route: Oral    fluconazole (DIFLUCAN) tablet 200 mg 200 mg Daily 2/5/2024 2/19/2024    Admin Instructions: Group 2 (Pink) Hazardous Drug - Reproductive Risk Only - See Handling Guide    Route: Oral    furosemide (LASIX) tablet 40 mg 40 mg Daily 2/5/2024 --    Admin Instructions: Hold for SBP less than 100, DBP less than 60    Route: Oral    HYDROmorphone (DILAUDID) injection 1 mg 1 mg Daily PRN 2/5/2024 2/10/2024    Admin Instructions: Based on patient request - if ordered for moderate or severe pain, provider allows for administration of a medication prescribed for a lower pain scale.      Caution: Look  alike/sound alike drug alert    If given for pain, use the following pain scale:  Mild Pain = Pain Score of 1-3, CPOT 1-2  Moderate Pain = Pain Score of 4-6, CPOT 3-4  Severe Pain = Pain Score of 7-10, CPOT 5-8    Route: Intravenous    hydrOXYzine (ATARAX) tablet 25 mg 25 mg 3 Times Daily PRN 2/6/2024 --    Admin Instructions: Caution: Look alike/sound alike drug alert    Route: Oral    lactated ringers infusion 9 mL/hr Continuous 2/6/2024 --    Admin Instructions: May switch to NS IV at KVO if renal / if indicated    Route: Intravenous    levoFLOXacin (LEVAQUIN) tablet 750 mg 750 mg Daily 2/5/2024 2/15/2024    Admin Instructions: Caution: Look alike/sound alike drug alert.  Avoid antacids/vitamins/calcium/iron.    Route: Oral    levothyroxine (SYNTHROID, LEVOTHROID) tablet 175 mcg 175 mcg Daily 2/5/2024 --    Admin Instructions: Take on empty stomach.    Route: Oral    LORazepam (ATIVAN) tablet 0.5 mg 0.5 mg 2 Times Daily PRN 2/5/2024 --    Admin Instructions:  Caution: Look alike/sound alike drug alert    Route: Oral    magnesium oxide (MAG-OX) tablet 400 mg 400 mg Daily 2/5/2024 --    Admin Instructions: Each 400mg of magnesium oxide is equal to 241.3mg of elemental magnesium.    Route: Oral    melatonin tablet 2.5 mg 2.5 mg Nightly PRN 2/5/2024 --    Route: Oral    nitroglycerin (NITROSTAT) SL tablet 0.4 mg 0.4 mg Every 5 Minutes PRN 2/5/2024 --    Admin Instructions: If Pain Unrelieved After 3 Doses Notify MD  May administer up to 3 doses per episode.    Route: Sublingual    oxyCODONE (ROXICODONE) immediate release tablet 10 mg 10 mg Every 4 Hours PRN 2/5/2024 2/10/2024    Admin Instructions: Based on patient request - if ordered for moderate or severe pain, provider allows for administration of a medication prescribed for a lower pain scale.  If given for pain, use the following pain scale:  Mild Pain = Pain Score of 1-3, CPOT 1-2  Moderate Pain = Pain Score of 4-6, CPOT 3-4  Severe Pain = Pain Score of 7-10,  CPOT 5-8    Route: Oral    Pharmacy to dose vancomycin  Continuous PRN 2/6/2024 2/20/2024    Route: Does not apply    polyethylene glycol (MIRALAX) packet 17 g 17 g Daily PRN 2/5/2024 --    Admin Instructions: Use if no bowel movement after 12 hours. Mix in 6-8 ounces of water.  Use 4-8 ounces of water, tea, or juice for each 17 gram dose.    Route: Oral    Linked Group 1: See Hyperspace for full Linked Orders Report.        potassium chloride (MICRO-K/KLOR-CON) CR capsule 10 mEq Daily 2/5/2024 --    Admin Instructions: Take with food.    Route: Oral    pramipexole (MIRAPEX) tablet 1 mg 1 mg Daily 2/5/2024 --    Route: Oral    pregabalin (LYRICA) capsule 200 mg 200 mg 3 Times Daily 2/5/2024 --    Admin Instructions:     Route: Oral    sennosides-docusate (PERICOLACE) 8.6-50 MG per tablet 2 tablet 2 tablet 2 Times Daily 2/5/2024 --    Admin Instructions: HOLD MEDICATION IF PATIENT HAS HAD BOWEL MOVEMENT. Start bowel management regimen if patient has not had a bowel movement after 12 hours.    Route: Oral    Linked Group 1: See Hyperspace for full Linked Orders Report.        sertraline (ZOLOFT) tablet 100 mg 100 mg Nightly 2/5/2024 --    Route: Oral    sodium chloride 0.9 % flush 10 mL 10 mL Every 12 Hours Scheduled 2/5/2024 --    Route: Intravenous    sodium chloride 0.9 % flush 10 mL 10 mL As Needed 2/5/2024 --    Route: Intravenous    sodium chloride 0.9 % infusion 40 mL 40 mL As Needed 2/5/2024 --    Admin Instructions: Following administration of an IV intermittent medication, flush line with 40mL NS at 100mL/hr.    Route: Intravenous    traZODone (DESYREL) tablet 50 mg 50 mg Nightly PRN 2/5/2024 --    Admin Instructions: Take with food.  Caution: Look alike/sound alike drug alert    Route: Oral    vancomycin (VANCOCIN) 1000 mg/200 mL dextrose 5% IVPB 1,000 mg Every 8 Hours 2/6/2024 2/13/2024    Route: Intravenous              Assessment & Plan     48-year-old female with chronic calcaneal osteomyelitis  nonhealing wound overlying left total knee stem prosthesis now status post staged above-knee amputation with subsequent wound dehiscence, subsequent debridement/irrigation with secondary staged closure, medial and lateral wound dehiscence now status post left lower extremity irrigation, debridement, wound vacuum-assisted closure 2/6/2024.      Osteomyelitis    Amputation stump infection      Nonweightbearing left lower extremity.  Every 72 hour wound vacuum-assisted closure changes per PT wound care; tentative plan for long-term wound vacuum-assisted closure as she has failed 2 attempts at primary wound closure.    Follow intraoperative cultures: enterococcus from preoperative cultures.  Intraoperative cultures have been negative.    With regard to her humerus: There appears to be bony callus for healed proximal humerus fracture.  Activity as tolerated.  When she is hopefully healed her thigh and fit for prosthesis, would plan for outpatient follow-up with one of my shoulder partners.    Anticipate she will benefit from placement, patient prefers to return to [facility]      Adama Witt Jr, MD  02/12/24  06:40 EST

## 2024-02-12 NOTE — PLAN OF CARE
Goal Outcome Evaluation:  Plan of Care Reviewed With: patient        Progress: no change  Outcome Evaluation: Pt dep LBD, setup to brush hair and  progressed from max A x 2 to mod A x 2 STS and bed to chair with RW.  Pt requiring cues for safety and to sequence with mobility.  Pt progressing, but continues below baseline with self care/ mobility d/t weakenss, decr balance and activity tolerance. Recomemnd return to [facility] rehab upon d/c.
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
## 2024-02-13 VITALS
DIASTOLIC BLOOD PRESSURE: 53 MMHG | BODY MASS INDEX: 44.87 KG/M2 | OXYGEN SATURATION: 94 % | TEMPERATURE: 97.6 F | WEIGHT: 243.8 LBS | RESPIRATION RATE: 18 BRPM | HEART RATE: 64 BPM | HEIGHT: 62 IN | SYSTOLIC BLOOD PRESSURE: 89 MMHG

## 2024-02-13 LAB
ANION GAP SERPL CALCULATED.3IONS-SCNC: 9 MMOL/L (ref 5–15)
BUN SERPL-MCNC: 7 MG/DL (ref 6–20)
BUN/CREAT SERPL: 9.9 (ref 7–25)
CALCIUM SPEC-SCNC: 8.2 MG/DL (ref 8.6–10.5)
CHLORIDE SERPL-SCNC: 105 MMOL/L (ref 98–107)
CO2 SERPL-SCNC: 26 MMOL/L (ref 22–29)
CREAT SERPL-MCNC: 0.71 MG/DL (ref 0.57–1)
DEPRECATED RDW RBC AUTO: 59.9 FL (ref 37–54)
EGFRCR SERPLBLD CKD-EPI 2021: 103.7 ML/MIN/1.73
ERYTHROCYTE [DISTWIDTH] IN BLOOD BY AUTOMATED COUNT: 17.1 % (ref 12.3–15.4)
FUNGUS WND CULT: NORMAL
FUNGUS WND CULT: NORMAL
GLUCOSE SERPL-MCNC: 105 MG/DL (ref 65–99)
HCT VFR BLD AUTO: 24.4 % (ref 34–46.6)
HGB BLD-MCNC: 7.4 G/DL (ref 12–15.9)
MCH RBC QN AUTO: 29 PG (ref 26.6–33)
MCHC RBC AUTO-ENTMCNC: 30.3 G/DL (ref 31.5–35.7)
MCV RBC AUTO: 95.7 FL (ref 79–97)
MYCOBACTERIUM SPEC CULT: NORMAL
MYCOBACTERIUM SPEC CULT: NORMAL
NIGHT BLUE STAIN TISS: NORMAL
NIGHT BLUE STAIN TISS: NORMAL
PLATELET # BLD AUTO: 224 10*3/MM3 (ref 140–450)
PMV BLD AUTO: 10.6 FL (ref 6–12)
POTASSIUM SERPL-SCNC: 4.1 MMOL/L (ref 3.5–5.2)
RBC # BLD AUTO: 2.55 10*6/MM3 (ref 3.77–5.28)
SODIUM SERPL-SCNC: 140 MMOL/L (ref 136–145)
WBC NRBC COR # BLD AUTO: 4.39 10*3/MM3 (ref 3.4–10.8)

## 2024-02-13 PROCEDURE — 97602 WOUND(S) CARE NON-SELECTIVE: CPT

## 2024-02-13 PROCEDURE — 99239 HOSP IP/OBS DSCHRG MGMT >30: CPT | Performed by: HOSPITALIST

## 2024-02-13 PROCEDURE — 25010000002 ENOXAPARIN PER 10 MG: Performed by: ORTHOPAEDIC SURGERY

## 2024-02-13 PROCEDURE — 25010000002 HYDROMORPHONE 1 MG/ML SOLUTION: Performed by: STUDENT IN AN ORGANIZED HEALTH CARE EDUCATION/TRAINING PROGRAM

## 2024-02-13 PROCEDURE — 25010000002 LEVOFLOXACIN PER 250 MG: Performed by: INTERNAL MEDICINE

## 2024-02-13 PROCEDURE — 25010000002 VANCOMYCIN PER 500 MG

## 2024-02-13 PROCEDURE — 80048 BASIC METABOLIC PNL TOTAL CA: CPT | Performed by: ORTHOPAEDIC SURGERY

## 2024-02-13 PROCEDURE — 85027 COMPLETE CBC AUTOMATED: CPT | Performed by: ORTHOPAEDIC SURGERY

## 2024-02-13 RX ORDER — MIDODRINE HYDROCHLORIDE 5 MG/1
5 TABLET ORAL
Start: 2024-02-13

## 2024-02-13 RX ORDER — VANCOMYCIN HYDROCHLORIDE 750 MG/150ML
750 INJECTION, SOLUTION INTRAVENOUS EVERY 12 HOURS
Qty: 900 ML | Refills: 0 | Status: SHIPPED | OUTPATIENT
Start: 2024-02-13 | End: 2024-02-16

## 2024-02-13 RX ORDER — CYANOCOBALAMIN 1000 UG/ML
1000 INJECTION, SOLUTION INTRAMUSCULAR; SUBCUTANEOUS
Start: 2024-02-19

## 2024-02-13 RX ORDER — LEVOFLOXACIN 5 MG/ML
INJECTION, SOLUTION INTRAVENOUS
Start: 2024-02-13

## 2024-02-13 RX ADMIN — FAMOTIDINE 20 MG: 20 TABLET ORAL at 08:25

## 2024-02-13 RX ADMIN — OXYCODONE HYDROCHLORIDE 10 MG: 10 TABLET ORAL at 08:25

## 2024-02-13 RX ADMIN — VANCOMYCIN HYDROCHLORIDE 750 MG: 750 INJECTION, SOLUTION INTRAVENOUS at 02:20

## 2024-02-13 RX ADMIN — HYDROMORPHONE HYDROCHLORIDE 1 MG: 1 INJECTION, SOLUTION INTRAMUSCULAR; INTRAVENOUS; SUBCUTANEOUS at 09:15

## 2024-02-13 RX ADMIN — LEVOFLOXACIN 750 MG: 750 INJECTION, SOLUTION INTRAVENOUS at 08:31

## 2024-02-13 RX ADMIN — MIDODRINE HYDROCHLORIDE 5 MG: 5 TABLET ORAL at 08:25

## 2024-02-13 RX ADMIN — LEVOTHYROXINE SODIUM 175 MCG: 0.17 TABLET ORAL at 05:26

## 2024-02-13 RX ADMIN — PRAMIPEXOLE DIHYDROCHLORIDE 1 MG: 1 TABLET ORAL at 08:25

## 2024-02-13 RX ADMIN — Medication 10 ML: at 08:26

## 2024-02-13 RX ADMIN — POTASSIUM CHLORIDE 10 MEQ: 750 CAPSULE, EXTENDED RELEASE ORAL at 08:25

## 2024-02-13 RX ADMIN — PREGABALIN 200 MG: 100 CAPSULE ORAL at 08:25

## 2024-02-13 RX ADMIN — FLUCONAZOLE 200 MG: 100 TABLET ORAL at 08:25

## 2024-02-13 RX ADMIN — OXYCODONE HYDROCHLORIDE 10 MG: 10 TABLET ORAL at 02:27

## 2024-02-13 RX ADMIN — ENOXAPARIN SODIUM 60 MG: 60 INJECTION SUBCUTANEOUS at 08:24

## 2024-02-13 RX ADMIN — MAGNESIUM OXIDE TAB 400 MG (241.3 MG ELEMENTAL MG) 400 MG: 400 (241.3 MG) TAB at 08:25

## 2024-02-13 NOTE — DISCHARGE SUMMARY
[facility] Medicine Services  DISCHARGE SUMMARY    Patient Name: Phoebe Carvajal  : 1975  MRN: [id number]    Date of Admission: 2024 10:29 AM  Date of Discharge:  2024  Primary Care Physician: Sofya Benjamin MD    Consults       Date and Time Order Name Status Description    2024  3:00 PM Inpatient Orthopedic Surgery Consult Completed     2024  3:00 PM Inpatient Infectious Diseases Consult Completed     2024  6:40 PM Inpatient Urology Consult Completed     1/3/2024  3:17 PM Inpatient Palliative Care MD Consult Completed     2023  1:21 AM Inpatient Infectious Diseases Consult Completed     2023  1:21 AM Inpatient Orthopedic Surgery Consult Completed             Hospital Course     Presenting Problem: Osteomyelitis, wound infection    Active Hospital Problems    Diagnosis  POA    **Osteomyelitis [M86.9]  Yes    Amputation stump infection [T87.40]  Yes      Resolved Hospital Problems   No resolved problems to display.          Hospital Course:  Phoebe Carvajal is a 48 y.o. female with PMHx of CKD, Stills disease, restless leg syndrome, hypothyroidism, PAD, morbid obesity. She has had chronic/recurrent osteomyelitis of left foot resulting in L AKA on 2023. She then had a prolonged admission - requiring debridements x2. She was discharged to [facility] with a wound vac on 24. ID followed throughout and discontinued antibiotics at time of discharge because path report showed margins without infection. She was continued on fluconazole for + yeast at the AKA wound site (C albicans). Re-presented due to concern for left AKA site wound dehiscence.     L AKA site with dehiscence and soft tissue infection  Chronic calcaneal osteomyelitis s/p left AKA  -Edema likely contributing to poor wound healing and serous drainage  -Wound culture  growing Enterococcus faecalis  -Orthopedic surgery evaluated, s/p I&D with wound VAC closure 24 by   Eduar. Will need long-term wound VAC until fully healed.  -Infectious disease following, continue oral levofloxacin, IV vancomycin. Follow final wound and blood cultures. Orthopedic surgery planning for wound VAC changes q72 hours, with PT.  -ID planned antibiotics 7-10 days following debridement and should follow up at [facility] [place]>      Growth of candida albicans from left AKA site   -Continue fluconazole, initial plan for 4 weeks of antifungal through 2/9/24. Now planning to continue until at least 2/16/2024.     Hypotension  -on midodrine, needs rehab     Complex pain, chronic opioid use  Peripheral neuropathy  -Continue current pain regimen     Chronic bilateral lower extremity edema  -On Lasix at home for lower extremity edema  -ECHO 5/2023 with EF 61%, no comment regarding diastolic function  -Lasix held for hypotension     L humerus nonunion  -initially occurred 8/2023, reported by patient on admission   -XR left humerus 9/2023 showed fairly comminuted proximal humeral fracture  -Healing fracture on xray, Dr. Witt to refer to partner once recovered         Discharge Follow Up Recommendations for outpatient labs/diagnostics:  As written    Day of Discharge     HPI:   Denies pain. No f/c/sweats. No n/v. No dyspnea.     Review of Systems  As above    Vital Signs:   Temp:  [97.6 °F (36.4 °C)-98.1 °F (36.7 °C)] 98.1 °F (36.7 °C)  Heart Rate:  [60-72] 64  Resp:  [16] 16  BP: (71-98)/(42-62) 98/61      Physical Exam:  NAD, alert and oriented  OP clear, MMM  Neck supple  No LAD  RRR  CTAB  +BS, soft  BRIONES  Normal affect  No rashes  LLE wound vac in place    Pertinent  and/or Most Recent Results     LAB RESULTS:      Lab 02/13/24  0401 02/12/24  0317 02/11/24  0347 02/10/24  0418 02/09/24  1322 02/09/24  0118   WBC 4.39 3.92 3.31* 3.91  --  2.89*   HEMOGLOBIN 7.4* 7.5* 7.7* 7.1*  --  7.3*   HEMATOCRIT 24.4* 23.5* 25.1* 22.8*  --  24.1*   PLATELETS 224 191 192 199  --  191   NEUTROS ABS  --   --   --   --   --  1.32*    IMMATURE GRANS (ABS)  --   --   --   --   --  0.00   LYMPHS ABS  --   --   --   --   --  1.26   MONOS ABS  --   --   --   --   --  0.25   EOS ABS  --   --   --   --   --  0.05   MCV 95.7 97.1* 98.8* 97.4*  --  98.8*   LACTATE  --   --   --   --  1.2  --          Lab 02/13/24  0401 02/12/24  0317 02/11/24  0347 02/10/24  0418 02/09/24  0118   SODIUM 140 140 138 140 140   POTASSIUM 4.1 4.5 4.2 4.0 4.3   CHLORIDE 105 106 105 105 104   CO2 26.0 23.0 27.0 29.0 31.0*   ANION GAP 9.0 11.0 6.0 6.0 5.0   BUN 7 7 7 8 7   CREATININE 0.71 0.63 0.60 0.73 0.55*   EGFR 103.7 108.2 109.5 100.3 111.8   GLUCOSE 105* 76 90 86 104*   CALCIUM 8.2* 8.1* 8.2* 8.5* 8.5*         Lab 02/12/24  0317   TOTAL PROTEIN 5.1*   ALBUMIN 2.7*   GLOBULIN 2.4   ALT (SGPT) 5   AST (SGOT) 18   BILIRUBIN 0.3   ALK PHOS 148*                     Brief Urine Lab Results  (Last result in the past 365 days)        Color   Clarity   Blood   Leuk Est   Nitrite   Protein   CREAT   Urine HCG        01/05/24 1351 Yellow   Cloudy   Negative   Small (1+)   Negative   Trace                 Microbiology Results (last 10 days)       Procedure Component Value - Date/Time    Anaerobic Culture - Swab, Leg, Left [[id number]]  (Normal) Collected: 02/06/24 1847    Lab Status: Final result Specimen: Swab from Leg, Left Updated: 02/11/24 1133     Anaerobic Culture No anaerobes isolated at 5 days    Fungus Culture - Swab, Leg, Left [[id number]] Collected: 02/06/24 1847    Lab Status: Preliminary result Specimen: Swab from Leg, Left Updated: 02/11/24 2031     Fungus Culture No fungus isolated at less than 1 week    Wound Culture - Swab, Leg, Left [[id number]] Collected: 02/06/24 1847    Lab Status: Final result Specimen: Swab from Leg, Left Updated: 02/09/24 0734     Wound Culture No growth at 3 days     Gram Stain Moderate (3+) Red blood cells      Few (2+) WBCs seen      No organisms seen    AFB Culture - Swab, Leg, Left [[id number]] Collected: 02/06/24 4542    Lab Status:  Preliminary result Specimen: Swab from Leg, Left Updated: 02/11/24 2031     AFB Culture No AFB isolated at less than 1 week     AFB Stain No acid fast bacilli seen on concentrated smear    MRSA Screen, PCR (Inpatient) - Swab, Nares [[id number]]  (Normal) Collected: 02/06/24 1247    Lab Status: Final result Specimen: Swab from Nares Updated: 02/06/24 2010     MRSA PCR No MRSA Detected    Narrative:      The negative predictive value of this diagnostic test is high and should only be used to consider de-escalating anti-MRSA therapy. A positive result may indicate colonization with MRSA and must be correlated clinically.    Blood Culture - Blood, Arm, Left [[id number]]  (Normal) Collected: 02/05/24 1120    Lab Status: Final result Specimen: Blood from Arm, Left Updated: 02/10/24 1145     Blood Culture No growth at 5 days    Wound Culture - Swab, Leg, Left [[id number]]  (Abnormal)  (Susceptibility) Collected: 02/05/24 1052    Lab Status: Final result Specimen: Swab from Leg, Left Updated: 02/10/24 0826     Wound Culture Light growth (2+) Enterococcus faecalis      Moderate growth (3+) Normal Skin Tracy     Gram Stain Moderate (3+) WBCs seen      Few (2+) Gram positive cocci in pairs and chains    Susceptibility        Enterococcus faecalis      LAVELLE      Ampicillin Susceptible      Vancomycin Susceptible                           Blood Culture - Blood, Arm, Right [[id number]]  (Normal) Collected: 02/05/24 1045    Lab Status: Final result Specimen: Blood from Arm, Right Updated: 02/10/24 1145     Blood Culture No growth at 5 days            XR Humerus Left    Result Date: 2/10/2024  XR HUMERUS LEFT Date of Exam: 2/10/2024 3:55 PM EST Indication: previous humerus fracture in 8/2023, patient still with pain Comparison: Portable chest 12/29/2023 Findings: Smooth deformity of the proximal left humerus on the prior chest radiograph is very similar in appearance to the humerus today on AP view. Lateral view gives the appearance of  some anterior subluxation, although this may reflect oblique patient positioning, rather than actual displacement. This appearance could be explained by capsular laxity as well with change in relationship of the glenoid and humerus with differences in position. There is moderately advanced AC joint DJD. No clearly new bony abnormality is identified here or elsewhere.     Impression: Findings consistent with old healed left humerus fracture, not obviously changed from prior exam. Scapular Y view suggests possible anterior subluxation, versus artifact of positioning. No evidence of dislocation. Consider follow-up imaging if patient's symptoms persist or worsen. Electronically Signed: Carlito Zhou MD  2/10/2024 4:36 PM EST  Workstation ID: [id number]                 Plan for Follow-up of Pending Labs/Results:  Pending Labs       Order Current Status    AFB Culture - Swab, Leg, Left Preliminary result    Fungus Culture - Swab, Leg, Left Preliminary result          Discharge Details        Discharge Medications        New Medications        Instructions Start Date   cyanocobalamin 1000 MCG/ML injection   1,000 mcg, Intramuscular, Every 7 Days   Start Date: February 19, 2024     levoFLOXacin (LEVAQUIN) 750 mg/150 mL D5W (premix) 750 MG/150ML solution IVPB  Commonly known as: LEVAQUIN   Daily until seen by ID      vancomycin in dextrose 5% 150 mL 750-5 MG/150ML-% IVPB  Commonly known as: VANCOCIN   750 mg, Intravenous, Every 12 Hours             Changes to Medications        Instructions Start Date   acetaminophen 325 MG tablet  Commonly known as: TYLENOL  What changed: Another medication with the same name was removed. Continue taking this medication, and follow the directions you see here.   650 mg, Oral, Every 4 Hours PRN      LORazepam 0.5 MG tablet  Commonly known as: ATIVAN  What changed: when to take this   0.5 mg, Oral, Daily PRN      midodrine 5 MG tablet  Commonly known as: PROAMATINE  What changed:   how much to  take  when to take this   5 mg, Oral, 2 Times Daily Before Meals             Continue These Medications        Instructions Start Date   albuterol (2.5 MG/3ML) 0.083% nebulizer solution  Commonly known as: PROVENTIL   2.5 mg, Nebulization, Every 6 Hours PRN      ascorbic acid 1000 MG tablet  Commonly known as: VITAMIN C   1,500 mg, Oral, Daily      calcium carbonate 500 MG chewable tablet  Commonly known as: TUMS   1 tablet, Oral, Every 8 Hours PRN      cloNIDine 0.1 MG tablet  Commonly known as: CATAPRES   0.1 mg, Oral, Every 6 Hours PRN      Enoxaparin Sodium 40 MG/0.4ML solution prefilled syringe syringe  Commonly known as: LOVENOX   40 mg, Subcutaneous, Daily      famotidine 20 MG tablet  Commonly known as: PEPCID   20 mg, Oral, 2 Times Daily Before Meals      fluconazole 200 MG tablet  Commonly known as: DIFLUCAN   200 mg, Oral, Daily      fluticasone 50 MCG/ACT nasal spray  Commonly known as: FLONASE   2 sprays, Nasal, Nightly      folic acid 1 MG tablet  Commonly known as: FOLVITE   1 mg, Oral, Daily      lactobacillus acidophilus capsule capsule   1 capsule, Oral, Daily      levothyroxine 175 MCG tablet  Commonly known as: SYNTHROID, LEVOTHROID   175 mcg, Oral, Daily      magnesium oxide 400 MG tablet  Commonly known as: MAG-OX   400 mg, Oral, Daily      melatonin 3 MG tablet   3 mg, Oral, Nightly PRN      ondansetron 4 MG tablet  Commonly known as: ZOFRAN   4 mg, Oral, Every 6 Hours PRN      oxyCODONE 10 MG 12 hr tablet  Commonly known as: oxyCONTIN   10 mg, Oral, Every 6 Hours PRN      potassium chloride 10 MEQ CR tablet   10 mEq, Oral, Daily      pramipexole 1 MG tablet  Commonly known as: MIRAPEX   1 mg, Oral, Daily      pregabalin 200 MG capsule  Commonly known as: LYRICA   200 mg, Oral, 3 Times Daily      sertraline 100 MG tablet  Commonly known as: ZOLOFT   100 mg, Oral, Nightly      traZODone 50 MG tablet  Commonly known as: DESYREL   50 mg, Oral, Nightly PRN      vitamin D 1.25 MG (49961 UT) capsule  capsule  Commonly known as: ERGOCALCIFEROL   50,000 Units, Oral, Weekly      ZINC GLUCONATE PO   220 mg, Oral, Daily             Stop These Medications      ammonium lactate 12 % lotion  Commonly known as: LAC-HYDRIN     Bisacodyl Laxative 10 MG suppository  Generic drug: bisacodyl     diphenhydrAMINE-zinc acetate 2-0.1 % cream     furosemide 40 MG tablet  Commonly known as: LASIX     levoFLOXacin 750 MG tablet  Commonly known as: LEVAQUIN     lidocaine 2 % jelly  Commonly known as: XYLOCAINE     miconazole 2 % powder  Commonly known as: MICOTIN     MiraLax Mix-In Stebbins 17 g packet  Generic drug: polyethylene glycol     spironolactone 25 MG tablet  Commonly known as: ALDACTONE              Allergies   Allergen Reactions    Vicodin [Hydrocodone-Acetaminophen] Itching         Discharge Disposition:  Rehab Facility or Unit (DC - External)    Diet:  Hospital:  Diet Order   Procedures    Diet: Cardiac Diets; Healthy Heart (2-3 Na+); Texture: Regular Texture (IDDSI 7); Fluid Consistency: Thin (IDDSI 0)            Activity:      Restrictions or Other Recommendations:         CODE STATUS:    Code Status and Medical Interventions:   Ordered at: 02/05/24 1500     Level Of Support Discussed With:    Patient     Code Status (Patient has no pulse and is not breathing):    CPR (Attempt to Resuscitate)     Medical Interventions (Patient has pulse or is breathing):    Full Support       Future Appointments   Date Time Provider Department Center   2/13/2024  9:45 AM MED 8  [facility]   2/19/2024  3:50 PM Samy Kinsey MD [facility]       Additional Instructions for the Follow-ups that You Need to Schedule       Discharge Follow-up with PCP   As directed       Currently Documented PCP:    Sofya Benjamin MD    PCP Phone Number:    [phone]     Follow Up Details: 2 weeks        Discharge Follow-up with Specified Provider: justino Witt 10 days   As directed      To: justino Witt 10 days                      Carlito MARTINEZ  MD Wade  02/13/24      Time Spent on Discharge:  I spent  40  minutes on this discharge activity which included: face-to-face encounter with the patient, reviewing the data in the system, coordination of the care with the nursing staff as well as consultants, documentation, and entering orders.

## 2024-02-13 NOTE — DISCHARGE PLACEMENT REQUEST
"Keith Carvajal (48 y.o. Female)       Date of Birth   1975    Social Security Number       Address   [address] [place] [address] [place] [address]    Home Phone   [phone]    MRN   [id number]       Religious   Yazidism    Marital Status                               Admission Date   24    Admission Type   Emergency    Admitting Provider   Carlito Olvera MD    Attending Provider   Carlito Olvera MD    Department, Room/Bed   [facility] 5G, S553/1       Discharge Date       Discharge Disposition   Rehab Facility or Unit (DC - External)    Discharge Destination                                 Attending Provider: Carlito Olvera MD    Allergies: Vicodin [Hydrocodone-acetaminophen]    Isolation: Contact   Infection: CR Pseudomonas CRPA (24), MDR Pseudomonas (24)   Code Status: CPR    Ht: 157.5 cm (62\")   Wt: 111 kg (243 lb 12.8 oz)    Admission Cmt: None   Principal Problem: Osteomyelitis [M86.9]                   Active Insurance as of 2024       Primary Coverage       Payor Plan Insurance Group Employer/Plan Group    [facility] BLUE [id number]       Payor Plan Address Payor Plan Phone Number Payor Plan Fax Number Effective Dates    [address]   8/10/2003 - None Entered    [place] [address]         Subscriber Name Subscriber Birth Date Member ID       KEITH CARVAJAL 1975 [id number]                     Emergency Contacts        (Rel.) Home Phone Work Phone Mobile Phone    ANGELICA CARVAJAL (Spouse) -- [phone] --    yanira carvajal (Daughter) -- [phone] --                 Discharge Summary        Carlito Olvera MD at 24 0758              [facility]  DISCHARGE SUMMARY    Patient Name: Keith Carvajal  : 1975  MRN: [id number]    Date of Admission: 2024 10:29 AM  Date of Discharge:  2024  Primary Care Physician: Sofya Benjamin MD    Consults       Date and Time " Order Name Status Description    2/5/2024  3:00 PM Inpatient Orthopedic Surgery Consult Completed     2/5/2024  3:00 PM Inpatient Infectious Diseases Consult Completed     1/5/2024  6:40 PM Inpatient Urology Consult Completed     1/3/2024  3:17 PM Inpatient Palliative Care MD Consult Completed     12/29/2023  1:21 AM Inpatient Infectious Diseases Consult Completed     12/29/2023  1:21 AM Inpatient Orthopedic Surgery Consult Completed             Hospital Course     Presenting Problem: Osteomyelitis, wound infection    Active Hospital Problems    Diagnosis  POA    **Osteomyelitis [M86.9]  Yes    Amputation stump infection [T87.40]  Yes      Resolved Hospital Problems   No resolved problems to display.          Hospital Course:  Phoebe Carvajal is a 48 y.o. female with PMHx of CKD, Stills disease, restless leg syndrome, hypothyroidism, PAD, morbid obesity. She has had chronic/recurrent osteomyelitis of left foot resulting in L AKA on 12/29/2023. She then had a prolonged admission 12/28-1/19 requiring debridements x2. She was discharged to [facility] with a wound vac on 1/19/24. ID followed throughout and discontinued antibiotics at time of discharge because path report showed margins without infection. She was continued on fluconazole for + yeast at the AKA wound site (C albicans). Re-presented due to concern for left AKA site wound dehiscence.     L AKA site with dehiscence and soft tissue infection  Chronic calcaneal osteomyelitis s/p left AKA  -Edema likely contributing to poor wound healing and serous drainage  -Wound culture 2/5 growing Enterococcus faecalis  -Orthopedic surgery evaluated, s/p I&D with wound VAC closure 2/6/24 by Dr. Witt. Will need long-term wound VAC until fully healed.  -Infectious disease following, continue oral levofloxacin, IV vancomycin. Follow final wound and blood cultures. Orthopedic surgery planning for wound VAC changes q72 hours, with PT.  -ID planned antibiotics 7-10 days following  debridement and should follow up at [facility]>      Growth of candida albicans from left AKA site   -Continue fluconazole, initial plan for 4 weeks of antifungal through 2/9/24. Now planning to continue until at least 2/16/2024.     Hypotension  -on midodrine, needs rehab     Complex pain, chronic opioid use  Peripheral neuropathy  -Continue current pain regimen     Chronic bilateral lower extremity edema  -On Lasix at home for lower extremity edema  -ECHO 5/2023 with EF 61%, no comment regarding diastolic function  -Lasix held for hypotension     L humerus nonunion  -initially occurred 8/2023, reported by patient on admission   -XR left humerus 9/2023 showed fairly comminuted proximal humeral fracture  -Healing fracture on xray, Dr. Witt to refer to partner once recovered         Discharge Follow Up Recommendations for outpatient labs/diagnostics:  As written    Day of Discharge     HPI:   Denies pain. No f/c/sweats. No n/v. No dyspnea.     Review of Systems  As above    Vital Signs:   Temp:  [97.6 °F (36.4 °C)-98.1 °F (36.7 °C)] 98.1 °F (36.7 °C)  Heart Rate:  [60-72] 64  Resp:  [16] 16  BP: (71-98)/(42-62) 98/61      Physical Exam:  NAD, alert and oriented  OP clear, MMM  Neck supple  No LAD  RRR  CTAB  +BS, soft  BRIONES  Normal affect  No rashes  LLE wound vac in place    Pertinent  and/or Most Recent Results     LAB RESULTS:      Lab 02/13/24  0401 02/12/24  0317 02/11/24  0347 02/10/24  0418 02/09/24  1322 02/09/24  0118   WBC 4.39 3.92 3.31* 3.91  --  2.89*   HEMOGLOBIN 7.4* 7.5* 7.7* 7.1*  --  7.3*   HEMATOCRIT 24.4* 23.5* 25.1* 22.8*  --  24.1*   PLATELETS 224 191 192 199  --  191   NEUTROS ABS  --   --   --   --   --  1.32*   IMMATURE GRANS (ABS)  --   --   --   --   --  0.00   LYMPHS ABS  --   --   --   --   --  1.26   MONOS ABS  --   --   --   --   --  0.25   EOS ABS  --   --   --   --   --  0.05   MCV 95.7 97.1* 98.8* 97.4*  --  98.8*   LACTATE  --   --   --   --  1.2  --          Lab 02/13/24  0401  02/12/24  0317 02/11/24  0347 02/10/24  0418 02/09/24  0118   SODIUM 140 140 138 140 140   POTASSIUM 4.1 4.5 4.2 4.0 4.3   CHLORIDE 105 106 105 105 104   CO2 26.0 23.0 27.0 29.0 31.0*   ANION GAP 9.0 11.0 6.0 6.0 5.0   BUN 7 7 7 8 7   CREATININE 0.71 0.63 0.60 0.73 0.55*   EGFR 103.7 108.2 109.5 100.3 111.8   GLUCOSE 105* 76 90 86 104*   CALCIUM 8.2* 8.1* 8.2* 8.5* 8.5*         Lab 02/12/24  0317   TOTAL PROTEIN 5.1*   ALBUMIN 2.7*   GLOBULIN 2.4   ALT (SGPT) 5   AST (SGOT) 18   BILIRUBIN 0.3   ALK PHOS 148*                     Brief Urine Lab Results  (Last result in the past 365 days)        Color   Clarity   Blood   Leuk Est   Nitrite   Protein   CREAT   Urine HCG        01/05/24 1351 Yellow   Cloudy   Negative   Small (1+)   Negative   Trace                 Microbiology Results (last 10 days)       Procedure Component Value - Date/Time    Anaerobic Culture - Swab, Leg, Left [[id number]]  (Normal) Collected: 02/06/24 1847    Lab Status: Final result Specimen: Swab from Leg, Left Updated: 02/11/24 1133     Anaerobic Culture No anaerobes isolated at 5 days    Fungus Culture - Swab, Leg, Left [[id number]] Collected: 02/06/24 1847    Lab Status: Preliminary result Specimen: Swab from Leg, Left Updated: 02/11/24 2031     Fungus Culture No fungus isolated at less than 1 week    Wound Culture - Swab, Leg, Left [[id number]] Collected: 02/06/24 1847    Lab Status: Final result Specimen: Swab from Leg, Left Updated: 02/09/24 0734     Wound Culture No growth at 3 days     Gram Stain Moderate (3+) Red blood cells      Few (2+) WBCs seen      No organisms seen    AFB Culture - Swab, Leg, Left [[id number]] Collected: 02/06/24 1847    Lab Status: Preliminary result Specimen: Swab from Leg, Left Updated: 02/11/24 2031     AFB Culture No AFB isolated at less than 1 week     AFB Stain No acid fast bacilli seen on concentrated smear    MRSA Screen, PCR (Inpatient) - Swab, Nares [[id number]]  (Normal) Collected: 02/06/24 1247    Lab  Status: Final result Specimen: Swab from Nares Updated: 02/06/24 2010     MRSA PCR No MRSA Detected    Narrative:      The negative predictive value of this diagnostic test is high and should only be used to consider de-escalating anti-MRSA therapy. A positive result may indicate colonization with MRSA and must be correlated clinically.    Blood Culture - Blood, Arm, Left [[id number]]  (Normal) Collected: 02/05/24 1120    Lab Status: Final result Specimen: Blood from Arm, Left Updated: 02/10/24 1145     Blood Culture No growth at 5 days    Wound Culture - Swab, Leg, Left [[id number]]  (Abnormal)  (Susceptibility) Collected: 02/05/24 1052    Lab Status: Final result Specimen: Swab from Leg, Left Updated: 02/10/24 0826     Wound Culture Light growth (2+) Enterococcus faecalis      Moderate growth (3+) Normal Skin Tracy     Gram Stain Moderate (3+) WBCs seen      Few (2+) Gram positive cocci in pairs and chains    Susceptibility        Enterococcus faecalis      LAVELLE      Ampicillin Susceptible      Vancomycin Susceptible                           Blood Culture - Blood, Arm, Right [[id number]]  (Normal) Collected: 02/05/24 1045    Lab Status: Final result Specimen: Blood from Arm, Right Updated: 02/10/24 1145     Blood Culture No growth at 5 days            XR Humerus Left    Result Date: 2/10/2024  XR HUMERUS LEFT Date of Exam: 2/10/2024 3:55 PM EST Indication: previous humerus fracture in 8/2023, patient still with pain Comparison: Portable chest 12/29/2023 Findings: Smooth deformity of the proximal left humerus on the prior chest radiograph is very similar in appearance to the humerus today on AP view. Lateral view gives the appearance of some anterior subluxation, although this may reflect oblique patient positioning, rather than actual displacement. This appearance could be explained by capsular laxity as well with change in relationship of the glenoid and humerus with differences in position. There is moderately  advanced AC joint DJD. No clearly new bony abnormality is identified here or elsewhere.     Impression: Findings consistent with old healed left humerus fracture, not obviously changed from prior exam. Scapular Y view suggests possible anterior subluxation, versus artifact of positioning. No evidence of dislocation. Consider follow-up imaging if patient's symptoms persist or worsen. Electronically Signed: Carlito Zhou MD  2/10/2024 4:36 PM EST  Workstation ID: [id number]                 Plan for Follow-up of Pending Labs/Results:  Pending Labs       Order Current Status    AFB Culture - Swab, Leg, Left Preliminary result    Fungus Culture - Swab, Leg, Left Preliminary result          Discharge Details        Discharge Medications        New Medications        Instructions Start Date   cyanocobalamin 1000 MCG/ML injection   1,000 mcg, Intramuscular, Every 7 Days   Start Date: February 19, 2024     levoFLOXacin (LEVAQUIN) 750 mg/150 mL D5W (premix) 750 MG/150ML solution IVPB  Commonly known as: LEVAQUIN   Daily until seen by ID      vancomycin in dextrose 5% 150 mL 750-5 MG/150ML-% IVPB  Commonly known as: VANCOCIN   750 mg, Intravenous, Every 12 Hours             Changes to Medications        Instructions Start Date   acetaminophen 325 MG tablet  Commonly known as: TYLENOL  What changed: Another medication with the same name was removed. Continue taking this medication, and follow the directions you see here.   650 mg, Oral, Every 4 Hours PRN      LORazepam 0.5 MG tablet  Commonly known as: ATIVAN  What changed: when to take this   0.5 mg, Oral, Daily PRN      midodrine 5 MG tablet  Commonly known as: PROAMATINE  What changed:   how much to take  when to take this   5 mg, Oral, 2 Times Daily Before Meals             Continue These Medications        Instructions Start Date   albuterol (2.5 MG/3ML) 0.083% nebulizer solution  Commonly known as: PROVENTIL   2.5 mg, Nebulization, Every 6 Hours PRN      ascorbic acid 1000 MG  tablet  Commonly known as: VITAMIN C   1,500 mg, Oral, Daily      calcium carbonate 500 MG chewable tablet  Commonly known as: TUMS   1 tablet, Oral, Every 8 Hours PRN      cloNIDine 0.1 MG tablet  Commonly known as: CATAPRES   0.1 mg, Oral, Every 6 Hours PRN      Enoxaparin Sodium 40 MG/0.4ML solution prefilled syringe syringe  Commonly known as: LOVENOX   40 mg, Subcutaneous, Daily      famotidine 20 MG tablet  Commonly known as: PEPCID   20 mg, Oral, 2 Times Daily Before Meals      fluconazole 200 MG tablet  Commonly known as: DIFLUCAN   200 mg, Oral, Daily      fluticasone 50 MCG/ACT nasal spray  Commonly known as: FLONASE   2 sprays, Nasal, Nightly      folic acid 1 MG tablet  Commonly known as: FOLVITE   1 mg, Oral, Daily      lactobacillus acidophilus capsule capsule   1 capsule, Oral, Daily      levothyroxine 175 MCG tablet  Commonly known as: SYNTHROID, LEVOTHROID   175 mcg, Oral, Daily      magnesium oxide 400 MG tablet  Commonly known as: MAG-OX   400 mg, Oral, Daily      melatonin 3 MG tablet   3 mg, Oral, Nightly PRN      ondansetron 4 MG tablet  Commonly known as: ZOFRAN   4 mg, Oral, Every 6 Hours PRN      oxyCODONE 10 MG 12 hr tablet  Commonly known as: oxyCONTIN   10 mg, Oral, Every 6 Hours PRN      potassium chloride 10 MEQ CR tablet   10 mEq, Oral, Daily      pramipexole 1 MG tablet  Commonly known as: MIRAPEX   1 mg, Oral, Daily      pregabalin 200 MG capsule  Commonly known as: LYRICA   200 mg, Oral, 3 Times Daily      sertraline 100 MG tablet  Commonly known as: ZOLOFT   100 mg, Oral, Nightly      traZODone 50 MG tablet  Commonly known as: DESYREL   50 mg, Oral, Nightly PRN      vitamin D 1.25 MG (77923 UT) capsule capsule  Commonly known as: ERGOCALCIFEROL   50,000 Units, Oral, Weekly      ZINC GLUCONATE PO   220 mg, Oral, Daily             Stop These Medications      ammonium lactate 12 % lotion  Commonly known as: LAC-HYDRIN     Bisacodyl Laxative 10 MG suppository  Generic drug: bisacodyl      diphenhydrAMINE-zinc acetate 2-0.1 % cream     furosemide 40 MG tablet  Commonly known as: LASIX     levoFLOXacin 750 MG tablet  Commonly known as: LEVAQUIN     lidocaine 2 % jelly  Commonly known as: XYLOCAINE     miconazole 2 % powder  Commonly known as: MICOTIN     MiraLax Mix-In Eloy 17 g packet  Generic drug: polyethylene glycol     spironolactone 25 MG tablet  Commonly known as: ALDACTONE              Allergies   Allergen Reactions    Vicodin [Hydrocodone-Acetaminophen] Itching         Discharge Disposition:  Rehab Facility or Unit (DC - External)    Diet:  Hospital:  Diet Order   Procedures    Diet: Cardiac Diets; Healthy Heart (2-3 Na+); Texture: Regular Texture (IDDSI 7); Fluid Consistency: Thin (IDDSI 0)            Activity:      Restrictions or Other Recommendations:         CODE STATUS:    Code Status and Medical Interventions:   Ordered at: 02/05/24 1500     Level Of Support Discussed With:    Patient     Code Status (Patient has no pulse and is not breathing):    CPR (Attempt to Resuscitate)     Medical Interventions (Patient has pulse or is breathing):    Full Support       Future Appointments   Date Time Provider Department Center   2/13/2024  9:45 AM MED 8  [facility]   2/19/2024  3:50 PM Samy Kinsey MD [facility]       Additional Instructions for the Follow-ups that You Need to Schedule       Discharge Follow-up with PCP   As directed       Currently Documented PCP:    Sofya Benjamin MD    PCP Phone Number:    [phone]     Follow Up Details: 2 weeks        Discharge Follow-up with Specified Provider: justino Witt 10 days   As directed      To: justino Witt 10 days                      Carlito Olvera MD  02/13/24      Time Spent on Discharge:  I spent  40  minutes on this discharge activity which included: face-to-face encounter with the patient, reviewing the data in the system, coordination of the care with the nursing staff as well as consultants, documentation, and  entering orders.            Electronically signed by Carlito Olvera MD at 02/13/24 0801

## 2024-02-13 NOTE — PLAN OF CARE
Problem: Adult Inpatient Plan of Care  Goal: Plan of Care Review  Outcome: Ongoing, Progressing  Goal: Patient-Specific Goal (Individualized)  Outcome: Ongoing, Progressing  Goal: Absence of Hospital-Acquired Illness or Injury  Outcome: Ongoing, Progressing  Intervention: Prevent Skin Injury  Description: Perform a screening for skin injury risk, such as pressure or moisture associated skin damage on admission and at regular intervals throughout hospital stay.  Keep all areas of skin (especially folds) clean and dry.  Maintain adequate skin hydration.  Relieve and redistribute pressure and protect bony prominences; implement measures based on patient-specific risk factors.  Match turning and repositioning schedule to clinical condition.  Encourage weight shift frequently; assist with reposition if unable to complete independently.  Float heels off bed; avoid pressure on the Achilles tendon.  Keep skin free from extended contact with medical devices.  Encourage functional activity and mobility, as early as tolerated.  Use aids (e.g., slide boards, mechanical lift) during transfer.  Recent Flowsheet Documentation  Taken 2/12/2024 2000 by Radha Capellan RN  Skin Protection:   adhesive use limited   incontinence pads utilized   tubing/devices free from skin contact   transparent dressing maintained  Intervention: Prevent and Manage VTE (Venous Thromboembolism) Risk  Description: Assess for VTE (venous thromboembolism) risk.  Encourage and assist with early ambulation.  Initiate and maintain compression or other therapy, as indicated, based on identified risk in accordance with organizational protocol and provider order.  Encourage both active and passive leg exercises while in bed, if unable to ambulate.  Recent Flowsheet Documentation  Taken 2/12/2024 2000 by Radha Capellan RN  Range of Motion: active ROM (range of motion) encouraged  Goal: Optimal Comfort and Wellbeing  Outcome: Ongoing,  Progressing  Intervention: Provide Person-Centered Care  Description: Use a family-focused approach to care.  Develop trust and rapport by proactively providing information, encouraging questions, addressing concerns and offering reassurance.  Acknowledge emotional response to hospitalization.  Recognize and utilize personal coping strategies.  Honor spiritual and cultural preferences.  Recent Flowsheet Documentation  Taken 2/12/2024 2000 by Radha Capellan RN  Trust Relationship/Rapport:   care explained   thoughts/feelings acknowledged   questions answered  Goal: Readiness for Transition of Care  Outcome: Ongoing, Progressing     Problem: Pain Chronic (Persistent) (Comorbidity Management)  Goal: Acceptable Pain Control and Functional Ability  Outcome: Ongoing, Progressing  Intervention: Optimize Psychosocial Wellbeing  Description: Facilitate patient’s self-control over pain by providing pain information and allowing choices in treatment.  Consider and address emotional response to pain.  Explore and promote use of coping strategies; address barriers to successful coping.  Evaluate and assist with psychosocial, cultural and spiritual factors impacting pain.  Modify pain perception by using techniques, such as distraction, mindfulness, guided imagery, meditation or music.  Assess and monitor for signs and symptoms of behavioral health concerns, such as unhealthy substance use, depression and suicidal ideation.  Consider referral for ongoing coping support, such as cognitive behavioral therapy and mindfulness-based stress reduction.  Recent Flowsheet Documentation  Taken 2/12/2024 2000 by Radha Capellan RN  Supportive Measures: active listening utilized  Diversional Activities:   television   coloring/artwork  Family/Support System Care: support provided     Problem: Skin Injury Risk Increased  Goal: Skin Health and Integrity  Outcome: Ongoing, Progressing  Intervention: Optimize Skin Protection  Description:  Perform a full pressure injury risk assessment, as indicated by screening, upon admission to care unit.  Reassess skin (injury risk, full inspection) frequently (e.g., scheduled interval, with change in condition) to provide optimal early detection and prevention.  Maintain adequate tissue perfusion (e.g., encourage fluid balance; avoid crossing legs, constrictive clothing or devices) to promote tissue oxygenation.  Maintain head of bed at lowest degree of elevation tolerated, considering medical condition and other restrictions.  Avoid positioning onto an area that remains reddened.  Minimize incontinence and moisture (e.g., toileting schedule; moisture-wicking pad, diaper or incontinence collection device; skin moisture barrier).  Cleanse skin promptly and gently when soiled utilizing a pH-balanced cleanser.  Relieve and redistribute pressure (e.g., scheduled position changes, weight shifts, use of support surface, medical device repositioning, protective dressing application, use of positioning device, microclimate control, use of pressure-injury-monitor  Encourage increased activity, such as sitting in a chair at the bedside or early mobilization, when able to tolerate.  Recent Flowsheet Documentation  Taken 2/12/2024 2000 by Radha Capellan, RN  Pressure Reduction Techniques:   positioned off wounds   frequent weight shift encouraged   weight shift assistance provided   pressure points protected  Pressure Reduction Devices:   specialty bed utilized   pressure-redistributing mattress utilized   heel offloading device utilized  Skin Protection:   adhesive use limited   incontinence pads utilized   tubing/devices free from skin contact   transparent dressing maintained     Problem: Fall Injury Risk  Goal: Absence of Fall and Fall-Related Injury  Outcome: Ongoing, Progressing     Problem: Adjustment to Illness (Sepsis/Septic Shock)  Goal: Optimal Coping  Outcome: Ongoing, Progressing  Intervention: Optimize  Psychosocial Adjustment to Illness  Description: Acknowledge, normalize, validate intensity and complexity of patient and support system response to situation.  Provide opportunity for expression of thoughts, feelings and concerns; respond with compassion and reassurance.  Decrease stress and anxiety by providing information about patient’s status and treatment.  Facilitate support system presence and participation in care; consider providing a diary in intensive care situation.  Support coping by recognizing current coping strategies; provide aid in developing new strategies.  Acknowledge and normalize difficulty in managing life-long lifestyle changes and expectations.  Assess and monitor for signs and symptoms of psychologic distress, anxiety and depression.  Consider palliative care consult for goals of care conversation, if the condition is worsening despite treatment.  Recent Flowsheet Documentation  Taken 2/12/2024 2000 by Radha aCpellan RN  Supportive Measures: active listening utilized  Family/Support System Care: support provided     Problem: Bleeding (Sepsis/Septic Shock)  Goal: Absence of Bleeding  Outcome: Ongoing, Progressing     Problem: Glycemic Control Impaired (Sepsis/Septic Shock)  Goal: Blood Glucose Level Within Desired Range  Outcome: Ongoing, Progressing     Problem: Infection Progression (Sepsis/Septic Shock)  Goal: Absence of Infection Signs and Symptoms  Outcome: Ongoing, Progressing     Problem: Nutrition Impaired (Sepsis/Septic Shock)  Goal: Optimal Nutrition Intake  Outcome: Ongoing, Progressing   Goal Outcome Evaluation:

## 2024-02-13 NOTE — PLAN OF CARE
Problem: Adult Inpatient Plan of Care  Goal: Plan of Care Review  Outcome: Met  Goal: Patient-Specific Goal (Individualized)  Outcome: Met  Goal: Absence of Hospital-Acquired Illness or Injury  Outcome: Met  Intervention: Identify and Manage Fall Risk  Recent Flowsheet Documentation  Taken 2/13/2024 0824 by Brittani Marcos RN  Safety Promotion/Fall Prevention:   activity supervised   assistive device/personal items within reach   clutter free environment maintained  Intervention: Prevent Skin Injury  Recent Flowsheet Documentation  Taken 2/13/2024 0824 by Brittani Marcos RN  Body Position: position changed independently  Skin Protection: adhesive use limited  Intervention: Prevent and Manage VTE (Venous Thromboembolism) Risk  Recent Flowsheet Documentation  Taken 2/13/2024 0824 by Brittani Marcos RN  Activity Management: activity encouraged  Intervention: Prevent Infection  Recent Flowsheet Documentation  Taken 2/13/2024 0824 by Brittani Marcos RN  Infection Prevention: environmental surveillance performed  Goal: Optimal Comfort and Wellbeing  Outcome: Met  Goal: Readiness for Transition of Care  Outcome: Met   Goal Outcome Evaluation:

## 2024-02-13 NOTE — NURSING NOTE
Called to give report to [facility], was transferred to nurse taking report, phone rang for several minutes. Was asked to provide my IP phone number and nurse would call back for report. Explained that transport was set up for 0945. Have not heard back by 0942.
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
## 2024-02-13 NOTE — CASE MANAGEMENT/SOCIAL WORK
Case Management Discharge Note      Final Note: Plan is [facility]. [facility] is scheduled for 09:45. PCS in dropbox. Nurse to call report to [phone] and fax discharge summary to [phone].         Selected Continued Care - Admitted Since 2/5/2024       Destination Coordination complete.      Service Provider Selected Services Address Phone Fax Patient Preferred    [facility] [address] [address] [phone] --              Durable Medical Equipment    No services have been selected for the patient.                Dialysis/Infusion    No services have been selected for the patient.                Home Medical Care    No services have been selected for the patient.                Therapy    No services have been selected for the patient.                Community Resources    No services have been selected for the patient.                Community & DME    No services have been selected for the patient.                    Selected Continued Care - Prior Encounters Includes continued care and service providers with selected services from prior encounters from 11/7/2023 to 2/13/2024      Discharged on 1/19/2024 Admission date: 12/28/2023 - Discharge disposition: Rehab Facility or Unit (DC - External)      Destination       Service Provider Selected Services Address Phone Fax Patient Preferred    [facility] [address] [address] [phone] --                               Final Discharge Disposition Code: 62 - inpatient rehab facility

## 2024-02-13 NOTE — PAYOR COMM NOTE
"[facility]# [id number]   Discharge Summary    Utilization Review  Phone [phone]  Fax [phone]    [place] [id number]         Keith Carvajal (48 y.o. Female)       Date of Birth   1975    Social Security Number       [place]   [address] [place] [address]    Home Phone   [phone]    MRN   [id number]       Gn[employer]ism   [employer]    Marital Status                               Admission Date   24    Admission Type   Emergency    Admitting Provider   Carlito Olvera MD    Attending Provider       Department, Room/Bed   [facility] 5G, S553/1       Discharge Date   2024    Discharge Disposition   Rehab Facility or Unit (DC - External)    Discharge Destination                                 Attending Provider: (none)   Allergies: Vicodin [Hydrocodone-acetaminophen]    Isolation: Contact   Infection: CR Pseudomonas CRPA (24), MDR Pseudomonas (24)   Code Status: Prior    Ht: 157.5 cm (62\")   Wt: 111 kg (243 lb 12.8 oz)    Admission Cmt: None   Principal Problem: Osteomyelitis [M86.9]                   Active Insurance as of 2024       Primary Coverage       Payor Plan Insurance Group Employer/Plan Group    [id number]       Payor Plan Address Payor Plan Phone Number Payor Plan Fax Number Effective Dates    [address]   8/10/2003 - None Entered    [place] [address]         Subscriber Name Subscriber Birth Date Member ID       KEITH CARVAJAL 1975 [id number]                     Emergency Contacts        (Rel.) Home Phone Work Phone Mobile Phone    ANGELICA CARVAJAL (Spouse) -- [phone] --    carvajalyanira lombardo (Daughter) -- [phone] --                 Discharge Summary        Carlito Olvera MD at 24 0758              [facility] Medicine Services  DISCHARGE SUMMARY    Patient Name: Keith Carvajal  : 1975  MRN: " [id number]    Date of Admission: 2/5/2024 10:29 AM  Date of Discharge:  2/13/2024  Primary Care Physician: Sofya Benjamin MD    Consults       Date and Time Order Name Status Description    2/5/2024  3:00 PM Inpatient Orthopedic Surgery Consult Completed     2/5/2024  3:00 PM Inpatient Infectious Diseases Consult Completed     1/5/2024  6:40 PM Inpatient Urology Consult Completed     1/3/2024  3:17 PM Inpatient Palliative Care MD Consult Completed     12/29/2023  1:21 AM Inpatient Infectious Diseases Consult Completed     12/29/2023  1:21 AM Inpatient Orthopedic Surgery Consult Completed             Hospital Course     Presenting Problem: Osteomyelitis, wound infection    Active Hospital Problems    Diagnosis  POA    **Osteomyelitis [M86.9]  Yes    Amputation stump infection [T87.40]  Yes      Resolved Hospital Problems   No resolved problems to display.          Hospital Course:  Phoebe Carvajal is a 48 y.o. female with PMHx of CKD, Stills disease, restless leg syndrome, hypothyroidism, PAD, morbid obesity. She has had chronic/recurrent osteomyelitis of left foot resulting in L AKA on 12/29/2023. She then had a prolonged admission 12/28-1/19 requiring debridements x2. She was discharged to [facility] with a wound vac on 1/19/24. ID followed throughout and discontinued antibiotics at time of discharge because path report showed margins without infection. She was continued on fluconazole for + yeast at the AKA wound site (C albicans). Re-presented due to concern for left AKA site wound dehiscence.     L AKA site with dehiscence and soft tissue infection  Chronic calcaneal osteomyelitis s/p left AKA  -Edema likely contributing to poor wound healing and serous drainage  -Wound culture 2/5 growing Enterococcus faecalis  -Orthopedic surgery evaluated, s/p I&D with wound VAC closure 2/6/24 by Dr. Witt. Will need long-term wound VAC until fully healed.  -Infectious disease following, continue oral levofloxacin, IV  vancomycin. Follow final wound and blood cultures. Orthopedic surgery planning for wound VAC changes q72 hours, with PT.  -ID planned antibiotics 7-10 days following debridement and should follow up at [facility]>      Growth of candida albicans from left AKA site   -Continue fluconazole, initial plan for 4 weeks of antifungal through 2/9/24. Now planning to continue until at least 2/16/2024.     Hypotension  -on midodrine, needs rehab     Complex pain, chronic opioid use  Peripheral neuropathy  -Continue current pain regimen     Chronic bilateral lower extremity edema  -On Lasix at home for lower extremity edema  -ECHO 5/2023 with EF 61%, no comment regarding diastolic function  -Lasix held for hypotension     L humerus nonunion  -initially occurred 8/2023, reported by patient on admission   -XR left humerus 9/2023 showed fairly comminuted proximal humeral fracture  -Healing fracture on xray, Dr. Witt to refer to partner once recovered         Discharge Follow Up Recommendations for outpatient labs/diagnostics:  As written    Day of Discharge     HPI:   Denies pain. No f/c/sweats. No n/v. No dyspnea.     Review of Systems  As above    Vital Signs:   Temp:  [97.6 °F (36.4 °C)-98.1 °F (36.7 °C)] 98.1 °F (36.7 °C)  Heart Rate:  [60-72] 64  Resp:  [16] 16  BP: (71-98)/(42-62) 98/61      Physical Exam:  NAD, alert and oriented  OP clear, MMM  Neck supple  No LAD  RRR  CTAB  +BS, soft  BRIONES  Normal affect  No rashes  LLE wound vac in place    Pertinent  and/or Most Recent Results     LAB RESULTS:      Lab 02/13/24  0401 02/12/24  0317 02/11/24  0347 02/10/24  0418 02/09/24  1322 02/09/24  0118   WBC 4.39 3.92 3.31* 3.91  --  2.89*   HEMOGLOBIN 7.4* 7.5* 7.7* 7.1*  --  7.3*   HEMATOCRIT 24.4* 23.5* 25.1* 22.8*  --  24.1*   PLATELETS 224 191 192 199  --  191   NEUTROS ABS  --   --   --   --   --  1.32*   IMMATURE GRANS (ABS)  --   --   --   --   --  0.00   LYMPHS ABS  --   --   --   --   --  1.26   MONOS ABS  --   --   --    --   --  0.25   EOS ABS  --   --   --   --   --  0.05   MCV 95.7 97.1* 98.8* 97.4*  --  98.8*   LACTATE  --   --   --   --  1.2  --          Lab 02/13/24  0401 02/12/24  0317 02/11/24  0347 02/10/24  0418 02/09/24  0118   SODIUM 140 140 138 140 140   POTASSIUM 4.1 4.5 4.2 4.0 4.3   CHLORIDE 105 106 105 105 104   CO2 26.0 23.0 27.0 29.0 31.0*   ANION GAP 9.0 11.0 6.0 6.0 5.0   BUN 7 7 7 8 7   CREATININE 0.71 0.63 0.60 0.73 0.55*   EGFR 103.7 108.2 109.5 100.3 111.8   GLUCOSE 105* 76 90 86 104*   CALCIUM 8.2* 8.1* 8.2* 8.5* 8.5*         Lab 02/12/24 0317   TOTAL PROTEIN 5.1*   ALBUMIN 2.7*   GLOBULIN 2.4   ALT (SGPT) 5   AST (SGOT) 18   BILIRUBIN 0.3   ALK PHOS 148*                     Brief Urine Lab Results  (Last result in the past 365 days)        Color   Clarity   Blood   Leuk Est   Nitrite   Protein   CREAT   Urine HCG        01/05/24 1351 Yellow   Cloudy   Negative   Small (1+)   Negative   Trace                 Microbiology Results (last 10 days)       Procedure Component Value - Date/Time    Anaerobic Culture - Swab, Leg, Left [[id number]]  (Normal) Collected: 02/06/24 1847    Lab Status: Final result Specimen: Swab from Leg, Left Updated: 02/11/24 1133     Anaerobic Culture No anaerobes isolated at 5 days    Fungus Culture - Swab, Leg, Left [[id number]] Collected: 02/06/24 1847    Lab Status: Preliminary result Specimen: Swab from Leg, Left Updated: 02/11/24 2031     Fungus Culture No fungus isolated at less than 1 week    Wound Culture - Swab, Leg, Left [[id number]] Collected: 02/06/24 1847    Lab Status: Final result Specimen: Swab from Leg, Left Updated: 02/09/24 0734     Wound Culture No growth at 3 days     Gram Stain Moderate (3+) Red blood cells      Few (2+) WBCs seen      No organisms seen    AFB Culture - Swab, Leg, Left [[id number]] Collected: 02/06/24 1847    Lab Status: Preliminary result Specimen: Swab from Leg, Left Updated: 02/11/24 2031     AFB Culture No AFB isolated at less than 1 week      AFB Stain No acid fast bacilli seen on concentrated smear    MRSA Screen, PCR (Inpatient) - Swab, Nares [[id number]]  (Normal) Collected: 02/06/24 1247    Lab Status: Final result Specimen: Swab from Nares Updated: 02/06/24 2010     MRSA PCR No MRSA Detected    Narrative:      The negative predictive value of this diagnostic test is high and should only be used to consider de-escalating anti-MRSA therapy. A positive result may indicate colonization with MRSA and must be correlated clinically.    Blood Culture - Blood, Arm, Left [[id number]]  (Normal) Collected: 02/05/24 1120    Lab Status: Final result Specimen: Blood from Arm, Left Updated: 02/10/24 1145     Blood Culture No growth at 5 days    Wound Culture - Swab, Leg, Left [[id number]]  (Abnormal)  (Susceptibility) Collected: 02/05/24 1052    Lab Status: Final result Specimen: Swab from Leg, Left Updated: 02/10/24 0826     Wound Culture Light growth (2+) Enterococcus faecalis      Moderate growth (3+) Normal Skin Tracy     Gram Stain Moderate (3+) WBCs seen      Few (2+) Gram positive cocci in pairs and chains    Susceptibility        Enterococcus faecalis      LAVELLE      Ampicillin Susceptible      Vancomycin Susceptible                           Blood Culture - Blood, Arm, Right [[id number]]  (Normal) Collected: 02/05/24 1045    Lab Status: Final result Specimen: Blood from Arm, Right Updated: 02/10/24 1145     Blood Culture No growth at 5 days            XR Humerus Left    Result Date: 2/10/2024  XR HUMERUS LEFT Date of Exam: 2/10/2024 3:55 PM EST Indication: previous humerus fracture in 8/2023, patient still with pain Comparison: Portable chest 12/29/2023 Findings: Smooth deformity of the proximal left humerus on the prior chest radiograph is very similar in appearance to the humerus today on AP view. Lateral view gives the appearance of some anterior subluxation, although this may reflect oblique patient positioning, rather than actual displacement. This  appearance could be explained by capsular laxity as well with change in relationship of the glenoid and humerus with differences in position. There is moderately advanced AC joint DJD. No clearly new bony abnormality is identified here or elsewhere.     Impression: Findings consistent with old healed left humerus fracture, not obviously changed from prior exam. Scapular Y view suggests possible anterior subluxation, versus artifact of positioning. No evidence of dislocation. Consider follow-up imaging if patient's symptoms persist or worsen. Electronically Signed: Carlito Zhou MD  2/10/2024 4:36 PM EST  Workstation ID: [id number]                 Plan for Follow-up of Pending Labs/Results:  Pending Labs       Order Current Status    AFB Culture - Swab, Leg, Left Preliminary result    Fungus Culture - Swab, Leg, Left Preliminary result          Discharge Details        Discharge Medications        New Medications        Instructions Start Date   cyanocobalamin 1000 MCG/ML injection   1,000 mcg, Intramuscular, Every 7 Days   Start Date: February 19, 2024     levoFLOXacin (LEVAQUIN) 750 mg/150 mL D5W (premix) 750 MG/150ML solution IVPB  Commonly known as: LEVAQUIN   Daily until seen by ID      vancomycin in dextrose 5% 150 mL 750-5 MG/150ML-% IVPB  Commonly known as: VANCOCIN   750 mg, Intravenous, Every 12 Hours             Changes to Medications        Instructions Start Date   acetaminophen 325 MG tablet  Commonly known as: TYLENOL  What changed: Another medication with the same name was removed. Continue taking this medication, and follow the directions you see here.   650 mg, Oral, Every 4 Hours PRN      LORazepam 0.5 MG tablet  Commonly known as: ATIVAN  What changed: when to take this   0.5 mg, Oral, Daily PRN      midodrine 5 MG tablet  Commonly known as: PROAMATINE  What changed:   how much to take  when to take this   5 mg, Oral, 2 Times Daily Before Meals             Continue These Medications        Instructions  Start Date   albuterol (2.5 MG/3ML) 0.083% nebulizer solution  Commonly known as: PROVENTIL   2.5 mg, Nebulization, Every 6 Hours PRN      ascorbic acid 1000 MG tablet  Commonly known as: VITAMIN C   1,500 mg, Oral, Daily      calcium carbonate 500 MG chewable tablet  Commonly known as: TUMS   1 tablet, Oral, Every 8 Hours PRN      cloNIDine 0.1 MG tablet  Commonly known as: CATAPRES   0.1 mg, Oral, Every 6 Hours PRN      Enoxaparin Sodium 40 MG/0.4ML solution prefilled syringe syringe  Commonly known as: LOVENOX   40 mg, Subcutaneous, Daily      famotidine 20 MG tablet  Commonly known as: PEPCID   20 mg, Oral, 2 Times Daily Before Meals      fluconazole 200 MG tablet  Commonly known as: DIFLUCAN   200 mg, Oral, Daily      fluticasone 50 MCG/ACT nasal spray  Commonly known as: FLONASE   2 sprays, Nasal, Nightly      folic acid 1 MG tablet  Commonly known as: FOLVITE   1 mg, Oral, Daily      lactobacillus acidophilus capsule capsule   1 capsule, Oral, Daily      levothyroxine 175 MCG tablet  Commonly known as: SYNTHROID, LEVOTHROID   175 mcg, Oral, Daily      magnesium oxide 400 MG tablet  Commonly known as: MAG-OX   400 mg, Oral, Daily      melatonin 3 MG tablet   3 mg, Oral, Nightly PRN      ondansetron 4 MG tablet  Commonly known as: ZOFRAN   4 mg, Oral, Every 6 Hours PRN      oxyCODONE 10 MG 12 hr tablet  Commonly known as: oxyCONTIN   10 mg, Oral, Every 6 Hours PRN      potassium chloride 10 MEQ CR tablet   10 mEq, Oral, Daily      pramipexole 1 MG tablet  Commonly known as: MIRAPEX   1 mg, Oral, Daily      pregabalin 200 MG capsule  Commonly known as: LYRICA   200 mg, Oral, 3 Times Daily      sertraline 100 MG tablet  Commonly known as: ZOLOFT   100 mg, Oral, Nightly      traZODone 50 MG tablet  Commonly known as: DESYREL   50 mg, Oral, Nightly PRN      vitamin D 1.25 MG (25873 UT) capsule capsule  Commonly known as: ERGOCALCIFEROL   50,000 Units, Oral, Weekly      ZINC GLUCONATE PO   220 mg, Oral, Daily              Stop These Medications      ammonium lactate 12 % lotion  Commonly known as: LAC-HYDRIN     Bisacodyl Laxative 10 MG suppository  Generic drug: bisacodyl     diphenhydrAMINE-zinc acetate 2-0.1 % cream     furosemide 40 MG tablet  Commonly known as: LASIX     levoFLOXacin 750 MG tablet  Commonly known as: LEVAQUIN     lidocaine 2 % jelly  Commonly known as: XYLOCAINE     miconazole 2 % powder  Commonly known as: MICOTIN     MiraLax Mix-In Neptune 17 g packet  Generic drug: polyethylene glycol     spironolactone 25 MG tablet  Commonly known as: ALDACTONE              Allergies   Allergen Reactions    Vicodin [Hydrocodone-Acetaminophen] Itching         Discharge Disposition:  Rehab Facility or Unit (DC - External)    Diet:  Hospital:  Diet Order   Procedures    Diet: Cardiac Diets; Healthy Heart (2-3 Na+); Texture: Regular Texture (IDDSI 7); Fluid Consistency: Thin (IDDSI 0)            Activity:      Restrictions or Other Recommendations:         CODE STATUS:    Code Status and Medical Interventions:   Ordered at: 02/05/24 1500     Level Of Support Discussed With:    Patient     Code Status (Patient has no pulse and is not breathing):    CPR (Attempt to Resuscitate)     Medical Interventions (Patient has pulse or is breathing):    Full Support       Future Appointments   Date Time Provider Department Center   2/13/2024  9:45 AM MED 8 [facility]   2/19/2024  3:50 PM Samy Kinsey MD [facility]       Additional Instructions for the Follow-ups that You Need to Schedule       Discharge Follow-up with PCP   As directed       Currently Documented PCP:    Sofya Benjamin MD    PCP Phone Number:    [phone]     Follow Up Details: 2 weeks        Discharge Follow-up with Specified Provider: justino Witt 10 days   As directed      To: justino Witt 10 days                      Carlito Olvera MD  02/13/24      Time Spent on Discharge:  I spent  40  minutes on this discharge activity which included:  face-to-face encounter with the patient, reviewing the data in the system, coordination of the care with the nursing staff as well as consultants, documentation, and entering orders.            Electronically signed by Carlito Siddiqi MD at 02/13/24 0801       Discharge Order (From admission, onward)       Start     Ordered    02/13/24 0756  Discharge patient  Once        Expected Discharge Date: 02/13/24   Discharge Disposition: Rehab Facility or Unit (DC - External)   Physician of Record for Attribution - Please select from Treatment Team: CARLITO SIDDIQI [[id number]]   Review needed by CMO to determine Physician of Record: No      Question Answer Comment   Physician of Record for Attribution - Please select from Treatment Team CARLITO SIDDIQI    Review needed by CMO to determine Physician of Record No        02/13/24 0758

## 2024-02-13 NOTE — PLAN OF CARE
Goal Outcome Evaluation:  Plan of Care Reviewed With: patient           Outcome Evaluation: wound vac removed to allow for transition to rehab.  Pt will benefit from cont use of wound vac at rehab to help further improve healing potential.

## 2024-02-16 LAB
MYCOBACTERIUM SPEC CULT: NORMAL
NIGHT BLUE STAIN TISS: NORMAL

## 2024-02-23 LAB
MYCOBACTERIUM SPEC CULT: NORMAL
NIGHT BLUE STAIN TISS: NORMAL

## 2024-02-27 LAB
MYCOBACTERIUM SPEC CULT: NORMAL
NIGHT BLUE STAIN TISS: NORMAL

## 2024-03-05 LAB
MYCOBACTERIUM SPEC CULT: NORMAL
NIGHT BLUE STAIN TISS: NORMAL

## 2024-03-10 LAB — FUNGUS WND CULT: ABNORMAL

## 2024-03-12 LAB
MYCOBACTERIUM SPEC CULT: NORMAL
NIGHT BLUE STAIN TISS: NORMAL

## 2024-03-19 LAB
MYCOBACTERIUM SPEC CULT: NORMAL
NIGHT BLUE STAIN TISS: NORMAL

## 2024-05-30 ENCOUNTER — LAB (OUTPATIENT)
Dept: LAB | Facility: HOSPITAL | Age: 49
End: 2024-05-30
Payer: COMMERCIAL

## 2024-05-30 ENCOUNTER — HOSPITAL ENCOUNTER (OUTPATIENT)
Dept: CARDIOLOGY | Facility: HOSPITAL | Age: 49
Discharge: HOME | End: 2024-05-30
Payer: COMMERCIAL

## 2024-05-30 ENCOUNTER — TRANSCRIBE ORDERS (OUTPATIENT)
Dept: LAB | Facility: HOSPITAL | Age: 49
End: 2024-05-30
Payer: COMMERCIAL

## 2024-05-30 VITALS — HEIGHT: 62 IN | BODY MASS INDEX: 44.72 KG/M2 | WEIGHT: 243 LBS

## 2024-05-30 DIAGNOSIS — L03.116 CELLULITIS OF LEFT FOOT: ICD-10-CM

## 2024-05-30 DIAGNOSIS — T87.81 DEHISCENCE OF AMPUTATION STUMP: ICD-10-CM

## 2024-05-30 DIAGNOSIS — T87.44 INFECTION OF LEFT BELOW KNEE AMPUTATION: Primary | ICD-10-CM

## 2024-05-30 DIAGNOSIS — M79.604 RIGHT LEG PAIN: ICD-10-CM

## 2024-05-30 LAB
ALBUMIN SERPL-MCNC: 3.5 G/DL (ref 3.5–5.2)
ALBUMIN/GLOB SERPL: 1 G/DL
ALP SERPL-CCNC: 155 U/L (ref 39–117)
ALT SERPL W P-5'-P-CCNC: 10 U/L (ref 1–33)
ANION GAP SERPL CALCULATED.3IONS-SCNC: 5 MMOL/L (ref 5–15)
AST SERPL-CCNC: 18 U/L (ref 1–32)
BASOPHILS # BLD AUTO: 0.01 10*3/MM3 (ref 0–0.2)
BASOPHILS NFR BLD AUTO: 0.3 % (ref 0–1.5)
BH CV LOWER VASCULAR LEFT COMMON FEMORAL PHASIC: NORMAL
BH CV LOWER VASCULAR LEFT COMMON FEMORAL SPONT: NORMAL
BH CV LOWER VASCULAR RIGHT COMMON FEMORAL AUGMENT: NORMAL
BH CV LOWER VASCULAR RIGHT COMMON FEMORAL COMPRESS: NORMAL
BH CV LOWER VASCULAR RIGHT COMMON FEMORAL PHASIC: NORMAL
BH CV LOWER VASCULAR RIGHT COMMON FEMORAL SPONT: NORMAL
BH CV LOWER VASCULAR RIGHT DISTAL FEMORAL AUGMENT: NORMAL
BH CV LOWER VASCULAR RIGHT DISTAL FEMORAL COMPRESS: NORMAL
BH CV LOWER VASCULAR RIGHT DISTAL FEMORAL PHASIC: NORMAL
BH CV LOWER VASCULAR RIGHT DISTAL FEMORAL SPONT: NORMAL
BH CV LOWER VASCULAR RIGHT GASTRONEMIUS COMPRESS: NORMAL
BH CV LOWER VASCULAR RIGHT GREATER SAPH AK COMPRESS: NORMAL
BH CV LOWER VASCULAR RIGHT GREATER SAPH BK COMPRESS: NORMAL
BH CV LOWER VASCULAR RIGHT LESSER SAPH COMPRESS: NORMAL
BH CV LOWER VASCULAR RIGHT MID FEMORAL AUGMENT: NORMAL
BH CV LOWER VASCULAR RIGHT MID FEMORAL COMPRESS: NORMAL
BH CV LOWER VASCULAR RIGHT MID FEMORAL PHASIC: NORMAL
BH CV LOWER VASCULAR RIGHT MID FEMORAL SPONT: NORMAL
BH CV LOWER VASCULAR RIGHT POPLITEAL AUGMENT: NORMAL
BH CV LOWER VASCULAR RIGHT POPLITEAL COMPRESS: NORMAL
BH CV LOWER VASCULAR RIGHT POPLITEAL PHASIC: NORMAL
BH CV LOWER VASCULAR RIGHT POPLITEAL SPONT: NORMAL
BH CV LOWER VASCULAR RIGHT POSTERIOR TIBIAL COMPRESS: NORMAL
BH CV LOWER VASCULAR RIGHT PROFUNDA FEMORAL PHASIC: NORMAL
BH CV LOWER VASCULAR RIGHT PROFUNDA FEMORAL SPONT: NORMAL
BH CV LOWER VASCULAR RIGHT PROXIMAL FEMORAL AUGMENT: NORMAL
BH CV LOWER VASCULAR RIGHT PROXIMAL FEMORAL COMPRESS: NORMAL
BH CV LOWER VASCULAR RIGHT PROXIMAL FEMORAL PHASIC: NORMAL
BH CV LOWER VASCULAR RIGHT PROXIMAL FEMORAL SPONT: NORMAL
BH CV LOWER VASCULAR RIGHT SAPHENOFEMORAL JUNCTION AUGMENT: NORMAL
BH CV LOWER VASCULAR RIGHT SAPHENOFEMORAL JUNCTION COMPRESS: NORMAL
BH CV LOWER VASCULAR RIGHT SAPHENOFEMORAL JUNCTION PHASIC: NORMAL
BH CV LOWER VASCULAR RIGHT SAPHENOFEMORAL JUNCTION SPONT: NORMAL
BILIRUB SERPL-MCNC: 0.4 MG/DL (ref 0–1.2)
BUN SERPL-MCNC: 12 MG/DL (ref 6–20)
BUN/CREAT SERPL: 17.6 (ref 7–25)
CALCIUM SPEC-SCNC: 9 MG/DL (ref 8.6–10.5)
CHLORIDE SERPL-SCNC: 105 MMOL/L (ref 98–107)
CK SERPL-CCNC: 32 U/L (ref 20–180)
CO2 SERPL-SCNC: 32 MMOL/L (ref 22–29)
CREAT SERPL-MCNC: 0.68 MG/DL (ref 0.57–1)
CRP SERPL-MCNC: 1.06 MG/DL (ref 0–0.5)
DEPRECATED RDW RBC AUTO: 50.5 FL (ref 37–54)
EGFRCR SERPLBLD CKD-EPI 2021: 106.3 ML/MIN/1.73
EOSINOPHIL # BLD AUTO: 0.14 10*3/MM3 (ref 0–0.4)
EOSINOPHIL NFR BLD AUTO: 3.8 % (ref 0.3–6.2)
ERYTHROCYTE [DISTWIDTH] IN BLOOD BY AUTOMATED COUNT: 16 % (ref 12.3–15.4)
ERYTHROCYTE [SEDIMENTATION RATE] IN BLOOD: 22 MM/HR (ref 0–30)
GLOBULIN UR ELPH-MCNC: 3.4 GM/DL
GLUCOSE SERPL-MCNC: 105 MG/DL (ref 65–99)
HCT VFR BLD AUTO: 31.9 % (ref 34–46.6)
HGB BLD-MCNC: 9.2 G/DL (ref 12–15.9)
IMM GRANULOCYTES # BLD AUTO: 0.01 10*3/MM3 (ref 0–0.05)
IMM GRANULOCYTES NFR BLD AUTO: 0.3 % (ref 0–0.5)
LYMPHOCYTES # BLD AUTO: 1.02 10*3/MM3 (ref 0.7–3.1)
LYMPHOCYTES NFR BLD AUTO: 27.6 % (ref 19.6–45.3)
MCH RBC QN AUTO: 25.1 PG (ref 26.6–33)
MCHC RBC AUTO-ENTMCNC: 28.8 G/DL (ref 31.5–35.7)
MCV RBC AUTO: 86.9 FL (ref 79–97)
MONOCYTES # BLD AUTO: 0.33 10*3/MM3 (ref 0.1–0.9)
MONOCYTES NFR BLD AUTO: 8.9 % (ref 5–12)
NEUTROPHILS NFR BLD AUTO: 2.19 10*3/MM3 (ref 1.7–7)
NEUTROPHILS NFR BLD AUTO: 59.1 % (ref 42.7–76)
NRBC BLD AUTO-RTO: 0 /100 WBC (ref 0–0.2)
PLATELET # BLD AUTO: 167 10*3/MM3 (ref 140–450)
PMV BLD AUTO: 10 FL (ref 6–12)
POTASSIUM SERPL-SCNC: 4.8 MMOL/L (ref 3.5–5.2)
PROT SERPL-MCNC: 6.9 G/DL (ref 6–8.5)
RBC # BLD AUTO: 3.67 10*6/MM3 (ref 3.77–5.28)
SODIUM SERPL-SCNC: 142 MMOL/L (ref 136–145)
WBC NRBC COR # BLD AUTO: 3.7 10*3/MM3 (ref 3.4–10.8)

## 2024-05-30 PROCEDURE — 82550 ASSAY OF CK (CPK): CPT | Performed by: INTERNAL MEDICINE

## 2024-05-30 PROCEDURE — 93971 EXTREMITY STUDY: CPT

## 2024-05-30 PROCEDURE — 80053 COMPREHEN METABOLIC PANEL: CPT | Performed by: INTERNAL MEDICINE

## 2024-05-30 PROCEDURE — 85652 RBC SED RATE AUTOMATED: CPT | Performed by: INTERNAL MEDICINE

## 2024-05-30 PROCEDURE — 86140 C-REACTIVE PROTEIN: CPT | Performed by: INTERNAL MEDICINE

## 2024-05-30 PROCEDURE — 85025 COMPLETE CBC W/AUTO DIFF WBC: CPT | Performed by: INTERNAL MEDICINE

## 2024-05-30 PROCEDURE — 36415 COLL VENOUS BLD VENIPUNCTURE: CPT | Performed by: INTERNAL MEDICINE

## 2025-04-21 ENCOUNTER — OFFICE VISIT (OUTPATIENT)
Age: 50
End: 2025-04-21
Payer: COMMERCIAL

## 2025-04-21 VITALS — SYSTOLIC BLOOD PRESSURE: 116 MMHG | DIASTOLIC BLOOD PRESSURE: 70 MMHG

## 2025-04-21 DIAGNOSIS — S60.221A CONTUSION OF RIGHT HAND, INITIAL ENCOUNTER: Primary | ICD-10-CM

## 2025-04-21 PROCEDURE — 99204 OFFICE O/P NEW MOD 45 MIN: CPT | Performed by: PLASTIC SURGERY

## 2025-04-21 RX ORDER — LORAZEPAM 1 MG/1
1 TABLET ORAL NIGHTLY PRN
COMMUNITY
Start: 2025-04-18

## 2025-04-21 RX ORDER — METHYLPREDNISOLONE 4 MG/1
1 TABLET ORAL DAILY
Qty: 21 TABLET | Refills: 0 | Status: SHIPPED | OUTPATIENT
Start: 2025-04-21

## 2025-04-21 NOTE — PROGRESS NOTES
[facility] Orthopedic     Office Visit       Date: 04/21/2025   Patient Name: Phoebe Carvajal  MRN: [id number]  YOB: 1975    Referring Physician: Liliam Isaac APRN     Chief Complaint:   Chief Complaint   Patient presents with    Right Hand - Pain       History of Present Illness:   Phoebe Carvajal is a 49 y.o. female right-hand-dominant presents with right hand pain of 5 days duration.  Patient fell and landed onto her right hand on 4/16/2025.  Reports pain at the MCP joints her index middle ring and small fingers as well as pain rating distally into her small finger.  Has tried bracing and anti-inflammatories with only mild improvement in her pain.  Reports pain is an 8 out of 10 at its worst.  She has a history of a left above-the-knee amputation.  Otherwise no relevant medical history.  She denies smoking.      Subjective   Review of Systems:   Review of Systems   Musculoskeletal:  Positive for arthralgias.   All other systems reviewed and are negative.       Pertinent review of systems per HPI.     I reviewed the patient's chief complaint, history of present illness, review of systems, past medical history, surgical history, family history, social history, medications and allergy list in the EMR on 04/21/2025 and agree with the findings above.    Objective    Vital Signs:   Vitals:    04/21/25 1532   BP: 116/70   Weight: Comment: Patient unable to stand     BMI: There is no height or weight on file to calculate BMI.    General Appearance: No acute distress. Alert and oriented.     Chest:  Non-labored breathing on room air. Regular rate and rhythm.    Upper Extremity Exam:    Edema ecchymosis of the right dorsal hand.  Tender to palpation of the index middle and ring finger MCPs.  No obvious deformity.  Nontender over the small finger DIP joint.  There is chronic flexion deformity of the small finger at the  PIP.    Fingers are warm, well-perfused with appropriate capillary refill.  Palpable radial pulse.    Sensation intact to light touch in median, radial and ulnar nerve distributions.    Motor- Fires FPL, ulnar intrinsics, EPL/EDC w/ full active and passive range of motion. Strength intact.    Non-tender except for in the areas highlighted    Imaging/Studies:   Imaging Results (Last 24 Hours)       ** No results found for the last 24 hours. **            X-ray of the right hand from 4/17/2025 demonstrates evidence of a chronic bony mallet of the right small finger.  Otherwise no acute bony abnormality    Procedures:  Procedures    Quality Measures:   ACP:   ACP discussion was deferred.    Tobacco:   Phoebe Carvajal  reports that she has never smoked. She has never been exposed to tobacco smoke. She has never used smokeless tobacco.      Assessment / Plan    Assessment/Plan:     There are no diagnoses linked to this encounter.     Phoebe Carvajalis a 49 y.o. female who presents with:      ICD-10-CM ICD-9-CM   1. Contusion of right hand, initial encounter  S60.221A 923.20         Presents to right hand bony contusion.  Discussed her diagnosis as well as treatment options.  Recommend writing Ace wrap for compression, ice and elevation.  Recommend Medrol Dosepak for pain and swelling.  Recommend physical therapy for active and passive range of motion of the right hand.  Recommend patient follow-up as needed if she has persistent pain in 4 to 6 weeks.    Follow Up:   Return if symptoms worsen or fail to improve.        José Miguel Mittal MD  [facility] Upper Extremity Surgeon

## (undated) DEVICE — BNDG ELAS W/CLIP 6IN 10YD LF STRL

## (undated) DEVICE — SUT PDS 1 CTX 36IN VIO PDP371T

## (undated) DEVICE — PK EXTREM LOWR 10

## (undated) DEVICE — STPLR SKIN PROXIMATE RH WD

## (undated) DEVICE — SPNG GZ WOVN 4X4IN 12PLY 10/BX STRL

## (undated) DEVICE — PROXIMATE RH ROTATING HEAD SKIN STAPLERS (35 WIDE) CONTAINS 35 STAINLESS STEEL STAPLES: Brand: PROXIMATE

## (undated) DEVICE — JACKSON-PRATT 100CC BULB RESERVOIR: Brand: CARDINAL HEALTH

## (undated) DEVICE — DRSNG PAD ABD 8X10IN STRL

## (undated) DEVICE — BNDG ELAS CO-FLEX SLF ADHR 6IN 5YD LF STRL

## (undated) DEVICE — BNDG ELAS ELITE V/CLOSE 4IN 5YD LF STRL

## (undated) DEVICE — GLV SURG SENSICARE PI MIC PF SZ9 LF STRL

## (undated) DEVICE — SUT ETHLN 3/0 FS1 30IN 669H

## (undated) DEVICE — X-LARGE COTTON GLOVE: Brand: DEROYAL

## (undated) DEVICE — STRYKER PERFORMANCE SERIES SAGITTAL BLADE: Brand: STRYKER PERFORMANCE SERIES

## (undated) DEVICE — DRN WND EVAC BULB 100CC

## (undated) DEVICE — BLANKT WARM UPPR/BDY ARM/OUT 57X196CM

## (undated) DEVICE — SYS IRRISEPT JET LAVG CHG .05PCT

## (undated) DEVICE — JP PERF DRN SIL FLT 10MM FULL: Brand: CARDINAL HEALTH

## (undated) DEVICE — CONTAINER,SPECIMEN,OR STERILE,4OZ: Brand: MEDLINE

## (undated) DEVICE — DRSNG GZ PETROLTM XEROFORM CURAD 1X8IN STRL

## (undated) DEVICE — SUT SILK 0 TIES 30IN A306H

## (undated) DEVICE — GAUZE,SPONGE,4"X4",16PLY,STRL,LF,10/TRAY: Brand: MEDLINE

## (undated) DEVICE — SUT SILK 0 CT1 18IN 424H

## (undated) DEVICE — PAD ARMBRD SURG CONVOL 7.5X20X2IN

## (undated) DEVICE — SUT ETHLN 2/0 PS 18IN 585H

## (undated) DEVICE — HANDPC IRR SURGILAV TIP/HIFLO SX/TBG

## (undated) DEVICE — CONTN URINE STRL SURG

## (undated) DEVICE — KT CANSTR VAC WND W/ISOLYSER SENSATRAC 500CC 5CS

## (undated) DEVICE — CULT AERO SGL CA/50

## (undated) DEVICE — DRSNG WND VAC GRANUFOAM SENSATRAC LG

## (undated) DEVICE — TRAP FLD MINIVAC MEGADYNE 100ML

## (undated) DEVICE — VAC WHITEFOAM DRESSING LARGE: Brand: V.A.C. WHITEFOAM™

## (undated) DEVICE — DRSNG GZ CURAD XEROFORM NONADHR OVERWRAP 5X9IN

## (undated) DEVICE — SUT PDS MONO 0 36 CT1 VIL PDP346H

## (undated) DEVICE — 450 ML BOTTLE OF 0.05% CHLORHEXIDINE GLUCONATE IN 99.95% STERILE WATER FOR IRRIGATION, USP AND APPLICATOR.: Brand: IRRISEPT ANTIMICROBIAL WOUND LAVAGE

## (undated) DEVICE — BNDG ELAS CO-FLEX SLF ADHR 4IN5YD LF STRL

## (undated) DEVICE — DRSNG WND VAC GRANUFOAM SENSATRAC MD 5PK

## (undated) DEVICE — GOWN SURG ORBIS LVL3 2XL STRL

## (undated) DEVICE — DRESSING,GAUZE,XEROFORM,CURAD,1"X8",ST: Brand: CURAD

## (undated) DEVICE — ADHS LIQ MASTISOL 2/3ML

## (undated) DEVICE — SUT PDS 0 CTX 36IN VIO PDP370T

## (undated) DEVICE — CULT ANAERB

## (undated) DEVICE — SUT PDS 2 0 CT1 27IN CLR Z259H

## (undated) DEVICE — PAD GRND E/S MEGADYNE MONOPLR 2/PLT W/CORD A/ DISP

## (undated) DEVICE — BANDAGE,GAUZE,BULKEE II,4.5"X4.1YD,STRL: Brand: MEDLINE

## (undated) DEVICE — BNDG RL GZ BULKEE2 4.5IN 4.1YD STRL

## (undated) DEVICE — DRSNG GZ PETROLTM XEROFORM CURAD 4X4IN STRL

## (undated) DEVICE — GOWN,SIRUS,POLYRNF,XLN/3XL,18/CS: Brand: MEDLINE

## (undated) DEVICE — SUT ETHLN 3/0 PC5 18IN 1893G

## (undated) DEVICE — TBG PENCL TELESCP MEGADYNE SMOKE EVAC 10FT

## (undated) DEVICE — NEEDLE,HYPODERM,SAFETY, 22GX1.5: Brand: MEDLINE

## (undated) DEVICE — PAD,ARMBOARD,CONV,FOAM,2X8X20",12PR/CS: Brand: MEDLINE

## (undated) DEVICE — DRSNG WND VAC GRANUFOAM SENSATRAC MD 10PK

## (undated) DEVICE — PATIENT RETURN ELECTRODE, SINGLE-USE, CONTACT QUALITY MONITORING, ADULT, WITH 9FT CORD, FOR PATIENTS WEIGING OVER 33LBS. (15KG): Brand: MEGADYNE

## (undated) DEVICE — SUT PDS 2/0 CT2 27IN VIL PDP333H

## (undated) DEVICE — UNDERCAST PADDING: Brand: DEROYAL

## (undated) DEVICE — SYR LUERLOK 20CC BX/50

## (undated) DEVICE — GLV COTN XL STRL

## (undated) DEVICE — BLAKE SILICONE DRAIN, 15 FR ROUND, HUBLESS WITH 3/16" TROCAR: Brand: BLAKE